# Patient Record
Sex: FEMALE | Race: WHITE | NOT HISPANIC OR LATINO | URBAN - METROPOLITAN AREA
[De-identification: names, ages, dates, MRNs, and addresses within clinical notes are randomized per-mention and may not be internally consistent; named-entity substitution may affect disease eponyms.]

---

## 2017-04-21 PROBLEM — Z00.00 ENCOUNTER FOR PREVENTIVE HEALTH EXAMINATION: Status: ACTIVE | Noted: 2017-04-21

## 2017-06-07 ENCOUNTER — OUTPATIENT (OUTPATIENT)
Dept: OUTPATIENT SERVICES | Facility: HOSPITAL | Age: 82
LOS: 1 days | Discharge: HOME | End: 2017-06-07

## 2017-06-07 DIAGNOSIS — R00.2 PALPITATIONS: ICD-10-CM

## 2017-06-07 DIAGNOSIS — I47.2 VENTRICULAR TACHYCARDIA: ICD-10-CM

## 2017-06-28 DIAGNOSIS — I48.91 UNSPECIFIED ATRIAL FIBRILLATION: ICD-10-CM

## 2017-06-28 DIAGNOSIS — Z79.01 LONG TERM (CURRENT) USE OF ANTICOAGULANTS: ICD-10-CM

## 2017-09-20 ENCOUNTER — OUTPATIENT (OUTPATIENT)
Dept: OUTPATIENT SERVICES | Facility: HOSPITAL | Age: 82
LOS: 1 days | Discharge: HOME | End: 2017-09-20

## 2017-09-20 DIAGNOSIS — R00.2 PALPITATIONS: ICD-10-CM

## 2017-09-20 DIAGNOSIS — I48.91 UNSPECIFIED ATRIAL FIBRILLATION: ICD-10-CM

## 2017-09-20 DIAGNOSIS — Z79.01 LONG TERM (CURRENT) USE OF ANTICOAGULANTS: ICD-10-CM

## 2017-09-20 DIAGNOSIS — I47.2 VENTRICULAR TACHYCARDIA: ICD-10-CM

## 2017-09-27 ENCOUNTER — OUTPATIENT (OUTPATIENT)
Dept: OUTPATIENT SERVICES | Facility: HOSPITAL | Age: 82
LOS: 1 days | Discharge: HOME | End: 2017-09-27

## 2017-09-27 DIAGNOSIS — Z79.01 LONG TERM (CURRENT) USE OF ANTICOAGULANTS: ICD-10-CM

## 2017-09-27 DIAGNOSIS — R00.2 PALPITATIONS: ICD-10-CM

## 2017-09-27 DIAGNOSIS — I47.2 VENTRICULAR TACHYCARDIA: ICD-10-CM

## 2017-09-27 DIAGNOSIS — I48.91 UNSPECIFIED ATRIAL FIBRILLATION: ICD-10-CM

## 2017-10-04 ENCOUNTER — OUTPATIENT (OUTPATIENT)
Dept: OUTPATIENT SERVICES | Facility: HOSPITAL | Age: 82
LOS: 1 days | Discharge: HOME | End: 2017-10-04

## 2017-10-04 DIAGNOSIS — I48.91 UNSPECIFIED ATRIAL FIBRILLATION: ICD-10-CM

## 2017-10-04 DIAGNOSIS — R00.2 PALPITATIONS: ICD-10-CM

## 2017-10-04 DIAGNOSIS — Z79.01 LONG TERM (CURRENT) USE OF ANTICOAGULANTS: ICD-10-CM

## 2017-10-04 DIAGNOSIS — I47.2 VENTRICULAR TACHYCARDIA: ICD-10-CM

## 2017-10-11 ENCOUNTER — OUTPATIENT (OUTPATIENT)
Dept: OUTPATIENT SERVICES | Facility: HOSPITAL | Age: 82
LOS: 1 days | Discharge: HOME | End: 2017-10-11

## 2017-10-11 DIAGNOSIS — R73.03 PREDIABETES: ICD-10-CM

## 2017-10-11 DIAGNOSIS — I10 ESSENTIAL (PRIMARY) HYPERTENSION: ICD-10-CM

## 2017-10-11 DIAGNOSIS — Z00.00 ENCOUNTER FOR GENERAL ADULT MEDICAL EXAMINATION WITHOUT ABNORMAL FINDINGS: ICD-10-CM

## 2017-10-11 DIAGNOSIS — I47.2 VENTRICULAR TACHYCARDIA: ICD-10-CM

## 2017-10-11 DIAGNOSIS — R00.2 PALPITATIONS: ICD-10-CM

## 2017-10-11 DIAGNOSIS — I27.20 PULMONARY HYPERTENSION, UNSPECIFIED: ICD-10-CM

## 2017-10-11 DIAGNOSIS — E55.9 VITAMIN D DEFICIENCY, UNSPECIFIED: ICD-10-CM

## 2017-10-11 DIAGNOSIS — D64.9 ANEMIA, UNSPECIFIED: ICD-10-CM

## 2017-10-18 ENCOUNTER — OUTPATIENT (OUTPATIENT)
Dept: OUTPATIENT SERVICES | Facility: HOSPITAL | Age: 82
LOS: 1 days | Discharge: HOME | End: 2017-10-18

## 2017-10-18 DIAGNOSIS — R00.2 PALPITATIONS: ICD-10-CM

## 2017-10-18 DIAGNOSIS — I48.91 UNSPECIFIED ATRIAL FIBRILLATION: ICD-10-CM

## 2017-10-18 DIAGNOSIS — Z79.01 LONG TERM (CURRENT) USE OF ANTICOAGULANTS: ICD-10-CM

## 2017-10-18 DIAGNOSIS — I47.2 VENTRICULAR TACHYCARDIA: ICD-10-CM

## 2017-10-19 ENCOUNTER — OUTPATIENT (OUTPATIENT)
Dept: OUTPATIENT SERVICES | Facility: HOSPITAL | Age: 82
LOS: 1 days | Discharge: HOME | End: 2017-10-19

## 2017-10-19 DIAGNOSIS — I47.2 VENTRICULAR TACHYCARDIA: ICD-10-CM

## 2017-10-19 DIAGNOSIS — R00.2 PALPITATIONS: ICD-10-CM

## 2017-10-21 ENCOUNTER — OUTPATIENT (OUTPATIENT)
Dept: OUTPATIENT SERVICES | Facility: HOSPITAL | Age: 82
LOS: 1 days | Discharge: HOME | End: 2017-10-21

## 2017-10-21 DIAGNOSIS — R00.2 PALPITATIONS: ICD-10-CM

## 2017-10-21 DIAGNOSIS — I47.2 VENTRICULAR TACHYCARDIA: ICD-10-CM

## 2017-10-23 DIAGNOSIS — H52.03 HYPERMETROPIA, BILATERAL: ICD-10-CM

## 2017-10-23 DIAGNOSIS — H26.9 UNSPECIFIED CATARACT: ICD-10-CM

## 2017-10-23 DIAGNOSIS — H35.373 PUCKERING OF MACULA, BILATERAL: ICD-10-CM

## 2017-10-23 DIAGNOSIS — H35.362 DRUSEN (DEGENERATIVE) OF MACULA, LEFT EYE: ICD-10-CM

## 2017-11-15 ENCOUNTER — OUTPATIENT (OUTPATIENT)
Dept: OUTPATIENT SERVICES | Facility: HOSPITAL | Age: 82
LOS: 1 days | Discharge: HOME | End: 2017-11-15

## 2017-11-15 DIAGNOSIS — R00.2 PALPITATIONS: ICD-10-CM

## 2017-11-15 DIAGNOSIS — Z79.01 LONG TERM (CURRENT) USE OF ANTICOAGULANTS: ICD-10-CM

## 2017-11-15 DIAGNOSIS — I48.91 UNSPECIFIED ATRIAL FIBRILLATION: ICD-10-CM

## 2017-11-15 DIAGNOSIS — I47.2 VENTRICULAR TACHYCARDIA: ICD-10-CM

## 2017-12-18 ENCOUNTER — EMERGENCY (EMERGENCY)
Facility: HOSPITAL | Age: 82
LOS: 0 days | Discharge: HOME | End: 2017-12-18

## 2017-12-18 DIAGNOSIS — I10 ESSENTIAL (PRIMARY) HYPERTENSION: ICD-10-CM

## 2017-12-18 DIAGNOSIS — I47.2 VENTRICULAR TACHYCARDIA: ICD-10-CM

## 2017-12-18 DIAGNOSIS — R00.2 PALPITATIONS: ICD-10-CM
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
## 2017-12-18 DIAGNOSIS — Z79.82 LONG TERM (CURRENT) USE OF ASPIRIN: ICD-10-CM
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
## 2017-12-18 DIAGNOSIS — M25.561 PAIN IN RIGHT KNEE: ICD-10-CM

## 2017-12-18 DIAGNOSIS — E78.00 PURE HYPERCHOLESTEROLEMIA, UNSPECIFIED: ICD-10-CM

## 2017-12-18 DIAGNOSIS — Z79.01 LONG TERM (CURRENT) USE OF ANTICOAGULANTS: ICD-10-CM

## 2017-12-18 DIAGNOSIS — Z95.5 PRESENCE OF CORONARY ANGIOPLASTY IMPLANT AND GRAFT: ICD-10-CM

## 2017-12-18 DIAGNOSIS — S00.83XA CONTUSION OF OTHER PART OF HEAD, INITIAL ENCOUNTER: ICD-10-CM
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
## 2017-12-18 DIAGNOSIS — R51 HEADACHE: ICD-10-CM

## 2017-12-20 ENCOUNTER — OUTPATIENT (OUTPATIENT)
Dept: OUTPATIENT SERVICES | Facility: HOSPITAL | Age: 82
LOS: 1 days | Discharge: HOME | End: 2017-12-20

## 2017-12-20 DIAGNOSIS — Z79.01 LONG TERM (CURRENT) USE OF ANTICOAGULANTS: ICD-10-CM

## 2017-12-20 DIAGNOSIS — I47.2 VENTRICULAR TACHYCARDIA: ICD-10-CM

## 2017-12-20 DIAGNOSIS — I48.91 UNSPECIFIED ATRIAL FIBRILLATION: ICD-10-CM

## 2017-12-20 DIAGNOSIS — R00.2 PALPITATIONS: ICD-10-CM

## 2017-12-27 ENCOUNTER — OUTPATIENT (OUTPATIENT)
Dept: OUTPATIENT SERVICES | Facility: HOSPITAL | Age: 82
LOS: 1 days | Discharge: HOME | End: 2017-12-27

## 2017-12-27 DIAGNOSIS — R00.2 PALPITATIONS: ICD-10-CM

## 2017-12-27 DIAGNOSIS — Z79.01 LONG TERM (CURRENT) USE OF ANTICOAGULANTS: ICD-10-CM

## 2017-12-27 DIAGNOSIS — I48.91 UNSPECIFIED ATRIAL FIBRILLATION: ICD-10-CM

## 2017-12-27 DIAGNOSIS — I47.2 VENTRICULAR TACHYCARDIA: ICD-10-CM

## 2018-01-10 ENCOUNTER — OUTPATIENT (OUTPATIENT)
Dept: OUTPATIENT SERVICES | Facility: HOSPITAL | Age: 83
LOS: 1 days | Discharge: HOME | End: 2018-01-10

## 2018-01-10 DIAGNOSIS — Z79.01 LONG TERM (CURRENT) USE OF ANTICOAGULANTS: ICD-10-CM

## 2018-01-10 DIAGNOSIS — R00.2 PALPITATIONS: ICD-10-CM

## 2018-01-10 DIAGNOSIS — I48.91 UNSPECIFIED ATRIAL FIBRILLATION: ICD-10-CM

## 2018-01-10 DIAGNOSIS — I47.2 VENTRICULAR TACHYCARDIA: ICD-10-CM

## 2018-01-31 ENCOUNTER — OUTPATIENT (OUTPATIENT)
Dept: OUTPATIENT SERVICES | Facility: HOSPITAL | Age: 83
LOS: 1 days | Discharge: HOME | End: 2018-01-31

## 2018-01-31 DIAGNOSIS — I48.91 UNSPECIFIED ATRIAL FIBRILLATION: ICD-10-CM

## 2018-01-31 DIAGNOSIS — Z79.01 LONG TERM (CURRENT) USE OF ANTICOAGULANTS: ICD-10-CM

## 2018-02-04 DIAGNOSIS — I47.2 VENTRICULAR TACHYCARDIA: ICD-10-CM

## 2018-02-04 DIAGNOSIS — R00.2 PALPITATIONS: ICD-10-CM

## 2018-02-14 ENCOUNTER — OUTPATIENT (OUTPATIENT)
Dept: OUTPATIENT SERVICES | Facility: HOSPITAL | Age: 83
LOS: 1 days | Discharge: HOME | End: 2018-02-14

## 2018-02-14 DIAGNOSIS — I48.91 UNSPECIFIED ATRIAL FIBRILLATION: ICD-10-CM

## 2018-02-14 DIAGNOSIS — Z79.01 LONG TERM (CURRENT) USE OF ANTICOAGULANTS: ICD-10-CM

## 2018-03-28 ENCOUNTER — OUTPATIENT (OUTPATIENT)
Dept: OUTPATIENT SERVICES | Facility: HOSPITAL | Age: 83
LOS: 1 days | Discharge: HOME | End: 2018-03-28

## 2018-03-28 DIAGNOSIS — I48.91 UNSPECIFIED ATRIAL FIBRILLATION: ICD-10-CM

## 2018-03-28 DIAGNOSIS — Z79.01 LONG TERM (CURRENT) USE OF ANTICOAGULANTS: ICD-10-CM

## 2018-04-05 ENCOUNTER — OUTPATIENT (OUTPATIENT)
Dept: OUTPATIENT SERVICES | Facility: HOSPITAL | Age: 83
LOS: 1 days | Discharge: HOME | End: 2018-04-05

## 2018-04-06 DIAGNOSIS — H52.4 PRESBYOPIA: ICD-10-CM

## 2018-04-06 DIAGNOSIS — H52.03 HYPERMETROPIA, BILATERAL: ICD-10-CM

## 2018-04-06 DIAGNOSIS — H35.3190 NONEXUDATIVE AGE-RELATED MACULAR DEGENERATION, UNSPECIFIED EYE, STAGE UNSPECIFIED: ICD-10-CM

## 2018-04-06 DIAGNOSIS — H35.373 PUCKERING OF MACULA, BILATERAL: ICD-10-CM

## 2018-04-06 DIAGNOSIS — H35.362 DRUSEN (DEGENERATIVE) OF MACULA, LEFT EYE: ICD-10-CM

## 2018-04-11 ENCOUNTER — OUTPATIENT (OUTPATIENT)
Dept: OUTPATIENT SERVICES | Facility: HOSPITAL | Age: 83
LOS: 1 days | Discharge: HOME | End: 2018-04-11

## 2018-04-11 DIAGNOSIS — I48.91 UNSPECIFIED ATRIAL FIBRILLATION: ICD-10-CM

## 2018-04-11 DIAGNOSIS — Z79.01 LONG TERM (CURRENT) USE OF ANTICOAGULANTS: ICD-10-CM

## 2018-05-03 ENCOUNTER — OUTPATIENT (OUTPATIENT)
Dept: OUTPATIENT SERVICES | Facility: HOSPITAL | Age: 83
LOS: 1 days | Discharge: HOME | End: 2018-05-03

## 2018-05-03 DIAGNOSIS — I24.1 DRESSLER'S SYNDROME: ICD-10-CM

## 2018-05-03 DIAGNOSIS — D64.9 ANEMIA, UNSPECIFIED: ICD-10-CM

## 2018-05-03 DIAGNOSIS — R60.9 EDEMA, UNSPECIFIED: ICD-10-CM

## 2018-05-03 DIAGNOSIS — I95.9 HYPOTENSION, UNSPECIFIED: ICD-10-CM

## 2018-05-03 DIAGNOSIS — R73.03 PREDIABETES: ICD-10-CM

## 2018-05-03 DIAGNOSIS — R06.02 SHORTNESS OF BREATH: ICD-10-CM

## 2018-05-03 DIAGNOSIS — Z00.00 ENCOUNTER FOR GENERAL ADULT MEDICAL EXAMINATION WITHOUT ABNORMAL FINDINGS: ICD-10-CM

## 2018-05-03 DIAGNOSIS — I27.20 PULMONARY HYPERTENSION, UNSPECIFIED: ICD-10-CM

## 2018-05-03 DIAGNOSIS — F41.9 ANXIETY DISORDER, UNSPECIFIED: ICD-10-CM

## 2018-05-09 ENCOUNTER — OUTPATIENT (OUTPATIENT)
Dept: OUTPATIENT SERVICES | Facility: HOSPITAL | Age: 83
LOS: 1 days | Discharge: HOME | End: 2018-05-09

## 2018-05-09 DIAGNOSIS — I48.91 UNSPECIFIED ATRIAL FIBRILLATION: ICD-10-CM

## 2018-05-09 DIAGNOSIS — N39.0 URINARY TRACT INFECTION, SITE NOT SPECIFIED: ICD-10-CM

## 2018-05-09 DIAGNOSIS — Z79.01 LONG TERM (CURRENT) USE OF ANTICOAGULANTS: ICD-10-CM

## 2018-05-11 DIAGNOSIS — N39.0 URINARY TRACT INFECTION, SITE NOT SPECIFIED: ICD-10-CM

## 2018-05-11 DIAGNOSIS — Z02.9 ENCOUNTER FOR ADMINISTRATIVE EXAMINATIONS, UNSPECIFIED: ICD-10-CM

## 2018-06-06 ENCOUNTER — OUTPATIENT (OUTPATIENT)
Dept: OUTPATIENT SERVICES | Facility: HOSPITAL | Age: 83
LOS: 1 days | Discharge: HOME | End: 2018-06-06

## 2018-06-06 DIAGNOSIS — I48.91 UNSPECIFIED ATRIAL FIBRILLATION: ICD-10-CM

## 2018-06-06 DIAGNOSIS — Z79.01 LONG TERM (CURRENT) USE OF ANTICOAGULANTS: ICD-10-CM

## 2018-06-07 ENCOUNTER — OUTPATIENT (OUTPATIENT)
Dept: OUTPATIENT SERVICES | Facility: HOSPITAL | Age: 83
LOS: 1 days | Discharge: HOME | End: 2018-06-07

## 2018-06-07 DIAGNOSIS — R35.0 FREQUENCY OF MICTURITION: ICD-10-CM

## 2018-06-07 DIAGNOSIS — N39.0 URINARY TRACT INFECTION, SITE NOT SPECIFIED: ICD-10-CM

## 2018-07-11 ENCOUNTER — OUTPATIENT (OUTPATIENT)
Dept: OUTPATIENT SERVICES | Facility: HOSPITAL | Age: 83
LOS: 1 days | Discharge: HOME | End: 2018-07-11

## 2018-07-11 DIAGNOSIS — I48.91 UNSPECIFIED ATRIAL FIBRILLATION: ICD-10-CM

## 2018-07-11 DIAGNOSIS — Z79.01 LONG TERM (CURRENT) USE OF ANTICOAGULANTS: ICD-10-CM

## 2018-07-12 ENCOUNTER — OUTPATIENT (OUTPATIENT)
Dept: OUTPATIENT SERVICES | Facility: HOSPITAL | Age: 83
LOS: 1 days | Discharge: HOME | End: 2018-07-12

## 2018-07-13 DIAGNOSIS — H26.9 UNSPECIFIED CATARACT: ICD-10-CM

## 2018-07-13 DIAGNOSIS — H52.223 REGULAR ASTIGMATISM, BILATERAL: ICD-10-CM

## 2018-07-13 DIAGNOSIS — H35.362 DRUSEN (DEGENERATIVE) OF MACULA, LEFT EYE: ICD-10-CM

## 2018-07-13 DIAGNOSIS — H35.373 PUCKERING OF MACULA, BILATERAL: ICD-10-CM

## 2018-07-13 DIAGNOSIS — H35.3190 NONEXUDATIVE AGE-RELATED MACULAR DEGENERATION, UNSPECIFIED EYE, STAGE UNSPECIFIED: ICD-10-CM

## 2018-07-13 DIAGNOSIS — H52.03 HYPERMETROPIA, BILATERAL: ICD-10-CM

## 2018-07-13 DIAGNOSIS — H52.4 PRESBYOPIA: ICD-10-CM

## 2018-07-18 ENCOUNTER — OUTPATIENT (OUTPATIENT)
Dept: OUTPATIENT SERVICES | Facility: HOSPITAL | Age: 83
LOS: 1 days | Discharge: HOME | End: 2018-07-18

## 2018-07-18 DIAGNOSIS — D64.9 ANEMIA, UNSPECIFIED: ICD-10-CM

## 2018-07-18 DIAGNOSIS — I48.91 UNSPECIFIED ATRIAL FIBRILLATION: ICD-10-CM

## 2018-07-18 DIAGNOSIS — Z79.01 LONG TERM (CURRENT) USE OF ANTICOAGULANTS: ICD-10-CM

## 2018-07-18 DIAGNOSIS — R42 DIZZINESS AND GIDDINESS: ICD-10-CM

## 2018-07-18 DIAGNOSIS — R27.0 ATAXIA, UNSPECIFIED: ICD-10-CM

## 2018-07-24 DIAGNOSIS — D64.9 ANEMIA, UNSPECIFIED: ICD-10-CM

## 2018-07-24 DIAGNOSIS — R42 DIZZINESS AND GIDDINESS: ICD-10-CM

## 2018-07-24 DIAGNOSIS — Z02.9 ENCOUNTER FOR ADMINISTRATIVE EXAMINATIONS, UNSPECIFIED: ICD-10-CM

## 2018-07-24 DIAGNOSIS — R27.0 ATAXIA, UNSPECIFIED: ICD-10-CM

## 2018-08-15 ENCOUNTER — OUTPATIENT (OUTPATIENT)
Dept: OUTPATIENT SERVICES | Facility: HOSPITAL | Age: 83
LOS: 1 days | Discharge: HOME | End: 2018-08-15

## 2018-08-15 DIAGNOSIS — Z79.01 LONG TERM (CURRENT) USE OF ANTICOAGULANTS: ICD-10-CM

## 2018-08-15 DIAGNOSIS — I48.91 UNSPECIFIED ATRIAL FIBRILLATION: ICD-10-CM

## 2018-09-12 ENCOUNTER — OUTPATIENT (OUTPATIENT)
Dept: OUTPATIENT SERVICES | Facility: HOSPITAL | Age: 83
LOS: 1 days | Discharge: HOME | End: 2018-09-12

## 2018-09-12 DIAGNOSIS — I48.91 UNSPECIFIED ATRIAL FIBRILLATION: ICD-10-CM

## 2018-09-12 DIAGNOSIS — Z79.01 LONG TERM (CURRENT) USE OF ANTICOAGULANTS: ICD-10-CM

## 2018-09-12 LAB
POCT INR: 3.3 RATIO — HIGH (ref 0.9–1.2)
POCT PT: 39.9 SEC — HIGH (ref 10–13.4)

## 2018-10-02 ENCOUNTER — OUTPATIENT (OUTPATIENT)
Dept: OUTPATIENT SERVICES | Facility: HOSPITAL | Age: 83
LOS: 1 days | Discharge: HOME | End: 2018-10-02

## 2018-10-03 DIAGNOSIS — M89.9 DISORDER OF BONE, UNSPECIFIED: ICD-10-CM

## 2018-10-03 DIAGNOSIS — Z78.0 ASYMPTOMATIC MENOPAUSAL STATE: ICD-10-CM

## 2018-10-03 DIAGNOSIS — Z09 ENCOUNTER FOR FOLLOW-UP EXAMINATION AFTER COMPLETED TREATMENT FOR CONDITIONS OTHER THAN MALIGNANT NEOPLASM: ICD-10-CM

## 2018-10-10 ENCOUNTER — OUTPATIENT (OUTPATIENT)
Dept: OUTPATIENT SERVICES | Facility: HOSPITAL | Age: 83
LOS: 1 days | Discharge: HOME | End: 2018-10-10

## 2018-10-10 DIAGNOSIS — I48.91 UNSPECIFIED ATRIAL FIBRILLATION: ICD-10-CM

## 2018-10-10 DIAGNOSIS — Z79.01 LONG TERM (CURRENT) USE OF ANTICOAGULANTS: ICD-10-CM

## 2018-10-10 LAB
POCT INR: 5.5 RATIO — CRITICAL HIGH (ref 0.9–1.2)
POCT PT: 65.9 SEC — HIGH (ref 10–13.4)

## 2018-10-17 ENCOUNTER — OUTPATIENT (OUTPATIENT)
Dept: OUTPATIENT SERVICES | Facility: HOSPITAL | Age: 83
LOS: 1 days | Discharge: HOME | End: 2018-10-17

## 2018-10-17 DIAGNOSIS — I48.91 UNSPECIFIED ATRIAL FIBRILLATION: ICD-10-CM

## 2018-10-17 DIAGNOSIS — Z79.01 LONG TERM (CURRENT) USE OF ANTICOAGULANTS: ICD-10-CM

## 2018-10-17 LAB
POCT INR: 5.1 RATIO — CRITICAL HIGH (ref 0.9–1.2)
POCT PT: 60.7 SEC — HIGH (ref 10–13.4)

## 2018-10-24 ENCOUNTER — OUTPATIENT (OUTPATIENT)
Dept: OUTPATIENT SERVICES | Facility: HOSPITAL | Age: 83
LOS: 1 days | Discharge: HOME | End: 2018-10-24

## 2018-10-24 DIAGNOSIS — Z79.01 LONG TERM (CURRENT) USE OF ANTICOAGULANTS: ICD-10-CM

## 2018-10-24 DIAGNOSIS — I48.91 UNSPECIFIED ATRIAL FIBRILLATION: ICD-10-CM

## 2018-10-24 LAB
POCT INR: 1.7 RATIO — HIGH (ref 0.9–1.2)
POCT PT: 20.4 SEC — HIGH (ref 10–13.4)

## 2018-10-25 ENCOUNTER — OUTPATIENT (OUTPATIENT)
Dept: OUTPATIENT SERVICES | Facility: HOSPITAL | Age: 83
LOS: 1 days | Discharge: HOME | End: 2018-10-25

## 2018-10-31 ENCOUNTER — OUTPATIENT (OUTPATIENT)
Dept: OUTPATIENT SERVICES | Facility: HOSPITAL | Age: 83
LOS: 1 days | Discharge: HOME | End: 2018-10-31

## 2018-10-31 DIAGNOSIS — I48.91 UNSPECIFIED ATRIAL FIBRILLATION: ICD-10-CM

## 2018-10-31 DIAGNOSIS — Z79.01 LONG TERM (CURRENT) USE OF ANTICOAGULANTS: ICD-10-CM

## 2018-10-31 LAB
POCT INR: 3.5 RATIO — HIGH (ref 0.9–1.2)
POCT PT: 41.4 SEC — HIGH (ref 10–13.4)

## 2018-11-02 DIAGNOSIS — H35.373 PUCKERING OF MACULA, BILATERAL: ICD-10-CM

## 2018-11-02 DIAGNOSIS — H52.223 REGULAR ASTIGMATISM, BILATERAL: ICD-10-CM

## 2018-11-02 DIAGNOSIS — H35.362 DRUSEN (DEGENERATIVE) OF MACULA, LEFT EYE: ICD-10-CM

## 2018-11-02 DIAGNOSIS — H52.03 HYPERMETROPIA, BILATERAL: ICD-10-CM

## 2018-11-02 DIAGNOSIS — H35.3190 NONEXUDATIVE AGE-RELATED MACULAR DEGENERATION, UNSPECIFIED EYE, STAGE UNSPECIFIED: ICD-10-CM

## 2018-11-07 ENCOUNTER — OUTPATIENT (OUTPATIENT)
Dept: OUTPATIENT SERVICES | Facility: HOSPITAL | Age: 83
LOS: 1 days | Discharge: HOME | End: 2018-11-07

## 2018-11-07 DIAGNOSIS — Z79.01 LONG TERM (CURRENT) USE OF ANTICOAGULANTS: ICD-10-CM

## 2018-11-07 DIAGNOSIS — I48.91 UNSPECIFIED ATRIAL FIBRILLATION: ICD-10-CM

## 2018-11-07 LAB
POCT INR: 4.4 RATIO — HIGH (ref 0.9–1.2)
POCT PT: 53 SEC — HIGH (ref 10–13.4)

## 2018-11-14 ENCOUNTER — OUTPATIENT (OUTPATIENT)
Dept: OUTPATIENT SERVICES | Facility: HOSPITAL | Age: 83
LOS: 1 days | Discharge: HOME | End: 2018-11-14

## 2018-11-14 DIAGNOSIS — Z79.01 LONG TERM (CURRENT) USE OF ANTICOAGULANTS: ICD-10-CM

## 2018-11-14 DIAGNOSIS — I48.91 UNSPECIFIED ATRIAL FIBRILLATION: ICD-10-CM

## 2018-11-14 LAB
POCT INR: 2.6 RATIO — HIGH (ref 0.9–1.2)
POCT PT: 30.8 SEC — HIGH (ref 10–13.4)

## 2018-11-28 ENCOUNTER — OUTPATIENT (OUTPATIENT)
Dept: OUTPATIENT SERVICES | Facility: HOSPITAL | Age: 83
LOS: 1 days | Discharge: HOME | End: 2018-11-28

## 2018-11-28 DIAGNOSIS — I48.91 UNSPECIFIED ATRIAL FIBRILLATION: ICD-10-CM

## 2018-11-28 DIAGNOSIS — Z79.01 LONG TERM (CURRENT) USE OF ANTICOAGULANTS: ICD-10-CM

## 2018-11-28 LAB
POCT INR: 1.9 RATIO — HIGH (ref 0.9–1.2)
POCT PT: 22.4 SEC — HIGH (ref 10–13.4)

## 2018-12-05 ENCOUNTER — OUTPATIENT (OUTPATIENT)
Dept: OUTPATIENT SERVICES | Facility: HOSPITAL | Age: 83
LOS: 1 days | Discharge: HOME | End: 2018-12-05

## 2018-12-05 DIAGNOSIS — I48.91 UNSPECIFIED ATRIAL FIBRILLATION: ICD-10-CM

## 2018-12-05 DIAGNOSIS — Z79.01 LONG TERM (CURRENT) USE OF ANTICOAGULANTS: ICD-10-CM

## 2018-12-12 ENCOUNTER — OUTPATIENT (OUTPATIENT)
Dept: OUTPATIENT SERVICES | Facility: HOSPITAL | Age: 83
LOS: 1 days | Discharge: HOME | End: 2018-12-12

## 2018-12-12 DIAGNOSIS — Z79.01 LONG TERM (CURRENT) USE OF ANTICOAGULANTS: ICD-10-CM

## 2018-12-12 DIAGNOSIS — I48.91 UNSPECIFIED ATRIAL FIBRILLATION: ICD-10-CM

## 2018-12-12 LAB
POCT INR: 3.3 RATIO — HIGH (ref 0.9–1.2)
POCT PT: 39.8 SEC — HIGH (ref 10–13.4)

## 2018-12-19 ENCOUNTER — OUTPATIENT (OUTPATIENT)
Dept: OUTPATIENT SERVICES | Facility: HOSPITAL | Age: 83
LOS: 1 days | Discharge: HOME | End: 2018-12-19

## 2018-12-19 DIAGNOSIS — I48.91 UNSPECIFIED ATRIAL FIBRILLATION: ICD-10-CM

## 2018-12-19 DIAGNOSIS — Z79.01 LONG TERM (CURRENT) USE OF ANTICOAGULANTS: ICD-10-CM

## 2019-01-02 ENCOUNTER — OUTPATIENT (OUTPATIENT)
Dept: OUTPATIENT SERVICES | Facility: HOSPITAL | Age: 84
LOS: 1 days | Discharge: HOME | End: 2019-01-02

## 2019-01-02 DIAGNOSIS — I48.91 UNSPECIFIED ATRIAL FIBRILLATION: ICD-10-CM

## 2019-01-02 DIAGNOSIS — Z79.01 LONG TERM (CURRENT) USE OF ANTICOAGULANTS: ICD-10-CM

## 2019-01-16 ENCOUNTER — OUTPATIENT (OUTPATIENT)
Dept: OUTPATIENT SERVICES | Facility: HOSPITAL | Age: 84
LOS: 1 days | Discharge: HOME | End: 2019-01-16

## 2019-01-16 DIAGNOSIS — Z79.01 LONG TERM (CURRENT) USE OF ANTICOAGULANTS: ICD-10-CM

## 2019-01-16 DIAGNOSIS — I48.91 UNSPECIFIED ATRIAL FIBRILLATION: ICD-10-CM

## 2019-01-16 LAB
POCT INR: 2.6 RATIO — HIGH (ref 0.9–1.2)
POCT PT: 30.6 SEC — HIGH (ref 10–13.4)

## 2019-02-06 ENCOUNTER — OUTPATIENT (OUTPATIENT)
Dept: OUTPATIENT SERVICES | Facility: HOSPITAL | Age: 84
LOS: 1 days | Discharge: HOME | End: 2019-02-06

## 2019-02-06 DIAGNOSIS — I48.91 UNSPECIFIED ATRIAL FIBRILLATION: ICD-10-CM

## 2019-02-06 DIAGNOSIS — Z79.01 LONG TERM (CURRENT) USE OF ANTICOAGULANTS: ICD-10-CM

## 2019-02-06 LAB
POCT INR: 2.3 RATIO — HIGH (ref 0.9–1.2)
POCT PT: 28 SEC — HIGH (ref 10–13.4)

## 2019-02-27 ENCOUNTER — OUTPATIENT (OUTPATIENT)
Dept: OUTPATIENT SERVICES | Facility: HOSPITAL | Age: 84
LOS: 1 days | Discharge: HOME | End: 2019-02-27

## 2019-02-27 DIAGNOSIS — Z79.01 LONG TERM (CURRENT) USE OF ANTICOAGULANTS: ICD-10-CM

## 2019-02-27 DIAGNOSIS — I48.91 UNSPECIFIED ATRIAL FIBRILLATION: ICD-10-CM

## 2019-02-27 LAB
POCT INR: 4.1 RATIO — HIGH (ref 0.9–1.2)
POCT PT: 40.4 SEC — HIGH (ref 10–13.4)

## 2019-03-06 ENCOUNTER — OUTPATIENT (OUTPATIENT)
Dept: OUTPATIENT SERVICES | Facility: HOSPITAL | Age: 84
LOS: 1 days | Discharge: HOME | End: 2019-03-06

## 2019-03-06 DIAGNOSIS — I48.91 UNSPECIFIED ATRIAL FIBRILLATION: ICD-10-CM

## 2019-03-06 DIAGNOSIS — Z79.01 LONG TERM (CURRENT) USE OF ANTICOAGULANTS: ICD-10-CM

## 2019-03-06 LAB
POCT INR: 3.9 RATIO — HIGH (ref 0.9–1.2)
POCT PT: 47 SEC — HIGH (ref 10–13.4)

## 2019-03-20 ENCOUNTER — OUTPATIENT (OUTPATIENT)
Dept: OUTPATIENT SERVICES | Facility: HOSPITAL | Age: 84
LOS: 1 days | Discharge: HOME | End: 2019-03-20

## 2019-03-20 DIAGNOSIS — Z79.01 LONG TERM (CURRENT) USE OF ANTICOAGULANTS: ICD-10-CM

## 2019-03-20 DIAGNOSIS — I48.91 UNSPECIFIED ATRIAL FIBRILLATION: ICD-10-CM

## 2019-03-20 LAB
POCT INR: 2.5 RATIO — HIGH (ref 0.9–1.2)
POCT PT: 29.5 SEC — HIGH (ref 10–13.4)

## 2019-04-03 ENCOUNTER — OUTPATIENT (OUTPATIENT)
Dept: OUTPATIENT SERVICES | Facility: HOSPITAL | Age: 84
LOS: 1 days | Discharge: HOME | End: 2019-04-03

## 2019-04-03 DIAGNOSIS — I48.91 UNSPECIFIED ATRIAL FIBRILLATION: ICD-10-CM

## 2019-04-03 DIAGNOSIS — Z79.01 LONG TERM (CURRENT) USE OF ANTICOAGULANTS: ICD-10-CM

## 2019-04-04 ENCOUNTER — OUTPATIENT (OUTPATIENT)
Dept: OUTPATIENT SERVICES | Facility: HOSPITAL | Age: 84
LOS: 1 days | Discharge: HOME | End: 2019-04-04

## 2019-04-08 DIAGNOSIS — H35.362 DRUSEN (DEGENERATIVE) OF MACULA, LEFT EYE: ICD-10-CM

## 2019-04-08 DIAGNOSIS — H26.9 UNSPECIFIED CATARACT: ICD-10-CM

## 2019-04-08 DIAGNOSIS — H52.4 PRESBYOPIA: ICD-10-CM

## 2019-04-08 DIAGNOSIS — H35.373 PUCKERING OF MACULA, BILATERAL: ICD-10-CM

## 2019-04-08 DIAGNOSIS — H52.223 REGULAR ASTIGMATISM, BILATERAL: ICD-10-CM

## 2019-04-08 DIAGNOSIS — H35.3190 NONEXUDATIVE AGE-RELATED MACULAR DEGENERATION, UNSPECIFIED EYE, STAGE UNSPECIFIED: ICD-10-CM

## 2019-04-27 ENCOUNTER — OUTPATIENT (OUTPATIENT)
Dept: OUTPATIENT SERVICES | Facility: HOSPITAL | Age: 84
LOS: 1 days | Discharge: HOME | End: 2019-04-27

## 2019-04-27 DIAGNOSIS — I25.10 ATHEROSCLEROTIC HEART DISEASE OF NATIVE CORONARY ARTERY WITHOUT ANGINA PECTORIS: ICD-10-CM

## 2019-04-27 DIAGNOSIS — I34.0 NONRHEUMATIC MITRAL (VALVE) INSUFFICIENCY: ICD-10-CM

## 2019-04-27 DIAGNOSIS — R73.03 PREDIABETES: ICD-10-CM

## 2019-05-01 ENCOUNTER — OUTPATIENT (OUTPATIENT)
Dept: OUTPATIENT SERVICES | Facility: HOSPITAL | Age: 84
LOS: 1 days | Discharge: HOME | End: 2019-05-01

## 2019-05-01 DIAGNOSIS — I48.91 UNSPECIFIED ATRIAL FIBRILLATION: ICD-10-CM

## 2019-05-01 DIAGNOSIS — Z79.01 LONG TERM (CURRENT) USE OF ANTICOAGULANTS: ICD-10-CM

## 2019-05-01 LAB
POCT INR: 4.2 RATIO — HIGH (ref 0.9–1.2)
POCT PT: 50.2 SEC — HIGH (ref 10–13.4)

## 2019-05-15 ENCOUNTER — OUTPATIENT (OUTPATIENT)
Dept: OUTPATIENT SERVICES | Facility: HOSPITAL | Age: 84
LOS: 1 days | Discharge: HOME | End: 2019-05-15

## 2019-05-15 DIAGNOSIS — I48.91 UNSPECIFIED ATRIAL FIBRILLATION: ICD-10-CM

## 2019-05-15 DIAGNOSIS — Z79.01 LONG TERM (CURRENT) USE OF ANTICOAGULANTS: ICD-10-CM

## 2019-05-15 LAB
POCT INR: 2.7 RATIO — HIGH (ref 0.9–1.2)
POCT PT: 32.8 SEC — HIGH (ref 10–13.4)

## 2019-06-12 ENCOUNTER — OUTPATIENT (OUTPATIENT)
Dept: OUTPATIENT SERVICES | Facility: HOSPITAL | Age: 84
LOS: 1 days | Discharge: HOME | End: 2019-06-12

## 2019-06-12 DIAGNOSIS — Z79.01 LONG TERM (CURRENT) USE OF ANTICOAGULANTS: ICD-10-CM

## 2019-06-12 DIAGNOSIS — I48.91 UNSPECIFIED ATRIAL FIBRILLATION: ICD-10-CM

## 2019-06-19 ENCOUNTER — OUTPATIENT (OUTPATIENT)
Dept: OUTPATIENT SERVICES | Facility: HOSPITAL | Age: 84
LOS: 1 days | Discharge: HOME | End: 2019-06-19

## 2019-06-19 DIAGNOSIS — I48.91 UNSPECIFIED ATRIAL FIBRILLATION: ICD-10-CM

## 2019-06-19 DIAGNOSIS — Z79.01 LONG TERM (CURRENT) USE OF ANTICOAGULANTS: ICD-10-CM

## 2019-06-19 LAB
POCT INR: 4.2 RATIO — HIGH (ref 0.9–1.2)
POCT PT: 50.1 SEC — HIGH (ref 10–13.4)

## 2019-06-26 ENCOUNTER — OUTPATIENT (OUTPATIENT)
Dept: OUTPATIENT SERVICES | Facility: HOSPITAL | Age: 84
LOS: 1 days | Discharge: HOME | End: 2019-06-26

## 2019-06-26 DIAGNOSIS — Z79.01 LONG TERM (CURRENT) USE OF ANTICOAGULANTS: ICD-10-CM

## 2019-06-26 DIAGNOSIS — I48.91 UNSPECIFIED ATRIAL FIBRILLATION: ICD-10-CM

## 2019-06-26 LAB
POCT INR: 2.4 RATIO — HIGH (ref 0.9–1.2)
POCT PT: 28.7 SEC — HIGH (ref 10–13.4)

## 2019-07-10 ENCOUNTER — OUTPATIENT (OUTPATIENT)
Dept: OUTPATIENT SERVICES | Facility: HOSPITAL | Age: 84
LOS: 1 days | Discharge: HOME | End: 2019-07-10

## 2019-07-10 DIAGNOSIS — Z79.01 LONG TERM (CURRENT) USE OF ANTICOAGULANTS: ICD-10-CM

## 2019-07-10 DIAGNOSIS — I48.91 UNSPECIFIED ATRIAL FIBRILLATION: ICD-10-CM

## 2019-07-24 ENCOUNTER — OUTPATIENT (OUTPATIENT)
Dept: OUTPATIENT SERVICES | Facility: HOSPITAL | Age: 84
LOS: 1 days | Discharge: HOME | End: 2019-07-24

## 2019-07-24 DIAGNOSIS — Z79.01 LONG TERM (CURRENT) USE OF ANTICOAGULANTS: ICD-10-CM

## 2019-07-24 DIAGNOSIS — I48.91 UNSPECIFIED ATRIAL FIBRILLATION: ICD-10-CM

## 2019-07-24 LAB
POCT INR: 3.3 RATIO — HIGH (ref 0.9–1.2)
POCT PT: 39.3 SEC — HIGH (ref 10–13.4)

## 2019-08-21 ENCOUNTER — OUTPATIENT (OUTPATIENT)
Dept: OUTPATIENT SERVICES | Facility: HOSPITAL | Age: 84
LOS: 1 days | Discharge: HOME | End: 2019-08-21

## 2019-08-21 DIAGNOSIS — I27.82 CHRONIC PULMONARY EMBOLISM: ICD-10-CM

## 2019-08-21 DIAGNOSIS — Z79.01 LONG TERM (CURRENT) USE OF ANTICOAGULANTS: ICD-10-CM

## 2019-08-21 LAB
POCT INR: 3.5 RATIO — HIGH (ref 0.9–1.2)
POCT PT: 42 SEC — HIGH (ref 10–13.4)

## 2019-09-18 ENCOUNTER — OUTPATIENT (OUTPATIENT)
Dept: OUTPATIENT SERVICES | Facility: HOSPITAL | Age: 84
LOS: 1 days | Discharge: HOME | End: 2019-09-18

## 2019-09-18 DIAGNOSIS — Z79.01 LONG TERM (CURRENT) USE OF ANTICOAGULANTS: ICD-10-CM

## 2019-09-18 DIAGNOSIS — I27.82 CHRONIC PULMONARY EMBOLISM: ICD-10-CM

## 2019-09-18 LAB
POCT INR: 3.6 RATIO — HIGH (ref 0.9–1.2)
POCT PT: 43.2 SEC — HIGH (ref 10–13.4)

## 2019-10-16 ENCOUNTER — OUTPATIENT (OUTPATIENT)
Dept: OUTPATIENT SERVICES | Facility: HOSPITAL | Age: 84
LOS: 1 days | Discharge: HOME | End: 2019-10-16

## 2019-10-16 DIAGNOSIS — I27.82 CHRONIC PULMONARY EMBOLISM: ICD-10-CM

## 2019-10-16 DIAGNOSIS — Z79.01 LONG TERM (CURRENT) USE OF ANTICOAGULANTS: ICD-10-CM

## 2019-10-16 LAB
POCT INR: 2.6 RATIO — HIGH (ref 0.9–1.2)
POCT PT: 30 SEC — HIGH (ref 10–13.4)

## 2019-10-28 ENCOUNTER — OUTPATIENT (OUTPATIENT)
Dept: OUTPATIENT SERVICES | Facility: HOSPITAL | Age: 84
LOS: 1 days | Discharge: HOME | End: 2019-10-28
Payer: MEDICARE

## 2019-10-28 PROCEDURE — 99212 OFFICE O/P EST SF 10 MIN: CPT

## 2019-11-09 ENCOUNTER — OUTPATIENT (OUTPATIENT)
Dept: OUTPATIENT SERVICES | Facility: HOSPITAL | Age: 84
LOS: 1 days | Discharge: HOME | End: 2019-11-09

## 2019-11-09 DIAGNOSIS — R73.03 PREDIABETES: ICD-10-CM

## 2019-11-09 DIAGNOSIS — E55.9 VITAMIN D DEFICIENCY, UNSPECIFIED: ICD-10-CM

## 2019-11-09 DIAGNOSIS — I27.20 PULMONARY HYPERTENSION, UNSPECIFIED: ICD-10-CM

## 2019-11-09 DIAGNOSIS — I25.10 ATHEROSCLEROTIC HEART DISEASE OF NATIVE CORONARY ARTERY WITHOUT ANGINA PECTORIS: ICD-10-CM

## 2019-11-09 DIAGNOSIS — I34.0 NONRHEUMATIC MITRAL (VALVE) INSUFFICIENCY: ICD-10-CM

## 2019-11-09 DIAGNOSIS — D64.9 ANEMIA, UNSPECIFIED: ICD-10-CM

## 2019-11-20 ENCOUNTER — OUTPATIENT (OUTPATIENT)
Dept: OUTPATIENT SERVICES | Facility: HOSPITAL | Age: 84
LOS: 1 days | Discharge: HOME | End: 2019-11-20

## 2019-11-20 DIAGNOSIS — Z79.01 LONG TERM (CURRENT) USE OF ANTICOAGULANTS: ICD-10-CM

## 2019-11-20 DIAGNOSIS — I27.82 CHRONIC PULMONARY EMBOLISM: ICD-10-CM

## 2019-11-20 LAB
POCT INR: 4.3 RATIO — HIGH (ref 0.9–1.2)
POCT PT: 34 SEC — HIGH (ref 10–13.4)

## 2019-12-04 ENCOUNTER — OUTPATIENT (OUTPATIENT)
Dept: OUTPATIENT SERVICES | Facility: HOSPITAL | Age: 84
LOS: 1 days | Discharge: HOME | End: 2019-12-04

## 2019-12-04 DIAGNOSIS — Z79.01 LONG TERM (CURRENT) USE OF ANTICOAGULANTS: ICD-10-CM

## 2019-12-04 DIAGNOSIS — I27.82 CHRONIC PULMONARY EMBOLISM: ICD-10-CM

## 2019-12-04 LAB
POCT INR: 3.6 RATIO — HIGH (ref 0.9–1.2)
POCT PT: 43.3 SEC — HIGH (ref 10–13.4)

## 2019-12-18 ENCOUNTER — OUTPATIENT (OUTPATIENT)
Dept: OUTPATIENT SERVICES | Facility: HOSPITAL | Age: 84
LOS: 1 days | Discharge: HOME | End: 2019-12-18

## 2019-12-18 DIAGNOSIS — Z79.01 LONG TERM (CURRENT) USE OF ANTICOAGULANTS: ICD-10-CM

## 2019-12-18 DIAGNOSIS — I27.82 CHRONIC PULMONARY EMBOLISM: ICD-10-CM

## 2019-12-19 ENCOUNTER — OUTPATIENT (OUTPATIENT)
Dept: OUTPATIENT SERVICES | Facility: HOSPITAL | Age: 84
LOS: 1 days | Discharge: HOME | End: 2019-12-19
Payer: MEDICARE

## 2019-12-19 PROCEDURE — 92014 COMPRE OPH EXAM EST PT 1/>: CPT

## 2020-01-15 ENCOUNTER — OUTPATIENT (OUTPATIENT)
Dept: OUTPATIENT SERVICES | Facility: HOSPITAL | Age: 85
LOS: 1 days | Discharge: HOME | End: 2020-01-15

## 2020-01-15 DIAGNOSIS — I27.82 CHRONIC PULMONARY EMBOLISM: ICD-10-CM

## 2020-01-15 DIAGNOSIS — Z79.01 LONG TERM (CURRENT) USE OF ANTICOAGULANTS: ICD-10-CM

## 2020-01-15 LAB
INR PPP: 2.1 RATIO
POCT INR: 2.1 RATIO — HIGH (ref 0.9–1.2)
POCT PT: 25.6 SEC — HIGH (ref 10–13.4)
POCT-PROTHROMBIN TIME: 25.6 SECS

## 2020-02-12 ENCOUNTER — OUTPATIENT (OUTPATIENT)
Dept: OUTPATIENT SERVICES | Facility: HOSPITAL | Age: 85
LOS: 1 days | Discharge: HOME | End: 2020-02-12

## 2020-02-12 DIAGNOSIS — I27.82 CHRONIC PULMONARY EMBOLISM: ICD-10-CM

## 2020-02-12 DIAGNOSIS — Z79.01 LONG TERM (CURRENT) USE OF ANTICOAGULANTS: ICD-10-CM

## 2020-02-12 LAB
POCT INR: 3.3 RATIO — HIGH (ref 0.9–1.2)
POCT PT: 39.2 SEC — HIGH (ref 10–13.4)

## 2020-03-07 ENCOUNTER — OUTPATIENT (OUTPATIENT)
Dept: OUTPATIENT SERVICES | Facility: HOSPITAL | Age: 85
LOS: 1 days | Discharge: HOME | End: 2020-03-07

## 2020-03-07 DIAGNOSIS — I27.82 CHRONIC PULMONARY EMBOLISM: ICD-10-CM

## 2020-03-07 DIAGNOSIS — Z79.01 LONG TERM (CURRENT) USE OF ANTICOAGULANTS: ICD-10-CM

## 2020-03-07 LAB
POCT INR: 2.8 RATIO — HIGH (ref 0.9–1.2)
POCT PT: 33.8 SEC — HIGH (ref 10–13.4)

## 2020-04-01 ENCOUNTER — OUTPATIENT (OUTPATIENT)
Dept: OUTPATIENT SERVICES | Facility: HOSPITAL | Age: 85
LOS: 1 days | Discharge: HOME | End: 2020-04-01

## 2020-04-01 DIAGNOSIS — I27.82 CHRONIC PULMONARY EMBOLISM: ICD-10-CM

## 2020-04-01 DIAGNOSIS — Z79.01 LONG TERM (CURRENT) USE OF ANTICOAGULANTS: ICD-10-CM

## 2020-04-01 LAB
POCT INR: 3.2 RATIO — HIGH (ref 0.9–1.2)
POCT PT: 37.8 SEC — HIGH (ref 10–13.4)

## 2020-04-08 LAB
INR PPP: 3.2 RATIO
POCT-PROTHROMBIN TIME: 37.8 SECS

## 2020-05-14 ENCOUNTER — OUTPATIENT (OUTPATIENT)
Dept: OUTPATIENT SERVICES | Facility: HOSPITAL | Age: 85
LOS: 1 days | Discharge: HOME | End: 2020-05-14

## 2020-05-14 DIAGNOSIS — I27.82 CHRONIC PULMONARY EMBOLISM: ICD-10-CM

## 2020-05-14 DIAGNOSIS — Z79.01 LONG TERM (CURRENT) USE OF ANTICOAGULANTS: ICD-10-CM

## 2020-05-14 LAB
POCT INR: 2.7 RATIO — HIGH (ref 0.9–1.2)
POCT PT: 32.8 SEC — HIGH (ref 10–13.4)

## 2020-06-10 ENCOUNTER — OUTPATIENT (OUTPATIENT)
Dept: OUTPATIENT SERVICES | Facility: HOSPITAL | Age: 85
LOS: 1 days | Discharge: HOME | End: 2020-06-10

## 2020-06-10 DIAGNOSIS — I27.82 CHRONIC PULMONARY EMBOLISM: ICD-10-CM

## 2020-06-10 DIAGNOSIS — Z79.01 LONG TERM (CURRENT) USE OF ANTICOAGULANTS: ICD-10-CM

## 2020-06-10 LAB
POCT INR: 2.8 RATIO — HIGH (ref 0.9–1.2)
POCT PT: 44.4 SEC — HIGH (ref 10–13.4)

## 2020-07-08 ENCOUNTER — OUTPATIENT (OUTPATIENT)
Dept: OUTPATIENT SERVICES | Facility: HOSPITAL | Age: 85
LOS: 1 days | Discharge: HOME | End: 2020-07-08

## 2020-07-08 DIAGNOSIS — Z79.01 LONG TERM (CURRENT) USE OF ANTICOAGULANTS: ICD-10-CM

## 2020-07-08 DIAGNOSIS — I27.82 CHRONIC PULMONARY EMBOLISM: ICD-10-CM

## 2020-07-08 LAB
POCT INR: 3.8 RATIO — HIGH (ref 0.9–1.2)
POCT PT: 37.5 SEC — HIGH (ref 10–13.4)

## 2020-08-12 DIAGNOSIS — Z86.39 PERSONAL HISTORY OF OTHER ENDOCRINE, NUTRITIONAL AND METABOLIC DISEASE: ICD-10-CM

## 2020-08-12 DIAGNOSIS — D56.3 THALASSEMIA MINOR: ICD-10-CM

## 2020-08-12 DIAGNOSIS — Z86.79 PERSONAL HISTORY OF OTHER DISEASES OF THE CIRCULATORY SYSTEM: ICD-10-CM

## 2020-08-12 DIAGNOSIS — Z78.9 OTHER SPECIFIED HEALTH STATUS: ICD-10-CM

## 2020-08-12 DIAGNOSIS — Z95.5 PRESENCE OF CORONARY ANGIOPLASTY IMPLANT AND GRAFT: ICD-10-CM

## 2020-08-12 RX ORDER — MULTIVIT-MIN/IRON/FOLIC ACID/K 18-600-40
500 CAPSULE ORAL
Refills: 0 | Status: ACTIVE | COMMUNITY

## 2020-08-12 RX ORDER — CHROMIUM 200 MCG
TABLET ORAL
Refills: 0 | Status: ACTIVE | COMMUNITY

## 2020-08-13 ENCOUNTER — OUTPATIENT (OUTPATIENT)
Dept: OUTPATIENT SERVICES | Facility: HOSPITAL | Age: 85
LOS: 1 days | Discharge: HOME | End: 2020-08-13

## 2020-08-13 ENCOUNTER — APPOINTMENT (OUTPATIENT)
Dept: MEDICATION MANAGEMENT | Facility: CLINIC | Age: 85
End: 2020-08-13

## 2020-08-13 VITALS — RESPIRATION RATE: 16 BRPM | HEART RATE: 68 BPM | OXYGEN SATURATION: 98 %

## 2020-08-13 DIAGNOSIS — I27.82 CHRONIC PULMONARY EMBOLISM: ICD-10-CM

## 2020-08-13 DIAGNOSIS — Z79.01 LONG TERM (CURRENT) USE OF ANTICOAGULANTS: ICD-10-CM

## 2020-08-13 LAB
INR PPP: 2.6 RATIO
POCT-PROTHROMBIN TIME: 30.5 SECS
QUALITY CONTROL: YES

## 2020-09-17 ENCOUNTER — APPOINTMENT (OUTPATIENT)
Dept: MEDICATION MANAGEMENT | Facility: CLINIC | Age: 85
End: 2020-09-17

## 2020-09-17 ENCOUNTER — OUTPATIENT (OUTPATIENT)
Dept: OUTPATIENT SERVICES | Facility: HOSPITAL | Age: 85
LOS: 1 days | Discharge: HOME | End: 2020-09-17

## 2020-09-17 VITALS — BODY MASS INDEX: 25.39 KG/M2 | WEIGHT: 158 LBS | TEMPERATURE: 97.8 F | HEIGHT: 66 IN

## 2020-09-17 LAB
INR PPP: 5.9 RATIO
POCT-PROTHROMBIN TIME: 70.7 SECS
QUALITY CONTROL: YES

## 2020-09-24 ENCOUNTER — APPOINTMENT (OUTPATIENT)
Dept: MEDICATION MANAGEMENT | Facility: CLINIC | Age: 85
End: 2020-09-24

## 2020-09-24 ENCOUNTER — OUTPATIENT (OUTPATIENT)
Dept: OUTPATIENT SERVICES | Facility: HOSPITAL | Age: 85
LOS: 1 days | Discharge: HOME | End: 2020-09-24

## 2020-09-24 VITALS — HEART RATE: 75 BPM | WEIGHT: 158 LBS | BODY MASS INDEX: 25.39 KG/M2 | HEIGHT: 66 IN

## 2020-09-24 DIAGNOSIS — I27.82 CHRONIC PULMONARY EMBOLISM: ICD-10-CM

## 2020-09-24 DIAGNOSIS — Z79.01 LONG TERM (CURRENT) USE OF ANTICOAGULANTS: ICD-10-CM

## 2020-09-24 LAB
INR PPP: 2.5 RATIO
POCT-PROTHROMBIN TIME: 29.5 SECS
QUALITY CONTROL: YES

## 2020-10-08 ENCOUNTER — APPOINTMENT (OUTPATIENT)
Dept: MEDICATION MANAGEMENT | Facility: CLINIC | Age: 85
End: 2020-10-08

## 2020-12-10 ENCOUNTER — EMERGENCY (EMERGENCY)
Facility: HOSPITAL | Age: 85
LOS: 0 days | Discharge: HOME | End: 2020-12-11
Attending: EMERGENCY MEDICINE | Admitting: EMERGENCY MEDICINE
Payer: MEDICARE

## 2020-12-10 VITALS
RESPIRATION RATE: 19 BRPM | HEART RATE: 65 BPM | SYSTOLIC BLOOD PRESSURE: 207 MMHG | WEIGHT: 156.09 LBS | TEMPERATURE: 98 F | HEIGHT: 64 IN | DIASTOLIC BLOOD PRESSURE: 82 MMHG
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
## 2020-12-10 DIAGNOSIS — M54.5 LOW BACK PAIN: ICD-10-CM
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
## 2020-12-10 DIAGNOSIS — Z88.0 ALLERGY STATUS TO PENICILLIN: ICD-10-CM

## 2020-12-10 DIAGNOSIS — E87.0 HYPEROSMOLALITY AND HYPERNATREMIA: ICD-10-CM

## 2020-12-10 DIAGNOSIS — Z91.018 ALLERGY TO OTHER FOODS: ICD-10-CM

## 2020-12-10 DIAGNOSIS — R79.1 ABNORMAL COAGULATION PROFILE: ICD-10-CM

## 2020-12-10 LAB
ALBUMIN SERPL ELPH-MCNC: 4.4 G/DL — SIGNIFICANT CHANGE UP (ref 3.5–5.2)
ALP SERPL-CCNC: 51 U/L — SIGNIFICANT CHANGE UP (ref 30–115)
ALT FLD-CCNC: 17 U/L — SIGNIFICANT CHANGE UP (ref 0–41)
ANION GAP SERPL CALC-SCNC: 13 MMOL/L — SIGNIFICANT CHANGE UP (ref 7–14)
AST SERPL-CCNC: 25 U/L — SIGNIFICANT CHANGE UP (ref 0–41)
BILIRUB SERPL-MCNC: 0.3 MG/DL — SIGNIFICANT CHANGE UP (ref 0.2–1.2)
BUN SERPL-MCNC: 38 MG/DL — HIGH (ref 10–20)
CALCIUM SERPL-MCNC: 9 MG/DL — SIGNIFICANT CHANGE UP (ref 8.5–10.1)
CHLORIDE SERPL-SCNC: 113 MMOL/L — HIGH (ref 98–110)
CO2 SERPL-SCNC: 26 MMOL/L — SIGNIFICANT CHANGE UP (ref 17–32)
CREAT SERPL-MCNC: 1.1 MG/DL — SIGNIFICANT CHANGE UP (ref 0.7–1.5)
GLUCOSE SERPL-MCNC: 105 MG/DL — HIGH (ref 70–99)
POTASSIUM SERPL-MCNC: 5.1 MMOL/L — HIGH (ref 3.5–5)
POTASSIUM SERPL-SCNC: 5.1 MMOL/L — HIGH (ref 3.5–5)
PROT SERPL-MCNC: 6.4 G/DL — SIGNIFICANT CHANGE UP (ref 6–8)
SODIUM SERPL-SCNC: 152 MMOL/L — HIGH (ref 135–146)

## 2020-12-10 PROCEDURE — 99284 EMERGENCY DEPT VISIT MOD MDM: CPT

## 2020-12-10 PROCEDURE — 71045 X-RAY EXAM CHEST 1 VIEW: CPT | Mod: 26

## 2020-12-10 PROCEDURE — 72170 X-RAY EXAM OF PELVIS: CPT | Mod: 26

## 2020-12-10 NOTE — ED PROVIDER NOTE - CARE PLAN
Principal Discharge DX:	Accident due to mechanical fall without injury, initial encounter  Secondary Diagnosis:	Hypernatremia   Principal Discharge DX:	Fall from standing, initial encounter  Secondary Diagnosis:	Hypernatremia  Secondary Diagnosis:	Acute left-sided low back pain without sciatica  Secondary Diagnosis:	Elevated INR

## 2020-12-10 NOTE — ED PROVIDER NOTE - OBJECTIVE STATEMENT
86 yo female, pmh of afib on warfin, presents to ed for fall, as per daughter fell backward, +chi. c/o mild, aching, no radiation left back pain. Denies loc, nv, cpm, sob, numbness, tingling, dizziness, cp, sob.

## 2020-12-10 NOTE — ED PROVIDER NOTE - ATTENDING CONTRIBUTION TO CARE
85yF afib on coumadin (last INR 5, pt holding dose) p/w fall - mechanical fall, fell backwards landing first on her coccyx then hit the back of her head.  No LOC.  No bleeding.  Pt c/o pain to her lower back, where she also has herniated discs and chronic pain.    exam w/o midline c/t/l/s spinal tenderness  pt awake, alert, interactive  no focal neuro deficits  pt ambulating w/ stable gait at her baseline (a few steps w/o assistance)

## 2020-12-10 NOTE — ED PROVIDER NOTE - PATIENT PORTAL LINK FT
You can access the FollowMyHealth Patient Portal offered by [facility] by registering at the following website: [email or web address]. By joining [employer]’s FollowMyHealth portal, you will also be able to view your health information using other applications (apps) compatible with our system.

## 2020-12-10 NOTE — ED PROVIDER NOTE - PHYSICAL EXAMINATION
Constitutional: Well developed, well nourished. NAD  TRAUMA: ABC intact. GCS 15.  Head: Normocephalic, atraumatic.  Eyes: PERRL. EOMI. No Raccoon eyes.   ENT: No nasal discharge. No septal hematoma. No Khan sign. Mucous membranes moist.  Neck: Supple. Painless ROM. No midline tenderness or stepoffs.  Cardiovascular: Normal S1, S2. Regular rate and rhythm. No murmurs, rubs, or gallops.  Pulmonary: Normal respiratory rate and effort. Lungs clear to auscultation bilaterally. No wheezing, rales, or rhonchi.  CHEST: No chest wall tenderness or crepitus.  Abdominal: Soft. Nondistended. Nontender. No rebound, guarding, or rigidity.  BACK: No midline T/L/S tenderness or stepoffs. No saddle paresthesia.  Extremities. Pelvis stable. No traumatic deformities or tenderness of extremities.  Skin: No rashes, cyanosis, lacerations, or abrasions.  Neuro: AAOx3. Strength 5/5 in all extremities. Sensation intact throughout. No focal neurological deficits.  Psych: Normal mood. Normal affect.

## 2020-12-10 NOTE — ED PROVIDER NOTE - CLINICAL SUMMARY MEDICAL DECISION MAKING FREE TEXT BOX
85yF afib on coumadin p/w mechanical fall and associated back pain and head trauma.  Imaging w/o apparent traumatic injury.  Pt w/ no focal neuro deficits and says her back pain is in location of her chronic back pain.  Labs reviewed, notable for elevated INR 4 (down from reported prior value of 5) but also hypernatremia suggestive of dehydration.  Pt treated with IV LR and counseled to f/u closely with her PCP for sodium monitoring and further care as needed.

## 2020-12-10 NOTE — ED PROVIDER NOTE - NSFOLLOWUPINSTRUCTIONS_ED_ALL_ED_FT
Patient had a mechanical fall today on coumadin. discussed radiology and lab results seen below. advised of return precautions. f/u with pcp    Hypernatremia    WHAT YOU NEED TO KNOW:    Hypernatremia occurs when there is an imbalance of sodium and water in your body. The amount of sodium (salt) in your blood is higher than normal. Sodium is an electrolyte (mineral) that helps your muscles, heart, and digestive system work properly. It helps control blood pressure and fluid balance. Hypernatremia can become life-threatening if left untreated.    DISCHARGE INSTRUCTIONS:    Follow up with your healthcare provider as directed: You will need more blood tests to check your level of sodium. Write down your questions so you remember to ask them during your visits.     Nutrition: Talk to your healthcare provider about any diet changes you need to make, such as decreasing sodium.     Liquids: Follow your healthcare provider's advice about the amount of liquid you should drink. Ask how much liquid to drink each day and which liquids are best for you.     Contact your healthcare provider if:     Your baby has a high-pitched cry, muscle weakness, or unusual irritability or drowsiness.      You have dry eyes or mouth.      You have nausea, and you are vomiting.      You have muscle weakness or twitching.      You have a headache, confusion, irritability, or any other changes in behavior.      You have questions or concerns about your condition or care.    Return to the emergency department if:     You have a seizure.      You cannot be awakened.      You are breathing faster than normal.         © Copyright [employer] 2019 All illustrations and images included in CareNotes are the copyrighted property of [employer] or [employer].      back to top                      © Copyright [employer] 2019

## 2020-12-10 NOTE — ED PROVIDER NOTE - PRINCIPAL DIAGNOSIS
Accident due to mechanical fall without injury, initial encounter Fall from standing, initial encounter

## 2020-12-10 NOTE — ED PROVIDER NOTE - PROGRESS NOTE DETAILS
FF: pt is asymptomatic. able to ambulate independently. discussed radiology and lab results with pt and daughter at bedside. advised to f/u with pcp and repeat blood work for hypernatremia. advised of return precautions. agreeable to dc.

## 2020-12-11 VITALS
OXYGEN SATURATION: 99 % | TEMPERATURE: 99 F | HEART RATE: 70 BPM | DIASTOLIC BLOOD PRESSURE: 80 MMHG | RESPIRATION RATE: 20 BRPM | SYSTOLIC BLOOD PRESSURE: 199 MMHG

## 2020-12-11 PROCEDURE — 74177 CT ABD & PELVIS W/CONTRAST: CPT | Mod: 26

## 2020-12-11 PROCEDURE — 71260 CT THORAX DX C+: CPT | Mod: 26

## 2020-12-11 PROCEDURE — 70450 CT HEAD/BRAIN W/O DYE: CPT | Mod: 26

## 2020-12-11 PROCEDURE — 72125 CT NECK SPINE W/O DYE: CPT | Mod: 26

## 2020-12-11 NOTE — ED ADULT NURSE NOTE - NSIMPLEMENTINTERV_GEN_ALL_ED
Implemented All Fall with Harm Risk Interventions:  [place] to call system. Call bell, personal items and telephone within reach. Instruct patient to call for assistance. Room bathroom lighting operational. Non-slip footwear when patient is off stretcher. Physically safe environment: no spills, clutter or unnecessary equipment. Stretcher in lowest position, wheels locked, appropriate side rails in place. Provide visual cue, wrist band, yellow gown, etc. Monitor gait and stability. Monitor for mental status changes and reorient to person, place, and time. Review medications for side effects contributing to fall risk. Reinforce activity limits and safety measures with patient and family. Provide visual clues: red socks.

## 2021-02-22 ENCOUNTER — RX RENEWAL (OUTPATIENT)
Age: 86
End: 2021-02-22

## 2021-05-27 ENCOUNTER — OUTPATIENT (OUTPATIENT)
Dept: OUTPATIENT SERVICES | Facility: HOSPITAL | Age: 86
LOS: 1 days | Discharge: HOME | End: 2021-05-27

## 2021-05-27 ENCOUNTER — APPOINTMENT (OUTPATIENT)
Dept: MEDICATION MANAGEMENT | Facility: CLINIC | Age: 86
End: 2021-05-27

## 2021-05-27 VITALS — RESPIRATION RATE: 18 BRPM | HEART RATE: 64 BPM | OXYGEN SATURATION: 99 %

## 2021-05-27 VITALS — BODY MASS INDEX: 25.18 KG/M2 | WEIGHT: 156 LBS

## 2021-05-27 DIAGNOSIS — Z79.01 LONG TERM (CURRENT) USE OF ANTICOAGULANTS: ICD-10-CM

## 2021-05-27 DIAGNOSIS — I27.82 CHRONIC PULMONARY EMBOLISM: ICD-10-CM

## 2021-05-27 LAB
INR PPP: 2.4 RATIO
POCT-PROTHROMBIN TIME: 29.4 SECS
QUALITY CONTROL: YES

## 2021-06-01 LAB
ANION GAP SERPL CALC-SCNC: 12 MMOL/L
BASOPHILS # BLD AUTO: 0.06 K/UL
BASOPHILS NFR BLD AUTO: 1 %
BUN SERPL-MCNC: 20 MG/DL
CALCIUM SERPL-MCNC: 9 MG/DL
CHLORIDE SERPL-SCNC: 97 MMOL/L
CO2 SERPL-SCNC: 27 MMOL/L
CREAT SERPL-MCNC: 0.9 MG/DL
EOSINOPHIL # BLD AUTO: 0.12 K/UL
EOSINOPHIL NFR BLD AUTO: 2 %
GLUCOSE SERPL-MCNC: 80 MG/DL
HCT VFR BLD CALC: 37.1 %
HGB BLD-MCNC: 11.6 G/DL
IMM GRANULOCYTES NFR BLD AUTO: 0.2 %
LYMPHOCYTES # BLD AUTO: 1.77 K/UL
LYMPHOCYTES NFR BLD AUTO: 28.8 %
MAN DIFF?: NORMAL
MCHC RBC-ENTMCNC: 30.2 PG
MCHC RBC-ENTMCNC: 31.3 G/DL
MCV RBC AUTO: 96.6 FL
MONOCYTES # BLD AUTO: 0.56 K/UL
MONOCYTES NFR BLD AUTO: 9.1 %
NEUTROPHILS # BLD AUTO: 3.63 K/UL
NEUTROPHILS NFR BLD AUTO: 58.9 %
PLATELET # BLD AUTO: 199 K/UL
POTASSIUM SERPL-SCNC: 4.7 MMOL/L
RBC # BLD: 3.84 M/UL
RBC # FLD: 13.8 %
SODIUM SERPL-SCNC: 136 MMOL/L
WBC # FLD AUTO: 6.15 K/UL

## 2021-06-02 ENCOUNTER — OUTPATIENT (OUTPATIENT)
Dept: OUTPATIENT SERVICES | Facility: HOSPITAL | Age: 86
LOS: 1 days | Discharge: HOME | End: 2021-06-02

## 2021-06-02 ENCOUNTER — RX RENEWAL (OUTPATIENT)
Age: 86
End: 2021-06-02

## 2021-06-02 ENCOUNTER — APPOINTMENT (OUTPATIENT)
Dept: MEDICATION MANAGEMENT | Facility: CLINIC | Age: 86
End: 2021-06-02

## 2021-06-02 VITALS — HEART RATE: 80 BPM | OXYGEN SATURATION: 95 % | RESPIRATION RATE: 16 BRPM

## 2021-06-02 DIAGNOSIS — I27.82 CHRONIC PULMONARY EMBOLISM: ICD-10-CM

## 2021-06-02 DIAGNOSIS — Z79.01 LONG TERM (CURRENT) USE OF ANTICOAGULANTS: ICD-10-CM

## 2021-06-02 LAB
INR PPP: 2.2 RATIO
POCT-PROTHROMBIN TIME: 25.1 SECS
QUALITY CONTROL: YES

## 2021-06-04 ENCOUNTER — OUTPATIENT (OUTPATIENT)
Dept: OUTPATIENT SERVICES | Facility: HOSPITAL | Age: 86
LOS: 1 days | Discharge: HOME | End: 2021-06-04

## 2021-06-04 ENCOUNTER — APPOINTMENT (OUTPATIENT)
Dept: MEDICATION MANAGEMENT | Facility: CLINIC | Age: 86
End: 2021-06-04

## 2021-06-04 DIAGNOSIS — I27.82 CHRONIC PULMONARY EMBOLISM: ICD-10-CM

## 2021-06-04 DIAGNOSIS — Z79.01 LONG TERM (CURRENT) USE OF ANTICOAGULANTS: ICD-10-CM

## 2021-06-04 LAB
INR PPP: 1.5 RATIO
POCT-PROTHROMBIN TIME: 18 SECS
QUALITY CONTROL: YES

## 2021-06-06 ENCOUNTER — INPATIENT (INPATIENT)
Facility: HOSPITAL | Age: 86
LOS: 3 days | Discharge: HOME | End: 2021-06-10
Attending: INTERNAL MEDICINE | Admitting: INTERNAL MEDICINE
Payer: MEDICARE

## 2021-06-06 VITALS
RESPIRATION RATE: 17 BRPM | DIASTOLIC BLOOD PRESSURE: 74 MMHG | OXYGEN SATURATION: 99 % | WEIGHT: 158.07 LBS | SYSTOLIC BLOOD PRESSURE: 193 MMHG | HEIGHT: 64 IN | HEART RATE: 70 BPM

## 2021-06-06 DIAGNOSIS — Z95.2 PRESENCE OF PROSTHETIC HEART VALVE: Chronic | ICD-10-CM

## 2021-06-06 LAB
ALBUMIN SERPL ELPH-MCNC: 3.4 G/DL — LOW (ref 3.5–5.2)
ALP SERPL-CCNC: 51 U/L — SIGNIFICANT CHANGE UP (ref 30–115)
ALT FLD-CCNC: 21 U/L — SIGNIFICANT CHANGE UP (ref 0–41)
ANION GAP SERPL CALC-SCNC: 10 MMOL/L — SIGNIFICANT CHANGE UP (ref 7–14)
APTT BLD: 46.7 SEC — HIGH (ref 27–39.2)
AST SERPL-CCNC: 27 U/L — SIGNIFICANT CHANGE UP (ref 0–41)
BASOPHILS # BLD AUTO: 0.03 K/UL — SIGNIFICANT CHANGE UP (ref 0–0.2)
BASOPHILS NFR BLD AUTO: 0.4 % — SIGNIFICANT CHANGE UP (ref 0–1)
BILIRUB DIRECT SERPL-MCNC: <0.2 MG/DL — SIGNIFICANT CHANGE UP (ref 0–0.2)
BILIRUB INDIRECT FLD-MCNC: >0 MG/DL — LOW (ref 0.2–1.2)
BILIRUB SERPL-MCNC: 0.2 MG/DL — SIGNIFICANT CHANGE UP (ref 0.2–1.2)
BLD GP AB SCN SERPL QL: SIGNIFICANT CHANGE UP
BUN SERPL-MCNC: 27 MG/DL — HIGH (ref 10–20)
CALCIUM SERPL-MCNC: 7.7 MG/DL — LOW (ref 8.5–10.1)
CHLORIDE SERPL-SCNC: 93 MMOL/L — LOW (ref 98–110)
CO2 SERPL-SCNC: 23 MMOL/L — SIGNIFICANT CHANGE UP (ref 17–32)
CREAT SERPL-MCNC: 1.1 MG/DL — SIGNIFICANT CHANGE UP (ref 0.7–1.5)
EOSINOPHIL # BLD AUTO: 0.08 K/UL — SIGNIFICANT CHANGE UP (ref 0–0.7)
EOSINOPHIL NFR BLD AUTO: 1.2 % — SIGNIFICANT CHANGE UP (ref 0–8)
GLUCOSE SERPL-MCNC: 117 MG/DL — HIGH (ref 70–99)
HCT VFR BLD CALC: 25.8 % — LOW (ref 37–47)
HGB BLD-MCNC: 8.4 G/DL — LOW (ref 12–16)
IMM GRANULOCYTES NFR BLD AUTO: 0.3 % — SIGNIFICANT CHANGE UP (ref 0.1–0.3)
INR BLD: 2.23 RATIO — HIGH (ref 0.65–1.3)
LIDOCAIN IGE QN: 32 U/L — SIGNIFICANT CHANGE UP (ref 7–60)
LYMPHOCYTES # BLD AUTO: 1.29 K/UL — SIGNIFICANT CHANGE UP (ref 1.2–3.4)
LYMPHOCYTES # BLD AUTO: 18.9 % — LOW (ref 20.5–51.1)
MCHC RBC-ENTMCNC: 30.3 PG — SIGNIFICANT CHANGE UP (ref 27–31)
MCHC RBC-ENTMCNC: 32.6 G/DL — SIGNIFICANT CHANGE UP (ref 32–37)
MCV RBC AUTO: 93.1 FL — SIGNIFICANT CHANGE UP (ref 81–99)
MONOCYTES # BLD AUTO: 0.62 K/UL — HIGH (ref 0.1–0.6)
MONOCYTES NFR BLD AUTO: 9.1 % — SIGNIFICANT CHANGE UP (ref 1.7–9.3)
NEUTROPHILS # BLD AUTO: 4.78 K/UL — SIGNIFICANT CHANGE UP (ref 1.4–6.5)
NEUTROPHILS NFR BLD AUTO: 70.1 % — SIGNIFICANT CHANGE UP (ref 42.2–75.2)
NRBC # BLD: 0 /100 WBCS — SIGNIFICANT CHANGE UP (ref 0–0)
PLATELET # BLD AUTO: 160 K/UL — SIGNIFICANT CHANGE UP (ref 130–400)
POTASSIUM SERPL-MCNC: 4.3 MMOL/L — SIGNIFICANT CHANGE UP (ref 3.5–5)
POTASSIUM SERPL-SCNC: 4.3 MMOL/L — SIGNIFICANT CHANGE UP (ref 3.5–5)
PROT SERPL-MCNC: 5.2 G/DL — LOW (ref 6–8)
PROTHROM AB SERPL-ACNC: 25.7 SEC — HIGH (ref 9.95–12.87)
RAPID RVP RESULT: SIGNIFICANT CHANGE UP
RBC # BLD: 2.77 M/UL — LOW (ref 4.2–5.4)
RBC # FLD: 13.3 % — SIGNIFICANT CHANGE UP (ref 11.5–14.5)
SARS-COV-2 RNA SPEC QL NAA+PROBE: SIGNIFICANT CHANGE UP
SODIUM SERPL-SCNC: 126 MMOL/L — LOW (ref 135–146)
TROPONIN T SERPL-MCNC: <0.01 NG/ML — SIGNIFICANT CHANGE UP
WBC # BLD: 6.82 K/UL — SIGNIFICANT CHANGE UP (ref 4.8–10.8)
WBC # FLD AUTO: 6.82 K/UL — SIGNIFICANT CHANGE UP (ref 4.8–10.8)

## 2021-06-06 PROCEDURE — 93010 ELECTROCARDIOGRAM REPORT: CPT | Mod: NC

## 2021-06-06 PROCEDURE — 99291 CRITICAL CARE FIRST HOUR: CPT

## 2021-06-06 PROCEDURE — 71045 X-RAY EXAM CHEST 1 VIEW: CPT | Mod: 26

## 2021-06-06 PROCEDURE — 99222 1ST HOSP IP/OBS MODERATE 55: CPT

## 2021-06-06 RX ORDER — SODIUM CHLORIDE 9 MG/ML
500 INJECTION INTRAMUSCULAR; INTRAVENOUS; SUBCUTANEOUS ONCE
Refills: 0 | Status: COMPLETED | OUTPATIENT
Start: 2021-06-06 | End: 2021-06-06

## 2021-06-06 RX ADMIN — SODIUM CHLORIDE 500 MILLILITER(S): 9 INJECTION INTRAMUSCULAR; INTRAVENOUS; SUBCUTANEOUS at 22:11

## 2021-06-06 NOTE — ED PROVIDER NOTE - OBJECTIVE STATEMENT
84 y/o F with PMH AVR--mechanical valve on coumadin, CAD s/p PCI x 3, HTN, HLD, depression, presents s/p syncopal episode 1 hr PTA in which she was with granddaughter and slumped on toilet after saying she felt lightheaded. no fall/head injury/trauma. no palliating/provoking factors.   Pt had 10 teeth extracted 4 days ago and had coumadin stopped 2 days before procedure, was bridged with Lovenox and has been doing both coumadin and Lovenox since yesterday and has had mild constant intermittent bleeding from different sites of different extractions x yesterday.  +nausea with 1 episode vomiting.    no cp/sob/palpitations/fever/cough.   ?dark stool today.   received 500cc NS by EMS.

## 2021-06-06 NOTE — ED ADULT NURSE NOTE - NSFALLRSKASSESSDT_ED_ALL_ED
Type 2 diabetes mellitus with hyperglycemia, with long-term current use of insulin 06-Jun-2021 21:01

## 2021-06-06 NOTE — ED PROVIDER NOTE - PROGRESS NOTE DETAILS
PALEVY: oozing from bottom gingiva-- placed quickclot and told pt to hold direct pressure. will continue to reassess Spoke to Dr. Nagy.   Recommends PRBC 1 unit and admit to tele. PALESYLVIA: no active bleeding from initial site which was anterior lower gingiva midline mouth, but now mild oozing from R posterior upper site--packed with quick clot. Russ discussed with Dr. aleman and Dr. Hernandes. JOVANNY: Patient with difficult IV access I intend to get an US guided IV

## 2021-06-06 NOTE — ED ADULT NURSE NOTE - NSIMPLEMENTINTERV_GEN_ALL_ED
Implemented All Fall with Harm Risk Interventions:  [place] to call system. Call bell, personal items and telephone within reach. Instruct patient to call for assistance. Room bathroom lighting operational. Non-slip footwear when patient is off stretcher. Physically safe environment: no spills, clutter or unnecessary equipment. Stretcher in lowest position, wheels locked, appropriate side rails in place. Provide visual cue, wrist band, yellow gown, etc. Monitor gait and stability. Monitor for mental status changes and reorient to person, place, and time. Review medications for side effects contributing to fall risk. Reinforce activity limits and safety measures with patient and family. Provide visual clues: red socks.

## 2021-06-06 NOTE — ED ADULT TRIAGE NOTE - CHIEF COMPLAINT QUOTE
pt c/o oral bleeding post 10 teeth extractions on Thursday , pt on Coumadin and Lovenox. pt bleeding from mouth since friday, vomited blood and witnessed pass out on toilet today.

## 2021-06-06 NOTE — ED PROVIDER NOTE - PHYSICAL EXAMINATION
PHYSICAL EXAM:    GENERAL: Alert, appears stated age, well appearing, non-toxic  SKIN: Warm, pink and dry. MMM.   HEAD: NC  EYE: Normal lids/conjunctiva  ENT: Normal hearing, patent oropharynx without erythema or exudate. +midline anterior lower gingiva with mild oozing blood.   NECK: +supple. No meningismus, or JVD  Pulm: Bilateral BS, normal resp effort, no wheezes, stridor, or retractions  CV: RRR, no M/R/G, 2+and = radial pulses  Abd: soft, non-tender, non-distended, no rebound/guarding. no CVA tenderness.   Mskel: no erythema, cyanosis, edema. no calf tenderness  Neuro: AAOx3, No speech slurring, pronator drift, facial asymmetry. . 5/5 strength throughout.

## 2021-06-06 NOTE — ED ADULT NURSE REASSESSMENT NOTE - NS ED NURSE REASSESS COMMENT FT1
pt stopped coumadin two days prior to teeth extraction on Thursday 6/3,  Lovenox given on  the Wednesday prior to procedure . Friday coumadin and Lovenox given.. Saturday Lovenox held Coumadin given. today Lovenox given and coumadin held.

## 2021-06-06 NOTE — ED PROVIDER NOTE - CLINICAL SUMMARY MEDICAL DECISION MAKING FREE TEXT BOX
Labs noted hgb 8.4, INR 2.2, trop negative.  EKG no acute changes.  CXR negative.  Given IVF, direct pressure with Quikclot, PRBC.  Will admit to tele. Labs noted hgb 8.4, INR 2.2, trop negative.  EKG no acute changes.  CXR negative.  Given IVF, direct pressure with Quikclot, PRBC.  Will admit to tele.    Attending Statement: I have personally provided the amount of critical care time documented below excluding time spent on separate procedures.     Critical Care Time Spent (min) Must be 30 or more minutes to qualify: 35.   35 minutes

## 2021-06-06 NOTE — ED PROVIDER NOTE - ATTENDING CONTRIBUTION TO CARE
I personally evaluated the patient. I reviewed the Resident’s or Physician Assistant’s note (as assigned above), and agree with the findings and plan except as documented in my note.  Chart reviewed.  H/O AVR, cardiac stents, on anticoagulants, had teeth extraction x 10 few days ago, brought in for syncopal episode.  Daughter states she is bleeding from mouth and had bloody stools.  Exam shows alert patient in no distress, HEENT NCAT mild oozing from gums, lungs clear, RR S1S2, abdomen soft NT +BS, no CCE.

## 2021-06-06 NOTE — ED PROVIDER NOTE - NS ED ROS FT
Review of Systems    Constitutional: (-) fever   Eyes/ENT: (-) vision changes, (-) epistaxis   Cardiovascular: (-) chest pain, (+) syncope (-) palpitations  Respiratory: (-) cough, (-) shortness of breath  Gastrointestinal: (-) diarrhea  (-) abdominal pain  Genitourinary:  (-) dysuria   Musculoskeletal: (-) neck pain, (-) back pain, (-) leg pain/swelling  Integumentary: (-) rash, (-) edema  Neurological: (-) headache (-) confusion  Hematologic: (-) easy bruising

## 2021-06-07 DIAGNOSIS — Z02.9 ENCOUNTER FOR ADMINISTRATIVE EXAMINATIONS, UNSPECIFIED: ICD-10-CM

## 2021-06-07 LAB
ANION GAP SERPL CALC-SCNC: 7 MMOL/L — SIGNIFICANT CHANGE UP (ref 7–14)
APTT BLD: 36.4 SEC — SIGNIFICANT CHANGE UP (ref 27–39.2)
BASOPHILS # BLD AUTO: 0.02 K/UL — SIGNIFICANT CHANGE UP (ref 0–0.2)
BASOPHILS NFR BLD AUTO: 0.3 % — SIGNIFICANT CHANGE UP (ref 0–1)
BUN SERPL-MCNC: 33 MG/DL — HIGH (ref 10–20)
CALCIUM SERPL-MCNC: 7.8 MG/DL — LOW (ref 8.5–10.1)
CHLORIDE SERPL-SCNC: 99 MMOL/L — SIGNIFICANT CHANGE UP (ref 98–110)
CO2 SERPL-SCNC: 24 MMOL/L — SIGNIFICANT CHANGE UP (ref 17–32)
CREAT SERPL-MCNC: 0.8 MG/DL — SIGNIFICANT CHANGE UP (ref 0.7–1.5)
EOSINOPHIL # BLD AUTO: 0.22 K/UL — SIGNIFICANT CHANGE UP (ref 0–0.7)
EOSINOPHIL NFR BLD AUTO: 2.9 % — SIGNIFICANT CHANGE UP (ref 0–8)
GLUCOSE SERPL-MCNC: 96 MG/DL — SIGNIFICANT CHANGE UP (ref 70–99)
HCT VFR BLD CALC: 26.3 % — LOW (ref 37–47)
HCT VFR BLD CALC: 27.7 % — LOW (ref 37–47)
HGB BLD-MCNC: 8.5 G/DL — LOW (ref 12–16)
HGB BLD-MCNC: 8.9 G/DL — LOW (ref 12–16)
IMM GRANULOCYTES NFR BLD AUTO: 0.4 % — HIGH (ref 0.1–0.3)
INR BLD: 1.99 RATIO — HIGH (ref 0.65–1.3)
LYMPHOCYTES # BLD AUTO: 1.68 K/UL — SIGNIFICANT CHANGE UP (ref 1.2–3.4)
LYMPHOCYTES # BLD AUTO: 22.3 % — SIGNIFICANT CHANGE UP (ref 20.5–51.1)
MAGNESIUM SERPL-MCNC: 1.7 MG/DL — LOW (ref 1.8–2.4)
MCHC RBC-ENTMCNC: 29 PG — SIGNIFICANT CHANGE UP (ref 27–31)
MCHC RBC-ENTMCNC: 29.5 PG — SIGNIFICANT CHANGE UP (ref 27–31)
MCHC RBC-ENTMCNC: 32.1 G/DL — SIGNIFICANT CHANGE UP (ref 32–37)
MCHC RBC-ENTMCNC: 32.3 G/DL — SIGNIFICANT CHANGE UP (ref 32–37)
MCV RBC AUTO: 89.8 FL — SIGNIFICANT CHANGE UP (ref 81–99)
MCV RBC AUTO: 91.7 FL — SIGNIFICANT CHANGE UP (ref 81–99)
MONOCYTES # BLD AUTO: 0.77 K/UL — HIGH (ref 0.1–0.6)
MONOCYTES NFR BLD AUTO: 10.2 % — HIGH (ref 1.7–9.3)
NEUTROPHILS # BLD AUTO: 4.83 K/UL — SIGNIFICANT CHANGE UP (ref 1.4–6.5)
NEUTROPHILS NFR BLD AUTO: 63.9 % — SIGNIFICANT CHANGE UP (ref 42.2–75.2)
NRBC # BLD: 0 /100 WBCS — SIGNIFICANT CHANGE UP (ref 0–0)
NRBC # BLD: 0 /100 WBCS — SIGNIFICANT CHANGE UP (ref 0–0)
PLATELET # BLD AUTO: 145 K/UL — SIGNIFICANT CHANGE UP (ref 130–400)
PLATELET # BLD AUTO: 148 K/UL — SIGNIFICANT CHANGE UP (ref 130–400)
POTASSIUM SERPL-MCNC: 4.5 MMOL/L — SIGNIFICANT CHANGE UP (ref 3.5–5)
POTASSIUM SERPL-SCNC: 4.5 MMOL/L — SIGNIFICANT CHANGE UP (ref 3.5–5)
PROTHROM AB SERPL-ACNC: 22.9 SEC — HIGH (ref 9.95–12.87)
RBC # BLD: 2.93 M/UL — LOW (ref 4.2–5.4)
RBC # BLD: 3.02 M/UL — LOW (ref 4.2–5.4)
RBC # FLD: 14.4 % — SIGNIFICANT CHANGE UP (ref 11.5–14.5)
RBC # FLD: 15.1 % — HIGH (ref 11.5–14.5)
SODIUM SERPL-SCNC: 130 MMOL/L — LOW (ref 135–146)
WBC # BLD: 5.19 K/UL — SIGNIFICANT CHANGE UP (ref 4.8–10.8)
WBC # BLD: 7.55 K/UL — SIGNIFICANT CHANGE UP (ref 4.8–10.8)
WBC # FLD AUTO: 5.19 K/UL — SIGNIFICANT CHANGE UP (ref 4.8–10.8)
WBC # FLD AUTO: 7.55 K/UL — SIGNIFICANT CHANGE UP (ref 4.8–10.8)

## 2021-06-07 PROCEDURE — 93010 ELECTROCARDIOGRAM REPORT: CPT | Mod: NC

## 2021-06-07 PROCEDURE — 99233 SBSQ HOSP IP/OBS HIGH 50: CPT

## 2021-06-07 PROCEDURE — 99232 SBSQ HOSP IP/OBS MODERATE 35: CPT

## 2021-06-07 PROCEDURE — 99223 1ST HOSP IP/OBS HIGH 75: CPT

## 2021-06-07 RX ORDER — SODIUM CHLORIDE 9 MG/ML
1000 INJECTION, SOLUTION INTRAVENOUS
Refills: 0 | Status: DISCONTINUED | OUTPATIENT
Start: 2021-06-07 | End: 2021-06-07

## 2021-06-07 RX ORDER — ESCITALOPRAM OXALATE 10 MG/1
10 TABLET, FILM COATED ORAL DAILY
Refills: 0 | Status: DISCONTINUED | OUTPATIENT
Start: 2021-06-07 | End: 2021-06-10

## 2021-06-07 RX ORDER — ATORVASTATIN CALCIUM 80 MG/1
20 TABLET, FILM COATED ORAL AT BEDTIME
Refills: 0 | Status: DISCONTINUED | OUTPATIENT
Start: 2021-06-07 | End: 2021-06-10

## 2021-06-07 RX ORDER — ATENOLOL 25 MG/1
25 TABLET ORAL DAILY
Refills: 0 | Status: DISCONTINUED | OUTPATIENT
Start: 2021-06-07 | End: 2021-06-10

## 2021-06-07 RX ORDER — ALPRAZOLAM 0.25 MG
0.5 TABLET ORAL AT BEDTIME
Refills: 0 | Status: DISCONTINUED | OUTPATIENT
Start: 2021-06-07 | End: 2021-06-10

## 2021-06-07 RX ORDER — ALPRAZOLAM 0.25 MG
0.25 TABLET ORAL ONCE
Refills: 0 | Status: DISCONTINUED | OUTPATIENT
Start: 2021-06-07 | End: 2021-06-07

## 2021-06-07 RX ORDER — VALSARTAN 80 MG/1
160 TABLET ORAL AT BEDTIME
Refills: 0 | Status: DISCONTINUED | OUTPATIENT
Start: 2021-06-07 | End: 2021-06-10

## 2021-06-07 RX ORDER — MAGNESIUM SULFATE 500 MG/ML
2 VIAL (ML) INJECTION ONCE
Refills: 0 | Status: COMPLETED | OUTPATIENT
Start: 2021-06-07 | End: 2021-06-07

## 2021-06-07 RX ORDER — CHLORHEXIDINE GLUCONATE 213 G/1000ML
1 SOLUTION TOPICAL
Refills: 0 | Status: DISCONTINUED | OUTPATIENT
Start: 2021-06-06 | End: 2021-06-10

## 2021-06-07 RX ADMIN — ESCITALOPRAM OXALATE 10 MILLIGRAM(S): 10 TABLET, FILM COATED ORAL at 21:16

## 2021-06-07 RX ADMIN — ATENOLOL 25 MILLIGRAM(S): 25 TABLET ORAL at 08:22

## 2021-06-07 RX ADMIN — Medication 25 GRAM(S): at 13:12

## 2021-06-07 RX ADMIN — ATORVASTATIN CALCIUM 20 MILLIGRAM(S): 80 TABLET, FILM COATED ORAL at 21:16

## 2021-06-07 RX ADMIN — Medication 0.25 MILLIGRAM(S): at 20:25

## 2021-06-07 RX ADMIN — CHLORHEXIDINE GLUCONATE 1 APPLICATION(S): 213 SOLUTION TOPICAL at 08:12

## 2021-06-07 RX ADMIN — SODIUM CHLORIDE 60 MILLILITER(S): 9 INJECTION, SOLUTION INTRAVENOUS at 03:38

## 2021-06-07 RX ADMIN — VALSARTAN 160 MILLIGRAM(S): 80 TABLET ORAL at 21:18

## 2021-06-07 NOTE — PROGRESS NOTE ADULT - ASSESSMENT
86 y/o F with PMH AVR--mechanical valve on coumadin, CAD s/p PCI x 3, HTN, HLD, depression, presents s/p micturition syncope.    Syncope / acute blood loss anemia     - s/p one unit PRBC transfusion   - coumadin on hold   - monitor H/H

## 2021-06-07 NOTE — H&P ADULT - HISTORY OF PRESENT ILLNESS
84 y/o female with PMH of AVR(mechanical valve on coumadin), Cardiac Stents, HTN, Osteoporosis and High cholesterol presented to the ED for evaluation of syncope. As per Pt and granddaughter at bedside, Pt had teeth extraction x10 a few days ago. As per granddaughter Pt had infected teeth which had to be removed, and coumadin was stopped 2 days prior the procedure. Granddaughter states that 2 days after anticoagulation resumed with Lovenox to bridging with coumadin she noticed bleeding from Pt's mouth. Per granddaughter Pt stopped the coumadin as per Pt's cardiologist Dr Nagy. Earlier today while Pt was on the toiled granddaughter noted bloody BM and Pt had syncopal episode, but did no fall and was caught by granddaughter.    In the ED labwork showed Hgb 8.4 and INR 2.23. Received NS 500mL IV bolus x1 and will receive PRBC x1. 84 y/o female with PMH of AVR(mechanical valve on coumadin), Cardiac Stents, HTN, Osteoporosis and High cholesterol presented to the ED for evaluation of syncope. As per Pt and granddaughter at bedside, Pt had teeth extraction x10 a few days ago. As per granddaughter Pt had infected teeth which had to be removed, and coumadin was stopped 2 days prior the procedure. Granddaughter states that 2 days after anticoagulation resumed with Lovenox to bridging with coumadin she noticed bleeding from Pt's mouth. Per granddaughter Pt stopped the coumadin as per Pt's cardiologist Dr Nagy. Earlier today while Pt was on the toiled granddaughter noted bloody BM and Pt had syncopal episode, but did no fall and was caught by granddaughter.    In the ED labwork showed , Hgb 8.4 and INR 2.23. Received NS 500mL IV bolus x1 and will receive PRBC x1.

## 2021-06-07 NOTE — CONSULT NOTE ADULT - SUBJECTIVE AND OBJECTIVE BOX
CARDIOLOGY CONSULT NOTE     CHIEF COMPLAINT/REASON FOR CONSULT:    HPI:  84 y/o female with PMH of AVR(mechanical valve on coumadin), Cardiac Stents, HTN, Osteoporosis and High cholesterol presented to the ED for evaluation of syncope. As per Pt and granddaughter at bedside, Pt had teeth extraction x10 a few days ago. As per granddaughter Pt had infected teeth which had to be removed, and coumadin was stopped 2 days prior the procedure. Granddaughter states that 2 days after anticoagulation resumed with Lovenox to bridging with coumadin she noticed bleeding from Pt's mouth. Per granddaughter Pt stopped the coumadin as per Pt's cardiologist me. Earlier yesterday  while Pt was on the toiled granddaughter noted bloody BM and Pt had syncopal episode, but did no fall and was caught by granddaughter.    In the ED labwork showed , Hgb 8.4 and INR 2.23. Received NS 500mL IV bolus x1 and  received PRBC x1. (07 Jun 2021 00:40)      PAST MEDICAL & SURGICAL HISTORY:  HTN (hypertension)    High cholesterol    Osteoporosis    Fracture of right shoulder    H/O heart valve replacement with mechanical valve        Cardiac Risks:   [ x]HTN, [ ] DM, [ ] Smoking, [ ] FH,  [x ] Lipids        MEDICATIONS:  MEDICATIONS  (STANDING):  ATENolol  Tablet 25 milliGRAM(s) Oral daily  atorvastatin 20 milliGRAM(s) Oral at bedtime  chlorhexidine 4% Liquid 1 Application(s) Topical <User Schedule>  dextrose 5% + sodium chloride 0.9%. 1000 milliLiter(s) (60 mL/Hr) IV Continuous <Continuous>  escitalopram 10 milliGRAM(s) Oral daily  valsartan 160 milliGRAM(s) Oral at bedtime      FAMILY HISTORY:      SOCIAL HISTORY:      [ ] Marital status  single  Allergies    penicillin (Unknown)  pinapples (Unknown)      	    REVIEW OF SYSTEMS:  CONSTITUTIONAL: No fever, weight loss, or fatigue  EYES: No eye pain, visual disturbances, or discharge  ENMT:  No difficulty hearing, tinnitus, vertigo; No sinus or throat pain  NECK: No pain or stiffness  RESPIRATORY: No cough, wheezing, chills or hemoptysis; No Shortness of Breath  CARDIOVASCULAR: See above  GASTROINTESTINAL: No abdominal or epigastric pain. No nausea, vomiting, or hematemesis; No diarrhea or constipation. No melena or hematochezia.  GENITOURINARY: No dysuria, frequency, hematuria, or incontinence  NEUROLOGICAL: No headaches, memory loss, loss of strength, numbness, or tremors  SKIN: No itching, burning, rashes, or lesions   	    PHYSICAL EXAM:  T(C): 36.1 (06-07-21 @ 07:33), Max: 37.1 (06-06-21 @ 20:21)  HR: 59 (06-07-21 @ 02:50) (59 - 72)  BP: 176/74 (06-07-21 @ 02:50) (136/63 - 193/74)  RR: 20 (06-07-21 @ 07:33) (17 - 26)  SpO2: 98% (06-07-21 @ 02:50) (98% - 99%)  Wt(kg): --  I&O's Summary    06 Jun 2021 07:01  -  07 Jun 2021 07:00  --------------------------------------------------------  IN: 621 mL / OUT: 500 mL / NET: 121 mL        Appearance: Normal	  Psychiatry: A & O x 3, Mood & affect appropriate  HEENT:   Normal oral mucosa, PERRL, EOMI	  Lymphatic: No lymphadenopathy  Cardiovascular: Normal S1 S2,RRR, No JVD, + click  Respiratory: Lungs clear to auscultation	  Gastrointestinal:  Soft, Non-tender, + BS	  Skin: No rashes, No ecchymoses, No cyanosis	  Neurologic: Non-focal  Extremities: Normal range of motion, No clubbing, cyanosis or edema  Vascular: Peripheral pulses palpable 2+ bilaterally      ECG:  	not  available    	  LABS:	 	    CARDIAC MARKERS:                                    8.5    5.19  )-----------( 148      ( 07 Jun 2021 05:26 )             26.3     06-07    130<L>  |  99  |  33<H>  ----------------------------<  96  4.5   |  24  |  0.8    Ca    7.8<L>      07 Jun 2021 05:26  Mg     1.7     06-07    TPro  5.2<L>  /  Alb  3.4<L>  /  TBili  0.2  /  DBili  <0.2  /  AST  27  /  ALT  21  /  AlkPhos  51  06-06    PT/INR - ( 07 Jun 2021 05:26 )   PT: 22.90 sec;   INR: 1.99 ratio         PTT - ( 07 Jun 2021 05:26 )  PTT:36.4 sec

## 2021-06-07 NOTE — H&P ADULT - NSHPPHYSICALEXAM_GEN_ALL_CORE
GENERAL:  86y/o Female NAD, resting comfortably.  HEAD:  Atraumatic, Normocephalic  EYES: EOMI, PERRLA, conjunctiva and sclera clear  NECK: Supple, No JVD, no cervical lymphadenopathy, non-tender  CHEST/LUNG: Clear to auscultation bilaterally; No wheeze, rhonchi, or rales  HEART: Regular rate and rhythm; S1&S2  ABDOMEN: Soft, Nontender, Nondistended x 4 quadrants; Bowel sounds present  EXTREMITIES:   Peripheral Pulses Present, No clubbing, no cyanosis, or no edema, no calf tenderness  PSYCH: AAOx3, cooperative, appropriate  NEUROLOGY: WNL  SKIN: **echymosis noted around chin and mouth**

## 2021-06-07 NOTE — H&P ADULT - NSICDXPASTMEDICALHX_GEN_ALL_CORE_FT
PAST MEDICAL HISTORY:  Fracture of right shoulder     High cholesterol     HTN (hypertension)     Osteoporosis

## 2021-06-07 NOTE — H&P ADULT - NSHPLABSRESULTS_GEN_ALL_CORE
06-06    126<L>  |  93<L>  |  27<H>  ----------------------------<  117<H>  4.3   |  23  |  1.1    Ca    7.7<L>      06 Jun 2021 20:13    TPro  5.2<L>  /  Alb  3.4<L>  /  TBili  0.2  /  DBili  <0.2  /  AST  27  /  ALT  21  /  AlkPhos  51  06-06                            8.4    6.82  )-----------( 160      ( 06 Jun 2021 20:13 )             25.8     CAPILLARY BLOOD GLUCOSE      POCT Blood Glucose.: 121 mg/dL (06 Jun 2021 21:04)    CARDIAC MARKERS ( 06 Jun 2021 20:13 )  x     / <0.01 ng/mL / x     / x     / x

## 2021-06-07 NOTE — H&P ADULT - NSHPREVIEWOFSYSTEMS_GEN_ALL_CORE
REVIEW OF SYSTEMS:    CONSTITUTIONAL: No weakness, fevers or chills  EYES/ENT: No visual changes;  No vertigo or throat pain   NECK: No pain or stiffness  RESPIRATORY: No cough, wheezing, hemoptysis; No shortness of breath  CARDIOVASCULAR: No chest pain or palpitations  GASTROINTESTINAL: No abdominal or epigastric pain. No nausea, vomiting, or hematemesis; No diarrhea or constipation.   GENITOURINARY: No dysuria, frequency or hematuria  NEUROLOGICAL: No numbness or weakness  SKIN: No itching, rashes

## 2021-06-07 NOTE — PROGRESS NOTE ADULT - ASSESSMENT
IMPRESSION:  Syncope likely 2/2 blood loss anemia from dental work  Hyponatremia  AVR(mechanical valve)      SUGGEST:  monitoring  received 1 Unit PRBC  serial HH  will hold AC-  gentle fluid hydration  f/u with repeat lytes and correct as needed  f/u with repeat BMP  cardiology management   2d echo

## 2021-06-07 NOTE — PROGRESS NOTE ADULT - SUBJECTIVE AND OBJECTIVE BOX
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 As per Pt and granddaughter at bedside, Pt had teeth extraction x10 a few days ago. As per granddaughter Pt had infected teeth which had to be removed, and coumadin was stopped 2 days prior the procedure. Granddaughter states that 2 days after anticoagulation resumed with Lovenox to bridging with coumadin she noticed bleeding from Pt's mouth. Per granddaughter Pt stopped the coumadin as per Pt's cardiologist Dr Nagy. Earlier today while Pt was on the toiled granddaughter noted bloody BM and Pt had syncopal episode, but did no fall and was caught by granddaughter.    In the ED labwork showed , Hgb 8.4 and INR 2.23. Received NS 500mL IV bolus x1 and will receive PRBC x1. (07 Jun 2021 00:40)      Pt evaluated on rounds.  I reviewed the radiology tests and hospital record.    I reviewed my previous notes on this patient.    Interval Events: No overnight events.    REVIEW OF SYSTEMS:   see HPI      OBJECTIVE:  ICU Vital Signs Last 24 Hrs  T(C): 36.1 (07 Jun 2021 07:33), Max: 37.1 (06 Jun 2021 20:21)  T(F): 96.9 (07 Jun 2021 07:33), Max: 98.7 (06 Jun 2021 20:21)  HR: 59 (07 Jun 2021 02:50) (59 - 72)  BP: 176/74 (07 Jun 2021 02:50) (136/63 - 193/74)  BP(mean): 106 (07 Jun 2021 02:50) (100 - 106)  ABP: --  ABP(mean): --  RR: 20 (07 Jun 2021 07:33) (17 - 26)  SpO2: 98% (07 Jun 2021 02:50) (98% - 99%)        06-06 @ 07:01  -  06-07 @ 07:00  --------------------------------------------------------  IN: 621 mL / OUT: 500 mL / NET: 121 mL      CAPILLARY BLOOD GLUCOSE      POCT Blood Glucose.: 121 mg/dL (06 Jun 2021 21:04)        PHYSICAL EXAM:     · CONSTITUTIONAL:   not septic appearing,   well nourished,   NAD    · ENMT:   Airway patent,   Nasal mucosa clear.  Mouth with normal mucosa.   No thrush    · EYES:   Clear bilaterally,   pupils equal,   round and reactive to light.    · CARDIAC:   Normal rate,   regular rhythm.    Heart sounds S1, S2.   No murmurs, no rubs or gallops on auscultation  no edema        CAROTID:   normal systolic impulse  no bruits    · RESPIRATORY:   rales  normal chest expansion  no retractions or use of accessory muscles  palpation of chest is normal with no fremitus  percussion of chest demonstrates no hyperresonance or dullness    · GASTROINTESTINAL:  Abdomen soft,   non-tender,   + BS  liver/spleen not palpable    · MUSCULOSKELETAL:   no clubbing, cyanosis      · SKIN:   Skin normal color for race,   warm, dry   No evidence of rash.        · HEME LYMPH:   no splenomegaly.  No cervical  lymphadenopathy.  no inguinal lymphadenopathy    HOSPITAL MEDICATIONS:  MEDICATIONS  (STANDING):  ATENolol  Tablet 25 milliGRAM(s) Oral daily  atorvastatin 20 milliGRAM(s) Oral at bedtime  chlorhexidine 4% Liquid 1 Application(s) Topical <User Schedule>  dextrose 5% + sodium chloride 0.9%. 1000 milliLiter(s) (60 mL/Hr) IV Continuous <Continuous>  escitalopram 10 milliGRAM(s) Oral daily  valsartan 160 milliGRAM(s) Oral at bedtime    MEDICATIONS  (PRN):  ALPRAZolam 0.5 milliGRAM(s) Oral at bedtime PRN anxiety    sodium chloride 0.9% Bolus:   500 milliLiter(s), IV Bolus, once, infuse over 60 Minute(s), Stop After 1 Doses  Provider's Contact #: [phone]      LABS:                        8.5    5.19  )-----------( 148      ( 07 Jun 2021 05:26 )             26.3     06-06    126<L>  |  93<L>  |  27<H>  ----------------------------<  117<H>  4.3   |  23  |  1.1    Ca    7.7<L>      06 Jun 2021 20:13    TPro  5.2<L>  /  Alb  3.4<L>  /  TBili  0.2  /  DBili  <0.2  /  AST  27  /  ALT  21  /  AlkPhos  51  06-06    PT/INR - ( 07 Jun 2021 05:26 )   PT: 22.90 sec;   INR: 1.99 ratio         PTT - ( 07 Jun 2021 05:26 )  PTT:36.4 sec        CARDIAC MARKERS ( 06 Jun 2021 20:13 )  x     / <0.01 ng/mL / x     / x     / x          SARS-CoV-2: NotDetec (06 Jun 2021 20:23)              RADIOLOGY: Today I personally interpreted the latest CXR and other pertinent films.

## 2021-06-07 NOTE — H&P ADULT - ASSESSMENT
84 y/o female with PMH of AVR(mechanical valve on coumadin), Cardiac Stents, HTN, Osteoporosis and High cholesterol presented to the ED for evaluation of syncope.      Plan:  #Syncope likely 2/2 blood loss anemia  -telemetry monitoring  -will receive 1 Unit PRBC  -f/u with repeat CBC  -will hold AC  -gentle fluid hydration  -f/u with repeat lytes and correct as needed    #AVR(mechanical valve)  -f/u with cardiology consult  -f/u with 2d echo    #HTN  -c/w home BP meds    #Osteoporosis  -Pt takes alendronate weekly(non-formulary) and may need to bring from home    #Hypercholesterolemia   -c/w atorvastatin    #Depression  -c/w escitalopram    DVT ppx-sq pneumatic compression stockings  Diet-NPO for now      Discussed case with Dr Nayak and Pt will be admitted to CCU-tele 84 y/o female with PMH of AVR(mechanical valve on coumadin), Cardiac Stents, HTN, Osteoporosis and High cholesterol presented to the ED for evaluation of syncope.      Plan:  #Syncope likely 2/2 blood loss anemia  -telemetry monitoring  -will receive 1 Unit PRBC  -f/u with repeat CBC  -will hold AC  -gentle fluid hydration  -f/u with repeat lytes and correct as needed    #Hyponatremia  -will start NS@60cc/hr  -f/u with repeat BMP    #AVR(mechanical valve)  -f/u with cardiology consult  -f/u with 2d echo     #HTN  -c/w home BP meds    #Osteoporosis  -Pt takes alendronate weekly(non-formulary) and may need to bring from home    #Hypercholesterolemia   -c/w atorvastatin    #Depression  -c/w escitalopram    DVT ppx-sq pneumatic compression stockings  Diet-NPO for now      Discussed case with Dr Nayak and Pt will be admitted to CCU-tele

## 2021-06-07 NOTE — PROGRESS NOTE ADULT - SUBJECTIVE AND OBJECTIVE BOX
History via [place]  ([employer] #[id number])   Pt reports feeling well today no pain or discomfort   presented to the hospital yesterday after having a syncopal episode while sitting on the toilet and urinating   pt had 10 teeth extractions 6 days ago and has been experiencing some gum bleeding        T(F): 96.9 (06-07-21 @ 07:33), Max: 98.7 (06-06-21 @ 20:21)  HR: 63 (06-07-21 @ 07:24)  BP: 146/83 (06-07-21 @ 07:24)  RR: 20 (06-07-21 @ 07:33)  SpO2: 99% (06-07-21 @ 07:24) (98% - 99%)    PHYSICAL EXAM:  GENERAL: NAD  EYES: EOMI, PERRLA, conjunctiva and sclera clear  ENMT: ecchymosis around mouth and jaw  NERVOUS SYSTEM:  Alert & Oriented X3, no focal deficits   CHEST/LUNG: Clear to percussion bilaterally; No rales, rhonchi, wheezing, or rubs  HEART: Regular rate and rhythm; No murmurs, rubs, or gallops  ABDOMEN: Soft, Nontender, Nondistended; Bowel sounds present  EXTREMITIES:  2+ Peripheral Pulses, No clubbing, cyanosis, or edema  LYMPH: No lymphadenopathy noted  SKIN: No rashes or lesions    LABS  06-07    130<L>  |  99  |  33<H>  ----------------------------<  96  4.5   |  24  |  0.8    Ca    7.8<L>      07 Jun 2021 05:26  Mg     1.7     06-07    TPro  5.2<L>  /  Alb  3.4<L>  /  TBili  0.2  /  DBili  <0.2  /  AST  27  /  ALT  21  /  AlkPhos  51  06-06                          8.5    5.19  )-----------( 148      ( 07 Jun 2021 05:26 )             26.3     PT/INR - ( 07 Jun 2021 05:26 )   PT: 22.90 sec;   INR: 1.99 ratio         PTT - ( 07 Jun 2021 05:26 )  PTT:36.4 sec    CARDIAC ENZYMES      Troponin T, Serum: <0.01 ng/mL (06-06-21 @ 20:13)  < from: 12 Lead ECG (06.07.21 @ 08:49) >    Diagnosis Line Normal sinus rhythm  Normal ECG    < end of copied text     RADIOLOGY  < from: Xray Chest 1 View- PORTABLE-Urgent (06.06.21 @ 20:50) >  Impression:    No radiographic evidence of acute cardiopulmonary disease.      < end of copied text >    MEDICATIONS  (STANDING):  ATENolol  Tablet 25 milliGRAM(s) Oral daily  atorvastatin 20 milliGRAM(s) Oral at bedtime  chlorhexidine 4% Liquid 1 Application(s) Topical <User Schedule>  escitalopram 10 milliGRAM(s) Oral daily  valsartan 160 milliGRAM(s) Oral at bedtime    MEDICATIONS  (PRN):  ALPRAZolam 0.5 milliGRAM(s) Oral at bedtime PRN anxiety

## 2021-06-07 NOTE — CONSULT NOTE ADULT - ASSESSMENT
Patient with hx cad s/p AVR mechanical. She had 10 teeth pulled. Patient was on coumadin and lovenox. She bled. She may need another unit of blood.  Hold lovenox coumadin asa today. If not bleeding can probably resume coumadin in am. Dvt prophylaxis.

## 2021-06-08 ENCOUNTER — APPOINTMENT (OUTPATIENT)
Dept: MEDICATION MANAGEMENT | Facility: CLINIC | Age: 86
End: 2021-06-08

## 2021-06-08 LAB
ANION GAP SERPL CALC-SCNC: 7 MMOL/L — SIGNIFICANT CHANGE UP (ref 7–14)
BASOPHILS # BLD AUTO: 0.03 K/UL — SIGNIFICANT CHANGE UP (ref 0–0.2)
BASOPHILS NFR BLD AUTO: 0.4 % — SIGNIFICANT CHANGE UP (ref 0–1)
BUN SERPL-MCNC: 20 MG/DL — SIGNIFICANT CHANGE UP (ref 10–20)
CALCIUM SERPL-MCNC: 8 MG/DL — LOW (ref 8.5–10.1)
CHLORIDE SERPL-SCNC: 99 MMOL/L — SIGNIFICANT CHANGE UP (ref 98–110)
CO2 SERPL-SCNC: 26 MMOL/L — SIGNIFICANT CHANGE UP (ref 17–32)
COVID-19 SPIKE DOMAIN AB INTERP: POSITIVE
COVID-19 SPIKE DOMAIN ANTIBODY RESULT: >250 U/ML — HIGH
CREAT SERPL-MCNC: 0.7 MG/DL — SIGNIFICANT CHANGE UP (ref 0.7–1.5)
EOSINOPHIL # BLD AUTO: 0.31 K/UL — SIGNIFICANT CHANGE UP (ref 0–0.7)
EOSINOPHIL NFR BLD AUTO: 4.4 % — SIGNIFICANT CHANGE UP (ref 0–8)
GLUCOSE SERPL-MCNC: 99 MG/DL — SIGNIFICANT CHANGE UP (ref 70–99)
HCT VFR BLD CALC: 24.7 % — LOW (ref 37–47)
HGB BLD-MCNC: 8.1 G/DL — LOW (ref 12–16)
IMM GRANULOCYTES NFR BLD AUTO: 0.1 % — SIGNIFICANT CHANGE UP (ref 0.1–0.3)
INR BLD: 1.66 RATIO — HIGH (ref 0.65–1.3)
LYMPHOCYTES # BLD AUTO: 1.91 K/UL — SIGNIFICANT CHANGE UP (ref 1.2–3.4)
LYMPHOCYTES # BLD AUTO: 27.2 % — SIGNIFICANT CHANGE UP (ref 20.5–51.1)
MAGNESIUM SERPL-MCNC: 2 MG/DL — SIGNIFICANT CHANGE UP (ref 1.8–2.4)
MCHC RBC-ENTMCNC: 29.6 PG — SIGNIFICANT CHANGE UP (ref 27–31)
MCHC RBC-ENTMCNC: 32.8 G/DL — SIGNIFICANT CHANGE UP (ref 32–37)
MCV RBC AUTO: 90.1 FL — SIGNIFICANT CHANGE UP (ref 81–99)
MONOCYTES # BLD AUTO: 0.63 K/UL — HIGH (ref 0.1–0.6)
MONOCYTES NFR BLD AUTO: 9 % — SIGNIFICANT CHANGE UP (ref 1.7–9.3)
NEUTROPHILS # BLD AUTO: 4.14 K/UL — SIGNIFICANT CHANGE UP (ref 1.4–6.5)
NEUTROPHILS NFR BLD AUTO: 58.9 % — SIGNIFICANT CHANGE UP (ref 42.2–75.2)
NRBC # BLD: 0 /100 WBCS — SIGNIFICANT CHANGE UP (ref 0–0)
PLATELET # BLD AUTO: 154 K/UL — SIGNIFICANT CHANGE UP (ref 130–400)
POTASSIUM SERPL-MCNC: 4.8 MMOL/L — SIGNIFICANT CHANGE UP (ref 3.5–5)
POTASSIUM SERPL-SCNC: 4.8 MMOL/L — SIGNIFICANT CHANGE UP (ref 3.5–5)
PROTHROM AB SERPL-ACNC: 19.1 SEC — HIGH (ref 9.95–12.87)
RBC # BLD: 2.74 M/UL — LOW (ref 4.2–5.4)
RBC # FLD: 14.9 % — HIGH (ref 11.5–14.5)
SARS-COV-2 IGG+IGM SERPL QL IA: >250 U/ML — HIGH
SARS-COV-2 IGG+IGM SERPL QL IA: POSITIVE
SODIUM SERPL-SCNC: 132 MMOL/L — LOW (ref 135–146)
WBC # BLD: 7.03 K/UL — SIGNIFICANT CHANGE UP (ref 4.8–10.8)
WBC # FLD AUTO: 7.03 K/UL — SIGNIFICANT CHANGE UP (ref 4.8–10.8)

## 2021-06-08 PROCEDURE — 99233 SBSQ HOSP IP/OBS HIGH 50: CPT

## 2021-06-08 PROCEDURE — 93306 TTE W/DOPPLER COMPLETE: CPT | Mod: 26

## 2021-06-08 PROCEDURE — 93010 ELECTROCARDIOGRAM REPORT: CPT | Mod: NC

## 2021-06-08 PROCEDURE — 99232 SBSQ HOSP IP/OBS MODERATE 35: CPT

## 2021-06-08 RX ORDER — ALPRAZOLAM 0.25 MG
0.25 TABLET ORAL AT BEDTIME
Refills: 0 | Status: DISCONTINUED | OUTPATIENT
Start: 2021-06-08 | End: 2021-06-10

## 2021-06-08 RX ORDER — PANTOPRAZOLE SODIUM 20 MG/1
40 TABLET, DELAYED RELEASE ORAL
Refills: 0 | Status: DISCONTINUED | OUTPATIENT
Start: 2021-06-08 | End: 2021-06-10

## 2021-06-08 RX ORDER — ALPRAZOLAM 0.25 MG
0.5 TABLET ORAL AT BEDTIME
Refills: 0 | Status: DISCONTINUED | OUTPATIENT
Start: 2021-06-08 | End: 2021-06-08

## 2021-06-08 RX ADMIN — CHLORHEXIDINE GLUCONATE 1 APPLICATION(S): 213 SOLUTION TOPICAL at 07:47

## 2021-06-08 RX ADMIN — ESCITALOPRAM OXALATE 10 MILLIGRAM(S): 10 TABLET, FILM COATED ORAL at 21:01

## 2021-06-08 RX ADMIN — Medication 0.25 MILLIGRAM(S): at 21:17

## 2021-06-08 RX ADMIN — ATORVASTATIN CALCIUM 20 MILLIGRAM(S): 80 TABLET, FILM COATED ORAL at 21:01

## 2021-06-08 RX ADMIN — PANTOPRAZOLE SODIUM 40 MILLIGRAM(S): 20 TABLET, DELAYED RELEASE ORAL at 19:33

## 2021-06-08 RX ADMIN — ATENOLOL 25 MILLIGRAM(S): 25 TABLET ORAL at 06:03

## 2021-06-08 RX ADMIN — VALSARTAN 160 MILLIGRAM(S): 80 TABLET ORAL at 21:02

## 2021-06-08 NOTE — PROGRESS NOTE ADULT - ASSESSMENT
IMPRESSION:  Syncope likely 2/2 blood loss anemia from dental work  Hyponatremia  AVR(mechanical valve)      SUGGEST:  monitoring  received 1 Unit PRBC  serial HH  will hold AC-  gentle fluid hydration  f/u with repeat lytes and correct as needed  f/u with repeat BMP  cardiology management   2d echo IMPRESSION:  Syncope likely 2/2 blood loss anemia from dental work  Hyponatremia; improving  AVR(mechanical valve)      SUGGEST:    transfuse 1 Unit PRBC  serial HH q12h  keep holding coumadin  follow up with cardiology  gentle fluid hydration  f/u with repeat lytes and correct as needed  f/u with repeat BMP  cardiology management   2d echo

## 2021-06-08 NOTE — ED PROCEDURE NOTE - ATTENDING CONTRIBUTION TO CARE
I was present for and supervised the key/critical aspects of the procedures performed during the care of the patient.
I personally supervised the study.  I reviewed the images and interpretation by the resident/ACP and have edited where appropriate.

## 2021-06-08 NOTE — PROGRESS NOTE ADULT - SUBJECTIVE AND OBJECTIVE BOX
ORAL WU  85y  Female      Patient is a 85y old  Female who presents with a chief complaint of syncope (08 Jun 2021 07:44)    near syncope after 10 teeth pulled with coumadin,lovenox bridge    REVIEW OF SYSTEMS:  CONSTITUTIONAL: No fever, weight loss, or fatigue  EYES: No eye pain, visual disturbances, or discharge  ENMT:  No difficulty hearing, tinnitus, vertigo; No sinus or throat pain  NECK: No pain or stiffness  BREASTS: No pain, masses, or nipple discharge  RESPIRATORY: No cough, wheezing, chills or hemoptysis; No shortness of breath  CARDIOVASCULAR: No chest pain, palpitations, dizziness, or leg swelling  GASTROINTESTINAL: No abdominal or epigastric pain. No nausea, vomiting, or hematemesis; diarrhea+  GENITOURINARY: No dysuria, frequency, hematuria, or incontinence  NEUROLOGICAL: No headaches, memory loss,   SKIN: No itching, burning, rashes, or lesions   PSYCHIATRIC: No depression, anxiety, mood swings, or difficulty sleeping  HEME/LYMPH: No easy bruising, or bleeding gums  ALLERY AND IMMUNOLOGIC: No hives or eczema  FAMILY HISTORY:    T(C): 36.8 (06-08-21 @ 07:10), Max: 37.3 (06-07-21 @ 15:10)  HR: 73 (06-08-21 @ 05:00) (61 - 73)  BP: 121/56 (06-08-21 @ 05:00) (121/56 - 213/79)  RR: 21 (06-08-21 @ 07:10) (17 - 29)  SpO2: 99% (06-08-21 @ 05:00) (99% - 100%)  Wt(kg): --Vital Signs Last 24 Hrs  T(C): 36.8 (08 Jun 2021 07:10), Max: 37.3 (07 Jun 2021 15:10)  T(F): 98.2 (08 Jun 2021 07:10), Max: 99.1 (07 Jun 2021 15:10)  HR: 73 (08 Jun 2021 05:00) (61 - 73)  BP: 121/56 (08 Jun 2021 05:00) (121/56 - 213/79)  BP(mean): 81 (08 Jun 2021 05:00) (81 - 115)  RR: 21 (08 Jun 2021 07:10) (17 - 29)  SpO2: 99% (08 Jun 2021 05:00) (99% - 100%)  penicillin (Unknown)  pinapples (Unknown)      PHYSICAL EXAM:  GENERAL: NAD,  HEAD:  Atraumatic, Normocephalic  EYES: EOMI, PERRLA, conjunctiva and sclera clear  ENMT: No tonsillar erythema, exudates,   NECK: Supple, No JVD, Normal thyroid  NERVOUS SYSTEM:  Alert   CHEST/LUNG: Clear to percussion bilaterally; No rales, rhonchi, wheezing, or rubs  HEART: Regular rate and rhythm;systolic mur mur+  ABDOMEN: Soft, Nontender, Nondistended; Bowel sounds present  EXTREMITIES: , No clubbing, cyanosis, or edema  LYMPH: No lymphadenopathy noted  SKIN: No rashes or lesions      LABS:  06-08    132<L>  |  99  |  20  ----------------------------<  99  4.8   |  26  |  0.7    Ca    8.0<L>      08 Jun 2021 05:40  Mg     2.0     06-08    TPro  5.2<L>  /  Alb  3.4<L>  /  TBili  0.2  /  DBili  <0.2  /  AST  27  /  ALT  21  /  AlkPhos  51  06-06                          8.1    7.03  )-----------( 154      ( 08 Jun 2021 05:40 )             24.7         RADIOLOGY & ADDITIONAL TESTS:    MEDICATION:  ALPRAZolam 0.5 milliGRAM(s) Oral at bedtime PRN  ATENolol  Tablet 25 milliGRAM(s) Oral daily  atorvastatin 20 milliGRAM(s) Oral at bedtime  chlorhexidine 4% Liquid 1 Application(s) Topical <User Schedule>  escitalopram 10 milliGRAM(s) Oral daily  valsartan 160 milliGRAM(s) Oral at bedtime      HEALTH ISSUES - PROBLEM Dx:  near syncope due to aNEMIA AND HYPONATREMIA  ANEMIA ACUTE BLOOD LOSS S/P 10 TEETH PULLED ,COUMADIN ON HOLD S/P 1 UNIT PRBC,WILL GIVE 1 MORE UNIT  HX AORTIC VALVE PLACEMENT RESUME COUMADIN WHEN CARDIOLOGIST RECOMMEND  HYPONATREMIA S/P NORMAL SALINE ,IMPROVED  HTN ON VALSARTAN,CASE D/W GRANDDAUGHTER AT BED SIDE

## 2021-06-08 NOTE — PROGRESS NOTE ADULT - SUBJECTIVE AND OBJECTIVE BOX
Patient is a 85y old  Female who presents with a chief complaint of syncope (08 Jun 2021 07:59)        Over Night Events:        ROS:     All ROS are negative except HPI         PHYSICAL EXAM    ICU Vital Signs Last 24 Hrs  T(C): 36.8 (08 Jun 2021 07:10), Max: 37.3 (07 Jun 2021 15:10)  T(F): 98.2 (08 Jun 2021 07:10), Max: 99.1 (07 Jun 2021 15:10)  HR: 66 (08 Jun 2021 07:06) (63 - 73)  BP: 150/66 (08 Jun 2021 07:06) (121/56 - 213/79)  BP(mean): 95 (08 Jun 2021 07:06) (81 - 115)  ABP: --  ABP(mean): --  RR: 21 (08 Jun 2021 07:10) (17 - 29)  SpO2: 100% (08 Jun 2021 07:06) (99% - 100%)      CONSTITUTIONAL:  Well nourished.  NAD    ENT:   Airway patent,   Mouth with normal mucosa.   No thrush    EYES:   Pupils equal,   Round and reactive to light.    CARDIAC:   Normal rate,   Regular rhythm.    No edema      Vascular:  Normal systolic impulse  No Carotid bruits    RESPIRATORY:   No wheezing  Bilateral BS  Normal chest expansion  Not tachypneic,  No use of accessory muscles    GASTROINTESTINAL:  Abdomen soft,   Non-tender,   No guarding,   + BS    MUSCULOSKELETAL:   Range of motion is not limited,  No clubbing, cyanosis    NEUROLOGICAL:   Alert and oriented   No motor  deficits.    SKIN:   Skin normal color for race,   Warm and dry and intact.   No evidence of rash.    PSYCHIATRIC:   Normal mood and affect.   No apparent risk to self or others.    HEMATOLOGICAL:  No cervical  lymphadenopathy.  no inguinal lymphadenopathy      06-07-21 @ 07:01  -  06-08-21 @ 07:00  --------------------------------------------------------  IN:    dextrose 5% + sodium chloride 0.9%: 60 mL    IV PiggyBack: 50 mL    Oral Fluid: 420 mL  Total IN: 530 mL    OUT:    Voided (mL): 200 mL  Total OUT: 200 mL    Total NET: 330 mL          LABS:                            8.1    7.03  )-----------( 154      ( 08 Jun 2021 05:40 )             24.7                                               06-08    132<L>  |  99  |  20  ----------------------------<  99  4.8   |  26  |  0.7    Ca    8.0<L>      08 Jun 2021 05:40  Mg     2.0     06-08    TPro  5.2<L>  /  Alb  3.4<L>  /  TBili  0.2  /  DBili  <0.2  /  AST  27  /  ALT  21  /  AlkPhos  51  06-06      PT/INR - ( 08 Jun 2021 05:40 )   PT: 19.10 sec;   INR: 1.66 ratio         PTT - ( 07 Jun 2021 05:26 )  PTT:36.4 sec                                           CARDIAC MARKERS ( 06 Jun 2021 20:13 )  x     / <0.01 ng/mL / x     / x     / x                                                LIVER FUNCTIONS - ( 06 Jun 2021 20:13 )  Alb: 3.4 g/dL / Pro: 5.2 g/dL / ALK PHOS: 51 U/L / ALT: 21 U/L / AST: 27 U/L / GGT: x                                                                                                                                       MEDICATIONS  (STANDING):  ATENolol  Tablet 25 milliGRAM(s) Oral daily  atorvastatin 20 milliGRAM(s) Oral at bedtime  chlorhexidine 4% Liquid 1 Application(s) Topical <User Schedule>  escitalopram 10 milliGRAM(s) Oral daily  valsartan 160 milliGRAM(s) Oral at bedtime    MEDICATIONS  (PRN):  ALPRAZolam 0.5 milliGRAM(s) Oral at bedtime PRN anxiety      New X-rays reviewed:                                                                                  ECHO    CXR interpreted by me:       Patient is a 85y old  Female who presents with a chief complaint of syncope (08 Jun 2021 07:59)        Over Night Events:    bleeding stopped last night.     ROS:     All ROS are negative except HPI         PHYSICAL EXAM    ICU Vital Signs Last 24 Hrs  T(C): 36.8 (08 Jun 2021 07:10), Max: 37.3 (07 Jun 2021 15:10)  T(F): 98.2 (08 Jun 2021 07:10), Max: 99.1 (07 Jun 2021 15:10)  HR: 66 (08 Jun 2021 07:06) (63 - 73)  BP: 150/66 (08 Jun 2021 07:06) (121/56 - 213/79)  BP(mean): 95 (08 Jun 2021 07:06) (81 - 115)  ABP: --  ABP(mean): --  RR: 21 (08 Jun 2021 07:10) (17 - 29)  SpO2: 100% (08 Jun 2021 07:06) (99% - 100%)      CONSTITUTIONAL:  Well nourished.  NAD    ENT:   Airway patent,   Mouth with normal mucosa.   No thrush    EYES:   Pupils equal,   Round and reactive to light.    CARDIAC:   Normal rate,   Regular rhythm.    No edema      Vascular:  Normal systolic impulse  No Carotid bruits    RESPIRATORY:   No wheezing  Bilateral BS  Normal chest expansion  Not tachypneic,  No use of accessory muscles    GASTROINTESTINAL:  Abdomen soft,   Non-tender,   No guarding,   + BS    MUSCULOSKELETAL:   Range of motion is not limited,  No clubbing, cyanosis    NEUROLOGICAL:   Alert and oriented   No motor  deficits.    SKIN:   Skin normal color for race,   Warm and dry and intact.   No evidence of rash.    PSYCHIATRIC:   Normal mood and affect.   No apparent risk to self or others.    HEMATOLOGICAL:  No cervical  lymphadenopathy.  no inguinal lymphadenopathy      06-07-21 @ 07:01  -  06-08-21 @ 07:00  --------------------------------------------------------  IN:    dextrose 5% + sodium chloride 0.9%: 60 mL    IV PiggyBack: 50 mL    Oral Fluid: 420 mL  Total IN: 530 mL    OUT:    Voided (mL): 200 mL  Total OUT: 200 mL    Total NET: 330 mL          LABS:                            8.1    7.03  )-----------( 154      ( 08 Jun 2021 05:40 )             24.7                                               06-08    132<L>  |  99  |  20  ----------------------------<  99  4.8   |  26  |  0.7    Ca    8.0<L>      08 Jun 2021 05:40  Mg     2.0     06-08    TPro  5.2<L>  /  Alb  3.4<L>  /  TBili  0.2  /  DBili  <0.2  /  AST  27  /  ALT  21  /  AlkPhos  51  06-06      PT/INR - ( 08 Jun 2021 05:40 )   PT: 19.10 sec;   INR: 1.66 ratio         PTT - ( 07 Jun 2021 05:26 )  PTT:36.4 sec                                           CARDIAC MARKERS ( 06 Jun 2021 20:13 )  x     / <0.01 ng/mL / x     / x     / x                                                LIVER FUNCTIONS - ( 06 Jun 2021 20:13 )  Alb: 3.4 g/dL / Pro: 5.2 g/dL / ALK PHOS: 51 U/L / ALT: 21 U/L / AST: 27 U/L / GGT: x                                                                                                                                       MEDICATIONS  (STANDING):  ATENolol  Tablet 25 milliGRAM(s) Oral daily  atorvastatin 20 milliGRAM(s) Oral at bedtime  chlorhexidine 4% Liquid 1 Application(s) Topical <User Schedule>  escitalopram 10 milliGRAM(s) Oral daily  valsartan 160 milliGRAM(s) Oral at bedtime    MEDICATIONS  (PRN):  ALPRAZolam 0.5 milliGRAM(s) Oral at bedtime PRN anxiety

## 2021-06-08 NOTE — PROGRESS NOTE ADULT - ASSESSMENT
Patient seems to be bleeding less. H/H lower. May need transfuse again. Will try give coumadin when stable OOB to chair. Ambulate whenstable

## 2021-06-09 LAB
ALBUMIN SERPL ELPH-MCNC: 3.1 G/DL — LOW (ref 3.5–5.2)
ALP SERPL-CCNC: 51 U/L — SIGNIFICANT CHANGE UP (ref 30–115)
ALT FLD-CCNC: 40 U/L — SIGNIFICANT CHANGE UP (ref 0–41)
ANION GAP SERPL CALC-SCNC: 8 MMOL/L — SIGNIFICANT CHANGE UP (ref 7–14)
AST SERPL-CCNC: 45 U/L — HIGH (ref 0–41)
BASOPHILS # BLD AUTO: 0.04 K/UL — SIGNIFICANT CHANGE UP (ref 0–0.2)
BASOPHILS NFR BLD AUTO: 0.7 % — SIGNIFICANT CHANGE UP (ref 0–1)
BILIRUB SERPL-MCNC: 0.5 MG/DL — SIGNIFICANT CHANGE UP (ref 0.2–1.2)
BUN SERPL-MCNC: 18 MG/DL — SIGNIFICANT CHANGE UP (ref 10–20)
CALCIUM SERPL-MCNC: 8.2 MG/DL — LOW (ref 8.5–10.1)
CHLORIDE SERPL-SCNC: 95 MMOL/L — LOW (ref 98–110)
CO2 SERPL-SCNC: 25 MMOL/L — SIGNIFICANT CHANGE UP (ref 17–32)
CREAT SERPL-MCNC: 0.8 MG/DL — SIGNIFICANT CHANGE UP (ref 0.7–1.5)
EOSINOPHIL # BLD AUTO: 0.3 K/UL — SIGNIFICANT CHANGE UP (ref 0–0.7)
EOSINOPHIL NFR BLD AUTO: 5.2 % — SIGNIFICANT CHANGE UP (ref 0–8)
GLUCOSE SERPL-MCNC: 96 MG/DL — SIGNIFICANT CHANGE UP (ref 70–99)
HCT VFR BLD CALC: 28.3 % — LOW (ref 37–47)
HGB BLD-MCNC: 9.4 G/DL — LOW (ref 12–16)
IMM GRANULOCYTES NFR BLD AUTO: 0.3 % — SIGNIFICANT CHANGE UP (ref 0.1–0.3)
INR BLD: 1.34 RATIO — HIGH (ref 0.65–1.3)
LYMPHOCYTES # BLD AUTO: 1.98 K/UL — SIGNIFICANT CHANGE UP (ref 1.2–3.4)
LYMPHOCYTES # BLD AUTO: 34.6 % — SIGNIFICANT CHANGE UP (ref 20.5–51.1)
MAGNESIUM SERPL-MCNC: 1.8 MG/DL — SIGNIFICANT CHANGE UP (ref 1.8–2.4)
MCHC RBC-ENTMCNC: 29.8 PG — SIGNIFICANT CHANGE UP (ref 27–31)
MCHC RBC-ENTMCNC: 33.2 G/DL — SIGNIFICANT CHANGE UP (ref 32–37)
MCV RBC AUTO: 89.8 FL — SIGNIFICANT CHANGE UP (ref 81–99)
MONOCYTES # BLD AUTO: 0.57 K/UL — SIGNIFICANT CHANGE UP (ref 0.1–0.6)
MONOCYTES NFR BLD AUTO: 10 % — HIGH (ref 1.7–9.3)
NEUTROPHILS # BLD AUTO: 2.81 K/UL — SIGNIFICANT CHANGE UP (ref 1.4–6.5)
NEUTROPHILS NFR BLD AUTO: 49.2 % — SIGNIFICANT CHANGE UP (ref 42.2–75.2)
NRBC # BLD: 0 /100 WBCS — SIGNIFICANT CHANGE UP (ref 0–0)
PLATELET # BLD AUTO: 146 K/UL — SIGNIFICANT CHANGE UP (ref 130–400)
POTASSIUM SERPL-MCNC: 4.6 MMOL/L — SIGNIFICANT CHANGE UP (ref 3.5–5)
POTASSIUM SERPL-SCNC: 4.6 MMOL/L — SIGNIFICANT CHANGE UP (ref 3.5–5)
PROT SERPL-MCNC: 4.9 G/DL — LOW (ref 6–8)
PROTHROM AB SERPL-ACNC: 15.4 SEC — HIGH (ref 9.95–12.87)
RBC # BLD: 3.15 M/UL — LOW (ref 4.2–5.4)
RBC # FLD: 14.4 % — SIGNIFICANT CHANGE UP (ref 11.5–14.5)
SODIUM SERPL-SCNC: 128 MMOL/L — LOW (ref 135–146)
WBC # BLD: 5.72 K/UL — SIGNIFICANT CHANGE UP (ref 4.8–10.8)
WBC # FLD AUTO: 5.72 K/UL — SIGNIFICANT CHANGE UP (ref 4.8–10.8)

## 2021-06-09 PROCEDURE — 99232 SBSQ HOSP IP/OBS MODERATE 35: CPT

## 2021-06-09 PROCEDURE — 93010 ELECTROCARDIOGRAM REPORT: CPT | Mod: NC

## 2021-06-09 RX ORDER — ACETAMINOPHEN 500 MG
650 TABLET ORAL EVERY 6 HOURS
Refills: 0 | Status: DISCONTINUED | OUTPATIENT
Start: 2021-06-09 | End: 2021-06-10

## 2021-06-09 RX ORDER — WARFARIN SODIUM 2.5 MG/1
4 TABLET ORAL ONCE
Refills: 0 | Status: COMPLETED | OUTPATIENT
Start: 2021-06-09 | End: 2021-06-09

## 2021-06-09 RX ADMIN — VALSARTAN 160 MILLIGRAM(S): 80 TABLET ORAL at 21:25

## 2021-06-09 RX ADMIN — Medication 0.5 MILLIGRAM(S): at 21:19

## 2021-06-09 RX ADMIN — WARFARIN SODIUM 4 MILLIGRAM(S): 2.5 TABLET ORAL at 17:26

## 2021-06-09 RX ADMIN — CHLORHEXIDINE GLUCONATE 1 APPLICATION(S): 213 SOLUTION TOPICAL at 05:20

## 2021-06-09 RX ADMIN — ESCITALOPRAM OXALATE 10 MILLIGRAM(S): 10 TABLET, FILM COATED ORAL at 21:17

## 2021-06-09 RX ADMIN — PANTOPRAZOLE SODIUM 40 MILLIGRAM(S): 20 TABLET, DELAYED RELEASE ORAL at 17:26

## 2021-06-09 RX ADMIN — Medication 0.25 MILLIGRAM(S): at 21:22

## 2021-06-09 RX ADMIN — ATORVASTATIN CALCIUM 20 MILLIGRAM(S): 80 TABLET, FILM COATED ORAL at 21:17

## 2021-06-09 RX ADMIN — Medication 650 MILLIGRAM(S): at 16:10

## 2021-06-09 RX ADMIN — ATENOLOL 25 MILLIGRAM(S): 25 TABLET ORAL at 05:20

## 2021-06-09 RX ADMIN — Medication 650 MILLIGRAM(S): at 16:40

## 2021-06-09 NOTE — PROGRESS NOTE ADULT - SUBJECTIVE AND OBJECTIVE BOX
Patient is a 85y old  Female who presents with a chief complaint of syncope (09 Jun 2021 08:12)        Over Night Events:    no events. no bleeding reported over past 24hrs    ROS:     All ROS are negative except HPI         PHYSICAL EXAM    ICU Vital Signs Last 24 Hrs  T(C): 36.6 (09 Jun 2021 07:02), Max: 36.8 (08 Jun 2021 23:01)  T(F): 97.8 (09 Jun 2021 07:02), Max: 98.3 (08 Jun 2021 23:01)  HR: 60 (09 Jun 2021 05:13) (60 - 65)  BP: 142/62 (09 Jun 2021 05:13) (122/63 - 144/80)  BP(mean): 89 (09 Jun 2021 05:13) (85 - 109)  ABP: --  ABP(mean): --  RR: 19 (09 Jun 2021 07:02) (16 - 22)  SpO2: --      CONSTITUTIONAL:  Well nourished.  NAD    ENT:   Airway patent,   Mouth with normal mucosa.   No thrush    EYES:   Pupils equal,   Round and reactive to light.    CARDIAC:   Normal rate,   Regular rhythm.    No edema      Vascular:  Normal systolic impulse  No Carotid bruits    RESPIRATORY:   No wheezing  Bilateral BS  Normal chest expansion  Not tachypneic,  No use of accessory muscles    GASTROINTESTINAL:  Abdomen soft,   Non-tender,   No guarding,   + BS    MUSCULOSKELETAL:   Range of motion is not limited,  No clubbing, cyanosis    NEUROLOGICAL:   Alert and oriented   No motor  deficits.    SKIN:   Skin normal color for race,   Warm and dry and intact.   No evidence of rash.    PSYCHIATRIC:   Normal mood and affect.   No apparent risk to self or others.    HEMATOLOGICAL:  No cervical  lymphadenopathy.  no inguinal lymphadenopathy      06-08-21 @ 07:01  -  06-09-21 @ 07:00  --------------------------------------------------------  IN:    Oral Fluid: 200 mL  Total IN: 200 mL    OUT:    Voided (mL): 400 mL  Total OUT: 400 mL    Total NET: -200 mL          LABS:                            8.1    7.03  )-----------( 154      ( 08 Jun 2021 05:40 )             24.7                                               06-09    128<L>  |  95<L>  |  18  ----------------------------<  96  4.6   |  25  |  0.8    Ca    8.2<L>      09 Jun 2021 05:42  Mg     1.8     06-09    TPro  4.9<L>  /  Alb  3.1<L>  /  TBili  0.5  /  DBili  x   /  AST  45<H>  /  ALT  40  /  AlkPhos  51  06-09      PT/INR - ( 09 Jun 2021 05:42 )   PT: 15.40 sec;   INR: 1.34 ratio                                                                                              LIVER FUNCTIONS - ( 09 Jun 2021 05:42 )  Alb: 3.1 g/dL / Pro: 4.9 g/dL / ALK PHOS: 51 U/L / ALT: 40 U/L / AST: 45 U/L / GGT: x                                                                                                                                       MEDICATIONS  (STANDING):  ALPRAZolam 0.25 milliGRAM(s) Oral at bedtime  ATENolol  Tablet 25 milliGRAM(s) Oral daily  atorvastatin 20 milliGRAM(s) Oral at bedtime  chlorhexidine 4% Liquid 1 Application(s) Topical <User Schedule>  escitalopram 10 milliGRAM(s) Oral daily  pantoprazole    Tablet 40 milliGRAM(s) Oral before breakfast  valsartan 160 milliGRAM(s) Oral at bedtime    MEDICATIONS  (PRN):  ALPRAZolam 0.5 milliGRAM(s) Oral at bedtime PRN anxiety

## 2021-06-09 NOTE — PROGRESS NOTE ADULT - SUBJECTIVE AND OBJECTIVE BOX
ORAL WU  85y  Female      Patient is a 85y old  Female who presents with a chief complaint of syncope (08 Jun 2021 08:41)    s/p 2 units PRBC    REVIEW OF SYSTEMS:  CONSTITUTIONAL: No fever, weight loss, or fatigue  EYES: No eye pain, visual disturbances, or discharge  ENMT:  No difficulty hearing, tinnitus, vertigo; No sinus or throat pain  NECK: No pain or stiffness  BREASTS: No pain, masses, or nipple discharge  RESPIRATORY: No cough, wheezing, chills or hemoptysis; No shortness of breath  CARDIOVASCULAR: No chest pain, palpitations, dizziness, or leg swelling  GASTROINTESTINAL: No abdominal or epigastric pain. No nausea, vomiting, or hematemesis; No diarrhea or constipation. No melena or hematochezia.  GENITOURINARY: No dysuria, frequency, hematuria, or incontinence  MUSCULOSKELETAL: No joint pain or swelling; No muscle, back, or extremity pain  PSYCHIATRIC: No depression, anxiety, mood swings, or difficulty sleeping  HEME/LYMPH: No easy bruising, or bleeding gums  ALLERY AND IMMUNOLOGIC: No hives or eczema  FAMILY HISTORY:    T(C): 36.6 (06-09-21 @ 07:02), Max: 36.8 (06-08-21 @ 23:01)  HR: 60 (06-09-21 @ 05:13) (60 - 65)  BP: 142/62 (06-09-21 @ 05:13) (122/63 - 144/80)  RR: 19 (06-09-21 @ 07:02) (16 - 22)  SpO2: --  Wt(kg): --Vital Signs Last 24 Hrs  T(C): 36.6 (09 Jun 2021 07:02), Max: 36.8 (08 Jun 2021 23:01)  T(F): 97.8 (09 Jun 2021 07:02), Max: 98.3 (08 Jun 2021 23:01)  HR: 60 (09 Jun 2021 05:13) (60 - 65)  BP: 142/62 (09 Jun 2021 05:13) (122/63 - 144/80)  BP(mean): 89 (09 Jun 2021 05:13) (85 - 109)  RR: 19 (09 Jun 2021 07:02) (16 - 22)  SpO2: --  penicillin (Unknown)  pinapples (Unknown)      PHYSICAL EXAM:  GENERAL: NAD,   HEAD:  Atraumatic, Normocephalic  EYES: EOMI, PERRLA, conjunctiva and sclera clear  ENMT: No tonsillar erythema, exudates, or enlargement;   NECK: Supple, No JVD, Normal thyroid  NERVOUS SYSTEM:  Alert & Orienteted  CHEST/LUNG: Clear to percussion bilaterally; No rales, rhonchi, wheezing, or rubs  HEART: Regular rate and rhythm; No murmurs, rubs, or gallops  ABDOMEN: Soft, Nontender, Nondistended; Bowel sounds present  EXTREMITIES:   No clubbing, cyanosis, or edema  LYMPH: No lymphadenopathy noted  SKIN: No rashes or lesions      LABS:  06-09    128<L>  |  95<L>  |  18  ----------------------------<  96  4.6   |  25  |  0.8    Ca    8.2<L>      09 Jun 2021 05:42  Mg     1.8     06-09    TPro  4.9<L>  /  Alb  3.1<L>  /  TBili  0.5  /  DBili  x   /  AST  45<H>  /  ALT  40  /  AlkPhos  51  06-09                          8.1    7.03  )-----------( 154      ( 08 Jun 2021 05:40 )             24.7         RADIOLOGY & ADDITIONAL TESTS:    MEDICATION:  ALPRAZolam 0.25 milliGRAM(s) Oral at bedtime  ALPRAZolam 0.5 milliGRAM(s) Oral at bedtime PRN  ATENolol  Tablet 25 milliGRAM(s) Oral daily  atorvastatin 20 milliGRAM(s) Oral at bedtime  chlorhexidine 4% Liquid 1 Application(s) Topical <User Schedule>  escitalopram 10 milliGRAM(s) Oral daily  pantoprazole    Tablet 40 milliGRAM(s) Oral before breakfast  valsartan 160 milliGRAM(s) Oral at bedtime      HEALTH ISSUES - PROBLEM Dx:  syncope due to anemia,hyponatremia  anemia acute blood loss ,10 teeth pulled on lovenox and coumadin bridge,s/p 2 units prbc  cahd ,hx AVR restart on coumadin  HTN on valsartan  depression on escitaloparm

## 2021-06-09 NOTE — PROGRESS NOTE ADULT - ASSESSMENT
Need check cbc, May need transfuse again. Will try give coumadin when stable OOB to chair. Ambulate when stable

## 2021-06-09 NOTE — PROGRESS NOTE ADULT - SUBJECTIVE AND OBJECTIVE BOX
Patient is a 85y old  Female who presents with a chief complaint of syncope (09 Jun 2021 07:56)      T(F): 97.8 (06-09-21 @ 07:02), Max: 98.3 (06-08-21 @ 23:01)  HR: 60 (06-09-21 @ 05:13)  BP: 142/62 (06-09-21 @ 05:13)  RR: 19 (06-09-21 @ 07:02)  SpO2: --    PHYSICAL EXAM:  GENERAL: NAD, well-groomed, well-developed  HEAD:  Atraumatic, Normocephalic  EYES: EOMI, PERRLA, conjunctiva and sclera clear  ENMT: No tonsillar erythema, exudates, or enlargement; Moist mucous membranes, Good dentition, No lesions  NECK: Supple, No JVD, Normal thyroid  NERVOUS SYSTEM:  Alert & Oriented X3,  Motor Strength 5/5 B/L upper and lower extremities  CHEST/LUNG: Clear to percussion bilaterally; No rales, rhonchi, wheezing, or rubs  HEART: Regular rate and rhythm; No murmurs, rubs, or gallops + click  ABDOMEN: Soft, Nontender, Nondistended; Bowel sounds present  EXTREMITIES:   No clubbing, cyanosis, or edema  LYMPH: No lymphadenopathy noted  SKIN: No rashes or lesions    labs  06-09    128<L>  |  95<L>  |  18  ----------------------------<  96  4.6   |  25  |  0.8    Ca    8.2<L>      09 Jun 2021 05:42  Mg     1.8     06-09    TPro  4.9<L>  /  Alb  3.1<L>  /  TBili  0.5  /  DBili  x   /  AST  45<H>  /  ALT  40  /  AlkPhos  51  06-09                          8.1    7.03  )-----------( 154      ( 08 Jun 2021 05:40 )             24.7       PT/INR - ( 09 Jun 2021 05:42 )   PT: 15.40 sec;   INR: 1.34 ratio                 ALPRAZolam 0.25 milliGRAM(s) Oral at bedtime  ALPRAZolam 0.5 milliGRAM(s) Oral at bedtime PRN  ATENolol  Tablet 25 milliGRAM(s) Oral daily  atorvastatin 20 milliGRAM(s) Oral at bedtime  chlorhexidine 4% Liquid 1 Application(s) Topical <User Schedule>  escitalopram 10 milliGRAM(s) Oral daily  pantoprazole    Tablet 40 milliGRAM(s) Oral before breakfast  valsartan 160 milliGRAM(s) Oral at bedtime

## 2021-06-09 NOTE — PROGRESS NOTE ADULT - ASSESSMENT
IMPRESSION:    Syncope likely 2/2 blood loss anemia from dental work  Hyponatremia; improving  AVR(mechanical valve)      SUGGEST:    restart coumadin today  serial HH q12h  repeat INR  follow up with cardiology  f/u with repeat lytes and correct as needed  f/u with repeat BMP  cardiology management

## 2021-06-10 ENCOUNTER — TRANSCRIPTION ENCOUNTER (OUTPATIENT)
Age: 86
End: 2021-06-10

## 2021-06-10 VITALS
SYSTOLIC BLOOD PRESSURE: 113 MMHG | RESPIRATION RATE: 24 BRPM | DIASTOLIC BLOOD PRESSURE: 58 MMHG | HEART RATE: 62 BPM | TEMPERATURE: 99 F

## 2021-06-10 LAB
ANION GAP SERPL CALC-SCNC: 12 MMOL/L — SIGNIFICANT CHANGE UP (ref 7–14)
ANION GAP SERPL CALC-SCNC: 4 MMOL/L — LOW (ref 7–14)
BASOPHILS # BLD AUTO: 0.03 K/UL — SIGNIFICANT CHANGE UP (ref 0–0.2)
BASOPHILS NFR BLD AUTO: 0.5 % — SIGNIFICANT CHANGE UP (ref 0–1)
BUN SERPL-MCNC: 16 MG/DL — SIGNIFICANT CHANGE UP (ref 10–20)
BUN SERPL-MCNC: 16 MG/DL — SIGNIFICANT CHANGE UP (ref 10–20)
CALCIUM SERPL-MCNC: 8 MG/DL — LOW (ref 8.5–10.1)
CALCIUM SERPL-MCNC: 8.5 MG/DL — SIGNIFICANT CHANGE UP (ref 8.5–10.1)
CHLORIDE SERPL-SCNC: 91 MMOL/L — LOW (ref 98–110)
CHLORIDE SERPL-SCNC: 94 MMOL/L — LOW (ref 98–110)
CO2 SERPL-SCNC: 26 MMOL/L — SIGNIFICANT CHANGE UP (ref 17–32)
CO2 SERPL-SCNC: 27 MMOL/L — SIGNIFICANT CHANGE UP (ref 17–32)
CREAT ?TM UR-MCNC: 34 MG/DL — SIGNIFICANT CHANGE UP
CREAT SERPL-MCNC: 0.9 MG/DL — SIGNIFICANT CHANGE UP (ref 0.7–1.5)
CREAT SERPL-MCNC: 0.9 MG/DL — SIGNIFICANT CHANGE UP (ref 0.7–1.5)
EOSINOPHIL # BLD AUTO: 0.29 K/UL — SIGNIFICANT CHANGE UP (ref 0–0.7)
EOSINOPHIL NFR BLD AUTO: 5.2 % — SIGNIFICANT CHANGE UP (ref 0–8)
GLUCOSE SERPL-MCNC: 140 MG/DL — HIGH (ref 70–99)
GLUCOSE SERPL-MCNC: 92 MG/DL — SIGNIFICANT CHANGE UP (ref 70–99)
HCT VFR BLD CALC: 27.5 % — LOW (ref 37–47)
HGB BLD-MCNC: 9.4 G/DL — LOW (ref 12–16)
IMM GRANULOCYTES NFR BLD AUTO: 0.5 % — HIGH (ref 0.1–0.3)
INR BLD: 1.2 RATIO — SIGNIFICANT CHANGE UP (ref 0.65–1.3)
LYMPHOCYTES # BLD AUTO: 1.77 K/UL — SIGNIFICANT CHANGE UP (ref 1.2–3.4)
LYMPHOCYTES # BLD AUTO: 31.8 % — SIGNIFICANT CHANGE UP (ref 20.5–51.1)
MCHC RBC-ENTMCNC: 30.5 PG — SIGNIFICANT CHANGE UP (ref 27–31)
MCHC RBC-ENTMCNC: 34.2 G/DL — SIGNIFICANT CHANGE UP (ref 32–37)
MCV RBC AUTO: 89.3 FL — SIGNIFICANT CHANGE UP (ref 81–99)
MONOCYTES # BLD AUTO: 0.58 K/UL — SIGNIFICANT CHANGE UP (ref 0.1–0.6)
MONOCYTES NFR BLD AUTO: 10.4 % — HIGH (ref 1.7–9.3)
NEUTROPHILS # BLD AUTO: 2.87 K/UL — SIGNIFICANT CHANGE UP (ref 1.4–6.5)
NEUTROPHILS NFR BLD AUTO: 51.6 % — SIGNIFICANT CHANGE UP (ref 42.2–75.2)
NRBC # BLD: 0 /100 WBCS — SIGNIFICANT CHANGE UP (ref 0–0)
OSMOLALITY UR: 237 MOS/KG — SIGNIFICANT CHANGE UP (ref 50–1200)
PLATELET # BLD AUTO: 166 K/UL — SIGNIFICANT CHANGE UP (ref 130–400)
POTASSIUM SERPL-MCNC: 4.7 MMOL/L — SIGNIFICANT CHANGE UP (ref 3.5–5)
POTASSIUM SERPL-MCNC: 5.9 MMOL/L — HIGH (ref 3.5–5)
POTASSIUM SERPL-SCNC: 4.7 MMOL/L — SIGNIFICANT CHANGE UP (ref 3.5–5)
POTASSIUM SERPL-SCNC: 5.9 MMOL/L — HIGH (ref 3.5–5)
PROT ?TM UR-MCNC: 14 MG/DLG/24H — SIGNIFICANT CHANGE UP
PROT/CREAT UR-RTO: 0.4 RATIO — HIGH (ref 0–0.2)
PROTHROM AB SERPL-ACNC: 13.8 SEC — HIGH (ref 9.95–12.87)
RBC # BLD: 3.08 M/UL — LOW (ref 4.2–5.4)
RBC # FLD: 14 % — SIGNIFICANT CHANGE UP (ref 11.5–14.5)
SODIUM SERPL-SCNC: 122 MMOL/L — LOW (ref 135–146)
SODIUM SERPL-SCNC: 132 MMOL/L — LOW (ref 135–146)
SODIUM UR-SCNC: 47 MMOL/L — SIGNIFICANT CHANGE UP
WBC # BLD: 5.57 K/UL — SIGNIFICANT CHANGE UP (ref 4.8–10.8)
WBC # FLD AUTO: 5.57 K/UL — SIGNIFICANT CHANGE UP (ref 4.8–10.8)

## 2021-06-10 PROCEDURE — 99232 SBSQ HOSP IP/OBS MODERATE 35: CPT

## 2021-06-10 RX ORDER — WARFARIN SODIUM 2.5 MG/1
4 TABLET ORAL ONCE
Refills: 0 | Status: COMPLETED | OUTPATIENT
Start: 2021-06-10 | End: 2021-06-10

## 2021-06-10 RX ADMIN — ATENOLOL 25 MILLIGRAM(S): 25 TABLET ORAL at 06:14

## 2021-06-10 RX ADMIN — WARFARIN SODIUM 4 MILLIGRAM(S): 2.5 TABLET ORAL at 20:14

## 2021-06-10 RX ADMIN — CHLORHEXIDINE GLUCONATE 1 APPLICATION(S): 213 SOLUTION TOPICAL at 06:14

## 2021-06-10 RX ADMIN — PANTOPRAZOLE SODIUM 40 MILLIGRAM(S): 20 TABLET, DELAYED RELEASE ORAL at 17:50

## 2021-06-10 NOTE — DISCHARGE NOTE PROVIDER - HOSPITAL COURSE
84 y/o female with PMH of AVR(mechanical valve on coumadin), Cardiac Stents, HTN, Osteoporosis and High cholesterol presented to the ED for evaluation of syncope. As per Pt and granddaughter at bedside, Pt had teeth extraction x10 a few days ago. As per granddaughter Pt had infected teeth which had to be removed, and coumadin was stopped 2 days prior the procedure. Granddaughter states that 2 days after anticoagulation resumed with Lovenox to bridging with coumadin she noticed bleeding from Pt's mouth. Per granddaughter Pt stopped the coumadin as per Pt's cardiologist Dr Nagy. Earlier today while Pt was on the toiled granddaughter noted bloody BM and Pt had syncopal episode, but did no fall and was caught by granddaughter.    Pt received 2 units PRBC and on serial CBC's, Hgb has been stable, and Hyponatremia improved. Pt reports no further bleeding from gums. Pt was restarted on coumadin as per cardiology and can f/u outpatient with Dr Nagy.

## 2021-06-10 NOTE — PROGRESS NOTE ADULT - PROVIDER SPECIALTY LIST ADULT
Critical Care
Cardiology
Cardiology
Critical Care
Internal Medicine
Internal Medicine
Pulmonology
Hospitalist
Cardiology
Critical Care
Critical Care

## 2021-06-10 NOTE — PROGRESS NOTE ADULT - ASSESSMENT
IMPRESSION:    Syncope likely 2/2 blood loss anemia from dental work; improved  Hyponatremia; improving  AVR(mechanical valve)      SUGGEST:    restarted coumadin  serial HH q12h  repeat INR  follow up with cardiology  f/u with repeat lytes and correct as needed  f/u with repeat BMP  cardiology management  downgrade to floor

## 2021-06-10 NOTE — DISCHARGE NOTE NURSING/CASE MANAGEMENT/SOCIAL WORK - PATIENT PORTAL LINK FT
You can access the FollowMyHealth Patient Portal offered by [facility] by registering at the following website: [email or web address]. By joining [employer]’s FollowMyHealth portal, you will also be able to view your health information using other applications (apps) compatible with our system.

## 2021-06-10 NOTE — PROGRESS NOTE ADULT - SUBJECTIVE AND OBJECTIVE BOX
Patient is a 85y old  Female who presents with a chief complaint of syncope (10 Mynor 2021 07:59)        Over Night Events:    no further bleeding reported.     ROS:     All ROS are negative except HPI         PHYSICAL EXAM    ICU Vital Signs Last 24 Hrs  T(C): 35.7 (10 Mynor 2021 07:01), Max: 37.4 (09 Jun 2021 15:05)  T(F): 96.3 (10 Mynor 2021 07:01), Max: 99.3 (09 Jun 2021 15:05)  HR: 59 (10 Mynor 2021 07:18) (53 - 61)  BP: 160/70 (10 Mynor 2021 07:18) (116/62 - 162/74)  BP(mean): 101 (10 Mynor 2021 07:18) (83 - 107)  ABP: --  ABP(mean): --  RR: 24 (10 Mynor 2021 07:18) (16 - 24)  SpO2: 98% (10 Mynor 2021 07:18) (98% - 98%)      CONSTITUTIONAL:  Well nourished.  NAD    ENT:   Airway patent,   Mouth with normal mucosa.   No thrush    EYES:   Pupils equal,   Round and reactive to light.    CARDIAC:   Normal rate,   Regular rhythm.    No edema      Vascular:  Normal systolic impulse  No Carotid bruits    RESPIRATORY:   No wheezing  Bilateral BS  Normal chest expansion  Not tachypneic,  No use of accessory muscles    GASTROINTESTINAL:  Abdomen soft,   Non-tender,   No guarding,   + BS    MUSCULOSKELETAL:   Range of motion is not limited,  No clubbing, cyanosis    NEUROLOGICAL:   Alert and oriented x3  No motor  deficits.    SKIN:   Skin normal color for race,   Warm and dry and intact.   No evidence of rash.    PSYCHIATRIC:   Normal mood and affect.   No apparent risk to self or others.    HEMATOLOGICAL:  No cervical  lymphadenopathy.  no inguinal lymphadenopathy        LABS:                            9.4    5.57  )-----------( 166      ( 10 Mynor 2021 05:50 )             27.5                                               06-10    122<L>  |  91<L>  |  16  ----------------------------<  92  4.7   |  27  |  0.9    Ca    8.0<L>      10 Mynor 2021 05:50  Mg     1.8     06-09    TPro  4.9<L>  /  Alb  3.1<L>  /  TBili  0.5  /  DBili  x   /  AST  45<H>  /  ALT  40  /  AlkPhos  51  06-09      PT/INR - ( 10 Mynor 2021 05:50 )   PT: 13.80 sec;   INR: 1.20 ratio                                                                                              LIVER FUNCTIONS - ( 09 Jun 2021 05:42 )  Alb: 3.1 g/dL / Pro: 4.9 g/dL / ALK PHOS: 51 U/L / ALT: 40 U/L / AST: 45 U/L / GGT: x                                                                                                                                       MEDICATIONS  (STANDING):  ALPRAZolam 0.25 milliGRAM(s) Oral at bedtime  ATENolol  Tablet 25 milliGRAM(s) Oral daily  atorvastatin 20 milliGRAM(s) Oral at bedtime  chlorhexidine 4% Liquid 1 Application(s) Topical <User Schedule>  escitalopram 10 milliGRAM(s) Oral daily  pantoprazole    Tablet 40 milliGRAM(s) Oral before breakfast  valsartan 160 milliGRAM(s) Oral at bedtime  warfarin 4 milliGRAM(s) Oral once    MEDICATIONS  (PRN):  acetaminophen   Tablet .. 650 milliGRAM(s) Oral every 6 hours PRN Temp greater or equal to 38C (100.4F), Mild Pain (1 - 3), Moderate Pain (4 - 6)  ALPRAZolam 0.5 milliGRAM(s) Oral at bedtime PRN anxiety      New X-rays reviewed:                                                                                  ECHO    CXR interpreted by me:

## 2021-06-10 NOTE — DISCHARGE NOTE PROVIDER - PROVIDER TOKENS
PROVIDER:[TOKEN:[[id number]],FOLLOWUP:[2 weeks]] PROVIDER:[TOKEN:[[id number]],FOLLOWUP:[2 weeks]],PROVIDER:[TOKEN:[[id number]],FOLLOWUP:[1 week]]

## 2021-06-10 NOTE — DISCHARGE NOTE PROVIDER - CARE PROVIDERS DIRECT ADDRESSES
[email or web address]

## 2021-06-10 NOTE — PROGRESS NOTE ADULT - SUBJECTIVE AND OBJECTIVE BOX
Patient is a 85y old  Female who presents with a chief complaint of syncope (09 Jun 2021 09:29)      T(F): 96.3 (06-10-21 @ 07:01), Max: 99.3 (06-09-21 @ 15:05)  HR: 59 (06-10-21 @ 07:18)  BP: 160/70 (06-10-21 @ 07:18)  RR: 24 (06-10-21 @ 07:18)  SpO2: 98% (06-10-21 @ 07:18) (98% - 98%)    PHYSICAL EXAM:  GENERAL: NAD, well-groomed, well-developed  HEAD:  Atraumatic, Normocephalic  EYES: EOMI, PERRLA, conjunctiva and sclera clear  ENMT: No tonsillar erythema, exudates, or enlargement; Moist mucous membranes, Good dentition, No lesions  NECK: Supple, No JVD, Normal thyroid  NERVOUS SYSTEM:  Alert & Oriented X3,  Motor Strength 5/5 B/L upper and lower extremities  CHEST/LUNG: Clear to percussion bilaterally; No rales, rhonchi, wheezing, or rubs  HEART: Regular rate and rhythm; No murmurs, rubs, or gallops  ABDOMEN: Soft, Nontender, Nondistended; Bowel sounds present  EXTREMITIES:   No clubbing, cyanosis, or edema  LYMPH: No lymphadenopathy noted  SKIN: No rashes or lesions    labs  06-09    128<L>  |  95<L>  |  18  ----------------------------<  96  4.6   |  25  |  0.8    Ca    8.2<L>      09 Jun 2021 05:42  Mg     1.8     06-09    TPro  4.9<L>  /  Alb  3.1<L>  /  TBili  0.5  /  DBili  x   /  AST  45<H>  /  ALT  40  /  AlkPhos  51  06-09                          9.4    5.57  )-----------( 166      ( 10 Mynor 2021 05:50 )             27.5       PT/INR - ( 10 Mynor 2021 05:50 )   PT: 13.80 sec;   INR: 1.20 ratio                 acetaminophen   Tablet .. 650 milliGRAM(s) Oral every 6 hours PRN  ALPRAZolam 0.25 milliGRAM(s) Oral at bedtime  ALPRAZolam 0.5 milliGRAM(s) Oral at bedtime PRN  ATENolol  Tablet 25 milliGRAM(s) Oral daily  atorvastatin 20 milliGRAM(s) Oral at bedtime  chlorhexidine 4% Liquid 1 Application(s) Topical <User Schedule>  escitalopram 10 milliGRAM(s) Oral daily  pantoprazole    Tablet 40 milliGRAM(s) Oral before breakfast  valsartan 160 milliGRAM(s) Oral at bedtime

## 2021-06-10 NOTE — PROGRESS NOTE ADULT - ATTENDING COMMENTS
Attending Statement: I have personally performed a face to face diagnostic evaluation on this patient. The patient is suffering from Syncope likely 2/2 blood loss anemia from dental work.  I have reviewed the above note and agree with the history, exam and suggestions for care, except as I have noted in the text. I have amended the treatment plans as necessary.
Attending Statement: I have personally performed a face to face diagnostic evaluation on this patient. The patient is suffering from Syncope likely 2/2 blood loss anemia .  I have reviewed the above note and agree with the history, exam and suggestions for care, except as I have noted in the text. I have amended the treatment plans as necessary.
Attending Statement: I have personally performed a face to face diagnostic evaluation on this patient. The patient is suffering from Syncope likely 2/2 blood loss anemia from dental work.  I have reviewed the above note and agree with the history, exam and suggestions for care, except as I have noted in the text. I have amended the treatment plans as necessary.
Attending Statement: I have personally performed a face to face diagnostic evaluation on this patient. The patient is suffering from blood loss anemia  .  I have reviewed the above note and agree with the history, exam and suggestions for care, except as I have noted in the text. I have amended the treatment plans as necessary.

## 2021-06-10 NOTE — PROGRESS NOTE ADULT - ASSESSMENT
IMPRESSION:    Syncope likely 2/2 blood loss anemia from dental work; improved  Hyponatremia; improving  AVR(mechanical valve)      SUGGEST:    restarted coumadin- w/o lovenox- as per cardio  serial HH q12h  repeat INR  follow up with cardiology  f/u with repeat lytes and correct as needed; liberalize salt intake; check Na q12- tonite and in am; fluid restrict-water  f/u with repeat BMP  cardiology management  downgrade to floor- pert

## 2021-06-10 NOTE — DISCHARGE NOTE PROVIDER - CARE PROVIDER_API CALL
Crow Nagy)  Cardiovascular Disease; Internal Medicine  [address] [place] [address]  Phone: [phone]  Fax: [phone]  Follow Up Time: 2 weeks   Crow Nagy)  Cardiovascular Disease; Internal Medicine  [address] [place] [address]  Phone: [phone]  Fax: [phone]  Follow Up Time: 2 weeks    [place] [address]  Phone: [phone]  Fax: [phone]  Follow Up Time: 1 week

## 2021-06-10 NOTE — DISCHARGE NOTE PROVIDER - NSDCCPCAREPLAN_GEN_ALL_CORE_FT
PRINCIPAL DISCHARGE DIAGNOSIS  Diagnosis: Syncope  Assessment and Plan of Treatment:       SECONDARY DISCHARGE DIAGNOSES  Diagnosis: Gingival bleeding  Assessment and Plan of Treatment:

## 2021-06-10 NOTE — PROGRESS NOTE ADULT - SUBJECTIVE AND OBJECTIVE BOX
Patient is a 85y old  Female who presented with a chief complaint of syncope (10 Mynor 2021 07:59), 2/2 to gross oral, postop bleeding. She was transfused and admitted to the ICU.\  DTR at bedside translating for all.    Over Night Events:    no further bleeding reported.     ROS:     All ROS are negative except HPI ; pt anxious to go home        PHYSICAL EXAM  Vital Signs Last 24 Hrs  T(C): 35.7 (10 Mynor 2021 07:01), Max: 37.4 (09 Jun 2021 15:05)  T(F): 96.3 (10 Mynor 2021 07:01), Max: 99.3 (09 Jun 2021 15:05)  HR: 59 (10 Mynor 2021 07:18) (53 - 61)  BP: 160/70 (10 Mynor 2021 07:18) (116/62 - 162/74)  BP(mean): 101 (10 Mynor 2021 07:18) (83 - 107)  RR: 24 (10 Mynor 2021 07:18) (16 - 24)  SpO2: 98% (10 Mynor 2021 07:18) (98% - 98%)    CONSTITUTIONAL:  Well nourished.  NAD    ENT:   Airway patent,   Mouth with normal mucosa. adentulous; no bleeding  No thrush  Ecchymose around R side of  mouth & chin    EYES:   Pupils equal,   Round and reactive to light.    CARDIAC: Scar;   Normal rate,   Regular rhythm.    No edema; 1/6 BELINDA w click    RESPIRATORY:   No wheezing  Bilateral BS  Not tachypneic,  No use of accessory muscles    GASTROINTESTINAL:  Abdomen soft,   Non-tender,   No guarding,   + BS    MUSCULOSKELETAL:   Range of motion is not limited,  No clubbing, cyanosis; tr edema shins    NEUROLOGICAL:   Alert and oriented x3  No motor  deficits.    SKIN:   Skin normal color for race,   Warm and dry and intact.   No evidence of rash.    PSYCHIATRIC:   Normal mood, min anxious  No apparent risk to self or others.    HEMATOLOGICAL:  No cervical  lymphadenopathy.  no inguinal lymphadenopathy        LABS:                            9.4    5.57  )-----------( 166      ( 10 Mynor 2021 05:50 )             27.5                                               06-10    122<L>  |  91<L>  |  16  ----------------------------<  92  4.7   |  27  |  0.9    Ca    8.0<L>      10 Mynor 2021 05:50  Mg     1.8     06-09    TPro  4.9<L>  /  Alb  3.1<L>  /  TBili  0.5  /  DBili  x   /  AST  45<H>  /  ALT  40  /  AlkPhos  51  06-09      PT/INR - ( 10 Mynor 2021 05:50 )   PT: 13.80 sec;   INR: 1.20 ratio                                                                                              LIVER FUNCTIONS - ( 09 Jun 2021 05:42 )  Alb: 3.1 g/dL / Pro: 4.9 g/dL / ALK PHOS: 51 U/L / ALT: 40 U/L / AST: 45 U/L / GGT: x                                                                                                                                       MEDICATIONS  (STANDING):  ALPRAZolam 0.25 milliGRAM(s) Oral at bedtime  ATENolol  Tablet 25 milliGRAM(s) Oral daily  atorvastatin 20 milliGRAM(s) Oral at bedtime  chlorhexidine 4% Liquid 1 Application(s) Topical <User Schedule>  escitalopram 10 milliGRAM(s) Oral daily  pantoprazole    Tablet 40 milliGRAM(s) Oral before breakfast  valsartan 160 milliGRAM(s) Oral at bedtime  warfarin 4 milliGRAM(s) Oral once    MEDICATIONS  (PRN):  acetaminophen   Tablet .. 650 milliGRAM(s) Oral every 6 hours PRN Temp greater or equal to 38C (100.4F), Mild Pain (1 - 3), Moderate Pain (4 - 6)  ALPRAZolam 0.5 milliGRAM(s) Oral at bedtime PRN anxiety      New X-rays reviewed:                                                                                  ECHO    CXR interpreted by me:

## 2021-06-10 NOTE — DISCHARGE NOTE PROVIDER - NSDCMRMEDTOKEN_GEN_ALL_CORE_FT
alendronate 35 mg oral tablet: 1 tab(s) orally once a week  ALPRAZolam 0.5 mg oral tablet: 1 tab(s) orally once a day (at bedtime), As Needed  aspirin 81 mg oral tablet, chewable: 1 tab(s) orally once a day  atenolol 25 mg oral tablet: 1 tab(s) orally once a day  atorvastatin 20 mg oral tablet: 1 tab(s) orally once a day  escitalopram 10 mg oral tablet: 1 tab(s) orally once a day  ezetimibe 10 mg oral tablet: 1 tab(s) orally once a day  iron gluconate: 27 milligram(s) orally once a day  lidocaine 5% patch: 1  transdermally once a day  valsartan 160 mg oral tablet: 1 tab(s) orally once a day (at bedtime)  warfarin 4 mg oral tablet: 1 tab(s) orally once a day

## 2021-06-11 ENCOUNTER — APPOINTMENT (OUTPATIENT)
Dept: MEDICATION MANAGEMENT | Facility: CLINIC | Age: 86
End: 2021-06-11

## 2021-06-11 ENCOUNTER — OUTPATIENT (OUTPATIENT)
Dept: OUTPATIENT SERVICES | Facility: HOSPITAL | Age: 86
LOS: 1 days | Discharge: HOME | End: 2021-06-11

## 2021-06-11 DIAGNOSIS — Z95.2 PRESENCE OF PROSTHETIC HEART VALVE: Chronic | ICD-10-CM

## 2021-06-11 DIAGNOSIS — Z79.01 LONG TERM (CURRENT) USE OF ANTICOAGULANTS: ICD-10-CM

## 2021-06-11 DIAGNOSIS — I27.81 COR PULMONALE (CHRONIC): ICD-10-CM

## 2021-06-11 PROBLEM — E78.00 PURE HYPERCHOLESTEROLEMIA, UNSPECIFIED: Chronic | Status: ACTIVE | Noted: 2021-06-06

## 2021-06-11 PROBLEM — S42.91XA FRACTURE OF RIGHT SHOULDER GIRDLE, PART UNSPECIFIED, INITIAL ENCOUNTER FOR CLOSED FRACTURE: Chronic | Status: ACTIVE | Noted: 2021-06-06

## 2021-06-11 PROBLEM — I10 ESSENTIAL (PRIMARY) HYPERTENSION: Chronic | Status: ACTIVE | Noted: 2021-06-06

## 2021-06-11 PROBLEM — M81.0 AGE-RELATED OSTEOPOROSIS WITHOUT CURRENT PATHOLOGICAL FRACTURE: Chronic | Status: ACTIVE | Noted: 2021-06-06

## 2021-06-11 LAB
INR PPP: 1.3 RATIO
QUALITY CONTROL: YES

## 2021-06-15 ENCOUNTER — OUTPATIENT (OUTPATIENT)
Dept: OUTPATIENT SERVICES | Facility: HOSPITAL | Age: 86
LOS: 1 days | Discharge: HOME | End: 2021-06-15

## 2021-06-15 ENCOUNTER — APPOINTMENT (OUTPATIENT)
Dept: MEDICATION MANAGEMENT | Facility: CLINIC | Age: 86
End: 2021-06-15
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
## 2021-06-15 DIAGNOSIS — F32.9 MAJOR DEPRESSIVE DISORDER, SINGLE EPISODE, UNSPECIFIED: ICD-10-CM

## 2021-06-15 DIAGNOSIS — D62 ACUTE POSTHEMORRHAGIC ANEMIA: ICD-10-CM

## 2021-06-15 DIAGNOSIS — T45.515A ADVERSE EFFECT OF ANTICOAGULANTS, INITIAL ENCOUNTER: ICD-10-CM

## 2021-06-15 DIAGNOSIS — Z95.2 PRESENCE OF PROSTHETIC HEART VALVE: Chronic | ICD-10-CM

## 2021-06-15 DIAGNOSIS — I25.10 ATHEROSCLEROTIC HEART DISEASE OF NATIVE CORONARY ARTERY WITHOUT ANGINA PECTORIS: ICD-10-CM

## 2021-06-15 DIAGNOSIS — K91.840 POSTPROCEDURAL HEMORRHAGE OF A DIGESTIVE SYSTEM ORGAN OR STRUCTURE FOLLOWING A DIGESTIVE SYSTEM PROCEDURE: ICD-10-CM
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
## 2021-06-15 DIAGNOSIS — R55 SYNCOPE AND COLLAPSE: ICD-10-CM

## 2021-06-15 DIAGNOSIS — Z95.2 PRESENCE OF PROSTHETIC HEART VALVE: ICD-10-CM

## 2021-06-15 DIAGNOSIS — Z95.5 PRESENCE OF CORONARY ANGIOPLASTY IMPLANT AND GRAFT: ICD-10-CM

## 2021-06-15 DIAGNOSIS — Z88.0 ALLERGY STATUS TO PENICILLIN: ICD-10-CM

## 2021-06-15 DIAGNOSIS — M81.0 AGE-RELATED OSTEOPOROSIS WITHOUT CURRENT PATHOLOGICAL FRACTURE: ICD-10-CM

## 2021-06-15 DIAGNOSIS — E87.1 HYPO-OSMOLALITY AND HYPONATREMIA: ICD-10-CM

## 2021-06-15 DIAGNOSIS — I27.81 COR PULMONALE (CHRONIC): ICD-10-CM

## 2021-06-15 DIAGNOSIS — Z79.01 LONG TERM (CURRENT) USE OF ANTICOAGULANTS: ICD-10-CM
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
## 2021-06-15 DIAGNOSIS — E78.5 HYPERLIPIDEMIA, UNSPECIFIED: ICD-10-CM

## 2021-06-15 DIAGNOSIS — Z91.018 ALLERGY TO OTHER FOODS: ICD-10-CM

## 2021-06-15 DIAGNOSIS — D68.32 HEMORRHAGIC DISORDER DUE TO EXTRINSIC CIRCULATING ANTICOAGULANTS: ICD-10-CM

## 2021-06-15 DIAGNOSIS — E78.00 PURE HYPERCHOLESTEROLEMIA, UNSPECIFIED: ICD-10-CM
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
## 2021-06-15 DIAGNOSIS — I10 ESSENTIAL (PRIMARY) HYPERTENSION: ICD-10-CM
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
## 2021-06-15 LAB
INR PPP: 2.1 RATIO
QUALITY CONTROL: YES

## 2021-06-22 ENCOUNTER — RX RENEWAL (OUTPATIENT)
Age: 86
End: 2021-06-22

## 2021-06-23 ENCOUNTER — OUTPATIENT (OUTPATIENT)
Dept: OUTPATIENT SERVICES | Facility: HOSPITAL | Age: 86
LOS: 1 days | Discharge: HOME | End: 2021-06-23

## 2021-06-23 ENCOUNTER — APPOINTMENT (OUTPATIENT)
Dept: MEDICATION MANAGEMENT | Facility: CLINIC | Age: 86
End: 2021-06-23

## 2021-06-23 DIAGNOSIS — I27.82 CHRONIC PULMONARY EMBOLISM: ICD-10-CM

## 2021-06-23 DIAGNOSIS — Z95.2 PRESENCE OF PROSTHETIC HEART VALVE: Chronic | ICD-10-CM

## 2021-06-23 DIAGNOSIS — Z79.01 LONG TERM (CURRENT) USE OF ANTICOAGULANTS: ICD-10-CM

## 2021-06-23 LAB
INR PPP: 3.2 RATIO
QUALITY CONTROL: YES

## 2021-06-30 ENCOUNTER — OUTPATIENT (OUTPATIENT)
Dept: OUTPATIENT SERVICES | Facility: HOSPITAL | Age: 86
LOS: 1 days | Discharge: HOME | End: 2021-06-30

## 2021-06-30 ENCOUNTER — APPOINTMENT (OUTPATIENT)
Dept: MEDICATION MANAGEMENT | Facility: CLINIC | Age: 86
End: 2021-06-30

## 2021-06-30 DIAGNOSIS — I27.82 CHRONIC PULMONARY EMBOLISM: ICD-10-CM

## 2021-06-30 DIAGNOSIS — Z95.2 PRESENCE OF PROSTHETIC HEART VALVE: Chronic | ICD-10-CM

## 2021-06-30 DIAGNOSIS — Z79.01 LONG TERM (CURRENT) USE OF ANTICOAGULANTS: ICD-10-CM

## 2021-06-30 PROBLEM — R60.0 LOWER EXTREMITY EDEMA: Status: ACTIVE | Noted: 2021-06-30

## 2021-06-30 PROBLEM — I07.1 TRICUSPID REGURGITATION: Status: ACTIVE | Noted: 2021-06-30

## 2021-06-30 PROBLEM — Z63.4 WIDOW: Status: ACTIVE | Noted: 2021-06-30

## 2021-06-30 LAB
INR PPP: 2.5 RATIO
QUALITY CONTROL: YES

## 2021-07-02 ENCOUNTER — APPOINTMENT (OUTPATIENT)
Dept: CARDIOLOGY | Facility: CLINIC | Age: 86
End: 2021-07-02
Payer: MEDICARE

## 2021-07-02 VITALS
DIASTOLIC BLOOD PRESSURE: 70 MMHG | BODY MASS INDEX: 25.23 KG/M2 | WEIGHT: 157 LBS | HEIGHT: 66 IN | SYSTOLIC BLOOD PRESSURE: 130 MMHG

## 2021-07-02 DIAGNOSIS — Z63.4 DISAPPEARANCE AND DEATH OF FAMILY MEMBER: ICD-10-CM

## 2021-07-02 DIAGNOSIS — R60.0 LOCALIZED EDEMA: ICD-10-CM

## 2021-07-02 DIAGNOSIS — I07.1 RHEUMATIC TRICUSPID INSUFFICIENCY: ICD-10-CM

## 2021-07-02 PROCEDURE — 99213 OFFICE O/P EST LOW 20 MIN: CPT

## 2021-07-02 SDOH — SOCIAL STABILITY - SOCIAL INSECURITY: DISSAPEARANCE AND DEATH OF FAMILY MEMBER: Z63.4

## 2021-07-02 NOTE — DISCUSSION/SUMMARY
[FreeTextEntry1] : The pt had CABG mechanical AVR 2003 on 5/14 palpitations found in V tach. IV amiodarone need temp pacemaker. Cath RCA PO. V tach secondary ischemia. Event monitor no VT. Now off amiodarone. Palpitations resolved. .Now off xanax. Echo 1017 mild pulm htn mild MR. off plavix. Now on asa 81mg not drive arthritis. She needs access a ride told resume exercise.She lost 3 lbs. She does not want stress test.Fx right shoulder 11/16. Refused shoulder surgery. go rehab.Dizzy get up resolved. She 6/21 10 teeth pulled. On Lovenox bled mouth. Got 2 units blood.

## 2021-07-02 NOTE — PHYSICAL EXAM
[5th Left ICS - MCL] : palpated at the 5th LICS in the midclavicular line [No Precordial Heave] : no precordial heave was noted [Normal Rate] : normal [Rhythm Regular] : regular [Normal S1] : normal S1 [Normal S2] : normal S2 [Click] : a ~M click was heard [I] : a grade 1 [No Pitting Edema] : no pitting edema present [2+] : left 2+ [No Abnormalities] : the abdominal aorta was not enlarged and no bruit was heard [Normal] : alert and oriented, normal memory [S3] : no S3 [S4] : no S4 [Right Carotid Bruit] : no bruit heard over the right carotid [Left Carotid Bruit] : no bruit heard over the left carotid

## 2021-07-02 NOTE — REASON FOR VISIT
[FreeTextEntry1] : Patient had 10 teeth pulled. Then on lovenox she bled. She got 2 units blood. No more bleeding. No SOB . INR good. No beeding

## 2021-07-02 NOTE — REVIEW OF SYSTEMS
[Palpitations] : palpitations [Joint Pain] : joint pain [Shoulder Pain] : shoulder pain [Hand Pain] : hand pain [Knee Pain] : knee pain [Lower Back Pain] : lower back pain [Fever] : no fever [Chills] : no chills [Blurry Vision] : no blurred vision [Earache] : no earache [Hearing Loss] : no hearing loss [Sinus Pressure] : no sinus pressure [SOB] : no shortness of breath [Chest Discomfort] : no chest discomfort [Cough] : no cough [Wheezing] : no wheezing [Abdominal Pain] : no abdominal pain [Vomiting] : no vomiting [Constipation] : no constipation [Dysuria] : no dysuria [Rash] : no rash [Skin Lesions] : no skin lesions [Dizziness] : no dizziness [Confusion] : no confusion was observed [Swollen Glands] : no swollen glands

## 2021-07-07 ENCOUNTER — OUTPATIENT (OUTPATIENT)
Dept: OUTPATIENT SERVICES | Facility: HOSPITAL | Age: 86
LOS: 1 days | Discharge: HOME | End: 2021-07-07

## 2021-07-07 ENCOUNTER — APPOINTMENT (OUTPATIENT)
Dept: MEDICATION MANAGEMENT | Facility: CLINIC | Age: 86
End: 2021-07-07

## 2021-07-07 DIAGNOSIS — Z79.01 LONG TERM (CURRENT) USE OF ANTICOAGULANTS: ICD-10-CM

## 2021-07-07 DIAGNOSIS — I27.82 CHRONIC PULMONARY EMBOLISM: ICD-10-CM

## 2021-07-07 DIAGNOSIS — Z95.2 PRESENCE OF PROSTHETIC HEART VALVE: Chronic | ICD-10-CM

## 2021-07-07 LAB
INR PPP: 3.3 RATIO
QUALITY CONTROL: YES

## 2021-07-13 ENCOUNTER — INPATIENT (INPATIENT)
Facility: HOSPITAL | Age: 86
LOS: 9 days | Discharge: NURSING, REHAB OR LONG-TERM CARE | End: 2021-07-23
Attending: SURGERY | Admitting: SURGERY
Payer: MEDICARE

## 2021-07-13 VITALS
WEIGHT: 156.97 LBS | SYSTOLIC BLOOD PRESSURE: 174 MMHG | TEMPERATURE: 96 F | RESPIRATION RATE: 18 BRPM | OXYGEN SATURATION: 99 % | HEIGHT: 66 IN | DIASTOLIC BLOOD PRESSURE: 74 MMHG | HEART RATE: 70 BPM

## 2021-07-13 DIAGNOSIS — Z95.2 PRESENCE OF PROSTHETIC HEART VALVE: Chronic | ICD-10-CM

## 2021-07-13 LAB
ALBUMIN SERPL ELPH-MCNC: 4 G/DL — SIGNIFICANT CHANGE UP (ref 3.5–5.2)
ALP SERPL-CCNC: 54 U/L — SIGNIFICANT CHANGE UP (ref 30–115)
ALT FLD-CCNC: 13 U/L — SIGNIFICANT CHANGE UP (ref 0–41)
ANION GAP SERPL CALC-SCNC: 13 MMOL/L — SIGNIFICANT CHANGE UP (ref 7–14)
APPEARANCE UR: ABNORMAL
APTT BLD: 39.9 SEC — HIGH (ref 27–39.2)
AST SERPL-CCNC: 24 U/L — SIGNIFICANT CHANGE UP (ref 0–41)
BACTERIA # UR AUTO: ABNORMAL
BASOPHILS # BLD AUTO: 0.03 K/UL — SIGNIFICANT CHANGE UP (ref 0–0.2)
BASOPHILS NFR BLD AUTO: 0.5 % — SIGNIFICANT CHANGE UP (ref 0–1)
BILIRUB SERPL-MCNC: 0.5 MG/DL — SIGNIFICANT CHANGE UP (ref 0.2–1.2)
BILIRUB UR-MCNC: NEGATIVE — SIGNIFICANT CHANGE UP
BUN SERPL-MCNC: 15 MG/DL — SIGNIFICANT CHANGE UP (ref 10–20)
CALCIUM SERPL-MCNC: 8.8 MG/DL — SIGNIFICANT CHANGE UP (ref 8.5–10.1)
CHLORIDE SERPL-SCNC: 94 MMOL/L — LOW (ref 98–110)
CO2 SERPL-SCNC: 25 MMOL/L — SIGNIFICANT CHANGE UP (ref 17–32)
COLOR SPEC: ABNORMAL
CREAT SERPL-MCNC: 1 MG/DL — SIGNIFICANT CHANGE UP (ref 0.7–1.5)
DIFF PNL FLD: ABNORMAL
EOSINOPHIL # BLD AUTO: 0.07 K/UL — SIGNIFICANT CHANGE UP (ref 0–0.7)
EOSINOPHIL NFR BLD AUTO: 1.3 % — SIGNIFICANT CHANGE UP (ref 0–8)
EPI CELLS # UR: 1 /HPF — SIGNIFICANT CHANGE UP (ref 0–5)
ETHANOL SERPL-MCNC: <10 MG/DL — SIGNIFICANT CHANGE UP
GLUCOSE SERPL-MCNC: 120 MG/DL — HIGH (ref 70–99)
GLUCOSE UR QL: NEGATIVE — SIGNIFICANT CHANGE UP
HCT VFR BLD CALC: 33.7 % — LOW (ref 37–47)
HGB BLD-MCNC: 10.9 G/DL — LOW (ref 12–16)
HYALINE CASTS # UR AUTO: >145 /LPF — HIGH (ref 0–7)
IMM GRANULOCYTES NFR BLD AUTO: 0.5 % — HIGH (ref 0.1–0.3)
INR BLD: 3.9 RATIO — HIGH (ref 0.65–1.3)
KETONES UR-MCNC: NEGATIVE — SIGNIFICANT CHANGE UP
LACTATE SERPL-SCNC: 3.1 MMOL/L — HIGH (ref 0.7–2)
LEUKOCYTE ESTERASE UR-ACNC: ABNORMAL
LIDOCAIN IGE QN: 40 U/L — SIGNIFICANT CHANGE UP (ref 7–60)
LYMPHOCYTES # BLD AUTO: 1.92 K/UL — SIGNIFICANT CHANGE UP (ref 1.2–3.4)
LYMPHOCYTES # BLD AUTO: 34.4 % — SIGNIFICANT CHANGE UP (ref 20.5–51.1)
MCHC RBC-ENTMCNC: 28.8 PG — SIGNIFICANT CHANGE UP (ref 27–31)
MCHC RBC-ENTMCNC: 32.3 G/DL — SIGNIFICANT CHANGE UP (ref 32–37)
MCV RBC AUTO: 89.2 FL — SIGNIFICANT CHANGE UP (ref 81–99)
MONOCYTES # BLD AUTO: 0.39 K/UL — SIGNIFICANT CHANGE UP (ref 0.1–0.6)
MONOCYTES NFR BLD AUTO: 7 % — SIGNIFICANT CHANGE UP (ref 1.7–9.3)
NEUTROPHILS # BLD AUTO: 3.14 K/UL — SIGNIFICANT CHANGE UP (ref 1.4–6.5)
NEUTROPHILS NFR BLD AUTO: 56.3 % — SIGNIFICANT CHANGE UP (ref 42.2–75.2)
NITRITE UR-MCNC: POSITIVE
NRBC # BLD: 0 /100 WBCS — SIGNIFICANT CHANGE UP (ref 0–0)
PH UR: 6.5 — SIGNIFICANT CHANGE UP (ref 5–8)
PLATELET # BLD AUTO: 197 K/UL — SIGNIFICANT CHANGE UP (ref 130–400)
POTASSIUM SERPL-MCNC: 4.4 MMOL/L — SIGNIFICANT CHANGE UP (ref 3.5–5)
POTASSIUM SERPL-SCNC: 4.4 MMOL/L — SIGNIFICANT CHANGE UP (ref 3.5–5)
PROT SERPL-MCNC: 6.1 G/DL — SIGNIFICANT CHANGE UP (ref 6–8)
PROT UR-MCNC: ABNORMAL
PROTHROM AB SERPL-ACNC: >40 SEC — HIGH (ref 9.95–12.87)
RBC # BLD: 3.78 M/UL — LOW (ref 4.2–5.4)
RBC # FLD: 13.8 % — SIGNIFICANT CHANGE UP (ref 11.5–14.5)
RBC CASTS # UR COMP ASSIST: 4 /HPF — SIGNIFICANT CHANGE UP (ref 0–4)
SARS-COV-2 RNA SPEC QL NAA+PROBE: SIGNIFICANT CHANGE UP
SODIUM SERPL-SCNC: 132 MMOL/L — LOW (ref 135–146)
SP GR SPEC: 1.01 — SIGNIFICANT CHANGE UP (ref 1.01–1.03)
UROBILINOGEN FLD QL: SIGNIFICANT CHANGE UP
WBC # BLD: 5.58 K/UL — SIGNIFICANT CHANGE UP (ref 4.8–10.8)
WBC # FLD AUTO: 5.58 K/UL — SIGNIFICANT CHANGE UP (ref 4.8–10.8)
WBC UR QL: >720 /HPF — HIGH (ref 0–5)

## 2021-07-13 PROCEDURE — 93010 ELECTROCARDIOGRAM REPORT: CPT

## 2021-07-13 PROCEDURE — 71045 X-RAY EXAM CHEST 1 VIEW: CPT | Mod: 26

## 2021-07-13 PROCEDURE — 73502 X-RAY EXAM HIP UNI 2-3 VIEWS: CPT | Mod: 26,RT

## 2021-07-13 PROCEDURE — 99285 EMERGENCY DEPT VISIT HI MDM: CPT

## 2021-07-13 PROCEDURE — 72125 CT NECK SPINE W/O DYE: CPT | Mod: 26,MA

## 2021-07-13 PROCEDURE — 99223 1ST HOSP IP/OBS HIGH 75: CPT

## 2021-07-13 PROCEDURE — 74177 CT ABD & PELVIS W/CONTRAST: CPT | Mod: 26,MA

## 2021-07-13 PROCEDURE — 71260 CT THORAX DX C+: CPT | Mod: 26,MA

## 2021-07-13 PROCEDURE — 70450 CT HEAD/BRAIN W/O DYE: CPT | Mod: 26,MA

## 2021-07-13 PROCEDURE — 73562 X-RAY EXAM OF KNEE 3: CPT | Mod: 26,RT

## 2021-07-13 PROCEDURE — 73552 X-RAY EXAM OF FEMUR 2/>: CPT | Mod: 26,RT

## 2021-07-13 RX ORDER — ALPRAZOLAM 0.25 MG
0.5 TABLET ORAL AT BEDTIME
Refills: 0 | Status: DISCONTINUED | OUTPATIENT
Start: 2021-07-13 | End: 2021-07-14

## 2021-07-13 RX ORDER — DEXTROSE 50 % IN WATER 50 %
15 SYRINGE (ML) INTRAVENOUS ONCE
Refills: 0 | Status: DISCONTINUED | OUTPATIENT
Start: 2021-07-13 | End: 2021-07-16

## 2021-07-13 RX ORDER — HEPARIN SODIUM 5000 [USP'U]/ML
5000 INJECTION INTRAVENOUS; SUBCUTANEOUS EVERY 8 HOURS
Refills: 0 | Status: DISCONTINUED | OUTPATIENT
Start: 2021-07-13 | End: 2021-07-14

## 2021-07-13 RX ORDER — DEXTROSE 50 % IN WATER 50 %
25 SYRINGE (ML) INTRAVENOUS ONCE
Refills: 0 | Status: DISCONTINUED | OUTPATIENT
Start: 2021-07-13 | End: 2021-07-16

## 2021-07-13 RX ORDER — ESCITALOPRAM OXALATE 10 MG/1
10 TABLET, FILM COATED ORAL DAILY
Refills: 0 | Status: DISCONTINUED | OUTPATIENT
Start: 2021-07-13 | End: 2021-07-16

## 2021-07-13 RX ORDER — IBUPROFEN 200 MG
400 TABLET ORAL EVERY 6 HOURS
Refills: 0 | Status: DISCONTINUED | OUTPATIENT
Start: 2021-07-13 | End: 2021-07-16

## 2021-07-13 RX ORDER — SENNA PLUS 8.6 MG/1
2 TABLET ORAL AT BEDTIME
Refills: 0 | Status: DISCONTINUED | OUTPATIENT
Start: 2021-07-13 | End: 2021-07-16

## 2021-07-13 RX ORDER — SODIUM CHLORIDE 9 MG/ML
500 INJECTION INTRAMUSCULAR; INTRAVENOUS; SUBCUTANEOUS ONCE
Refills: 0 | Status: COMPLETED | OUTPATIENT
Start: 2021-07-13 | End: 2021-07-13

## 2021-07-13 RX ORDER — GLUCAGON INJECTION, SOLUTION 0.5 MG/.1ML
1 INJECTION, SOLUTION SUBCUTANEOUS ONCE
Refills: 0 | Status: DISCONTINUED | OUTPATIENT
Start: 2021-07-13 | End: 2021-07-16

## 2021-07-13 RX ORDER — ATENOLOL 25 MG/1
25 TABLET ORAL DAILY
Refills: 0 | Status: DISCONTINUED | OUTPATIENT
Start: 2021-07-13 | End: 2021-07-16

## 2021-07-13 RX ORDER — VALSARTAN 80 MG/1
160 TABLET ORAL DAILY
Refills: 0 | Status: DISCONTINUED | OUTPATIENT
Start: 2021-07-13 | End: 2021-07-16

## 2021-07-13 RX ORDER — CHLORHEXIDINE GLUCONATE 213 G/1000ML
1 SOLUTION TOPICAL
Refills: 0 | Status: DISCONTINUED | OUTPATIENT
Start: 2021-07-13 | End: 2021-07-16

## 2021-07-13 RX ORDER — MORPHINE SULFATE 50 MG/1
4 CAPSULE, EXTENDED RELEASE ORAL ONCE
Refills: 0 | Status: DISCONTINUED | OUTPATIENT
Start: 2021-07-13 | End: 2021-07-13

## 2021-07-13 RX ORDER — ATORVASTATIN CALCIUM 80 MG/1
20 TABLET, FILM COATED ORAL AT BEDTIME
Refills: 0 | Status: DISCONTINUED | OUTPATIENT
Start: 2021-07-13 | End: 2021-07-16

## 2021-07-13 RX ORDER — PANTOPRAZOLE SODIUM 20 MG/1
40 TABLET, DELAYED RELEASE ORAL
Refills: 0 | Status: DISCONTINUED | OUTPATIENT
Start: 2021-07-13 | End: 2021-07-16

## 2021-07-13 RX ORDER — INSULIN LISPRO 100/ML
VIAL (ML) SUBCUTANEOUS
Refills: 0 | Status: DISCONTINUED | OUTPATIENT
Start: 2021-07-13 | End: 2021-07-16

## 2021-07-13 RX ORDER — ACETAMINOPHEN 500 MG
650 TABLET ORAL EVERY 6 HOURS
Refills: 0 | Status: DISCONTINUED | OUTPATIENT
Start: 2021-07-13 | End: 2021-07-16

## 2021-07-13 RX ORDER — SODIUM CHLORIDE 9 MG/ML
1000 INJECTION, SOLUTION INTRAVENOUS
Refills: 0 | Status: DISCONTINUED | OUTPATIENT
Start: 2021-07-13 | End: 2021-07-16

## 2021-07-13 RX ORDER — CEFTRIAXONE 500 MG/1
1000 INJECTION, POWDER, FOR SOLUTION INTRAMUSCULAR; INTRAVENOUS ONCE
Refills: 0 | Status: COMPLETED | OUTPATIENT
Start: 2021-07-13 | End: 2021-07-13

## 2021-07-13 RX ORDER — ASPIRIN/CALCIUM CARB/MAGNESIUM 324 MG
81 TABLET ORAL DAILY
Refills: 0 | Status: DISCONTINUED | OUTPATIENT
Start: 2021-07-13 | End: 2021-07-16

## 2021-07-13 RX ORDER — MORPHINE SULFATE 50 MG/1
2 CAPSULE, EXTENDED RELEASE ORAL ONCE
Refills: 0 | Status: DISCONTINUED | OUTPATIENT
Start: 2021-07-13 | End: 2021-07-13

## 2021-07-13 RX ORDER — CEFTRIAXONE 500 MG/1
1000 INJECTION, POWDER, FOR SOLUTION INTRAMUSCULAR; INTRAVENOUS EVERY 24 HOURS
Refills: 0 | Status: COMPLETED | OUTPATIENT
Start: 2021-07-13 | End: 2021-07-16

## 2021-07-13 RX ORDER — BUPIVACAINE HCL/PF 7.5 MG/ML
50 VIAL (ML) INJECTION ONCE
Refills: 0 | Status: DISCONTINUED | OUTPATIENT
Start: 2021-07-13 | End: 2021-07-13

## 2021-07-13 RX ADMIN — MORPHINE SULFATE 4 MILLIGRAM(S): 50 CAPSULE, EXTENDED RELEASE ORAL at 15:24

## 2021-07-13 RX ADMIN — CEFTRIAXONE 100 MILLIGRAM(S): 500 INJECTION, POWDER, FOR SOLUTION INTRAMUSCULAR; INTRAVENOUS at 17:57

## 2021-07-13 RX ADMIN — SODIUM CHLORIDE 500 MILLILITER(S): 9 INJECTION INTRAMUSCULAR; INTRAVENOUS; SUBCUTANEOUS at 15:20

## 2021-07-13 RX ADMIN — ATORVASTATIN CALCIUM 20 MILLIGRAM(S): 80 TABLET, FILM COATED ORAL at 22:22

## 2021-07-13 RX ADMIN — SODIUM CHLORIDE 500 MILLILITER(S): 9 INJECTION INTRAMUSCULAR; INTRAVENOUS; SUBCUTANEOUS at 14:19

## 2021-07-13 RX ADMIN — MORPHINE SULFATE 2 MILLIGRAM(S): 50 CAPSULE, EXTENDED RELEASE ORAL at 18:07

## 2021-07-13 RX ADMIN — SENNA PLUS 2 TABLET(S): 8.6 TABLET ORAL at 22:22

## 2021-07-13 RX ADMIN — MORPHINE SULFATE 4 MILLIGRAM(S): 50 CAPSULE, EXTENDED RELEASE ORAL at 14:19

## 2021-07-13 NOTE — ED PROVIDER NOTE - PROGRESS NOTE DETAILS
AH - R femoral neck fx; Ortho consulted, will come see pt Authored by Dr. Nelson: urine results reviewed. no symptoms. no fever. urine cx sent. ATTENDING NOTE: I personally evaluated the patient. I reviewed the Resident’s note (as assigned above), and agree with the findings and plan except as documented in my note. 84 y/o F PMH aortic mechanical valve replacement on Coumadin, Stance and HTN presents s/p mechanical fall off of a commode, landing on her R side, now with pain to the R hip. Pt also hit her head but there was no LOC. On exam: Awake and alert. GCS 15. PERRL, EOMI. Lungs clear. No focal neuro deficits. RLE shortened and externally rotated. Pain reproducible with log roll of RLE. Trauma alert called on arrival.  Plan: Labs, imaging, ortho consult and admit. Authored by Dr. Nelson: Bedside imaging reviewed + R hip fracture. Ortho consulted. AH - R femoral neck fx; Ortho consulted, will come see pt/ xray viewed on portable machine Authored by Dr. Nelson: s/o to dr yang - f/u results of ct imaging, trauma completion and dispo Received sign out from Dr. Nelson. Patient reassessed, still having right hip pain. Pending labs, ct read and trauma and ortho recommendations.

## 2021-07-13 NOTE — H&P ADULT - HISTORY OF PRESENT ILLNESS
85F w/PMHx of HTN, HLD, hx of mechanical aortic valve on coumadin, depression, anxiety, osteoporosis presents s/p mechanical fall, +HT, -LOC, +AC (coumadin). Patient was in her bathroom attempting to get out of the bathtub when she fell, falling onto her right side as well as the right side of her head. Denies any loss of consciousness. Was not ambulatory following the incident. In the ED she is GCS 15, A&Ox3. Primary survey negative. On exam her RLE is shortened and externally rotated. Endorses pain at her right hip. Unable to flex at the right knee joint. No other external signs of injury, denies pain at the chest, abdomen, and spine.

## 2021-07-13 NOTE — H&P ADULT - ATTENDING COMMENTS
This is 85F w/PMHx of HTN, HLD, hx of mechanical aortic valve on coumadin, depression, anxiety, osteoporosis presents s/p mechanical fall, +HT, -LOC, +AC (coumadin). Patient was in her bathroom attempting to get out of the bathtub when she fell, falling onto her right side as well as the right side of her head. Denies any loss of consciousness. Was not ambulatory following the incident. In the ED she is GCS 15, A&Ox3. Primary survey negative. On exam her RLE is shortened and externally rotated. Endorses pain at her right hip.     Primary and secondary surveys were performed.    AAO x3  GCS 15.  Left forehead ecchymosis.  Neuro intact.  Neck: no pain  Chest: clear.  CV : rrr  Abdomen: soft, nontender  Extr: right leg shorten    All images and labs were reviewed.    ASSESSMENT:  84 y/o female, S/P Fall.  Closed head injury.  Forehead ecchymosis.  Closed Right Femoral Neck Fracture.  Acute pain due to trauma  Atelectasis  AVR. No Coumadin  Coagulopathy.    PLAN:  - Ortho evaluation  - immobilize the right leg  - hold Coumadin  - Cardiology eval preoperatively.  - follow INR and all labs  Admit to Trauma. This is 85F w/PMHx of HTN, HLD, hx of mechanical aortic valve on coumadin, depression, anxiety, osteoporosis presents s/p mechanical fall, +HT, -LOC, +AC (coumadin). Patient was in her bathroom attempting to get out of the bathtub when she fell, falling onto her right side as well as the right side of her head. Denies any loss of consciousness. Was not ambulatory following the incident. In the ED she is GCS 15, A&Ox3. Primary survey negative. On exam her RLE is shortened and externally rotated. Endorses pain at her right hip.     Primary and secondary surveys were performed.    AAO x3  GCS 15.  Left forehead ecchymosis.  Neuro intact.  Neck: no pain  Chest: clear.  CV : rrr  Abdomen: soft, nontender  Extr: right leg shorten    All images and labs were reviewed.    ASSESSMENT:  84 y/o female, S/P Fall.  Closed head injury.  Forehead ecchymosis.  Closed Right Femoral Neck Fracture.  Acute pain due to trauma  Atelectasis  AVR. On Coumadin  Coagulopathy.    PLAN:  - Ortho evaluation  - immobilize the right leg  - hold Coumadin  - Cardiology eval preoperatively.  - follow INR and all labs  Admit to Trauma.

## 2021-07-13 NOTE — CONSULT NOTE ADULT - SUBJECTIVE AND OBJECTIVE BOX
TRAUMA ACTIVATION LEVEL:  Trauma Alert     MECHANISM OF INJURY: S/p     GCS: 15 	E: 4	V: 5	M: 6    HPI:  85F w/PMHx of HTN, HLD, hx of mechanical aortic valve on coumadin, depression, anxiety, osteoporosis presents s/p mechanical fall, +HT, -LOC, +AC (coumadin). Patient was in her bathroom attempting to get out of the bathtub when she fell, falling onto her right side as well as the right side of her head. Denies any loss of consciousness. Was not ambulatory following the incident. In the ED she is GCS 15, A&Ox3. Primary survey negative. On exam her RLE is shortened and externally rotated. Endorses pain at her right hip. Unable to flex at the right knee joint. No other external signs of injury, denies pain at the chest, abdomen, and spine.     PAST MEDICAL & SURGICAL HISTORY:  HTN (hypertension)  High cholesterol  Osteoporosis  Fracture of right shoulder  Anxiety  Depression  H/O heart valve replacement with mechanical valve    Allergies  penicillin (Unknown)  pinapples (Unknown)    Home Medications:  alendronate 35 mg oral tablet: 1 tab(s) orally once a week ()  ALPRAZolam 0.5 mg oral tablet: 1 tab(s) orally once a day (at bedtime), As Needed ()  aspirin 81 mg oral tablet, chewable: 1 tab(s) orally once a day ()  atenolol 25 mg oral tablet: 1 tab(s) orally once a day ()  atorvastatin 20 mg oral tablet: 1 tab(s) orally once a day ()  escitalopram 10 mg oral tablet: 1 tab(s) orally once a day (:)  ezetimibe 10 mg oral tablet: 1 tab(s) orally once a day (16)  iron gluconate: 27 milligram(s) orally once a day (16)  lidocaine 5% patch: 1  transdermally once a day (:)  valsartan 160 mg oral tablet: 1 tab(s) orally once a day (at bedtime) ()  warfarin 4 mg oral tablet: 1 tab(s) orally once a day (2021 00:16)      ROS: 10-system review is otherwise negative except HPI above.      Primary Survey:    A - airway intact  B - bilateral breath sounds and good chest rise  C - palpable pulses in all extremities  D - GCS 15 on arrival, JONES  Exposure obtained    Vital Signs Last 24 Hrs  T(C): 35.7 (2021 13:51), Max: 35.7 (2021 13:51)  T(F): 96.3 (2021 13:51), Max: 96.3 (2021 13:51)  HR: 67 (2021 14:12) (67 - 70)  BP: 189/85 (2021 14:12) (174/74 - 189/85)  RR: 18 (2021 14:12) (18 - 18)  SpO2: 99% (2021 14:12) (99% - 99%)    Secondary Survey:   General: NAD  HEENT: Normocephalic, atraumatic, EOMI, PEERLA. no scalp lacerations   Neck: Soft, midline trachea. no cspine tenderness  Chest: No chest wall tenderness. or subq  emphysema   Cardiac: S1, S2, RRR  Respiratory: Bilateral breath sounds, clear and equal bilaterally  Abdomen: Soft, non-distended, non-tender, no rebound,   Groin: Normal appearing, pelvis stable   Ext: palp radial b/l UE, b/l DP palp in Lower Extrem. RLE shortened and externally rotated, tender at the right hip  Back: no TTP, no palpable runoff/stepoff/deformity    FAST    Procedures:    LABS:    POCT Blood Glucose.: 96 mg/dL (2021 14:02)                      10.9   5.58  )-----------( 197      ( 2021 14:15 )             33.7       Auto Neutrophil %: 56.3 % (21 @ 14:15)  Auto Immature Granulocyte %: 0.5 % (21 @ 14:15)        132<L>  |  94<L>  |  15  ----------------------------<  120<H>  4.4   |  25  |  1.0    Calcium, Total Serum: 8.8 mg/dL (21 @ 14:15)    LFTs:             6.1  | 0.5  | 24       ------------------[54      ( 2021 14:15 )  4.0  | x    | 13          Lipase:40       Lactate, Blood: 3.1 mmol/L (21 @ 14:15)    Coags:     >40.00  ----< 3.90    ( 2021 14:15 )     39.9     Alcohol, Blood: <10 mg/dL (21 @ 14:15)    Urinalysis Basic - ( 2021 14:33 )    Color: Orange / Appearance: Turbid / S.010 / pH: x  Gluc: x / Ketone: Negative  / Bili: Negative / Urobili: <2 mg/dL   Blood: x / Protein: 30 mg/dL / Nitrite: Positive   Leuk Esterase: Large / RBC: 4 /HPF / WBC >720 /HPF   Sq Epi: x / Non Sq Epi: 1 /HPF / Bacteria: Few    Alcohol, Blood: <10 mg/dL (21 @ 14:15)    RADIOLOGY & ADDITIONAL STUDIES:  *Pending trauma pan scan    TRAUMA ACTIVATION LEVEL:  Trauma Alert     MECHANISM OF INJURY: S/p mechanical fall, +HT, -LOC, +AC (coumadin)    GCS: 15 	E: 4	V: 5	M: 6    HPI:  85F w/PMHx of HTN, HLD, hx of mechanical aortic valve on coumadin, depression, anxiety, osteoporosis presents s/p mechanical fall, +HT, -LOC, +AC (coumadin). Patient was in her bathroom attempting to get out of the bathtub when she fell, falling onto her right side as well as the right side of her head. Denies any loss of consciousness. Was not ambulatory following the incident. In the ED she is GCS 15, A&Ox3. Primary survey negative. On exam her RLE is shortened and externally rotated. Endorses pain at her right hip. Unable to flex at the right knee joint. No other external signs of injury, denies pain at the chest, abdomen, and spine.     PAST MEDICAL & SURGICAL HISTORY:  HTN (hypertension)  High cholesterol  Osteoporosis  Fracture of right shoulder  Anxiety  Depression  H/O heart valve replacement with mechanical valve    Allergies  penicillin (Unknown)  pinapples (Unknown)    Home Medications:  alendronate 35 mg oral tablet: 1 tab(s) orally once a week (:)  ALPRAZolam 0.5 mg oral tablet: 1 tab(s) orally once a day (at bedtime), As Needed (:16)  aspirin 81 mg oral tablet, chewable: 1 tab(s) orally once a day (:16)  atenolol 25 mg oral tablet: 1 tab(s) orally once a day (:)  atorvastatin 20 mg oral tablet: 1 tab(s) orally once a day (:16)  escitalopram 10 mg oral tablet: 1 tab(s) orally once a day (:16)  ezetimibe 10 mg oral tablet: 1 tab(s) orally once a day (:16)  iron gluconate: 27 milligram(s) orally once a day (:16)  lidocaine 5% patch: 1  transdermally once a day (:)  valsartan 160 mg oral tablet: 1 tab(s) orally once a day (at bedtime) (2021 00:16)  warfarin 4 mg oral tablet: 1 tab(s) orally once a day (2021 00:16)      ROS: 10-system review is otherwise negative except HPI above.      Primary Survey:    A - airway intact  B - bilateral breath sounds and good chest rise  C - palpable pulses in all extremities  D - GCS 15 on arrival, JONES  Exposure obtained    Vital Signs Last 24 Hrs  T(C): 35.7 (2021 13:51), Max: 35.7 (2021 13:51)  T(F): 96.3 (2021 13:51), Max: 96.3 (2021 13:51)  HR: 67 (2021 14:12) (67 - 70)  BP: 189/85 (2021 14:12) (174/74 - 189/85)  RR: 18 (2021 14:12) (18 - 18)  SpO2: 99% (2021 14:12) (99% - 99%)    Secondary Survey:   General: NAD  HEENT: Normocephalic, atraumatic, EOMI, PEERLA. no scalp lacerations   Neck: Soft, midline trachea. no cspine tenderness  Chest: No chest wall tenderness. or subq  emphysema   Cardiac: S1, S2, RRR  Respiratory: Bilateral breath sounds, clear and equal bilaterally  Abdomen: Soft, non-distended, non-tender, no rebound,   Groin: Normal appearing, pelvis stable   Ext: palp radial b/l UE, b/l DP palp in Lower Extrem. RLE shortened and externally rotated, tender at the right hip  Back: no TTP, no palpable runoff/stepoff/deformity    FAST    Procedures:    LABS:    POCT Blood Glucose.: 96 mg/dL (2021 14:02)                      10.9   5.58  )-----------( 197      ( 2021 14:15 )             33.7       Auto Neutrophil %: 56.3 % (21 @ 14:15)  Auto Immature Granulocyte %: 0.5 % (21 @ 14:15)        132<L>  |  94<L>  |  15  ----------------------------<  120<H>  4.4   |  25  |  1.0    Calcium, Total Serum: 8.8 mg/dL (21 @ 14:15)    LFTs:             6.1  | 0.5  | 24       ------------------[54      ( 2021 14:15 )  4.0  | x    | 13          Lipase:40       Lactate, Blood: 3.1 mmol/L (21 @ 14:15)    Coags:     >40.00  ----< 3.90    ( 2021 14:15 )     39.9     Alcohol, Blood: <10 mg/dL (21 @ 14:15)    Urinalysis Basic - ( 2021 14:33 )    Color: Orange / Appearance: Turbid / S.010 / pH: x  Gluc: x / Ketone: Negative  / Bili: Negative / Urobili: <2 mg/dL   Blood: x / Protein: 30 mg/dL / Nitrite: Positive   Leuk Esterase: Large / RBC: 4 /HPF / WBC >720 /HPF   Sq Epi: x / Non Sq Epi: 1 /HPF / Bacteria: Few    Alcohol, Blood: <10 mg/dL (21 @ 14:15)    RADIOLOGY & ADDITIONAL STUDIES:

## 2021-07-13 NOTE — ED ADULT TRIAGE NOTE - ACCOMPANIED BY
DATE OF SERVICE:  12/27/2020     UROLOGIC CONSULTATION     REQUESTING PHYSICIAN:  Emergency room physician.     CHIEF COMPLAINT:  This is a 52-year-old woman with intractable left flank   pain.     HISTORY OF PRESENT ILLNESS:  The patient states she was in her usual state of   health until several weeks ago when she presented with a significant   left-sided stone.  She was treated with a stent.  She developed intractable   stent pain.  The stent was removed and a third procedure was performed on   12/24 by Dr. Rosenthal where a cystoscopy, ureteroscopy, laser lithotripsy was   performed.  The patient did well afterwards, but on 12/26/2020 developed acute   onset left flank pain with nausea and vomiting, presented to the emergency   room, was found to have what looks to be 7 mm of steinstrasse stone burden   distally and some stone fragments in the left kidney.  The patient was   admitted to the hospitalist and treated with IV fluid hydration.     She has some urgency, frequency, dysuria.  She has had some hematuria and she   rates the pain as 10/10.     PAST MEDICAL HISTORY:  Noteworthy for,  1.  Epistaxis.  2.  Gastroesophageal reflux disease.  3.  Hypertension.  4.  Indigestion.  5.  Migraines.  6.  Postoperative nausea and vomiting.  7.  History of urinary tract infection.  8.  History of nephrolithiasis.     PAST SURGICAL HISTORY:  Noteworthy for a lumpectomy in the left breast in   1988.  She has had a laparoscopy, hysteroscopy.  She has a history of   dilatation and curettage.  She has a history of cystoscopy with left stent   insertion on 12/11/2020.  She has a history of left stent removal on   12/20/2020 and she has a history of a cystoscopy, ureteroscopy, laser   lithotripsy of stone on 12/24/2020.     MEDICATIONS:  She is on Percocet as needed for pain.  She takes Pepcid.  She   is using Tylenol, probiotic, multivitamin, Pyridium as needed.     ALLERGIES:  HYDROCODONE, WHICH CAUSES NIGHTMARES AND NAUSEA;  CODEINE, WHICH   CAUSES EMESIS, AND GRAPE JUICE.  SHE ALSO STATES THE PENICILLINS HAVE CAUSED   HER VOMITING IN THE PAST.     FAMILY HISTORY:  Noteworthy for hypertension in the mother, heart disease in   maternal grandmother and lung disease in her maternal uncle and mother.     SOCIAL HISTORY:  She quit smoking eight years ago.  She uses marijuana   recreationally.  She denies any current alcohol use.     REVIEW OF SYSTEMS:  GENERAL:  She denies any fever or chills.  She has had no significant weight   loss or weight gain.  HEENT:  Denies any loss of vision, loss of hearing, difficulty swallowing.  RESPIRATORY:  Denies any shortness of breath, cough or hemoptysis.  CARDIOVASCULAR:  Denies any dyspnea on exertion, chest pain or skipped   heartbeats.  ABDOMEN:  She has had nausea and vomiting.  She denies any blood in the stool.  GENITOURINARY:  She has had urgency, frequency, dysuria and left flank pain.  MUSCULOSKELETAL:  She denies any arthralgias or myalgias.  NEUROLOGIC:  She denies any seizure or discoordination.  PSYCHIATRIC:  Denies any anxiety or depression.  ENDOCRINE:  Denies a feeling of excessive heat or cold intolerance or   polyuria.     PHYSICAL EXAMINATION:  VITAL SIGNS:  She is a well-developed, well-nourished woman in moderate   distress due to left flank pain.  HEENT: Normocephalic, atraumatic.  Pupils are equal, round.  Sclerae   nonicteric.  NECK:  Supple.  I did not appreciate a carotid bruit or jugular venous   distention.  CARDIOVASCULAR:  Regular rate and rhythm without a murmur.  ABDOMEN:  Soft, nontender, nondistended.  She had normoactive bowel sounds.  BACK:  Noteworthy for mild left costovertebral angle tenderness.  EXTREMITIES:  Without clubbing, cyanosis, edema. Homans' sign is negative.  NEUROLOGICAL:  Cranial nerves II-XII intact.  Motor and sensory examination   nonfocal.  PSYCHIATRIC:  Appropriate mood and judgment.     DIAGNOSTIC DATA:  I have personally reviewed her CAT scan.   She has a 3-4 mm   stones in the upper pole on the left side, one is probably biggest 5 mm.  In   addition, she has what looks to be 7 mm of steinstrasse stone burden in the   distal ureter.  She denies passing this since admission and her creatinine is   1.28.     ASSESSMENT:  1.  Left flank pain with intractable pain and nausea.  2.  Left distal steinstrasse.  3.  Left nephrolithiasis.     PLAN:  I explained to the patient that I would recommend proceeding with   cystoscopy, left ureteroscopy, laser lithotripsy and possible stent.  I   explained the rationale for the stent that she likely has a significant amount   of edema due to steinstrasse.  We discussed the risk of the procedure   including, but not limited to risk of perioperative urinary tract infection,   urosepsis, risk of ureteral injury, requiring a stent for three to four weeks.    There is the risk of stent migration, associated urgency, frequency and   stent pain, which she has previously had.  I explained to the patient I would   put a stent on a string so if need to be removed sooner, we could proceed with   self removal or removal in the office.  In addition, I discussed the   potential need for another procedure that stone burden can be significant and   difficult to eradicate.  We also discussed the perioperative risk of deep vein   thrombosis, pulmonary embolism, aspiration pneumonia, heart attack, stroke   and death.  Informed consent was given to me by the patient to proceed with   surgery and she was marked on her left thigh with my initials DH and the Y for   proper site surgery.        ______________________________  MD LILI Pierce/ALFRED/RICHARD    DD:  12/27/2020 12:26  DT:  12/27/2020 15:13    Job#:  [id number]   EMT/paramedic

## 2021-07-13 NOTE — ED PROVIDER NOTE - PHYSICAL EXAMINATION
CONSTITUTIONAL: in mild acute distress  SKIN: Warm, dry  HEAD: Normocephalic; atraumatic  EYES: No conjunctival injection. PERRLA. EOMI  ENT: No nasal discharge; oropharynx nonerythematous; airway clear  NECK: Supple; non tender  CARD:  Regular rate and rhythm  RESP: CTAB  ABD: Soft NTND; No guarding or rebound tenderness  EXT: No clubbing or cyanosis.  No edema; RLE shortened, externally rotated, DP pulses 2+, moving all extremities; no bleeding or bruising, no sensory deficits  NEURO: A&O x3, grossly unremarkable, no focal deficits

## 2021-07-13 NOTE — H&P ADULT - NSHPPHYSICALEXAM_GEN_ALL_CORE
General: NAD  HEENT: Normocephalic, atraumatic, EOMI, PEERLA. no scalp lacerations   Neck: Soft, midline trachea. no cspine tenderness  Chest: No chest wall tenderness. or subq  emphysema   Cardiac: S1, S2, RRR  Respiratory: Bilateral breath sounds, clear and equal bilaterally  Abdomen: Soft, non-distended, non-tender, no rebound,   Groin: Normal appearing, pelvis stable   Ext: palp radial b/l UE, b/l DP palp in Lower Extrem. RLE shortened and externally rotated, tender at the right hip  Back: no TTP, no palpable runoff/stepoff/deformity

## 2021-07-13 NOTE — ED PROVIDER NOTE - WR INTERPRETATION DATE TIME  3
(H52.533) Spasm of accommodation, bilateral - Assesment : Examination revealed spasm of accommodation OU. - Plan : New contact lenses were prescribed to correct visual acuity and function. New glasses were prescribed to correct visual acuity and function.    RTC 1 year/CL Sarah Wilson
13-Jul-2021 17:41

## 2021-07-13 NOTE — CONSULT NOTE ADULT - ASSESSMENT
85F w/PMHx of HTN, HLD, hx of mechanical aortic valve on coumadin, depression, anxiety, osteoporosis presents s/p mechanical fall, +HT, -LOC, +AC (coumadin).    Plan:   -Trauma pan scan   -Trauma labs   -CXR, pelvis XR, extremity XRs   -C-collar in place  -Patient and plan discussed with Dr. Salmon

## 2021-07-13 NOTE — ED ADULT NURSE NOTE - OBJECTIVE STATEMENT
pt tripped on commode in bathroom, unwitnessed. found on the floor. bumped head on wall, no LOC on coumadin. c/o right groin pain. right leg externally rotated

## 2021-07-13 NOTE — CONSULT NOTE ADULT - ASSESSMENT
[facility] CON[facility]ULT [facility]    CC / HPI    85F w/PMHx of HTN, HLD, hx of mechanical aortic valve on coumadin, depression, anxiety, osteoporosis presents s/p mechanical fall, +HT, -LOC, +AC (coumadin). Patient was in her bathroom attempting to get out of the bathtub when she fell, falling onto her right side as well as the right side of her head. Denies any loss of consciousness. Was not ambulatory following the incident. In the ED she is GCS 15, A&Ox3. Primary survey negative. On exam her RLE is shortened and externally rotated. Endorses pain at her right hip. Unable to flex at the right knee joint. No other external signs of injury, denies pain at the chest, abdomen, and spine.       PMH:   HTN (hypertension)  High cholesterol  Osteoporosis  Fracture of right shoulder  Anxiety  Depression  H/O heart valve replacement with mechanical valve    Allergies  penicillin (Unknown)  pinapples (Unknown)    Home Medications:  alendronate 35 mg oral tablet: 1 tab(s) orally once a week (07 Jun 2021 00:16)  ALPRAZolam 0.5 mg oral tablet: 1 tab(s) orally once a day (at bedtime), As Needed (07 Jun 2021 00:16)  aspirin 81 mg oral tablet, chewable: 1 tab(s) orally once a day (07 Jun 2021 00:16)  atenolol 25 mg oral tablet: 1 tab(s) orally once a day (07 Jun 2021 00:16)  atorvastatin 20 mg oral tablet: 1 tab(s) orally once a day (07 Jun 2021 00:16)  escitalopram 10 mg oral tablet: 1 tab(s) orally once a day (07 Jun 2021 00:16)  ezetimibe 10 mg oral tablet: 1 tab(s) orally once a day (07 Jun 2021 00:16)  iron gluconate: 27 milligram(s) orally once a day (07 Jun 2021 00:16)  lidocaine 5% patch: 1  transdermally once a day (07 Jun 2021 00:16)  valsartan 160 mg oral tablet: 1 tab(s) orally once a day (at bedtime) (07 Jun 2021 00:16)  warfarin 4 mg oral tablet: 1 tab(s) orally once a day (07 Jun 2021 00:16)    SH:   - lives at home w/ family as primary caregivers  - walk around house w/o assistive device, but does not walk outside     PHYSICAL EXAM  General: In no acute distress, awake/alert and oriented    Right Hip  Unable to range hip secondary to pain  No swelling or ecchymosis  Skin intact about the hip  AROM limited due to pain  Motor intact: 5/5 gastroc-soleus, tibialis anterior, FHL, EHL  Sensation intact to light touch sural/saphenous/DP/SP/tibial   DP/PT palpable pulses, toes warm and well perfused    Complete orthopaedic secondary exam unremarkable for other injuries    IMAGING  Studies During Admission:  Multiple XR views today of pelvis, R hip and femur demonstrate   complete, displaced femoral neck fracture     ASSESSMENT AND PLAN   67F w/ a R femoral neck fracture     Discussed with patient and daughter the nature and severity of the injury, and the need and rationale for operative intervention for best chance return to function. We discussed that many patients never return to function and significant medical complications are relatively common with this injury. All questions answered.     Formal orthopedic plan to follow  Will require operative fixation of right hip   OR TBD pending medical optimization     - Medicine/Trauma & cardiology team consult  needs cardiology clearance    primary cardiologist: Dr. Crow Nagy ([facility])  Office: [phone]  Cell: [phone]      - Preop labs: need CBC, BMP, PT/PTT, active type and screen   - Goal INR for surgery: 1.5  please DC coumadin and trend INR  - Chest XR  - EKG  - Nonweight bearing  - Pain control

## 2021-07-13 NOTE — CONSULT NOTE ADULT - NSCONSULTADDITIONALINFOA_GEN_ALL_CORE
Orthopedic Attending  Patient seen and examined.  PMHx, Psurg Hx, Medications and Allergies reviewed.  X-rays and CT reviewed.  Agree with findings, assessment and plan. Impression: Displaced femoral neck fracture.  Recommend: Hemiarthroplasty when optimized.

## 2021-07-13 NOTE — ED PROVIDER NOTE - OBJECTIVE STATEMENT
84 yo F with Aortic valve replacement on Coumadin, CAD, HTN, HLD, OA s/p mechanical fall in bathroom.  Pt attempting to get out of tub, tripped, fell on R side, did hit head, no LOC, was not able to ambulate after fall.  Pt denies any other pain. No palpitations, chest pain, abdominal pain, back pain.

## 2021-07-13 NOTE — H&P ADULT - NSICDXPASTMEDICALHX_GEN_ALL_CORE_FT
PAST MEDICAL HISTORY:  Anxiety     Depression     Fracture of right shoulder     High cholesterol     HTN (hypertension)     Osteoporosis

## 2021-07-13 NOTE — ED PROVIDER NOTE - CLINICAL SUMMARY MEDICAL DECISION MAKING FREE TEXT BOX
Patient presents after a fall. labs, xray, ct done. Trauma alert since patient is on coumadin. Found to have a hip fracture. Ortho consulted. Patient admitted to trauma service.

## 2021-07-13 NOTE — H&P ADULT - NSHPLABSRESULTS_GEN_ALL_CORE
Labs:  CAPILLARY BLOOD GLUCOSE    POCT Blood Glucose.: 96 mg/dL (2021 14:02)                        10.9   5.58  )-----------( 197      ( 2021 14:15 )             33.7       Auto Neutrophil %: 56.3 % (21 @ 14:15)  Auto Immature Granulocyte %: 0.5 % (21 @ 14:15)        132<L>  |  94<L>  |  15  ----------------------------<  120<H>  4.4   |  25  |  1.0    Calcium, Total Serum: 8.8 mg/dL (21 @ 14:15)    LFTs:             6.1  | 0.5  | 24       ------------------[54      ( 2021 14:15 )  4.0  | x    | 13          Lipase:40       Lactate, Blood: 3.1 mmol/L (21 @ 14:15)    Coags:     >40.00  ----< 3.90    ( 2021 14:15 )     39.9      Alcohol, Blood: <10 mg/dL (21 @ 14:15)    Urinalysis Basic - ( 2021 14:33 )    Color: Orange / Appearance: Turbid / S.010 / pH: x  Gluc: x / Ketone: Negative  / Bili: Negative / Urobili: <2 mg/dL   Blood: x / Protein: 30 mg/dL / Nitrite: Positive   Leuk Esterase: Large / RBC: 4 /HPF / WBC >720 /HPF   Sq Epi: x / Non Sq Epi: 1 /HPF / Bacteria: Few    RADIOLOGY:   < from: CT Head No Cont (21 @ 16:44) >  IMPRESSION:  CT HEAD:  No acute intracranial injury.  Mild chronic microvascular ischemic changes.  Extracalvarial scalp fluid collection measuring 0.9 cm in thickness overlies the frontal and biparietal calvarium likely representing subgaleal hematoma. No underlying calvarial fracture.  CT CERVICAL SPINE:  No acute fracture or dislocation.    < from: CT Chest w/ IV Cont (21 @ 16:54) >  IMPRESSION:  1.  Acute displaced subcapital right femoral neck fracture with superior displacement and varus angulation.  2.  No CT evidence of additional acutetraumatic intrathoracic or abdominopelvic pathology.  3.  Additional nonemergent findings as above.    < from: Xray Pelvis AP only (21 @ 17:30) >  IMPRESSION:  Acute impacted, displaced subcapital right femoral neck fracture.    < from: Xray Hip 1 View, Right (21 @ 17:31) >  IMPRESSION:  Acute impacted, displaced subcapital right femoral neck fracture.

## 2021-07-13 NOTE — ED PROVIDER NOTE - NS ED ROS FT
Review of Systems:  CONSTITUTIONAL: No fever, No diaphoresis  SKIN: No rash  EYES: No eye pain, No blurred vision  ENT: No change in hearing, No sore throat, No neck pain  RESPIRATORY: No shortness of breath, No cough  CARDIAC: No chest pain, No palpitations  GI: No abdominal pain, No nausea, No vomiting  MUSCULOSKELETAL: + joint paint, No swelling, No back pain  NEUROLOGIC: No numbness, No focal weakness, No headache, No dizziness  All other systems negative, unless specified in HPI

## 2021-07-13 NOTE — H&P ADULT - ASSESSMENT
85F w/PMHx of HTN, HLD, hx of mechanical aortic valve on coumadin, depression, anxiety, osteoporosis presents s/p mechanical fall, +HT, -LOC, +AC (coumadin) with right femoral neck fracture    Plan:   -Admission to the Trauma Surgery service   -Orthopedic surgery: operative repair once INR 1.5   -Holding home coumadin, repeat coags   -Regular diet, NPO at midnight   -Cardiology and Medical risk stratification   -Continue home medications   -Adequate pain control  -PT/Rehab  -Social work for dispo planning  -Patient and plan discussed with Dr. Pedro

## 2021-07-13 NOTE — ED ADULT TRIAGE NOTE - CHIEF COMPLAINT QUOTE
BIBA s/p fall from standing height. Patient hit head but did not lose consciousness. Patient on coumadin. RLE is externally rotated. C.o hip pain. GCS=15.

## 2021-07-14 PROBLEM — F41.9 ANXIETY DISORDER, UNSPECIFIED: Chronic | Status: ACTIVE | Noted: 2021-07-13

## 2021-07-14 PROBLEM — F32.9 MAJOR DEPRESSIVE DISORDER, SINGLE EPISODE, UNSPECIFIED: Chronic | Status: ACTIVE | Noted: 2021-07-13

## 2021-07-14 LAB
A1C WITH ESTIMATED AVERAGE GLUCOSE RESULT: 5.2 % — SIGNIFICANT CHANGE UP (ref 4–5.6)
ANION GAP SERPL CALC-SCNC: 9 MMOL/L — SIGNIFICANT CHANGE UP (ref 7–14)
APTT BLD: 38.2 SEC — SIGNIFICANT CHANGE UP (ref 27–39.2)
APTT BLD: 43.4 SEC — HIGH (ref 27–39.2)
BLD GP AB SCN SERPL QL: SIGNIFICANT CHANGE UP
BUN SERPL-MCNC: 16 MG/DL — SIGNIFICANT CHANGE UP (ref 10–20)
CALCIUM SERPL-MCNC: 8.3 MG/DL — LOW (ref 8.5–10.1)
CHLORIDE SERPL-SCNC: 96 MMOL/L — LOW (ref 98–110)
CO2 SERPL-SCNC: 24 MMOL/L — SIGNIFICANT CHANGE UP (ref 17–32)
COVID-19 SPIKE DOMAIN AB INTERP: POSITIVE
COVID-19 SPIKE DOMAIN ANTIBODY RESULT: >250 U/ML — HIGH
CREAT SERPL-MCNC: 0.9 MG/DL — SIGNIFICANT CHANGE UP (ref 0.7–1.5)
ESTIMATED AVERAGE GLUCOSE: 103 MG/DL — SIGNIFICANT CHANGE UP (ref 68–114)
GLUCOSE BLDC GLUCOMTR-MCNC: 115 MG/DL — HIGH (ref 70–99)
GLUCOSE BLDC GLUCOMTR-MCNC: 115 MG/DL — HIGH (ref 70–99)
GLUCOSE SERPL-MCNC: 126 MG/DL — HIGH (ref 70–99)
HCT VFR BLD CALC: 29.3 % — LOW (ref 37–47)
HCT VFR BLD CALC: 30.6 % — LOW (ref 37–47)
HGB BLD-MCNC: 10.1 G/DL — LOW (ref 12–16)
HGB BLD-MCNC: 9.9 G/DL — LOW (ref 12–16)
INR BLD: 3.56 RATIO — HIGH (ref 0.65–1.3)
INR BLD: 3.62 RATIO — HIGH (ref 0.65–1.3)
INR BLD: 3.63 RATIO — HIGH (ref 0.65–1.3)
LACTATE SERPL-SCNC: 1 MMOL/L — SIGNIFICANT CHANGE UP (ref 0.7–2)
MAGNESIUM SERPL-MCNC: 1.8 MG/DL — SIGNIFICANT CHANGE UP (ref 1.8–2.4)
MCHC RBC-ENTMCNC: 29 PG — SIGNIFICANT CHANGE UP (ref 27–31)
MCHC RBC-ENTMCNC: 30.2 PG — SIGNIFICANT CHANGE UP (ref 27–31)
MCHC RBC-ENTMCNC: 33 G/DL — SIGNIFICANT CHANGE UP (ref 32–37)
MCHC RBC-ENTMCNC: 33.8 G/DL — SIGNIFICANT CHANGE UP (ref 32–37)
MCV RBC AUTO: 87.9 FL — SIGNIFICANT CHANGE UP (ref 81–99)
MCV RBC AUTO: 89.3 FL — SIGNIFICANT CHANGE UP (ref 81–99)
NRBC # BLD: 0 /100 WBCS — SIGNIFICANT CHANGE UP (ref 0–0)
NRBC # BLD: 0 /100 WBCS — SIGNIFICANT CHANGE UP (ref 0–0)
PHOSPHATE SERPL-MCNC: 4.6 MG/DL — SIGNIFICANT CHANGE UP (ref 2.1–4.9)
PLATELET # BLD AUTO: 181 K/UL — SIGNIFICANT CHANGE UP (ref 130–400)
PLATELET # BLD AUTO: 184 K/UL — SIGNIFICANT CHANGE UP (ref 130–400)
POTASSIUM SERPL-MCNC: 4.7 MMOL/L — SIGNIFICANT CHANGE UP (ref 3.5–5)
POTASSIUM SERPL-SCNC: 4.7 MMOL/L — SIGNIFICANT CHANGE UP (ref 3.5–5)
PROTHROM AB SERPL-ACNC: >40 SEC — HIGH (ref 9.95–12.87)
RBC # BLD: 3.28 M/UL — LOW (ref 4.2–5.4)
RBC # BLD: 3.48 M/UL — LOW (ref 4.2–5.4)
RBC # FLD: 14 % — SIGNIFICANT CHANGE UP (ref 11.5–14.5)
RBC # FLD: 14.2 % — SIGNIFICANT CHANGE UP (ref 11.5–14.5)
SARS-COV-2 IGG+IGM SERPL QL IA: >250 U/ML — HIGH
SARS-COV-2 IGG+IGM SERPL QL IA: POSITIVE
SODIUM SERPL-SCNC: 129 MMOL/L — LOW (ref 135–146)
WBC # BLD: 11.22 K/UL — HIGH (ref 4.8–10.8)
WBC # BLD: 8.06 K/UL — SIGNIFICANT CHANGE UP (ref 4.8–10.8)
WBC # FLD AUTO: 11.22 K/UL — HIGH (ref 4.8–10.8)
WBC # FLD AUTO: 8.06 K/UL — SIGNIFICANT CHANGE UP (ref 4.8–10.8)

## 2021-07-14 PROCEDURE — 99232 SBSQ HOSP IP/OBS MODERATE 35: CPT

## 2021-07-14 PROCEDURE — 99221 1ST HOSP IP/OBS SF/LOW 40: CPT

## 2021-07-14 RX ORDER — MORPHINE SULFATE 50 MG/1
2 CAPSULE, EXTENDED RELEASE ORAL ONCE
Refills: 0 | Status: DISCONTINUED | OUTPATIENT
Start: 2021-07-14 | End: 2021-07-14

## 2021-07-14 RX ORDER — ALPRAZOLAM 0.25 MG
0.25 TABLET ORAL AT BEDTIME
Refills: 0 | Status: DISCONTINUED | OUTPATIENT
Start: 2021-07-14 | End: 2021-07-16

## 2021-07-14 RX ORDER — ALPRAZOLAM 0.25 MG
0.25 TABLET ORAL EVERY 12 HOURS
Refills: 0 | Status: DISCONTINUED | OUTPATIENT
Start: 2021-07-14 | End: 2021-07-14

## 2021-07-14 RX ORDER — PROTHROMBIN COMPLEX CONCENTRATE (HUMAN) 25.5; 16.5; 24; 22; 22; 26 [IU]/ML; [IU]/ML; [IU]/ML; [IU]/ML; [IU]/ML; [IU]/ML
1500 POWDER, FOR SOLUTION INTRAVENOUS ONCE
Refills: 0 | Status: DISCONTINUED | OUTPATIENT
Start: 2021-07-14 | End: 2021-07-14

## 2021-07-14 RX ADMIN — ATORVASTATIN CALCIUM 20 MILLIGRAM(S): 80 TABLET, FILM COATED ORAL at 21:14

## 2021-07-14 RX ADMIN — Medication 400 MILLIGRAM(S): at 07:12

## 2021-07-14 RX ADMIN — VALSARTAN 160 MILLIGRAM(S): 80 TABLET ORAL at 05:09

## 2021-07-14 RX ADMIN — Medication 0.25 MILLIGRAM(S): at 21:13

## 2021-07-14 RX ADMIN — Medication 0.5 MILLIGRAM(S): at 00:32

## 2021-07-14 RX ADMIN — CEFTRIAXONE 100 MILLIGRAM(S): 500 INJECTION, POWDER, FOR SOLUTION INTRAMUSCULAR; INTRAVENOUS at 05:08

## 2021-07-14 RX ADMIN — CHLORHEXIDINE GLUCONATE 1 APPLICATION(S): 213 SOLUTION TOPICAL at 05:54

## 2021-07-14 RX ADMIN — HEPARIN SODIUM 5000 UNIT(S): 5000 INJECTION INTRAVENOUS; SUBCUTANEOUS at 05:08

## 2021-07-14 RX ADMIN — MORPHINE SULFATE 2 MILLIGRAM(S): 50 CAPSULE, EXTENDED RELEASE ORAL at 21:13

## 2021-07-14 RX ADMIN — PANTOPRAZOLE SODIUM 40 MILLIGRAM(S): 20 TABLET, DELAYED RELEASE ORAL at 05:09

## 2021-07-14 RX ADMIN — MORPHINE SULFATE 2 MILLIGRAM(S): 50 CAPSULE, EXTENDED RELEASE ORAL at 21:45

## 2021-07-14 RX ADMIN — ATENOLOL 25 MILLIGRAM(S): 25 TABLET ORAL at 05:08

## 2021-07-14 RX ADMIN — SENNA PLUS 2 TABLET(S): 8.6 TABLET ORAL at 21:14

## 2021-07-14 NOTE — CONSULT NOTE ADULT - SUBJECTIVE AND OBJECTIVE BOX
Patient is a 85y old  Female who presents with a chief complaint of S/p mechanical fall, +HT, -LOC, +AC (coumadin) (2021 13:24)      HPI:  85F w/PMHx of HTN, HLD, hx of mechanical aortic valve on coumadin, depression, anxiety, osteoporosis presents s/p mechanical fall, +HT, -LOC, +AC (coumadin). Patient was in her bathroom attempting to get out of the bathtub when she fell, falling onto her right side as well as the right side of her head. Denies any loss of consciousness. Was not ambulatory following the incident. In the ED she is GCS 15, A&Ox3. Primary survey negative. On exam her RLE is shortened and externally rotated. Endorses pain at her right hip. Unable to flex at the right knee joint. No other external signs of injury, denies pain at the chest, abdomen, and spine.    (2021 21:43)      PAST MEDICAL & SURGICAL HISTORY:  HTN (hypertension)    High cholesterol    Osteoporosis    Fracture of right shoulder    Anxiety    Depression    H/O heart valve replacement with mechanical valve                        PREVIOUS DIAGNOSTIC TESTING:      ECHO  FINDINGS:    STRESS  FINDINGS:    CATHETERIZATION  FINDINGS:    MEDICATIONS  (STANDING):  aspirin  chewable 81 milliGRAM(s) Oral daily  ATENolol  Tablet 25 milliGRAM(s) Oral daily  atorvastatin 20 milliGRAM(s) Oral at bedtime  cefTRIAXone   IVPB 1000 milliGRAM(s) IV Intermittent every 24 hours  chlorhexidine 4% Liquid 1 Application(s) Topical <User Schedule>  dextrose 40% Gel 15 Gram(s) Oral once  dextrose 5%. 1000 milliLiter(s) (50 mL/Hr) IV Continuous <Continuous>  dextrose 50% Injectable 25 Gram(s) IV Push once  escitalopram 10 milliGRAM(s) Oral daily  glucagon  Injectable 1 milliGRAM(s) IntraMuscular once  insulin lispro (ADMELOG) corrective regimen sliding scale   SubCutaneous three times a day before meals  pantoprazole    Tablet 40 milliGRAM(s) Oral before breakfast  senna 2 Tablet(s) Oral at bedtime  valsartan 160 milliGRAM(s) Oral daily    MEDICATIONS  (PRN):  acetaminophen   Tablet .. 650 milliGRAM(s) Oral every 6 hours PRN Temp greater or equal to 38C (100.4F), Mild Pain (1 - 3)  ALPRAZolam 0.25 milliGRAM(s) Oral at bedtime PRN for anxiety  ibuprofen  Tablet. 400 milliGRAM(s) Oral every 6 hours PRN Moderate Pain (4 - 6)      FAMILY HISTORY:      SOCIAL HISTORY:    CIGARETTES:    ALCOHOL:        Vital Signs Last 24 Hrs  T(C): 37.3 (2021 09:27), Max: 37.3 (2021 07:45)  T(F): 99.1 (2021 07:45), Max: 99.1 (2021 07:45)  HR: 67 (2021 09:27) (64 - 75)  BP: 140/65 (2021 09:27) (130/60 - 162/72)  BP(mean): --  RR: 18 (2021 09:27) (18 - 18)  SpO2: 98% (2021 09:27) (98% - 99%)            INTERPRETATION OF TELEMETRY:    ECG:    I&O's Detail    2021 07:01  -  2021 07:00  --------------------------------------------------------  IN:  Total IN: 0 mL    OUT:    Ureteral Catheter (mL): 1200 mL  Total OUT: 1200 mL    Total NET: -1200 mL          LABS:                        9.9    8.06  )-----------( 184      ( 2021 07:50 )             29.3     07-14    129<L>  |  96<L>  |  16  ----------------------------<  126<H>  4.7   |  24  |  0.9    Ca    8.3<L>      2021 00:37  Phos  4.6     07-14  Mg     1.8     07-14    TPro  6.1  /  Alb  4.0  /  TBili  0.5  /  DBili  x   /  AST  24  /  ALT  13  /  AlkPhos  54  07-13        PT/INR - ( 2021 11:10 )   PT: >40.00 sec;   INR: 3.63 ratio         PTT - ( 2021 07:50 )  PTT:38.2 sec  Urinalysis Basic - ( 2021 14:33 )    Color: Orange / Appearance: Turbid / S.010 / pH: x  Gluc: x / Ketone: Negative  / Bili: Negative / Urobili: <2 mg/dL   Blood: x / Protein: 30 mg/dL / Nitrite: Positive   Leuk Esterase: Large / RBC: 4 /HPF / WBC >720 /HPF   Sq Epi: x / Non Sq Epi: 1 /HPF / Bacteria: Few      I&O's Summary    2021 07:01  -  2021 07:00  --------------------------------------------------------  IN: 0 mL / OUT: 1200 mL / NET: -1200 mL      BNP  RADIOLOGY & ADDITIONAL STUDIES:

## 2021-07-14 NOTE — CONSULT NOTE ADULT - ASSESSMENT
# right femoral neck fracture following mechanical fall  trauma and orthopedic team following. Case discussed with ortho team  and cardiologist Dr Nagy. ALso discussed with granddaughter at bedside.  My recommendation is to hold coumadin and follow INR levels. Once levels less than 2.5 can initiate heparin or lovenox for bridging until levels are to the goal for surgery.  echo from June reviewed. Normal EF  patient has no active cardiac symptoms and has MET score >= 4  will await official evaluation by cardiologist for clearance.    # UTI  follow urine culture  continue ceftriaxone for total of 3 days    # hyponatremia- chronic and stable    # h/o CAD- s/p CABG and stents   # HTN  # HLP  # osteoporosis  # depression    DVT px- at therpeutic level of INR

## 2021-07-14 NOTE — PROGRESS NOTE ADULT - SUBJECTIVE AND OBJECTIVE BOX
TRAUMA SURGERY PROGRESS NOTE    Patient: ORAL WU , 85y ([birth year])Female   MRN: [id number]  Location: [address]  Visit: 21 Inpatient  Date: 21 @ 21:06    Hospital Day #: 2    Procedure/Dx/Injuries: mechanical fall, +HT, -LOC, +AC (coumadin)    Events of past 24 hours: Patient seen this AM bedside.  Pan scan significant for closed Right Femoral Neck Fracture, plan is possible OR w/ ortho today pending cariology consult and INR trend as patient is on coumadin for mechanical heart valve.      PAST MEDICAL & SURGICAL HISTORY:  HTN (hypertension)    High cholesterol    Osteoporosis    Fracture of right shoulder    Anxiety    Depression    H/O heart valve replacement with mechanical valve        Vitals:   T(F): 97.5 (21 @ 15:19), Max: 99.1 (21 @ 07:45)  HR: 60 (21 @ 15:19)  BP: 133/63 (21 @ 15:19)  RR: 19 (21 @ 15:19)  SpO2: 99% (21 @ 18:36)      Diet, NPO after Midnight:      NPO Start Date: 2021,   NPO Start Time: 23:59  Except Medications  Diet, Regular:   DASH/TLC Sodium & Cholesterol Restricted    I & O's:    21 @ 07:01  -  21 @ 07:00  --------------------------------------------------------  IN:  Total IN: 0 mL    OUT:    Ureteral Catheter (mL): 1200 mL  Total OUT: 1200 mL    Total NET: -1200 mL      PHYSICAL EXAM:  General: NAD  HEENT: Normocephalic, atraumatic, EOMI, PEERLA. no scalp lacerations   Neck: Soft, midline trachea. no c-spine tenderness  Chest: No chest wall tenderness, no subcutaneous emphysema   Cardiac: RRR  Respiratory: Unlabored breathing at rest  Abdomen: Soft, non-distended, non-tender  Groin: Normal appearing, pelvis stable       MEDICATIONS  (STANDING):  aspirin  chewable 81 milliGRAM(s) Oral daily  ATENolol  Tablet 25 milliGRAM(s) Oral daily  atorvastatin 20 milliGRAM(s) Oral at bedtime  cefTRIAXone   IVPB 1000 milliGRAM(s) IV Intermittent every 24 hours  chlorhexidine 4% Liquid 1 Application(s) Topical <User Schedule>  dextrose 40% Gel 15 Gram(s) Oral once  dextrose 5%. 1000 milliLiter(s) (50 mL/Hr) IV Continuous <Continuous>  dextrose 50% Injectable 25 Gram(s) IV Push once  escitalopram 10 milliGRAM(s) Oral daily  glucagon  Injectable 1 milliGRAM(s) IntraMuscular once  insulin lispro (ADMELOG) corrective regimen sliding scale   SubCutaneous three times a day before meals  morphine  - Injectable 2 milliGRAM(s) IV Push once  pantoprazole    Tablet 40 milliGRAM(s) Oral before breakfast  senna 2 Tablet(s) Oral at bedtime  valsartan 160 milliGRAM(s) Oral daily    MEDICATIONS  (PRN):  acetaminophen   Tablet .. 650 milliGRAM(s) Oral every 6 hours PRN Temp greater or equal to 38C (100.4F), Mild Pain (1 - 3)  ALPRAZolam 0.25 milliGRAM(s) Oral at bedtime PRN for anxiety  ibuprofen  Tablet. 400 milliGRAM(s) Oral every 6 hours PRN Moderate Pain (4 - 6)      DVT PROPHYLAXIS: SCDs,   GI PROPHYLAXIS: pantoprazole    Tablet 40 milliGRAM(s) Oral before breakfast    ANTICOAGULATION:   ANTIBIOTICS: cefTRIAXone   IVPB 1000 milliGRAM(s)            LAB/STUDIES:  Labs:  CAPILLARY BLOOD GLUCOSE      POCT Blood Glucose.: 115 mg/dL (2021 11:07)  POCT Blood Glucose.: 115 mg/dL (2021 08:06)                          9.9    8.06  )-----------( 184      ( 2021 07:50 )             29.3         07-14    129<L>  |  96<L>  |  16  ----------------------------<  126<H>  4.7   |  24  |  0.9      Calcium, Total Serum: 8.3 mg/dL (21 @ 00:37)      LFTs:             6.1  | 0.5  | 24       ------------------[54      ( 2021 14:15 )  4.0  | x    | 13          Lipase:40     Amylase:x         Lactate, Blood: 1.0 mmol/L (21 @ 07:50)  Lactate, Blood: 3.1 mmol/L (21 @ 14:15)      Coags:     >40.00  ----< 3.63    ( 2021 11:10 )     x                   Urinalysis Basic - ( 2021 14:33 )    Color: Orange / Appearance: Turbid / S.010 / pH: x  Gluc: x / Ketone: Negative  / Bili: Negative / Urobili: <2 mg/dL   Blood: x / Protein: 30 mg/dL / Nitrite: Positive   Leuk Esterase: Large / RBC: 4 /HPF / WBC >720 /HPF   Sq Epi: x / Non Sq Epi: 1 /HPF / Bacteria: Few        IMAGING:  `< from: CT Abdomen and Pelvis w/ IV Cont (21 @ 16:54) >  IMPRESSION:    1.  Acute displaced subcapital right femoral neck fracture with superior displacement and varus angulation.    2.  No CT evidence of additional acutetraumatic intrathoracic or abdominopelvic pathology.    3.  Additional nonemergent findings as above.    < end of copied text >  < from: CT Cervical Spine No Cont (21 @ 16:44) >    CT HEAD:  No acute intracranial injury.    Mild chronic microvascular ischemic changes.    Extracalvarial scalp fluid collection measuring 0.9 cm in thickness overlies the frontal and biparietal calvarium likely representing subgaleal hematoma. No underlying calvarial fracture.    CT CERVICAL SPINE:  No acute fracture or dislocation.    < end of copied text >      ASSESSMENT:  85F w/PMHx of HTN, HLD, hx of mechanical aortic valve on coumadin, depression, anxiety, osteoporosis presents s/p mechanical fall, +HT, -LOC, +AC (coumadin) with right femoral neck fracture.  Plan is OR w/ Ortho 7/15, cardiology risk stratification done and patient is moderate risk.    PLAN:   - NPOmn, IVF  - Trend PTT, INR, INR goal of 1.5, hold coumadin  - Appreciate cards recs  - OR 7/15 for Right hip hemiarthroplasty w/ ortho        TRAUMA SPECTRA: [id number]

## 2021-07-14 NOTE — CONSULT NOTE ADULT - SUBJECTIVE AND OBJECTIVE BOX
ORAL WU  85y  Female      Patient is a 85y old  Female who presents with a chief complaint of S/p mechanical fall  patient with PMH of  HTN, HLD, CAD s/p CABG 17 y ears before and 2-3 stents placed -14-15 years before,  hx of mechanical aortic valve on coumadin, depression, anxiety, osteoporosis presents s/p mechanical fall, +HT, -LOC, +AC (coumadin). Patient was in her bathroom attempting to get out of the bathtub when she fell, falling onto her right side as well as the right side of her head. Denies any loss of consciousness. Was not ambulatory following the incident. In the ED she is GCS 15, A&Ox3. Primary survey negative. On exam her RLE is shortened and externally rotated. Endorses pain at her right hip. Unable to flex at the right knee joint. No other external signs of injury, denies pain at the chest, abdomen, and spine.       PAST MEDICAL/SURGICAL HISTORY  PAST MEDICAL & SURGICAL HISTORY:  HTN (hypertension)    High cholesterol    Osteoporosis    Fracture of right shoulder    Anxiety    Depression    H/O heart valve replacement with mechanical valve        REVIEW OF SYSTEMS:  CONSTITUTIONAL: No fever, weight loss, or fatigue  EYES: No eye pain, visual disturbances, or discharge  ENMT:  No difficulty hearing, tinnitus, vertigo; No sinus or throat pain  NECK: No pain or stiffness  BREASTS: No pain, masses, or nipple discharge  RESPIRATORY: No cough, wheezing, chills or hemoptysis; No shortness of breath  CARDIOVASCULAR: No chest pain, palpitations, dizziness, or leg swelling  GASTROINTESTINAL: No abdominal or epigastric pain. No nausea, vomiting, or hematemesis; No diarrhea or constipation. No melena or hematochezia.  GENITOURINARY: No dysuria, frequency, hematuria, or incontinence  NEUROLOGICAL: No headaches, memory loss, loss of strength, numbness, or tremors  SKIN: No itching, burning, rashes, or lesions   LYMPH NODES: No enlarged glands  ENDOCRINE: No heat or cold intolerance; No hair loss  MUSCULOSKELETAL: No joint pain or swelling; No muscle, back, or extremity pain  PSYCHIATRIC: No depression, anxiety, mood swings, or difficulty sleeping  HEME/LYMPH: No easy bruising, or bleeding gums  ALLERY AND IMMUNOLOGIC: No hives or eczema    T(C): 37.3 (07-14-21 @ 09:27), Max: 37.3 (07-14-21 @ 07:45)  HR: 67 (07-14-21 @ 09:27) (64 - 75)  BP: 140/65 (07-14-21 @ 09:27) (130/60 - 189/85)  RR: 18 (07-14-21 @ 09:27) (16 - 18)  SpO2: 98% (07-14-21 @ 09:27) (98% - 99%)  Wt(kg): --Vital Signs Last 24 Hrs  T(C): 37.3 (14 Jul 2021 09:27), Max: 37.3 (14 Jul 2021 07:45)  T(F): 99.1 (14 Jul 2021 07:45), Max: 99.1 (14 Jul 2021 07:45)  HR: 67 (14 Jul 2021 09:27) (64 - 75)  BP: 140/65 (14 Jul 2021 09:27) (130/60 - 189/85)  BP(mean): --  RR: 18 (14 Jul 2021 09:27) (16 - 18)  SpO2: 98% (14 Jul 2021 09:27) (98% - 99%)    PHYSICAL EXAM:  GENERAL: NAD, well-groomed, well-developed  HEAD:  Atraumatic, Normocephalic  EYES: EOMI, PERRLA, conjunctiva and sclera clear  ENMT: No tonsillar erythema, exudates, or enlargement; Moist mucous membranes, Good dentition, No lesions  NECK: Supple, No JVD, Normal thyroid  NERVOUS SYSTEM:  Alert & Oriented X3, Good concentration; Motor Strength 5/5 B/L upper and lower extremities; DTRs 2+ intact and symmetric  CHEST/LUNG: Clear to percussion bilaterally; No rales, rhonchi, wheezing, or rubs  HEART: Regular rate and rhythm; No murmurs, rubs, or gallops  ABDOMEN: Soft, Nontender, Nondistended; Bowel sounds present  EXTREMITIES:  2+ Peripheral Pulses, No clubbing, cyanosis, or edema  LYMPH: No lymphadenopathy noted  SKIN: No rashes or lesions    Consultant(s) Notes Reviewed:  [x ] YES  [ ] NO  Care Discussed with Consultants/Other Providers [ x] YES  [ ] NO    LABS:  CBC   07-14-21 @ 07:50  Hematcorit 29.3  Hemoglobin 9.9  Mean Cell Hemoglobin 30.2  Platelet Count-Automated 184  RBC Count 3.28  Red Cell Distrib Width 14.2  Wbc Count 8.06  07-14-21 @ 00:37  Hematcorit 30.6  Hemoglobin 10.1  Mean Cell Hemoglobin 29.0  Platelet Count-Automated 181  RBC Count 3.48  Red Cell Distrib Width 14.0  Wbc Count 11.22  07-13-21 @ 14:15  Hematcorit 33.7  Hemoglobin 10.9  Mean Cell Hemoglobin 28.8  Platelet Count-Automated 197  RBC Count 3.78  Red Cell Distrib Width 13.8  Wbc Count 5.58      BMP  07-14-21 @ 00:37  Anion Gap. Serum 9  Blood Urea Nitrogen,Serm 16  Calcium, Total Serum 8.3  Carbon Dioxide, Serum 24  Chloride, Serum 96  Creatinine, Serum 0.9  eGFR in  68  eGFR in Non Afican American 58  Gloucose, serum 126  Potassium, Serum 4.7  Sodium, Serum 129              07-13-21 @ 14:15  Anion Gap. Serum 13  Blood Urea Nitrogen,Serm 15  Calcium, Total Serum 8.8  Carbon Dioxide, Serum 25  Chloride, Serum 94  Creatinine, Serum 1.0  eGFR in  59  eGFR in Non Afican American 51  Gloucose, serum 120  Potassium, Serum 4.4  Sodium, Serum 132                  CMP  07-14-21 @ 00:37  Daly Aminotransferase(ALT/SGPT)--  Albumin, Serum --  Alkaline Phosphatase, Serum --  Anion Gap, Serum 9  Aspartate Aminotransferase (AST/SGOT)--  Bilirubin Total, Serum --  Blood Urea Nitrogen, Serum 16  Calcium,Total Serum 8.3  Carbon Dioxide, Serum 24  Chloride, Serum 96  Creatinine, Serum 0.9  eGFR if  68  eGFR if Non African American 58  Glucose, Serum 126  Potassium, Serum 4.7  Protein Total, Serum --  Sodium, Serum 129                      07-13-21 @ 14:15  Daly Aminotransferase(ALT/SGPT)13  Albumin, Serum 4.0  Alkaline Phosphatase, Serum 54  Anion Gap, Serum 13  Aspartate Aminotransferase (AST/SGOT)24  Bilirubin Total, Serum 0.5  Blood Urea Nitrogen, Serum 15  Calcium,Total Serum 8.8  Carbon Dioxide, Serum 25  Chloride, Serum 94  Creatinine, Serum 1.0  eGFR if  59  eGFR if Non African American 51  Glucose, Serum 120  Potassium, Serum 4.4  Protein Total, Serum 6.1  Sodium, Serum 132                          PT/INR  PT/INR  07-14-21 @ 07:50  INR 3.62  Prothrombin Time Comment --  Prothrobin Time, Plasma>40.00  PT/INR  07-14-21 @ 00:37  INR 3.56  Prothrombin Time Comment --  Prothrobin Time, Plasma>40.00  PT/INR  07-13-21 @ 14:15  INR 3.90  Prothrombin Time Comment --  Prothrobin Time, Plasma>40.00      Amylase/Lipase  07-13-21 @ 14:15  Amylase, Serum Total --  Lipase, Serum 40            RADIOLOGY & ADDITIONAL TESTS:    Imaging Personally Reviewed:  [ ] YES  [ ] NO ORAL WU  85y  Female      Patient is a 85y old  Female who presents with a chief complaint of S/p mechanical fall  patient with PMH of  HTN, HLD, CAD s/p CABG 17 y ears before and 2-3 stents placed 14-15 years before,  hx of mechanical aortic valve on coumadin, depression, anxiety, osteoporosis presents s/p mechanical fall.  Denies any loss of consciousness. Further evaluation notived to have right femoral neck fracture. Patient is followed by ortho team and planning for hemiarthroplasty.    Patient denies any cardiac symptoms now. Denies any chest pain, SOB. Denies any palpitation.   Denies any N/V/ abdominal pain.   Denies any h/o stroke, renal disease or diabetes.  mild dysuria present. also have increased frequency of urination.      PAST MEDICAL/SURGICAL HISTORY  HTN (hypertension)  High cholesterol  Osteoporosis  Fracture of right shoulder  Anxiety  Depression  H/O heart valve replacement with mechanical valve  h/o CABG  h/o cardiac stent placements    SH: no tobacco or alcohol use  Family history: h/o heart disease in mother's family  Medications: reviewed  Allergie: NKDA    REVIEW OF SYSTEMS:  CONSTITUTIONAL: No fever, weight loss, or fatigue  EYES: No eye pain, visual disturbances, or discharge  ENMT:  No difficulty hearing, tinnitus, vertigo; No sinus or throat pain  NECK: No pain or stiffness  BREASTS: No pain, masses, or nipple discharge  RESPIRATORY: No cough, wheezing, chills or hemoptysis; No shortness of breath  CARDIOVASCULAR: No chest pain, palpitations, dizziness,   GASTROINTESTINAL: No abdominal or epigastric pain. No nausea, vomiting, or hematemesis; No diarrhea or constipation. No melena or hematochezia.  GENITOURINARY: as mentioned in HPIO  NEUROLOGICAL: No headaches, memory loss, loss of strength, numbness, or tremors  SKIN: No itching, burning, rashes, or lesions   LYMPH NODES: No enlarged glands  ENDOCRINE: No heat or cold intolerance; No hair loss  MUSCULOSKELETAL: as mentioned in HPI  HEME/LYMPH: recent bleeding problem after dental extraction  ALLERY AND IMMUNOLOGIC: No hives or eczema    T(C): 37.3 (07-14-21 @ 09:27), Max: 37.3 (07-14-21 @ 07:45)  HR: 67 (07-14-21 @ 09:27) (64 - 75)  BP: 140/65 (07-14-21 @ 09:27) (130/60 - 189/85)  RR: 18 (07-14-21 @ 09:27) (16 - 18)  SpO2: 98% (07-14-21 @ 09:27) (98% - 99%)  Wt(kg): --Vital Signs Last 24 Hrs  T(C): 37.3 (14 Jul 2021 09:27), Max: 37.3 (14 Jul 2021 07:45)  T(F): 99.1 (14 Jul 2021 07:45), Max: 99.1 (14 Jul 2021 07:45)  HR: 67 (14 Jul 2021 09:27) (64 - 75)  BP: 140/65 (14 Jul 2021 09:27) (130/60 - 189/85)  BP(mean): --  RR: 18 (14 Jul 2021 09:27) (16 - 18)  SpO2: 98% (14 Jul 2021 09:27) (98% - 99%)      PHYSICAL EXAM    GENERAL: resting, lying in bed comfortably, no distress  HEAD:  Atraumatic, Normocephalic  EYES: EOMI, PERRLA, conjunctiva and sclera clear  ENT: Moist mucous membranes  NECK: No JVD, trachea central  CHEST/LUNG: BS heard b/l equal, right basilar crepts present  HEART: Regular rate and rhythm; S1,S2 present, mechanical heart valve sound present  ABDOMEN: Bowel sounds present; Soft, nontender, nondistended, no guarding or rigidity  EXTREMITIES:  No clubbing, cyanosis. 1+ edema present on right lower leg  MUSCULOSKELETAL:right hip deformity.  no swollen or erythematous joint  NERVOUS SYSTEM:  A&Ox3, no focal deficits   LYMPH NODES: No lymphadenopahty  SKIN: No rashes or lesions      Consultant(s) Notes Reviewed:  [x ] YES  [ ] NO  Care Discussed with Consultants/Other Providers [ x] YES  [ ] NO    LABS:  CBC   07-14-21 @ 07:50  Hematcorit 29.3  Hemoglobin 9.9  Mean Cell Hemoglobin 30.2  Platelet Count-Automated 184  RBC Count 3.28  Red Cell Distrib Width 14.2  Wbc Count 8.06  07-14-21 @ 00:37  Hematcorit 30.6  Hemoglobin 10.1  Mean Cell Hemoglobin 29.0  Platelet Count-Automated 181  RBC Count 3.48  Red Cell Distrib Width 14.0  Wbc Count 11.22  07-13-21 @ 14:15  Hematcorit 33.7  Hemoglobin 10.9  Mean Cell Hemoglobin 28.8  Platelet Count-Automated 197  RBC Count 3.78  Red Cell Distrib Width 13.8  Wbc Count 5.58      BMP  07-14-21 @ 00:37  Anion Gap. Serum 9  Blood Urea Nitrogen,Serm 16  Calcium, Total Serum 8.3  Carbon Dioxide, Serum 24  Chloride, Serum 96  Creatinine, Serum 0.9  eGFR in  68  eGFR in Non Afican American 58  Gloucose, serum 126  Potassium, Serum 4.7  Sodium, Serum 129              07-13-21 @ 14:15  Anion Gap. Serum 13  Blood Urea Nitrogen,Serm 15  Calcium, Total Serum 8.8  Carbon Dioxide, Serum 25  Chloride, Serum 94  Creatinine, Serum 1.0  eGFR in  59  eGFR in Non Afican American 51  Gloucose, serum 120  Potassium, Serum 4.4  Sodium, Serum 132                  CMP  07-14-21 @ 00:37  Daly Aminotransferase(ALT/SGPT)--  Albumin, Serum --  Alkaline Phosphatase, Serum --  Anion Gap, Serum 9  Aspartate Aminotransferase (AST/SGOT)--  Bilirubin Total, Serum --  Blood Urea Nitrogen, Serum 16  Calcium,Total Serum 8.3  Carbon Dioxide, Serum 24  Chloride, Serum 96  Creatinine, Serum 0.9  eGFR if  68  eGFR if Non African American 58  Glucose, Serum 126  Potassium, Serum 4.7  Protein Total, Serum --  Sodium, Serum 129                      07-13-21 @ 14:15  Daly Aminotransferase(ALT/SGPT)13  Albumin, Serum 4.0  Alkaline Phosphatase, Serum 54  Anion Gap, Serum 13  Aspartate Aminotransferase (AST/SGOT)24  Bilirubin Total, Serum 0.5  Blood Urea Nitrogen, Serum 15  Calcium,Total Serum 8.8  Carbon Dioxide, Serum 25  Chloride, Serum 94  Creatinine, Serum 1.0  eGFR if  59  eGFR if Non African American 51  Glucose, Serum 120  Potassium, Serum 4.4  Protein Total, Serum 6.1  Sodium, Serum 132                          PT/INR  PT/INR  07-14-21 @ 07:50  INR 3.62  Prothrombin Time Comment --  Prothrobin Time, Plasma>40.00  PT/INR  07-14-21 @ 00:37  INR 3.56  Prothrombin Time Comment --  Prothrobin Time, Plasma>40.00  PT/INR  07-13-21 @ 14:15  INR 3.90  Prothrombin Time Comment --  Prothrobin Time, Plasma>40.00      Amylase/Lipase  07-13-21 @ 14:15  Amylase, Serum Total --  Lipase, Serum 40            RADIOLOGY & ADDITIONAL TESTS:    Imaging Personally Reviewed:  [ ] YES  [ ] NO

## 2021-07-14 NOTE — CONSULT NOTE ADULT - ASSESSMENT
pt s/p mechanical avr on anticoag, s/p cabg, pci for cad. pt w/o cp,sob. pt w/ atleast moderate to high risk. no further cardiac intervention is advised prior to her planned procedure. advise choose anesthesia to minimize risk. anticoagulation may be held for the miniimum time required for surgery given mechanical valve.

## 2021-07-15 LAB
ANION GAP SERPL CALC-SCNC: 12 MMOL/L — SIGNIFICANT CHANGE UP (ref 7–14)
ANION GAP SERPL CALC-SCNC: 15 MMOL/L — HIGH (ref 7–14)
APTT BLD: 26.4 SEC — LOW (ref 27–39.2)
APTT BLD: 31.6 SEC — SIGNIFICANT CHANGE UP (ref 27–39.2)
APTT BLD: 40.6 SEC — HIGH (ref 27–39.2)
APTT BLD: 44.4 SEC — HIGH (ref 27–39.2)
BASOPHILS # BLD AUTO: 0.04 K/UL — SIGNIFICANT CHANGE UP (ref 0–0.2)
BASOPHILS # BLD AUTO: 0.04 K/UL — SIGNIFICANT CHANGE UP (ref 0–0.2)
BASOPHILS NFR BLD AUTO: 0.4 % — SIGNIFICANT CHANGE UP (ref 0–1)
BASOPHILS NFR BLD AUTO: 0.5 % — SIGNIFICANT CHANGE UP (ref 0–1)
BUN SERPL-MCNC: 17 MG/DL — SIGNIFICANT CHANGE UP (ref 10–20)
BUN SERPL-MCNC: 19 MG/DL — SIGNIFICANT CHANGE UP (ref 10–20)
CALCIUM SERPL-MCNC: 8 MG/DL — LOW (ref 8.5–10.1)
CALCIUM SERPL-MCNC: 8.1 MG/DL — LOW (ref 8.5–10.1)
CHLORIDE SERPL-SCNC: 94 MMOL/L — LOW (ref 98–110)
CHLORIDE SERPL-SCNC: 94 MMOL/L — LOW (ref 98–110)
CO2 SERPL-SCNC: 21 MMOL/L — SIGNIFICANT CHANGE UP (ref 17–32)
CO2 SERPL-SCNC: 24 MMOL/L — SIGNIFICANT CHANGE UP (ref 17–32)
CREAT SERPL-MCNC: 0.9 MG/DL — SIGNIFICANT CHANGE UP (ref 0.7–1.5)
CREAT SERPL-MCNC: 1 MG/DL — SIGNIFICANT CHANGE UP (ref 0.7–1.5)
EOSINOPHIL # BLD AUTO: 0.26 K/UL — SIGNIFICANT CHANGE UP (ref 0–0.7)
EOSINOPHIL # BLD AUTO: 0.29 K/UL — SIGNIFICANT CHANGE UP (ref 0–0.7)
EOSINOPHIL NFR BLD AUTO: 2.9 % — SIGNIFICANT CHANGE UP (ref 0–8)
EOSINOPHIL NFR BLD AUTO: 3.8 % — SIGNIFICANT CHANGE UP (ref 0–8)
GLUCOSE BLDC GLUCOMTR-MCNC: 109 MG/DL — HIGH (ref 70–99)
GLUCOSE BLDC GLUCOMTR-MCNC: 110 MG/DL — HIGH (ref 70–99)
GLUCOSE BLDC GLUCOMTR-MCNC: 116 MG/DL — HIGH (ref 70–99)
GLUCOSE BLDC GLUCOMTR-MCNC: 142 MG/DL — HIGH (ref 70–99)
GLUCOSE SERPL-MCNC: 106 MG/DL — HIGH (ref 70–99)
GLUCOSE SERPL-MCNC: 112 MG/DL — HIGH (ref 70–99)
HCT VFR BLD CALC: 26.3 % — LOW (ref 37–47)
HCT VFR BLD CALC: 26.7 % — LOW (ref 37–47)
HGB BLD-MCNC: 8.7 G/DL — LOW (ref 12–16)
HGB BLD-MCNC: 9 G/DL — LOW (ref 12–16)
IMM GRANULOCYTES NFR BLD AUTO: 0.3 % — SIGNIFICANT CHANGE UP (ref 0.1–0.3)
IMM GRANULOCYTES NFR BLD AUTO: 0.4 % — HIGH (ref 0.1–0.3)
INR BLD: 1.57 RATIO — HIGH (ref 0.65–1.3)
INR BLD: 1.99 RATIO — HIGH (ref 0.65–1.3)
INR BLD: 2.91 RATIO — HIGH (ref 0.65–1.3)
INR BLD: 3.82 RATIO — HIGH (ref 0.65–1.3)
LYMPHOCYTES # BLD AUTO: 0.88 K/UL — LOW (ref 1.2–3.4)
LYMPHOCYTES # BLD AUTO: 0.88 K/UL — LOW (ref 1.2–3.4)
LYMPHOCYTES # BLD AUTO: 11.4 % — LOW (ref 20.5–51.1)
LYMPHOCYTES # BLD AUTO: 9.8 % — LOW (ref 20.5–51.1)
MAGNESIUM SERPL-MCNC: 1.8 MG/DL — SIGNIFICANT CHANGE UP (ref 1.8–2.4)
MAGNESIUM SERPL-MCNC: 2.2 MG/DL — SIGNIFICANT CHANGE UP (ref 1.8–2.4)
MCHC RBC-ENTMCNC: 29.9 PG — SIGNIFICANT CHANGE UP (ref 27–31)
MCHC RBC-ENTMCNC: 30 PG — SIGNIFICANT CHANGE UP (ref 27–31)
MCHC RBC-ENTMCNC: 33.1 G/DL — SIGNIFICANT CHANGE UP (ref 32–37)
MCHC RBC-ENTMCNC: 33.7 G/DL — SIGNIFICANT CHANGE UP (ref 32–37)
MCV RBC AUTO: 89 FL — SIGNIFICANT CHANGE UP (ref 81–99)
MCV RBC AUTO: 90.4 FL — SIGNIFICANT CHANGE UP (ref 81–99)
MONOCYTES # BLD AUTO: 0.45 K/UL — SIGNIFICANT CHANGE UP (ref 0.1–0.6)
MONOCYTES # BLD AUTO: 0.63 K/UL — HIGH (ref 0.1–0.6)
MONOCYTES NFR BLD AUTO: 5.9 % — SIGNIFICANT CHANGE UP (ref 1.7–9.3)
MONOCYTES NFR BLD AUTO: 7 % — SIGNIFICANT CHANGE UP (ref 1.7–9.3)
NEUTROPHILS # BLD AUTO: 6.01 K/UL — SIGNIFICANT CHANGE UP (ref 1.4–6.5)
NEUTROPHILS # BLD AUTO: 7.1 K/UL — HIGH (ref 1.4–6.5)
NEUTROPHILS NFR BLD AUTO: 78.1 % — HIGH (ref 42.2–75.2)
NEUTROPHILS NFR BLD AUTO: 79.5 % — HIGH (ref 42.2–75.2)
NRBC # BLD: 0 /100 WBCS — SIGNIFICANT CHANGE UP (ref 0–0)
NRBC # BLD: 0 /100 WBCS — SIGNIFICANT CHANGE UP (ref 0–0)
PHOSPHATE SERPL-MCNC: 3.3 MG/DL — SIGNIFICANT CHANGE UP (ref 2.1–4.9)
PHOSPHATE SERPL-MCNC: 3.9 MG/DL — SIGNIFICANT CHANGE UP (ref 2.1–4.9)
PLATELET # BLD AUTO: 143 K/UL — SIGNIFICANT CHANGE UP (ref 130–400)
PLATELET # BLD AUTO: 168 K/UL — SIGNIFICANT CHANGE UP (ref 130–400)
POTASSIUM SERPL-MCNC: 4.4 MMOL/L — SIGNIFICANT CHANGE UP (ref 3.5–5)
POTASSIUM SERPL-MCNC: 4.5 MMOL/L — SIGNIFICANT CHANGE UP (ref 3.5–5)
POTASSIUM SERPL-SCNC: 4.4 MMOL/L — SIGNIFICANT CHANGE UP (ref 3.5–5)
POTASSIUM SERPL-SCNC: 4.5 MMOL/L — SIGNIFICANT CHANGE UP (ref 3.5–5)
PROTHROM AB SERPL-ACNC: 18 SEC — HIGH (ref 9.95–12.87)
PROTHROM AB SERPL-ACNC: 22.9 SEC — HIGH (ref 9.95–12.87)
PROTHROM AB SERPL-ACNC: 33.5 SEC — HIGH (ref 9.95–12.87)
PROTHROM AB SERPL-ACNC: >40 SEC — HIGH (ref 9.95–12.87)
RBC # BLD: 2.91 M/UL — LOW (ref 4.2–5.4)
RBC # BLD: 3 M/UL — LOW (ref 4.2–5.4)
RBC # FLD: 13.8 % — SIGNIFICANT CHANGE UP (ref 11.5–14.5)
RBC # FLD: 14 % — SIGNIFICANT CHANGE UP (ref 11.5–14.5)
SODIUM SERPL-SCNC: 130 MMOL/L — LOW (ref 135–146)
SODIUM SERPL-SCNC: 130 MMOL/L — LOW (ref 135–146)
WBC # BLD: 7.69 K/UL — SIGNIFICANT CHANGE UP (ref 4.8–10.8)
WBC # BLD: 8.95 K/UL — SIGNIFICANT CHANGE UP (ref 4.8–10.8)
WBC # FLD AUTO: 7.69 K/UL — SIGNIFICANT CHANGE UP (ref 4.8–10.8)
WBC # FLD AUTO: 8.95 K/UL — SIGNIFICANT CHANGE UP (ref 4.8–10.8)

## 2021-07-15 PROCEDURE — 99232 SBSQ HOSP IP/OBS MODERATE 35: CPT

## 2021-07-15 PROCEDURE — 99233 SBSQ HOSP IP/OBS HIGH 50: CPT

## 2021-07-15 RX ORDER — PHYTONADIONE (VIT K1) 5 MG
5 TABLET ORAL ONCE
Refills: 0 | Status: DISCONTINUED | OUTPATIENT
Start: 2021-07-15 | End: 2021-07-15

## 2021-07-15 RX ORDER — PHYTONADIONE (VIT K1) 5 MG
2.5 TABLET ORAL ONCE
Refills: 0 | Status: COMPLETED | OUTPATIENT
Start: 2021-07-15 | End: 2021-07-15

## 2021-07-15 RX ORDER — MAGNESIUM SULFATE 500 MG/ML
2 VIAL (ML) INJECTION ONCE
Refills: 0 | Status: COMPLETED | OUTPATIENT
Start: 2021-07-15 | End: 2021-07-15

## 2021-07-15 RX ORDER — PROTHROMBIN COMPLEX CONCENTRATE (HUMAN) 25.5; 16.5; 24; 22; 22; 26 [IU]/ML; [IU]/ML; [IU]/ML; [IU]/ML; [IU]/ML; [IU]/ML
1500 POWDER, FOR SOLUTION INTRAVENOUS ONCE
Refills: 0 | Status: DISCONTINUED | OUTPATIENT
Start: 2021-07-15 | End: 2021-07-15

## 2021-07-15 RX ADMIN — Medication 100.5 MILLIGRAM(S): at 14:42

## 2021-07-15 RX ADMIN — ATORVASTATIN CALCIUM 20 MILLIGRAM(S): 80 TABLET, FILM COATED ORAL at 21:10

## 2021-07-15 RX ADMIN — PANTOPRAZOLE SODIUM 40 MILLIGRAM(S): 20 TABLET, DELAYED RELEASE ORAL at 05:15

## 2021-07-15 RX ADMIN — Medication 0.25 MILLIGRAM(S): at 21:10

## 2021-07-15 RX ADMIN — Medication 50 GRAM(S): at 08:07

## 2021-07-15 RX ADMIN — ATENOLOL 25 MILLIGRAM(S): 25 TABLET ORAL at 05:15

## 2021-07-15 RX ADMIN — VALSARTAN 160 MILLIGRAM(S): 80 TABLET ORAL at 05:15

## 2021-07-15 RX ADMIN — CEFTRIAXONE 100 MILLIGRAM(S): 500 INJECTION, POWDER, FOR SOLUTION INTRAMUSCULAR; INTRAVENOUS at 05:15

## 2021-07-15 RX ADMIN — SENNA PLUS 2 TABLET(S): 8.6 TABLET ORAL at 21:10

## 2021-07-15 NOTE — PROGRESS NOTE ADULT - SUBJECTIVE AND OBJECTIVE BOX
SUBJ:No chest pain or shortness of breath      MEDICATIONS  (STANDING):  aspirin  chewable 81 milliGRAM(s) Oral daily  ATENolol  Tablet 25 milliGRAM(s) Oral daily  atorvastatin 20 milliGRAM(s) Oral at bedtime  cefTRIAXone   IVPB 1000 milliGRAM(s) IV Intermittent every 24 hours  chlorhexidine 4% Liquid 1 Application(s) Topical <User Schedule>  dextrose 40% Gel 15 Gram(s) Oral once  dextrose 5%. 1000 milliLiter(s) (50 mL/Hr) IV Continuous <Continuous>  dextrose 50% Injectable 25 Gram(s) IV Push once  escitalopram 10 milliGRAM(s) Oral daily  glucagon  Injectable 1 milliGRAM(s) IntraMuscular once  insulin lispro (ADMELOG) corrective regimen sliding scale   SubCutaneous three times a day before meals  pantoprazole    Tablet 40 milliGRAM(s) Oral before breakfast  senna 2 Tablet(s) Oral at bedtime  valsartan 160 milliGRAM(s) Oral daily    MEDICATIONS  (PRN):  acetaminophen   Tablet .. 650 milliGRAM(s) Oral every 6 hours PRN Temp greater or equal to 38C (100.4F), Mild Pain (1 - 3)  ALPRAZolam 0.25 milliGRAM(s) Oral at bedtime PRN for anxiety  ibuprofen  Tablet. 400 milliGRAM(s) Oral every 6 hours PRN Moderate Pain (4 - 6)            Vital Signs Last 24 Hrs  T(C): 37 (15 Jul 2021 15:28), Max: 37.2 (15 Jul 2021 07:40)  T(F): 98.6 (15 Jul 2021 15:28), Max: 99 (15 Jul 2021 07:40)  HR: 66 (15 Jul 2021 15:28) (66 - 78)  BP: 134/79 (15 Jul 2021 15:28) (134/79 - 157/67)  BP(mean): --  RR: 18 (15 Jul 2021 15:28) (18 - 18)  SpO2: 97% (15 Jul 2021 07:40) (97% - 99%)      ECG:NML    TTE:    LABS:                        9.0    7.69  )-----------( 143      ( 15 Jul 2021 01:02 )             26.7     07-15    130<L>  |  94<L>  |  17  ----------------------------<  106<H>  4.4   |  24  |  1.0    Ca    8.0<L>      15 Jul 2021 01:02  Phos  3.9     07-15  Mg     1.8     07-15          PT/INR - ( 15 Jul 2021 11:32 )   PT: 33.50 sec;   INR: 2.91 ratio         PTT - ( 15 Jul 2021 11:32 )  PTT:40.6 sec    I&O's Summary    14 Jul 2021 07:01  -  15 Jul 2021 07:00  --------------------------------------------------------  IN: 0 mL / OUT: 1050 mL / NET: -1050 mL    15 Jul 2021 07:01  -  15 Jul 2021 16:23  --------------------------------------------------------  IN: 0 mL / OUT: 600 mL / NET: -600 mL      BNP

## 2021-07-15 NOTE — PROGRESS NOTE ADULT - SUBJECTIVE AND OBJECTIVE BOX
GENERAL SURGERY PROGRESS NOTE    Patient: ORAL WU , 85y ([birth year])Female   MRN: [id number]  Location: [address] 016   Visit: 21 Inpatient  Date: 07-15-21 @ 11:41    Hospital Day #: 3  Post-Op Day #:    Procedure/Dx/Injuries: right femoral neck fx    Events of past 24 hours:    PAST MEDICAL & SURGICAL HISTORY:  HTN (hypertension)    High cholesterol    Osteoporosis    Fracture of right shoulder    Anxiety    Depression    H/O heart valve replacement with mechanical valve        Vitals:   T(F): 99 (07-15-21 @ 07:40), Max: 99 (07-15-21 @ 07:40)  HR: 67 (07-15-21 @ 07:40)  BP: 156/69 (07-15-21 @ 07:40)  RR: 18 (07-15-21 @ 07:40)  SpO2: 97% (07-15-21 @ 07:40)      Diet, Regular:   DASH/TLC Sodium & Cholesterol Restricted      Fluids:     I & O's:    21 @ 07:01  -  07-15-21 @ 07:00  --------------------------------------------------------  IN:  Total IN: 0 mL    OUT:    Ureteral Catheter (mL): 1050 mL  Total OUT: 1050 mL    Total NET: -1050 mL      PHYSICAL EXAM:  General: NAD, AAOx3, calm and cooperative  HEENT: NCAT, SILVIA, EOMI, Trachea ML, Neck supple  Cardiac: RRR S1, S2, no Murmurs, rubs or gallops  Respiratory: CTAB, normal respiratory effort, breath sounds equal BL, no wheeze, rhonchi or crackles  Abdomen: Soft, non-distended, non-tender, no rebound, no guarding. +BS.  Musculoskeletal: Strength 5/5 BL UE/LE, ROM intact, compartments soft, RLE externally rotated  Neuro: Sensation grossly intact and equal throughout, no focal deficits  Vascular: Pulses 2+ throughout, extremities well perfused  Skin: Warm/dry, normal color, no jaundice      MEDICATIONS  (STANDING):  aspirin  chewable 81 milliGRAM(s) Oral daily  ATENolol  Tablet 25 milliGRAM(s) Oral daily  atorvastatin 20 milliGRAM(s) Oral at bedtime  cefTRIAXone   IVPB 1000 milliGRAM(s) IV Intermittent every 24 hours  chlorhexidine 4% Liquid 1 Application(s) Topical <User Schedule>  dextrose 40% Gel 15 Gram(s) Oral once  dextrose 5%. 1000 milliLiter(s) (50 mL/Hr) IV Continuous <Continuous>  dextrose 50% Injectable 25 Gram(s) IV Push once  escitalopram 10 milliGRAM(s) Oral daily  glucagon  Injectable 1 milliGRAM(s) IntraMuscular once  insulin lispro (ADMELOG) corrective regimen sliding scale   SubCutaneous three times a day before meals  pantoprazole    Tablet 40 milliGRAM(s) Oral before breakfast  phytonadione  IVPB 5 milliGRAM(s) IV Intermittent once  senna 2 Tablet(s) Oral at bedtime  valsartan 160 milliGRAM(s) Oral daily    MEDICATIONS  (PRN):  acetaminophen   Tablet .. 650 milliGRAM(s) Oral every 6 hours PRN Temp greater or equal to 38C (100.4F), Mild Pain (1 - 3)  ALPRAZolam 0.25 milliGRAM(s) Oral at bedtime PRN for anxiety  ibuprofen  Tablet. 400 milliGRAM(s) Oral every 6 hours PRN Moderate Pain (4 - 6)      DVT PROPHYLAXIS:   GI PROPHYLAXIS: pantoprazole    Tablet 40 milliGRAM(s) Oral before breakfast    ANTICOAGULATION:   ANTIBIOTICS:  cefTRIAXone   IVPB 1000 milliGRAM(s)      LAB/STUDIES:  Labs:  CAPILLARY BLOOD GLUCOSE      POCT Blood Glucose.: 110 mg/dL (15 Jul 2021 11:09)  POCT Blood Glucose.: 109 mg/dL (15 Jul 2021 07:18)                          9.0    7.69  )-----------( 143      ( 15 Jul 2021 01:02 )             26.7       Auto Neutrophil %: 78.1 % (07-15-21 @ 01:02)  Auto Immature Granulocyte %: 0.3 % (07-15-21 @ 01:02)    07-15    130<L>  |  94<L>  |  17  ----------------------------<  106<H>  4.4   |  24  |  1.0      Calcium, Total Serum: 8.0 mg/dL (07-15-21 @ 01:02)      LFTs:             6.1  | 0.5  | 24       ------------------[54      ( 2021 14:15 )  4.0  | x    | 13          Lipase:40     Amylase:x         Lactate, Blood: 1.0 mmol/L (21 @ 07:50)  Lactate, Blood: 3.1 mmol/L (21 @ 14:15)      Coags:     >40.00  ----< 3.82    ( 15 Jul 2021 01:02 )     44.4                Urinalysis Basic - ( 2021 14:33 )    Color: Orange / Appearance: Turbid / S.010 / pH: x  Gluc: x / Ketone: Negative  / Bili: Negative / Urobili: <2 mg/dL   Blood: x / Protein: 30 mg/dL / Nitrite: Positive   Leuk Esterase: Large / RBC: 4 /HPF / WBC >720 /HPF   Sq Epi: x / Non Sq Epi: 1 /HPF / Bacteria: Few      IMAGING:      ACCESS/ DEVICES:  [X ] Peripheral IV

## 2021-07-15 NOTE — PRE PROCEDURE NOTE - PRE PROCEDURE EVALUATION
ORTHOPEDIC SURGERY PRE OP NOTE    Diagnosis: right femoral neck fracture    Planned Procedure: right hip hemiarthroplasty    Consent: TO BE OBTAINED BY ATTENDING                   Risks/benefits/alternatives were discussed with the patient/family and they wish to proceed with surgery.       ANTICIPATED DATE OF PROCEDURE : 7/16/21  SCHEDULED CASE OR ADD-ON CASE: scheduled      Consent: to be obtained in pre-op; grand daughter is proxy     Clearances:   [x] Medicine: cleared as long as cardiology cleared  [x] Cardiology: moderate to high risk  [x] Anesthesia     T(C): 37 (07-15-21 @ 15:28), Max: 37.2 (07-15-21 @ 07:40)  HR: 66 (07-15-21 @ 15:28) (66 - 78)  BP: 134/79 (07-15-21 @ 15:28) (134/79 - 157/67)  RR: 18 (07-15-21 @ 15:28) (18 - 18)  SpO2: 97% (07-15-21 @ 07:40) (97% - 99%)    Labs:                        9.0    7.69  )-----------( 143      ( 15 Jul 2021 01:02 )             26.7     07-15    130<L>  |  94<L>  |  17  ----------------------------<  106<H>  4.4   |  24  |  1.0    Ca    8.0<L>      15 Jul 2021 01:02  Phos  3.9     07-15  Mg     1.8     07-15      PT/INR -   -PT: 33.50 sec -( 15 Jul 2021 11:32 )    - INR: 2.91 ratio   -( 15 Jul 2021 11:32 )  ; 3.8 7/14 (down trending)  - PTT:40.6 sec ( 15 Jul 2021 11:32 )      [x]Type and Screen X 2    COVID-19 PCR: NotDetec (13 Jul 2021 14:20)  SARS-CoV-2: NotDetec (06 Jun 2021 20:23)    [ ]Pregnancy test ( if female of childbearing age) : n/a  [x]EKG  [x]CXR       DIET: NPO after midnight  IVF: per primary team       ANTICOAGULATION STATUS ( include name of anticoagulant) :  COUMADIN AND ASA BEING HELD DUE TO INR     A/P: Patient is a 85y y/o Female Pending right hip hemiarthroplasty tomorrow  [x]NPO and IVF @ midnight  [x ]pain control/analgesia prn per primary team   [x]Incentive Spirometry   [ ]F/U Clearance- PENDING GOAL INR BETWEEN 1.5-2.0; PATIENT RECEIVING VIT K TODAY  [ ]F/U Pending Labs- 4PM INR PENDING       Notify Ortho with any questions- spectra [id number]    [x]DISCUSSED WITH PRIMARY TEAM MEMBER (name of team member): KRISTIE Galvan +   [x]Date and Time DISCUSSED WITH PRIMARY TEAM MEMBER: 10AM 7/15/21 @ patients Bedside

## 2021-07-15 NOTE — PROGRESS NOTE ADULT - SUBJECTIVE AND OBJECTIVE BOX
pre-operative evaluation follow up    patient seen at bedside, spoke with granddaughter and orthopedic PA. Patient to  receive vitamin K today. Surgery postponed until tomorrow pending INR level goal of between 1.5 and 2.0. Granddaughter Janina is healthcare proxy and will sign Anesthesia consent after she has signed surgical consent to be obtained later today or tomorrow. Cardiology has seen patient and reports no further cardiac interventions.

## 2021-07-15 NOTE — PROGRESS NOTE ADULT - SUBJECTIVE AND OBJECTIVE BOX
Orthopaedic Surgery Progress Note    85 year old female with right femoral neck fracture. S&E at bedside this AM, granddaughter bedside. Pain well controlled. Denies fever/chills/CP/SOB. No other complaints.       T(C): 37.2 (07-15-21 @ 07:40), Max: 37.2 (07-15-21 @ 07:40)  HR: 67 (07-15-21 @ 07:40) (60 - 78)  BP: 156/69 (07-15-21 @ 07:40) (133/63 - 157/67)  RR: 18 (07-15-21 @ 07:40) (18 - 19)  SpO2: 97% (07-15-21 @ 07:40) (97% - 99%)    P/E:  AOx3, NAD  Nonlabored breathing    Labs                        9.0    7.69  )-----------( 143      ( 15 Jul 2021 01:02 )             26.7     07-15    130<L>  |  94<L>  |  17  ----------------------------<  106<H>  4.4   |  24  |  1.0    Ca    8.0<L>      15 Jul 2021 01:02  Phos  3.9     07-15  Mg     1.8     07-15    TPro  6.1  /  Alb  4.0  /  TBili  0.5  /  DBili  x   /  AST  24  /  ALT  13  /  AlkPhos  54  07-13    LIVER FUNCTIONS - ( 13 Jul 2021 14:15 )  Alb: 4.0 g/dL / Pro: 6.1 g/dL / ALK PHOS: 54 U/L / ALT: 13 U/L / AST: 24 U/L / GGT: x           PT/INR - ( 15 Jul 2021 01:02 )   PT: >40.00 sec;   INR: 3.82 ratio         PTT - ( 15 Jul 2021 01:02 )  PTT:44.4 sec    A/P:   84yo Female with right femoral neck fracture  -Discussed plan with Trauma attending/resident/PA and granddaughter bedside. Patient will be given Vitamin K today to lower INR so patient is optimized for surgery tomorrow morning. Will continue to monitor INR today/tomorrow AM, if INR does not reach goal of 1.5-2.0, patient will receive dose of  Kcentra pre-operatively.   Weightbearing: bedrest  DVT ppx: coumadin being held  Pain control  Home meds  Trend labs  ortho following

## 2021-07-15 NOTE — PROGRESS NOTE ADULT - ASSESSMENT
ASSESSMENT:  85y F s/p mechanical fall with right femoral neck fracture. Orthopedics following. Pt takes coumadin at home and last INR was 3.8. Vitamin K given and will repeat INR before ortho surgery.     PLAN:  - Vit K 5 mg IV given, will trend INR   - pre op for OR tomorrow morning, NPO at midnight   -    TRAUMA [id number]

## 2021-07-15 NOTE — PROGRESS NOTE ADULT - SUBJECTIVE AND OBJECTIVE BOX
HPI  Patient is a 85y old Female who presents with a chief complaint of S/p mechanical fall, +HT, -LOC, +AC (coumadin) (15 Jul 2021 11:40)    Currently admitted to medicine with the primary diagnosis of Hip fracture       Today is hospital day 2d.     INTERVAL HPI / OVERNIGHT EVENTS:  Patient was examined and seen at bedside.   family present at bedside  pain is controlled  no new complaints        PAST MEDICAL & SURGICAL HISTORY  HTN (hypertension)    High cholesterol    Osteoporosis    Fracture of right shoulder    Anxiety    Depression    H/O heart valve replacement with mechanical valve      ALLERGIES  penicillin (Unknown)  pinapples (Unknown)    MEDICATIONS  STANDING MEDICATIONS  aspirin  chewable 81 milliGRAM(s) Oral daily  ATENolol  Tablet 25 milliGRAM(s) Oral daily  atorvastatin 20 milliGRAM(s) Oral at bedtime  cefTRIAXone   IVPB 1000 milliGRAM(s) IV Intermittent every 24 hours  chlorhexidine 4% Liquid 1 Application(s) Topical <User Schedule>  dextrose 40% Gel 15 Gram(s) Oral once  dextrose 5%. 1000 milliLiter(s) IV Continuous <Continuous>  dextrose 50% Injectable 25 Gram(s) IV Push once  escitalopram 10 milliGRAM(s) Oral daily  glucagon  Injectable 1 milliGRAM(s) IntraMuscular once  insulin lispro (ADMELOG) corrective regimen sliding scale   SubCutaneous three times a day before meals  pantoprazole    Tablet 40 milliGRAM(s) Oral before breakfast  phytonadione  IVPB 5 milliGRAM(s) IV Intermittent once  senna 2 Tablet(s) Oral at bedtime  valsartan 160 milliGRAM(s) Oral daily    PRN MEDICATIONS  acetaminophen   Tablet .. 650 milliGRAM(s) Oral every 6 hours PRN  ALPRAZolam 0.25 milliGRAM(s) Oral at bedtime PRN  ibuprofen  Tablet. 400 milliGRAM(s) Oral every 6 hours PRN    VITALS:  T(F): 99  HR: 67  BP: 156/69  RR: 18  SpO2: 97%    PHYSICAL EXAM  GEN: NAD, Resting comfortably in bed  PULM: normal respiratory rate  EXT: No edema  NEURO: A&Ox3, no focal deficits    LABS                        9.0    7.69  )-----------( 143      ( 15 Jul 2021 01:02 )             26.7     07-15    130<L>  |  94<L>  |  17  ----------------------------<  106<H>  4.4   |  24  |  1.0    Ca    8.0<L>      15 Jul 2021 01:02  Phos  3.9     07-15  Mg     1.8     07-15    TPro  6.1  /  Alb  4.0  /  TBili  0.5  /  DBili  x   /  AST  24  /  ALT  13  /  AlkPhos  54  07-13    PT/INR - ( 15 Jul 2021 01:02 )   PT: >40.00 sec;   INR: 3.82 ratio         PTT - ( 15 Jul 2021 01:02 )  PTT:44.4 sec  Urinalysis Basic - ( 2021 14:33 )    Color: Orange / Appearance: Turbid / S.010 / pH: x  Gluc: x / Ketone: Negative  / Bili: Negative / Urobili: <2 mg/dL   Blood: x / Protein: 30 mg/dL / Nitrite: Positive   Leuk Esterase: Large / RBC: 4 /HPF / WBC >720 /HPF   Sq Epi: x / Non Sq Epi: 1 /HPF / Bacteria: Few                RADIOLOGY

## 2021-07-15 NOTE — PROGRESS NOTE ADULT - ASSESSMENT
Patient is a 85y old  Female who presents with a chief complaint of S/p mechanical fall  patient with PMH of  HTN, HLD, CAD s/p CABG 17 y ears before and 2-3 stents placed 14-15 years before,  hx of mechanical aortic valve on coumadin, depression, anxiety, osteoporosis presents s/p mechanical fall.  Denies any loss of consciousness. Further evaluation notived to have right femoral neck fracture. Patient is followed by ortho team and planning for hemiarthroplasty.    # right femoral neck fracture following mechanical fall  trauma and orthopedic team following. Case discussed with ortho team   echo from June reviewed. Normal EF  patient has no active cardiac symptoms and has MET score >= 4  Patient has given vitamin K 5 mg IV today. Plan for monitoring INR and possible OR tomorrow    # UTI  continue ceftriaxone 2/ 3 days    # hyponatremia- chronic and stable    # h/o CAD- s/p CABG and stents   # HTN  # HLP  # osteoporosis  # depression    DVT px- at supra therapeutic level of INR

## 2021-07-16 ENCOUNTER — RESULT REVIEW (OUTPATIENT)
Age: 86
End: 2021-07-16

## 2021-07-16 LAB
ANION GAP SERPL CALC-SCNC: 12 MMOL/L — SIGNIFICANT CHANGE UP (ref 7–14)
ANION GAP SERPL CALC-SCNC: 8 MMOL/L — SIGNIFICANT CHANGE UP (ref 7–14)
APTT BLD: 29.7 SEC — SIGNIFICANT CHANGE UP (ref 27–39.2)
BASOPHILS # BLD AUTO: 0.02 K/UL — SIGNIFICANT CHANGE UP (ref 0–0.2)
BASOPHILS # BLD AUTO: 0.04 K/UL — SIGNIFICANT CHANGE UP (ref 0–0.2)
BASOPHILS NFR BLD AUTO: 0.2 % — SIGNIFICANT CHANGE UP (ref 0–1)
BASOPHILS NFR BLD AUTO: 0.4 % — SIGNIFICANT CHANGE UP (ref 0–1)
BUN SERPL-MCNC: 19 MG/DL — SIGNIFICANT CHANGE UP (ref 10–20)
BUN SERPL-MCNC: 19 MG/DL — SIGNIFICANT CHANGE UP (ref 10–20)
CALCIUM SERPL-MCNC: 7.2 MG/DL — LOW (ref 8.5–10.1)
CALCIUM SERPL-MCNC: 7.3 MG/DL — LOW (ref 8.5–10.1)
CHLORIDE SERPL-SCNC: 95 MMOL/L — LOW (ref 98–110)
CHLORIDE SERPL-SCNC: 96 MMOL/L — LOW (ref 98–110)
CK MB CFR SERPL CALC: 5.3 NG/ML — SIGNIFICANT CHANGE UP (ref 0.6–6.3)
CK SERPL-CCNC: 255 U/L — HIGH (ref 0–225)
CO2 SERPL-SCNC: 21 MMOL/L — SIGNIFICANT CHANGE UP (ref 17–32)
CO2 SERPL-SCNC: 24 MMOL/L — SIGNIFICANT CHANGE UP (ref 17–32)
CREAT SERPL-MCNC: 0.8 MG/DL — SIGNIFICANT CHANGE UP (ref 0.7–1.5)
CREAT SERPL-MCNC: 0.8 MG/DL — SIGNIFICANT CHANGE UP (ref 0.7–1.5)
EOSINOPHIL # BLD AUTO: 0.2 K/UL — SIGNIFICANT CHANGE UP (ref 0–0.7)
EOSINOPHIL # BLD AUTO: 0.24 K/UL — SIGNIFICANT CHANGE UP (ref 0–0.7)
EOSINOPHIL NFR BLD AUTO: 2.1 % — SIGNIFICANT CHANGE UP (ref 0–8)
EOSINOPHIL NFR BLD AUTO: 2.2 % — SIGNIFICANT CHANGE UP (ref 0–8)
GLUCOSE BLDC GLUCOMTR-MCNC: 112 MG/DL — HIGH (ref 70–99)
GLUCOSE BLDC GLUCOMTR-MCNC: 122 MG/DL — HIGH (ref 70–99)
GLUCOSE SERPL-MCNC: 134 MG/DL — HIGH (ref 70–99)
GLUCOSE SERPL-MCNC: 137 MG/DL — HIGH (ref 70–99)
HCT VFR BLD CALC: 18.4 % — LOW (ref 37–47)
HCT VFR BLD CALC: 22.4 % — LOW (ref 37–47)
HCT VFR BLD CALC: 26.5 % — LOW (ref 37–47)
HGB BLD-MCNC: 6.2 G/DL — CRITICAL LOW (ref 12–16)
HGB BLD-MCNC: 7.4 G/DL — LOW (ref 12–16)
HGB BLD-MCNC: 8.7 G/DL — LOW (ref 12–16)
IMM GRANULOCYTES NFR BLD AUTO: 0.4 % — HIGH (ref 0.1–0.3)
IMM GRANULOCYTES NFR BLD AUTO: 0.5 % — HIGH (ref 0.1–0.3)
INR BLD: 1.26 RATIO — SIGNIFICANT CHANGE UP (ref 0.65–1.3)
INR BLD: 1.29 RATIO — SIGNIFICANT CHANGE UP (ref 0.65–1.3)
LYMPHOCYTES # BLD AUTO: 0.67 K/UL — LOW (ref 1.2–3.4)
LYMPHOCYTES # BLD AUTO: 0.88 K/UL — LOW (ref 1.2–3.4)
LYMPHOCYTES # BLD AUTO: 6.1 % — LOW (ref 20.5–51.1)
LYMPHOCYTES # BLD AUTO: 9.1 % — LOW (ref 20.5–51.1)
MAGNESIUM SERPL-MCNC: 1.7 MG/DL — LOW (ref 1.8–2.4)
MCHC RBC-ENTMCNC: 29.7 PG — SIGNIFICANT CHANGE UP (ref 27–31)
MCHC RBC-ENTMCNC: 29.7 PG — SIGNIFICANT CHANGE UP (ref 27–31)
MCHC RBC-ENTMCNC: 30.2 PG — SIGNIFICANT CHANGE UP (ref 27–31)
MCHC RBC-ENTMCNC: 32.8 G/DL — SIGNIFICANT CHANGE UP (ref 32–37)
MCHC RBC-ENTMCNC: 33 G/DL — SIGNIFICANT CHANGE UP (ref 32–37)
MCHC RBC-ENTMCNC: 33.7 G/DL — SIGNIFICANT CHANGE UP (ref 32–37)
MCV RBC AUTO: 89.8 FL — SIGNIFICANT CHANGE UP (ref 81–99)
MCV RBC AUTO: 90 FL — SIGNIFICANT CHANGE UP (ref 81–99)
MCV RBC AUTO: 90.4 FL — SIGNIFICANT CHANGE UP (ref 81–99)
MONOCYTES # BLD AUTO: 0.72 K/UL — HIGH (ref 0.1–0.6)
MONOCYTES # BLD AUTO: 0.74 K/UL — HIGH (ref 0.1–0.6)
MONOCYTES NFR BLD AUTO: 6.8 % — SIGNIFICANT CHANGE UP (ref 1.7–9.3)
MONOCYTES NFR BLD AUTO: 7.4 % — SIGNIFICANT CHANGE UP (ref 1.7–9.3)
NEUTROPHILS # BLD AUTO: 7.79 K/UL — HIGH (ref 1.4–6.5)
NEUTROPHILS # BLD AUTO: 9.2 K/UL — HIGH (ref 1.4–6.5)
NEUTROPHILS NFR BLD AUTO: 80.6 % — HIGH (ref 42.2–75.2)
NEUTROPHILS NFR BLD AUTO: 84.2 % — HIGH (ref 42.2–75.2)
NRBC # BLD: 0 /100 WBCS — SIGNIFICANT CHANGE UP (ref 0–0)
PHOSPHATE SERPL-MCNC: 3.5 MG/DL — SIGNIFICANT CHANGE UP (ref 2.1–4.9)
PLATELET # BLD AUTO: 137 K/UL — SIGNIFICANT CHANGE UP (ref 130–400)
PLATELET # BLD AUTO: 152 K/UL — SIGNIFICANT CHANGE UP (ref 130–400)
PLATELET # BLD AUTO: 160 K/UL — SIGNIFICANT CHANGE UP (ref 130–400)
POTASSIUM SERPL-MCNC: 4.5 MMOL/L — SIGNIFICANT CHANGE UP (ref 3.5–5)
POTASSIUM SERPL-MCNC: 4.5 MMOL/L — SIGNIFICANT CHANGE UP (ref 3.5–5)
POTASSIUM SERPL-SCNC: 4.5 MMOL/L — SIGNIFICANT CHANGE UP (ref 3.5–5)
POTASSIUM SERPL-SCNC: 4.5 MMOL/L — SIGNIFICANT CHANGE UP (ref 3.5–5)
PROTHROM AB SERPL-ACNC: 14.5 SEC — HIGH (ref 9.95–12.87)
PROTHROM AB SERPL-ACNC: 14.8 SEC — HIGH (ref 9.95–12.87)
RBC # BLD: 2.05 M/UL — LOW (ref 4.2–5.4)
RBC # BLD: 2.49 M/UL — LOW (ref 4.2–5.4)
RBC # BLD: 2.93 M/UL — LOW (ref 4.2–5.4)
RBC # FLD: 13.5 % — SIGNIFICANT CHANGE UP (ref 11.5–14.5)
RBC # FLD: 13.6 % — SIGNIFICANT CHANGE UP (ref 11.5–14.5)
RBC # FLD: 13.6 % — SIGNIFICANT CHANGE UP (ref 11.5–14.5)
SARS-COV-2 RNA SPEC QL NAA+PROBE: SIGNIFICANT CHANGE UP
SODIUM SERPL-SCNC: 127 MMOL/L — LOW (ref 135–146)
SODIUM SERPL-SCNC: 129 MMOL/L — LOW (ref 135–146)
TROPONIN T SERPL-MCNC: 0.03 NG/ML — CRITICAL HIGH
WBC # BLD: 10.92 K/UL — HIGH (ref 4.8–10.8)
WBC # BLD: 12.88 K/UL — HIGH (ref 4.8–10.8)
WBC # BLD: 9.67 K/UL — SIGNIFICANT CHANGE UP (ref 4.8–10.8)
WBC # FLD AUTO: 10.92 K/UL — HIGH (ref 4.8–10.8)
WBC # FLD AUTO: 12.88 K/UL — HIGH (ref 4.8–10.8)
WBC # FLD AUTO: 9.67 K/UL — SIGNIFICANT CHANGE UP (ref 4.8–10.8)

## 2021-07-16 PROCEDURE — 99291 CRITICAL CARE FIRST HOUR: CPT | Mod: 24,25

## 2021-07-16 PROCEDURE — 93010 ELECTROCARDIOGRAM REPORT: CPT

## 2021-07-16 PROCEDURE — 88305 TISSUE EXAM BY PATHOLOGIST: CPT | Mod: 26

## 2021-07-16 PROCEDURE — 71045 X-RAY EXAM CHEST 1 VIEW: CPT | Mod: 26

## 2021-07-16 PROCEDURE — 88311 DECALCIFY TISSUE: CPT | Mod: 26

## 2021-07-16 PROCEDURE — 73502 X-RAY EXAM HIP UNI 2-3 VIEWS: CPT | Mod: 26,RT

## 2021-07-16 RX ORDER — DEXTROSE 50 % IN WATER 50 %
15 SYRINGE (ML) INTRAVENOUS ONCE
Refills: 0 | Status: DISCONTINUED | OUTPATIENT
Start: 2021-07-16 | End: 2021-07-19

## 2021-07-16 RX ORDER — MAGNESIUM SULFATE 500 MG/ML
2 VIAL (ML) INJECTION ONCE
Refills: 0 | Status: COMPLETED | OUTPATIENT
Start: 2021-07-16 | End: 2021-07-16

## 2021-07-16 RX ORDER — INSULIN LISPRO 100/ML
VIAL (ML) SUBCUTANEOUS
Refills: 0 | Status: DISCONTINUED | OUTPATIENT
Start: 2021-07-16 | End: 2021-07-23

## 2021-07-16 RX ORDER — CHLORHEXIDINE GLUCONATE 213 G/1000ML
1 SOLUTION TOPICAL
Refills: 0 | Status: DISCONTINUED | OUTPATIENT
Start: 2021-07-16 | End: 2021-07-23

## 2021-07-16 RX ORDER — SODIUM CHLORIDE 9 MG/ML
1000 INJECTION, SOLUTION INTRAVENOUS
Refills: 0 | Status: DISCONTINUED | OUTPATIENT
Start: 2021-07-16 | End: 2021-07-19

## 2021-07-16 RX ORDER — SODIUM CHLORIDE 9 MG/ML
1000 INJECTION INTRAMUSCULAR; INTRAVENOUS; SUBCUTANEOUS
Refills: 0 | Status: DISCONTINUED | OUTPATIENT
Start: 2021-07-16 | End: 2021-07-17

## 2021-07-16 RX ORDER — CEFAZOLIN SODIUM 1 G
2000 VIAL (EA) INJECTION EVERY 8 HOURS
Refills: 0 | Status: COMPLETED | OUTPATIENT
Start: 2021-07-16 | End: 2021-07-17

## 2021-07-16 RX ORDER — DEXTROSE 50 % IN WATER 50 %
25 SYRINGE (ML) INTRAVENOUS ONCE
Refills: 0 | Status: DISCONTINUED | OUTPATIENT
Start: 2021-07-16 | End: 2021-07-19

## 2021-07-16 RX ORDER — ASPIRIN/CALCIUM CARB/MAGNESIUM 324 MG
81 TABLET ORAL DAILY
Refills: 0 | Status: DISCONTINUED | OUTPATIENT
Start: 2021-07-16 | End: 2021-07-16

## 2021-07-16 RX ORDER — LABETALOL HCL 100 MG
5 TABLET ORAL ONCE
Refills: 0 | Status: COMPLETED | OUTPATIENT
Start: 2021-07-16 | End: 2021-07-16

## 2021-07-16 RX ORDER — ESCITALOPRAM OXALATE 10 MG/1
10 TABLET, FILM COATED ORAL DAILY
Refills: 0 | Status: DISCONTINUED | OUTPATIENT
Start: 2021-07-16 | End: 2021-07-23

## 2021-07-16 RX ORDER — ATENOLOL 25 MG/1
25 TABLET ORAL DAILY
Refills: 0 | Status: DISCONTINUED | OUTPATIENT
Start: 2021-07-16 | End: 2021-07-23

## 2021-07-16 RX ORDER — IBUPROFEN 200 MG
400 TABLET ORAL EVERY 6 HOURS
Refills: 0 | Status: DISCONTINUED | OUTPATIENT
Start: 2021-07-16 | End: 2021-07-16

## 2021-07-16 RX ORDER — ACETAMINOPHEN 500 MG
650 TABLET ORAL EVERY 6 HOURS
Refills: 0 | Status: DISCONTINUED | OUTPATIENT
Start: 2021-07-16 | End: 2021-07-18

## 2021-07-16 RX ORDER — PANTOPRAZOLE SODIUM 20 MG/1
40 TABLET, DELAYED RELEASE ORAL
Refills: 0 | Status: DISCONTINUED | OUTPATIENT
Start: 2021-07-16 | End: 2021-07-23

## 2021-07-16 RX ORDER — GLUCAGON INJECTION, SOLUTION 0.5 MG/.1ML
1 INJECTION, SOLUTION SUBCUTANEOUS ONCE
Refills: 0 | Status: DISCONTINUED | OUTPATIENT
Start: 2021-07-16 | End: 2021-07-19

## 2021-07-16 RX ORDER — HEPARIN SODIUM 5000 [USP'U]/ML
5000 INJECTION INTRAVENOUS; SUBCUTANEOUS EVERY 8 HOURS
Refills: 0 | Status: DISCONTINUED | OUTPATIENT
Start: 2021-07-16 | End: 2021-07-17

## 2021-07-16 RX ORDER — SODIUM CHLORIDE 9 MG/ML
1000 INJECTION, SOLUTION INTRAVENOUS
Refills: 0 | Status: DISCONTINUED | OUTPATIENT
Start: 2021-07-16 | End: 2021-07-16

## 2021-07-16 RX ORDER — ATORVASTATIN CALCIUM 80 MG/1
20 TABLET, FILM COATED ORAL AT BEDTIME
Refills: 0 | Status: DISCONTINUED | OUTPATIENT
Start: 2021-07-16 | End: 2021-07-23

## 2021-07-16 RX ORDER — ONDANSETRON 8 MG/1
4 TABLET, FILM COATED ORAL ONCE
Refills: 0 | Status: DISCONTINUED | OUTPATIENT
Start: 2021-07-16 | End: 2021-07-16

## 2021-07-16 RX ORDER — HYDROMORPHONE HYDROCHLORIDE 2 MG/ML
0.4 INJECTION INTRAMUSCULAR; INTRAVENOUS; SUBCUTANEOUS
Refills: 0 | Status: DISCONTINUED | OUTPATIENT
Start: 2021-07-16 | End: 2021-07-16

## 2021-07-16 RX ORDER — SODIUM CHLORIDE 9 MG/ML
250 INJECTION, SOLUTION INTRAVENOUS ONCE
Refills: 0 | Status: COMPLETED | OUTPATIENT
Start: 2021-07-16 | End: 2021-07-16

## 2021-07-16 RX ORDER — ATENOLOL 25 MG/1
25 TABLET ORAL DAILY
Refills: 0 | Status: DISCONTINUED | OUTPATIENT
Start: 2021-07-16 | End: 2021-07-16

## 2021-07-16 RX ORDER — SENNA PLUS 8.6 MG/1
2 TABLET ORAL AT BEDTIME
Refills: 0 | Status: DISCONTINUED | OUTPATIENT
Start: 2021-07-16 | End: 2021-07-23

## 2021-07-16 RX ADMIN — Medication 25 GRAM(S): at 21:07

## 2021-07-16 RX ADMIN — PANTOPRAZOLE SODIUM 40 MILLIGRAM(S): 20 TABLET, DELAYED RELEASE ORAL at 05:47

## 2021-07-16 RX ADMIN — VALSARTAN 160 MILLIGRAM(S): 80 TABLET ORAL at 05:39

## 2021-07-16 RX ADMIN — ATENOLOL 25 MILLIGRAM(S): 25 TABLET ORAL at 05:39

## 2021-07-16 RX ADMIN — SODIUM CHLORIDE 500 MILLILITER(S): 9 INJECTION, SOLUTION INTRAVENOUS at 14:30

## 2021-07-16 RX ADMIN — HEPARIN SODIUM 5000 UNIT(S): 5000 INJECTION INTRAVENOUS; SUBCUTANEOUS at 16:35

## 2021-07-16 RX ADMIN — CEFTRIAXONE 100 MILLIGRAM(S): 500 INJECTION, POWDER, FOR SOLUTION INTRAMUSCULAR; INTRAVENOUS at 05:39

## 2021-07-16 NOTE — CONSULT NOTE ADULT - SUBJECTIVE AND OBJECTIVE BOX
SICU Consultation Note  =====================================================  HPI:  86 y/o F w/ PMHx of HTN, HLD, CAD, h/o PCI/PO RCA 2018, CABG 2003, hx of mechanical aortic valve 2003 (on coumadin), depression, anxiety, osteoporosis presents s/p mechanical fall, +HT, -LOC, falling onto her right side as well as the right side of her head.   CT head and CT c-spine were negative.  Pt was found to have Right femoral neck fx.  INR 3.9 on admission, pt was given 2.5mg of Vitamin K, INR came down to 1.26.  Pt went to the OR this morning with ortho, s/p hemiarthroplasty of right hip.  Uneventful case, pt was successfully extubated.  SICU c/s was called later towards the evening due to pt being hypotensive with MAP of 45 and making 7-12cc of urine, bolus 250cc x 2 given.  Stat labs sent and revealed Hb 6.2, 2 units of pRBCs are being given.  Pt is upgraded to SICU.     Surgery Information  OR time: 3.5hrs      EBL:  150     UO:  375           IV Fluids:  2L     Blood Products: none            PAST MEDICAL & SURGICAL HISTORY:  HTN (hypertension)    High cholesterol    Osteoporosis    Fracture of right shoulder    Anxiety    Depression    H/O heart valve replacement with mechanical valve 2003    CABG 2003    PCI/PO 2018    Allergies  penicillin (Unknown)  pinapples (Unknown)    Home Medications:  alendronate 35 mg oral tablet: 1 tab(s) orally once a week (07 Jun 2021 00:16)  ALPRAZolam 0.5 mg oral tablet: 1 tab(s) orally once a day (at bedtime), As Needed (07 Jun 2021 00:16)  aspirin 81 mg oral tablet, chewable: 1 tab(s) orally once a day (07 Jun 2021 00:16)  atenolol 25 mg oral tablet: 1 tab(s) orally once a day (07 Jun 2021 00:16)  atorvastatin 20 mg oral tablet: 1 tab(s) orally once a day (07 Jun 2021 00:16)  escitalopram 10 mg oral tablet: 1 tab(s) orally once a day (07 Jun 2021 00:16)  ezetimibe 10 mg oral tablet: 1 tab(s) orally once a day (07 Jun 2021 00:16)  iron gluconate: 27 milligram(s) orally once a day (07 Jun 2021 00:16)  lidocaine 5% patch: 1  transdermally once a day (07 Jun 2021 00:16)  valsartan 160 mg oral tablet: 1 tab(s) orally once a day (at bedtime) (07 Jun 2021 00:16)  warfarin 4 mg oral tablet: 1 tab(s) orally once a day (07 Jun 2021 00:16)    Advanced Directives: Full Code     ROS:  [x ] A ten-point review of systems was otherwise negative except as noted.  [ ] Due to altered mental status/intubation, subjective information were not able to be obtained from the patient. History was obtained, to the extent possible, from review of the chart and collateral sources of information.      CURRENT MEDICATIONS:   --------------------------------------------------------------------------------------  Neurologic Medications  acetaminophen   Tablet .. 650 milliGRAM(s) Oral every 6 hours PRN Temp greater or equal to 38C (100.4F), Mild Pain (1 - 3)  escitalopram 10 milliGRAM(s) Oral daily  HYDROmorphone  Injectable 0.4 milliGRAM(s) IV Push every 10 minutes PRN Moderate Pain (4 - 6)  ondansetron Injectable 4 milliGRAM(s) IV Push once PRN Nausea and/or Vomiting    Respiratory Medications    Cardiovascular Medications    Gastrointestinal Medications  dextrose 5%. 1000 milliLiter(s) IV Continuous <Continuous>  pantoprazole    Tablet 40 milliGRAM(s) Oral before breakfast  senna 2 Tablet(s) Oral at bedtime  sodium chloride 0.9%. 1000 milliLiter(s) IV Continuous <Continuous>    Genitourinary Medications    Hematologic/Oncologic Medications  aspirin  chewable 81 milliGRAM(s) Oral daily  heparin   Injectable 5000 Unit(s) SubCutaneous every 8 hours    Antimicrobial/Immunologic Medications  ceFAZolin   IVPB 2000 milliGRAM(s) IV Intermittent every 8 hours    Endocrine/Metabolic Medications  atorvastatin 20 milliGRAM(s) Oral at bedtime  dextrose 40% Gel 15 Gram(s) Oral once  dextrose 50% Injectable 25 Gram(s) IV Push once  glucagon  Injectable 1 milliGRAM(s) IntraMuscular once  insulin lispro (ADMELOG) corrective regimen sliding scale   SubCutaneous three times a day before meals    Topical/Other Medications  chlorhexidine 4% Liquid 1 Application(s) Topical <User Schedule>    --------------------------------------------------------------------------------------    Vital Signs Last 24 Hrs  T(C): 36.9 (16 Jul 2021 21:42), Max: 36.9 (16 Jul 2021 03:55)  T(F): 98.4 (16 Jul 2021 21:42), Max: 98.5 (16 Jul 2021 03:55)  HR: 83 (16 Jul 2021 21:42) (65 - 95)  BP: 122/58 (16 Jul 2021 21:27) (83/42 - 161/72)  BP(mean): 81 (16 Jul 2021 21:00) (60 - 102)  RR: 14 (16 Jul 2021 21:42) (11 - 23)  SpO2: 96% (16 Jul 2021 21:42) (93% - 100%)  I&O's Detail    15 Jul 2021 07:01  -  16 Jul 2021 07:00  --------------------------------------------------------  IN:  Total IN: 0 mL    OUT:    Ureteral Catheter (mL): 1200 mL  Total OUT: 1200 mL    Total NET: -1200 mL      16 Jul 2021 07:01  -  16 Jul 2021 22:16  --------------------------------------------------------  IN:    Lactated Ringers: 700 mL    Lactated Ringers Bolus: 250 mL    PRBCs (Packed Red Blood Cells): 299 mL  Total IN: 1249 mL    OUT:    Ureteral Catheter (mL): 117 mL  Total OUT: 117 mL    Total NET: 1132 mL        I&O's Summary    15 Jul 2021 07:01  -  16 Jul 2021 07:00  --------------------------------------------------------  IN: 0 mL / OUT: 1200 mL / NET: -1200 mL    16 Jul 2021 07:01  -  16 Jul 2021 22:16  --------------------------------------------------------  IN: 1249 mL / OUT: 117 mL / NET: 1132 mL        LABS:  ABG - ( 16 Jul 2021 10:38 )  pH, Arterial: 7.46  pH, Blood: x     /  pCO2: 36    /  pO2: 293   / HCO3: 26    / Base Excess: 1.9   /  SaO2: 100                           6.2    10.92 )-----------( 137      ( 16 Jul 2021 19:33 )             18.4     07-16    129<L>  |  96<L>  |  19  ----------------------------<  137<H>  4.5   |  21  |  0.8    Ca    7.3<L>      16 Jul 2021 19:33  Phos  3.5     07-16  Mg     1.7     07-16    PT/INR - ( 16 Jul 2021 19:33 )   PT: 14.80 sec;   INR: 1.29 ratio    PTT - ( 16 Jul 2021 19:33 )  PTT:29.7 sec    CARDIAC MARKERS ( 16 Jul 2021 19:33 )  x     / 0.03 ng/mL / 255 U/L / x     / 5.3 ng/mL        EXAM:  General/Neuro     Exam: looking pale and drowsy, but arousable, no focal deficits. PERRLA, EOMI, JONES    Respiratory  Exam: Lungs clear to auscultation, Normal expansion/effort.     Cardiovascular  Exam: S1, S2.  Regular rate and rhythm.   Cardiac Rhythm: Normal Sinus Rhythm    GI  Exam: Abdomen soft, Non-tender, Non-distended.      Extremities  Exam: Extremities warm, No peripheral edema b/l LE  Right thigh area dressing c/d/i, no hematoma    Derm:  Exam: Good skin turgor, no skin breakdown.      :   Exam: Zamudio catheter in place.     Tubes/Lines/Drains  ***  [x] Peripheral IV  [x] Arterial Line		[x] R	[] L	[] Fem	[x] Rad	[] Ax	Date Placed: 7/16/21     [x] Urinary Catheter		Date Placed: 7/16/21           SICU Consultation Note  =====================================================  HPI:  86 y/o F w/ PMHx of HTN, HLD, CAD, h/o PCI/PO RCA 2014, 3V CABG 2003, hx of mechanical aortic valve 2003 (on coumadin), depression, anxiety, osteoporosis presents s/p mechanical fall, +HT, -LOC, falling onto her right side as well as the right side of her head.   CT head and CT c-spine were negative.  Pt was found to have Right femoral neck fx.  INR 3.9 on admission, pt was given 2.5mg of Vitamin K, INR came down to 1.26.  Pt went to the OR this morning with ortho, s/p hemiarthroplasty of right hip.  Uneventful case, pt was successfully extubated.  SICU c/s was called later towards the evening due to pt being hypotensive with MAP of 45 and making 7-12cc of urine, bolus 250cc x 2 given.  Stat labs sent and revealed Hb 6.2, 2 units of pRBCs are being given.  Pt is upgraded to SICU.     Surgery Information  OR time: 3.5hrs      EBL:  150     UO:  375           IV Fluids:  2L     Blood Products: none            PAST MEDICAL & SURGICAL HISTORY:  HTN (hypertension)    High cholesterol    Osteoporosis    Fracture of right shoulder    Anxiety    Depression    H/O heart valve replacement with mechanical valve 2003    CABG 2003    PCI/PO 2018    Allergies  penicillin (Unknown)  pinapples (Unknown)    Home Medications:  alendronate 35 mg oral tablet: 1 tab(s) orally once a week (07 Jun 2021 00:16)  ALPRAZolam 0.5 mg oral tablet: 1 tab(s) orally once a day (at bedtime), As Needed (07 Jun 2021 00:16)  aspirin 81 mg oral tablet, chewable: 1 tab(s) orally once a day (07 Jun 2021 00:16)  atenolol 25 mg oral tablet: 1 tab(s) orally once a day (07 Jun 2021 00:16)  atorvastatin 20 mg oral tablet: 1 tab(s) orally once a day (07 Jun 2021 00:16)  escitalopram 10 mg oral tablet: 1 tab(s) orally once a day (07 Jun 2021 00:16)  ezetimibe 10 mg oral tablet: 1 tab(s) orally once a day (07 Jun 2021 00:16)  iron gluconate: 27 milligram(s) orally once a day (07 Jun 2021 00:16)  lidocaine 5% patch: 1  transdermally once a day (07 Jun 2021 00:16)  valsartan 160 mg oral tablet: 1 tab(s) orally once a day (at bedtime) (07 Jun 2021 00:16)  warfarin 4 mg oral tablet: 1 tab(s) orally once a day (07 Jun 2021 00:16)    Advanced Directives: Full Code     ROS:  [x ] A ten-point review of systems was otherwise negative except as noted.  [ ] Due to altered mental status/intubation, subjective information were not able to be obtained from the patient. History was obtained, to the extent possible, from review of the chart and collateral sources of information.      CURRENT MEDICATIONS:   --------------------------------------------------------------------------------------  Neurologic Medications  acetaminophen   Tablet .. 650 milliGRAM(s) Oral every 6 hours PRN Temp greater or equal to 38C (100.4F), Mild Pain (1 - 3)  escitalopram 10 milliGRAM(s) Oral daily  HYDROmorphone  Injectable 0.4 milliGRAM(s) IV Push every 10 minutes PRN Moderate Pain (4 - 6)  ondansetron Injectable 4 milliGRAM(s) IV Push once PRN Nausea and/or Vomiting    Respiratory Medications    Cardiovascular Medications    Gastrointestinal Medications  dextrose 5%. 1000 milliLiter(s) IV Continuous <Continuous>  pantoprazole    Tablet 40 milliGRAM(s) Oral before breakfast  senna 2 Tablet(s) Oral at bedtime  sodium chloride 0.9%. 1000 milliLiter(s) IV Continuous <Continuous>    Genitourinary Medications    Hematologic/Oncologic Medications  aspirin  chewable 81 milliGRAM(s) Oral daily  heparin   Injectable 5000 Unit(s) SubCutaneous every 8 hours    Antimicrobial/Immunologic Medications  ceFAZolin   IVPB 2000 milliGRAM(s) IV Intermittent every 8 hours    Endocrine/Metabolic Medications  atorvastatin 20 milliGRAM(s) Oral at bedtime  dextrose 40% Gel 15 Gram(s) Oral once  dextrose 50% Injectable 25 Gram(s) IV Push once  glucagon  Injectable 1 milliGRAM(s) IntraMuscular once  insulin lispro (ADMELOG) corrective regimen sliding scale   SubCutaneous three times a day before meals    Topical/Other Medications  chlorhexidine 4% Liquid 1 Application(s) Topical <User Schedule>    --------------------------------------------------------------------------------------    Vital Signs Last 24 Hrs  T(C): 36.9 (16 Jul 2021 21:42), Max: 36.9 (16 Jul 2021 03:55)  T(F): 98.4 (16 Jul 2021 21:42), Max: 98.5 (16 Jul 2021 03:55)  HR: 83 (16 Jul 2021 21:42) (65 - 95)  BP: 122/58 (16 Jul 2021 21:27) (83/42 - 161/72)  BP(mean): 81 (16 Jul 2021 21:00) (60 - 102)  RR: 14 (16 Jul 2021 21:42) (11 - 23)  SpO2: 96% (16 Jul 2021 21:42) (93% - 100%)  I&O's Detail    15 Jul 2021 07:01  -  16 Jul 2021 07:00  --------------------------------------------------------  IN:  Total IN: 0 mL    OUT:    Ureteral Catheter (mL): 1200 mL  Total OUT: 1200 mL    Total NET: -1200 mL      16 Jul 2021 07:01  -  16 Jul 2021 22:16  --------------------------------------------------------  IN:    Lactated Ringers: 700 mL    Lactated Ringers Bolus: 250 mL    PRBCs (Packed Red Blood Cells): 299 mL  Total IN: 1249 mL    OUT:    Ureteral Catheter (mL): 117 mL  Total OUT: 117 mL    Total NET: 1132 mL        I&O's Summary    15 Jul 2021 07:01  -  16 Jul 2021 07:00  --------------------------------------------------------  IN: 0 mL / OUT: 1200 mL / NET: -1200 mL    16 Jul 2021 07:01  -  16 Jul 2021 22:16  --------------------------------------------------------  IN: 1249 mL / OUT: 117 mL / NET: 1132 mL        LABS:  ABG - ( 16 Jul 2021 10:38 )  pH, Arterial: 7.46  pH, Blood: x     /  pCO2: 36    /  pO2: 293   / HCO3: 26    / Base Excess: 1.9   /  SaO2: 100                           6.2    10.92 )-----------( 137      ( 16 Jul 2021 19:33 )             18.4     07-16    129<L>  |  96<L>  |  19  ----------------------------<  137<H>  4.5   |  21  |  0.8    Ca    7.3<L>      16 Jul 2021 19:33  Phos  3.5     07-16  Mg     1.7     07-16    PT/INR - ( 16 Jul 2021 19:33 )   PT: 14.80 sec;   INR: 1.29 ratio    PTT - ( 16 Jul 2021 19:33 )  PTT:29.7 sec    CARDIAC MARKERS ( 16 Jul 2021 19:33 )  x     / 0.03 ng/mL / 255 U/L / x     / 5.3 ng/mL        EXAM:  General/Neuro     Exam: looking pale and drowsy, but arousable, no focal deficits. PERRLA, EOMI, JONES    Respiratory  Exam: Lungs clear to auscultation, Normal expansion/effort.     Cardiovascular  Exam: S1, S2.  Regular rate and rhythm.   Cardiac Rhythm: Normal Sinus Rhythm    GI  Exam: Abdomen soft, Non-tender, Non-distended.      Extremities  Exam: Extremities warm, No peripheral edema b/l LE  Right thigh area dressing c/d/i, no hematoma    Derm:  Exam: Good skin turgor, no skin breakdown.      :   Exam: Zamudio catheter in place.     Tubes/Lines/Drains  ***  [x] Peripheral IV  [x] Arterial Line		[x] R	[] L	[] Fem	[x] Rad	[] Ax	Date Placed: 7/16/21     [x] Urinary Catheter		Date Placed: 7/16/21

## 2021-07-16 NOTE — PROGRESS NOTE ADULT - ASSESSMENT
ASSESSMENT:  85y Female  hospital day 4 admitted for right femoral neck fracture. Patient is due to go to OR with Ortho on 7/16 at 7:30 am. 2 units PRBC's and 2 units FFP's have been placed on hold for OR per Ortho request. INR still above 1.5. Per ortho, if 4am INR is not below 1.5, give 1 unit FFP prior to OR.     PLAN:   - F/U 4am INR- if below 1.5 give 1 unit FFP prior to OR per Ortho.   - OR with Ortho on 7/16.   - Start on Heparin drip post op   - NPO at midnight     Lines/Tubes: PIV, Zamudio Catheter.    TRAUMA SPECTRA: [id number]

## 2021-07-16 NOTE — CHART NOTE - NSCHARTNOTEFT_GEN_A_CORE
PACU ANESTHESIA ADMISSION NOTE      Procedure: Hemiarthroplasty of right hip      Post op diagnosis:  Fracture of femoral neck, right        ____  Intubated  TV:______       Rate: ______      FiO2: ______    __X__  Patent Airway    __X__  Full return of protective reflexes    __X__  Full recovery from anesthesia / back to baseline status    Vitals:  T(F): 98.3  HR: 93  BP: 166/52   RR: 14   SpO2: 99%     Mental Status:  ____ Awake   _____ Alert   ___X__ Drowsy   _____ Sedated    Nausea/Vomiting:  ____ Yes, See Post - Op Orders      ____ No    Pain Scale (0-10):  _____    Treatment: ____ None    ____ See Post - Op/PCA Orders    Post - Operative Fluids:   ____ Oral   ____ See Post - Op Orders    Plan: Discharge:   ____Home       ___X__Floor     _____Critical Care    _____  Other:_________________    Comments:

## 2021-07-16 NOTE — PRE-OP CHECKLIST - 1.
Patient is Uzbek speaking. Uzbek phone  used # [phone]. Patient gives approval for daughter to provide translation and daughter states she is HCP and will be signing consents. Daughter is english speaking.

## 2021-07-16 NOTE — PROGRESS NOTE ADULT - SUBJECTIVE AND OBJECTIVE BOX
TRAUMA SURGERY PROGRESS NOTE    Patient: ORAL WU , 85y ([birth year])Female   MRN: [id number]  Location: [address]  Visit: 07-13-21 Inpatient  Date: 07-16-21 @ 01:43    Hospital Day #: 4      Procedure/Dx/Injuries: Right femoral neck fracture     Events of past 24 hours: Patient has been pre-op'ed for OR tomorrow morning with Ortho. Per ortho request 2 units PRBC's and FFP's have been placed on hold for OR. Patient only speaks Estonian so have been using granddaughter Oral to communicate with patient.     PAST MEDICAL & SURGICAL HISTORY:  HTN (hypertension)    High cholesterol    Osteoporosis    Fracture of right shoulder    Anxiety    Depression    H/O heart valve replacement with mechanical valve        Vitals:   T(F): 97.9 (07-15-21 @ 23:26), Max: 99 (07-15-21 @ 07:40)  HR: 79 (07-15-21 @ 23:26)  BP: 155/68 (07-15-21 @ 23:26)  RR: 18 (07-15-21 @ 23:26)  SpO2: 97% (07-15-21 @ 07:40)      Diet, NPO after Midnight:      NPO Start Date: 15-Jul-2021,   NPO Start Time: 23:59  Diet, Regular:   DASH/TLC Sodium & Cholesterol Restricted      Fluids:     I & O's:    07-14-21 @ 07:01  -  07-15-21 @ 07:00  --------------------------------------------------------  IN:  Total IN: 0 mL    OUT:    Ureteral Catheter (mL): 1050 mL  Total OUT: 1050 mL    Total NET: -1050 mL        Bowel Movement: : [x] YES [] NO  Flatus: : [x] YES [] NO    PHYSICAL EXAM:  General: NAD  HEENT: Normocephalic, atraumatic, EOMI, PEERLA. no scalp lacerations   Neck: Soft, midline trachea. no c-spine tenderness  Chest: No chest wall tenderness, no subcutaneous emphysema   Cardiac: S1, S2, RRR  Respiratory: Bilateral breath sounds, clear and equal bilaterally  Abdomen: Soft, non-distended, non-tender, no rebound, no guarding.  Groin: Normal appearing, pelvis stable   Ext:  Moving b/l upper and lower extremities. Palpable Radial b/l UE, b/l DP palpable in LE.       MEDICATIONS  (STANDING):  aspirin  chewable 81 milliGRAM(s) Oral daily  ATENolol  Tablet 25 milliGRAM(s) Oral daily  atorvastatin 20 milliGRAM(s) Oral at bedtime  cefTRIAXone   IVPB 1000 milliGRAM(s) IV Intermittent every 24 hours  chlorhexidine 4% Liquid 1 Application(s) Topical <User Schedule>  dextrose 40% Gel 15 Gram(s) Oral once  dextrose 5%. 1000 milliLiter(s) (50 mL/Hr) IV Continuous <Continuous>  dextrose 50% Injectable 25 Gram(s) IV Push once  escitalopram 10 milliGRAM(s) Oral daily  glucagon  Injectable 1 milliGRAM(s) IntraMuscular once  insulin lispro (ADMELOG) corrective regimen sliding scale   SubCutaneous three times a day before meals  pantoprazole    Tablet 40 milliGRAM(s) Oral before breakfast  senna 2 Tablet(s) Oral at bedtime  valsartan 160 milliGRAM(s) Oral daily    MEDICATIONS  (PRN):  acetaminophen   Tablet .. 650 milliGRAM(s) Oral every 6 hours PRN Temp greater or equal to 38C (100.4F), Mild Pain (1 - 3)  ALPRAZolam 0.25 milliGRAM(s) Oral at bedtime PRN for anxiety  ibuprofen  Tablet. 400 milliGRAM(s) Oral every 6 hours PRN Moderate Pain (4 - 6)      DVT PROPHYLAXIS: SCDs,   GI PROPHYLAXIS: pantoprazole    Tablet 40 milliGRAM(s) Oral before breakfast    ANTICOAGULATION:   ANTIBIOTICS: cefTRIAXone   IVPB 1000 milliGRAM(s)            LAB/STUDIES:  Labs:  CAPILLARY BLOOD GLUCOSE      POCT Blood Glucose.: 142 mg/dL (15 Jul 2021 21:16)  POCT Blood Glucose.: 116 mg/dL (15 Jul 2021 16:46)  POCT Blood Glucose.: 110 mg/dL (15 Jul 2021 11:09)  POCT Blood Glucose.: 109 mg/dL (15 Jul 2021 07:18)                          8.7    8.95  )-----------( 168      ( 15 Jul 2021 20:00 )             26.3       Auto Neutrophil %: 79.5 % (07-15-21 @ 20:00)  Auto Immature Granulocyte %: 0.4 % (07-15-21 @ 20:00)    07-15    130<L>  |  94<L>  |  19  ----------------------------<  112<H>  4.5   |  21  |  0.9      Calcium, Total Serum: 8.1 mg/dL (07-15-21 @ 20:00)      LFTs:     Lactate, Blood: 1.0 mmol/L (07-14-21 @ 07:50)  Lactate, Blood: 3.1 mmol/L (07-13-21 @ 14:15)      Coags:     18.00  ----< 1.57    ( 15 Jul 2021 20:00 )     31.6                            IMAGING:      ACCESS DEVICES:  [x ] Peripheral IV  [ x] Urinary Catheter,  Date Placed:

## 2021-07-16 NOTE — PROVIDER CONTACT NOTE (OTHER) - SITUATION
pt is baseline axo4. pt is now disoriented to place. RN is attempting to reorient pt through , but pt believes she is at home on the couch. can you come assess her?

## 2021-07-16 NOTE — PHYSICAL THERAPY INITIAL EVALUATION ADULT - SPECIFY REASON(S)
Pt is still pending surgery and does not have activity orders. Will hold PT  will follow up for IE post-surgery
Pt is still pending surgery. PT will follow up for IE post-op, will hold PT until then.
Pt currently does not have activity orders. PT spoke with ortho team ext [phone] to get the activity orders. AS per the ortho team, pt is scheduled for surgery today.
PT attempted to see the patient for the PT IE. Pt is currently off the unit. PT will f/u when appropriate.

## 2021-07-16 NOTE — PRE-ANESTHESIA EVALUATION ADULT - NSANTHPMHFT_GEN_ALL_CORE
History of mechanical AVR on ATC, recently given vitamin K now INR 1.26  History of CABG
85F w/PMHx of HTN, HLD, hx of mechanical aortic valve on coumadin, depression, anxiety, osteoporosis presents s/p mechanical fall, +HT, -LOC, +AC (coumadin). Patient was in her bathroom attempting to get out of the bathtub when she fell, falling onto her right side as well as the right side of her head. Denies any loss of consciousness. Was not ambulatory following the incident. In the ED she is GCS 15, A&Ox3. Primary survey negative. On exam her RLE is shortened and externally rotated. Endorses pain at her right hip. Unable to flex at the right knee joint. No other external signs of injury, denies pain at the chest, abdomen, and spine.       PMH:   HTN (hypertension)  High cholesterol  Osteoporosis  Fracture of right shoulder  Anxiety  Depression  H/O heart valve replacement with mechanical valve    7/14/21 AWAITING CARDIAC CLEARANCE AND TREATMENT OF ELEVATED INR ( 3.62 as of 7/14/21 07:50)

## 2021-07-16 NOTE — PRE-ANESTHESIA EVALUATION ADULT - NSANTHOSAYNRD_GEN_A_CORE
No. LINDA screening performed.  STOP BANG Legend: 0-2 = LOW Risk; 3-4 = INTERMEDIATE Risk; 5-8 = HIGH Risk
No. LINDA screening performed.  STOP BANG Legend: 0-2 = LOW Risk; 3-4 = INTERMEDIATE Risk; 5-8 = HIGH Risk

## 2021-07-16 NOTE — BRIEF OPERATIVE NOTE - OPERATION/FINDINGS
Right femoral neck fracture  Stem - Depuy SUMMIT 4 stem  Femoral head - outer 48, inner 28+5 Displaced right subcapital femoral neck fracture; post op - WBAT with anterolateral hip recautions; Abx and DVT ppx per prptocol  Dict:  Implants:  Depuy SUMMIT size 4 standard offset stem; +5/28/48 Biploar head; Simplex Displaced right subcapital femoral neck fracture; post op - WBAT with anterolateral hip precautions; Abx and DVT ppx per protocol  Dict:[id number]  Implants:  Depuy SUMMIT size 4 standard offset stem; +5/28/48 Biploar head; Simplex

## 2021-07-16 NOTE — CONSULT NOTE ADULT - ASSESSMENT
Assessment & Plan  86 y/o F w/ PMHx of HTN, HLD, CAD, h/o PCI/PO RCA 2018, CABG 2003, hx of mechanical aortic valve 2003 (on coumadin), depression, anxiety, presents s/p mechanical fall, with Right femoral neck fx, s/p hemiarthroplasty of right hip.  Upgraded to SICU for hypotension, with Hb 6.2.    NEURO:   -Acute pain-controlled with tylenol  -h/o depession/anxiety - resume lexapro    RESP:     Oxygen insufficiency-wean off NC to RA as tolerated    Activity- bedrest tonight  07-16 @ 10:38 -- 7.46 / 36 / 293 / 26 / 100        SAT  100  Lac 1.2   -Encourage IS  -f/up CXR      CARDS:    -h/o HTN-hold home antihypertensives for now (Valsartan, Atenolol) due to hypotension  -Nicom prn  -HLD -resume statin  -h/o CABG/PCI- resume ASA  -h/o mechanical AVR -hold AC (Coumodin) for now  -1st Trop 0.03, 2 more sets pending  -f/up ECG  -ECHO 6/8/21: EF 55-60%, moderate MR and TR, mechanical AV, mild pulm HTN    GI/NUTR:     Diet, Regular    GI Prophylaxis- PPI    Bowel regimen-  senna 2 Tablet(s) Oral at bedtime    /RENAL:      Monitor UO-anderson in place, strict I and Os  Labs:          BUN/Cr- 19/0.9  -->,  19/0.8  -->,  19/0.8  -->          Electrolytes-Na 129 // K 4.5 // Mg 1.7 //  Phos 3.5 (07-16 @ 19:33)  -Hyponatremia: change IVF LR to NS @ 75/hr  -hypomagenesemia repleted      HEME/ONC:       DVT prophylaxis-heparin Injectable, SCDs  -Hold full dose AC for now, transfuse 2 units pRBCs for Hb 6.2  -INR 3.9 on admission, s/p 2.5mg of Vit K    Labs: Hb/Hct:  7.4/22.4  -->,  6.2/18.4  -->                      Plts:  137  -->,  152  -->                 PTT/INR:  --/1.26  (07-16 @ 05:35) --->,  29.7/1.29  (07-16 @ 19:33) --->                 T&S: (07-14)    ID:  WBC- 9.67  --->>,  12.88  --->>,  10.92  --->>  Temp trend- 24hrs T(F): 98.4 (07-16 @ 21:42), Max: 98.5 (07-16 @ 03:55)  Antibiotics-ceFAZolin   IVPB 2000 every 8 hours       ENDO:     Glucose, Serum: 137 (07-16 @ 19:33)  -FS q6 while NPO    HA1C in am    LINES/DRAINS:  Wiht FOSTER Foley     DISPO:    SICU  -approved by Dr. Michaels Assessment & Plan  84 y/o F w/ PMHx of HTN, HLD, CAD, h/o PCI/PO RCA 2014, 3V CABG 2003, hx of mechanical aortic valve 2003 (on coumadin), depression, anxiety, presents s/p mechanical fall, with Right femoral neck fx, s/p hemiarthroplasty of right hip.  Upgraded to SICU for hypotension, with Hb 6.2.    NEURO:   -Acute pain-controlled with tylenol  -h/o depession/anxiety - resume lexapro    RESP:     Oxygen insufficiency-wean off NC to RA as tolerated    Activity- bedrest tonight  07-16 @ 10:38 -- 7.46 / 36 / 293 / 26 / 100        SAT  100  Lac 1.2   -Encourage IS  -f/up CXR      CARDS:    -h/o HTN-hold home antihypertensives for now (Valsartan, Atenolol) due to hypotension  -Nicom prn  -HLD -resume statin  -h/o CABG/PCI- resume ASA  -h/o mechanical AVR -hold AC (Coumodin) for now  -1st Trop 0.03, 2 more sets pending  -f/up ECG  -ECHO 6/8/21: EF 55-60%, moderate MR and TR, mechanical AV, mild pulm HTN    GI/NUTR:     Diet, Regular    GI Prophylaxis- PPI    Bowel regimen-  senna 2 Tablet(s) Oral at bedtime    /RENAL:      Monitor UOJayceanderson in place, strict I and Os  Labs:          BUN/Cr- 19/0.9  -->,  19/0.8  -->,  19/0.8  -->          Electrolytes-Na 129 // K 4.5 // Mg 1.7 //  Phos 3.5 (07-16 @ 19:33)  -Hyponatremia: change IVF LR to NS @ 75/hr  -hypomagenesemia repleted      HEME/ONC:       DVT prophylaxis-heparin Injectable, SCDs  -Hold full dose AC for now, transfuse 2 units pRBCs for Hb 6.2  -INR 3.9 on admission, s/p 2.5mg of Vit K    Labs: Hb/Hct:  7.4/22.4  -->,  6.2/18.4  -->                      Plts:  137  -->,  152  -->                 PTT/INR:  --/1.26  (07-16 @ 05:35) --->,  29.7/1.29  (07-16 @ 19:33) --->                 T&S: (07-14)    ID:  WBC- 9.67  --->>,  12.88  --->>,  10.92  --->>  Temp trend- 24hrs T(F): 98.4 (07-16 @ 21:42), Max: 98.5 (07-16 @ 03:55)  Antibiotics-ceFAZolin   IVPB 2000 every 8 hours       ENDO:     Glucose, Serum: 137 (07-16 @ 19:33)  -FS q6 while NPO    HA1C in am    LINES/DRAINS:  Whit FOSTER Foley     DISPO:    SICU  -approved by Dr. Michaels

## 2021-07-16 NOTE — CHART NOTE - NSCHARTNOTEFT_GEN_A_CORE
Pre-Op Note    Patient: ORAL WU  MRN: [id number]  85y Female  Location: [facility] [id number] A  21 @ 01:50    Admit Diagnosis: HIP FRACTURE        Procedure: ORIF right femoral neck.   Consent in Chart: [x  ] Yes [  ] No  Diet: Diet, NPO after Midnight:      NPO Start Date: 15-Jul-2021,   NPO Start Time: 23:59 (07-15-21 @ 15:54)    Fluids: dextrose 5%. 1000 milliLiter(s) (50 mL/Hr) IV Continuous <Continuous>    EK Lead ECG:   Ventricular Rate 73 BPM    Atrial Rate 73 BPM    P-R Interval 200 ms    QRS Duration 78 ms    Q-T Interval 388 ms    QTC Calculation(Bazett) 427 ms    P Axis 92 degrees    R Axis 78 degrees    T Axis 77 degrees    Diagnosis Line Normal sinus rhythm  Nonspecific ST abnormality  Abnormal ECG    Confirmed by CANDIDO JOYNER MD ([id number]) on 2021 3:49:06 PM (21 @ 15:07)    CXR:  Xray Chest 1 View AP/PA:   EXAM:  XR CHEST 1 VIEW            PROCEDURE DATE:  2021            INTERPRETATION:  Clinical History / Reason for exam: Trauma    Comparison : Chest radiograph 2021.    Technique/Positioning: Single frontal chest radiograph.    Findings:    Support devices: None.    Cardiac/mediastinum/hilum: Stable heart size. Status post median sternotomy.    Lung parenchyma/Pleura: No focal pulmonary consolidation. No pneumothorax.    Skeleton/soft tissues: Bony demineralization. Degenerative changes.    Impression:    No focal pulmonary consolidation.    --- End of Report ---              ERMA KRISHNA MD; Attending Radiologist  This document has been electronically signed. 2021  9:25AM (21 @ 17:21)      Vitals:  T(F): 97.9 (07-15-21 @ 23:26), Max: 99 (07-15-21 @ 07:40)  HR: 79 (07-15-21 @ 23:26) (66 - 79)  BP: 155/68 (07-15-21 @ 23:26) (134/79 - 157/67)  RR: 18 (07-15-21 @ 23:26) (18 - 18)  SpO2: 97% (07-15-21 @ 07:40) (97% - 97%)    Pre-OP Labs:  CAPILLARY BLOOD GLUCOSE      POCT Blood Glucose.: 142 mg/dL (15 Jul 2021 21:16)  POCT Blood Glucose.: 116 mg/dL (15 Jul 2021 16:46)  POCT Blood Glucose.: 110 mg/dL (15 Jul 2021 11:09)  POCT Blood Glucose.: 109 mg/dL (15 Jul 2021 07:18)                          8.7    8.95  )-----------( 168      ( 15 Jul 2021 20:00 )             26.3     07-15    130<L>  |  94<L>  |  19  ----------------------------<  112<H>  4.5   |  21  |  0.9    Ca    8.1<L>      15 Jul 2021 20:00  Phos  3.3     07-15  Mg     2.2     07-15      PT/INR - ( 15 Jul 2021 20:00 )   PT: 18.00 sec;   INR: 1.57 ratio         PTT - ( 15 Jul 2021 20:00 )  PTT:31.6 sec      Type & Screen: ABO RH Interpretation: A POS (21 @ 07:50)      Pregnancy Test: negative     COVID: COVID-19 PCR: NotDetec (2021 00:00)  COVID-19 PCR: NotDetec (2021 14:20)  SARS-CoV-2: NotDetec (2021 20:23)

## 2021-07-16 NOTE — PROGRESS NOTE ADULT - SUBJECTIVE AND OBJECTIVE BOX
SUBJ:No chest pain or shortness of breath      MEDICATIONS  (STANDING):  aspirin  chewable 81 milliGRAM(s) Oral daily  atorvastatin 20 milliGRAM(s) Oral at bedtime  ceFAZolin   IVPB 2000 milliGRAM(s) IV Intermittent every 8 hours  chlorhexidine 4% Liquid 1 Application(s) Topical <User Schedule>  dextrose 40% Gel 15 Gram(s) Oral once  dextrose 5%. 1000 milliLiter(s) (50 mL/Hr) IV Continuous <Continuous>  dextrose 50% Injectable 25 Gram(s) IV Push once  escitalopram 10 milliGRAM(s) Oral daily  glucagon  Injectable 1 milliGRAM(s) IntraMuscular once  heparin   Injectable 5000 Unit(s) SubCutaneous every 8 hours  insulin lispro (ADMELOG) corrective regimen sliding scale   SubCutaneous three times a day before meals  pantoprazole    Tablet 40 milliGRAM(s) Oral before breakfast  senna 2 Tablet(s) Oral at bedtime  sodium chloride 0.9%. 1000 milliLiter(s) (75 mL/Hr) IV Continuous <Continuous>    MEDICATIONS  (PRN):  acetaminophen   Tablet .. 650 milliGRAM(s) Oral every 6 hours PRN Temp greater or equal to 38C (100.4F), Mild Pain (1 - 3)            Vital Signs Last 24 Hrs  T(C): 36.1 (16 Jul 2021 22:26), Max: 36.9 (16 Jul 2021 03:55)  T(F): 97 (16 Jul 2021 22:26), Max: 98.5 (16 Jul 2021 03:55)  HR: 91 (16 Jul 2021 23:00) (65 - 95)  BP: 133/62 (16 Jul 2021 22:26) (83/42 - 161/72)  BP(mean): 81 (16 Jul 2021 21:00) (60 - 102)  RR: 16 (16 Jul 2021 22:26) (11 - 23)  SpO2: 84% (16 Jul 2021 23:00) (73% - 100%)      ECG:NML    TTE:    LABS:                        6.2    10.92 )-----------( 137      ( 16 Jul 2021 19:33 )             18.4     07-16    129<L>  |  96<L>  |  19  ----------------------------<  137<H>  4.5   |  21  |  0.8    Ca    7.3<L>      16 Jul 2021 19:33  Phos  3.5     07-16  Mg     1.7     07-16      CARDIAC MARKERS ( 16 Jul 2021 19:33 )  x     / 0.03 ng/mL / 255 U/L / x     / 5.3 ng/mL      PT/INR - ( 16 Jul 2021 19:33 )   PT: 14.80 sec;   INR: 1.29 ratio         PTT - ( 16 Jul 2021 19:33 )  PTT:29.7 sec    I&O's Summary    15 Jul 2021 07:01  -  16 Jul 2021 07:00  --------------------------------------------------------  IN: 0 mL / OUT: 1200 mL / NET: -1200 mL    16 Jul 2021 07:01  -  16 Jul 2021 23:25  --------------------------------------------------------  IN: 1249 mL / OUT: 117 mL / NET: 1132 mL      BNP

## 2021-07-16 NOTE — BRIEF OPERATIVE NOTE - NSICDXBRIEFPROCEDURE_GEN_ALL_CORE_FT
PROCEDURES:  Hemiarthroplasty of right hip 16-Jul-2021 11:11:14  Jr Marcos   PROCEDURES:  Hemiarthroplasty of right hip 16-Jul-2021 11:11:14 for fracture CPT code 18483 Jr Marcos

## 2021-07-16 NOTE — CONSULT NOTE ADULT - ATTENDING COMMENTS
Critical Care: [id number]   This patient has a high probability of sudden, clinically significant deterioration, which requires the highest level of physician preparedness to intervene urgently. I managed/supervised life or organ supporting interventions that required frequent physician assessment. I devoted my full attention in the ICU to the direct care of this patient for the period of time indicated below. Time I spent with the family or surrogate(s) is included only if the patient was incapable of providing the necessary information or participating in medical decision making. Time devoted to teaching and to any procedures I billed separately is not included.     ORAL WU 85y Female admitted to [x ] SICU / [ ] SDU with hemodynamic instability S/P hip hemiarthroplasty, found to have post hemorrhagic anemia with Hg 6.2, airway at risk , oliguria  Patient is examined and evaluated at the bedside with SICU team. Treatment plan discussed with SICU team, nurses and primary team.   Chest X-ray and all relevant studies reviewed during rounds.  Will continue hemodynamic monitoring as per protocol in SICU.    Neuro:  GCS [14 ]   [x ]  Neuro checks as per SICU protocol                 [ ] 3% NaCl    Paralysis [ ] Yes  [x ]  No  Sedated/Pain control with                 [ ] Dilaudid drip, [ ]  Ketamine, [ ] Fentanyl, [ ] Propofol, [ ] Precedex, [ ] Versed, [ ] Ativan,                           [x ] Tylenol, [x ] Gabapentin, [ ] OxyContin standing,  [ ] OxyContin PRN,  [ ] Dilaudid PRN pushes, [x] Fentanyl PRN pushes,  [ ]  PCA  Other Medications               [ ] Seroquel, [ ] Haldol, [ ] Zyprexa,  [ ] Clonazepam [ ] Xanax, [ ] Versed/Ativan PRN,  [ ]None               [ ] Keppra  [ ] Lamictal  [ ] Depakote  [ ] Dilantin    CV: hypotensive and respond to fluids and PRBCs transfusions. Know history of mechanical valve on Coumadin   On pressors [ ]  Yes  [x ]  No          [ ]  Levophed, [ ] Godfrey-Synephrine, [ ] Vasopressin, [ ]  Epinephrine          [ ] Dobutamine, [ ] Milrinone, [ ]  Midodrine,  [ ] Others    Other Cardiac Meds          [ ] Amiodarone drip, [ ] Digoxin, [ ] Cardizem drip, [ ] Cleviprex drip, [ ] Esmolol drip    Respiratory: Acute respiratory insufficiency ->continue to monitor                        None Invasive Support  [ x] Incentive Spirometer                                  [ ] BiPAP   [ ] CPAP   [ ] HFNC   [ ] NR Face Mask   [ x] NC  [ ] Trach Caller                        Ventilatory support  [ ] Yes [ x] No   [ ] SBT                               [ ] PC    [ ] VC   [ ] AC   [ ] BiVent/APRV   [ ]CPAP     GI  [ x]  bowel regiment  [ x] BM none  Nutrition: continue    [x ] Diet  NPO overnight [ ] TPN/PPN     [ ]  Tube Feeds       Renal: Continue I&Os monitoring, Zamudio catheter  [ x] Yes,  [ ]   No ,  [ ] Consideration for discontinuation  Lytes/Acid-base: replete hypokalemia, hypomagnesemia, hypocalcemia, hypophosphatemia   IV Fluids   [x ] LR,  [ ] NS    ID: leukocytosis -> continue to monitor:         IV Abx [x ] Yes-> sandra-Op, [ ] No;  ID consulted [ ] Yes, [x ] No        Cultures send  [ ] Respiratory     [ ] Blood     [ ] Urine    [ ] Fluids   [x ]  None    Lines:   [ ] Right   [ ] Left  [ ] Bilateral                     [ ] Subclavian TLC        [ ]  Internal Jugular TLC     [ ]  Femoral TLC                     [ ] Subclavian Cordis    [ ] Internal Jugular Cordis   [ ] Femoral Cordis                     [ ] HD catheter                     [ ] PICC     [ ]  Midline   [ x] Peripheral IVs                                  [x ] Right   [ ] Left   [ ] None                     [x ] Radial A-Line           [ ] Femoral A-Line            [ ] Axillary A-Line                  Heme: continue to evaluate for acute blood loss anemia- trend Hg/Hct                     Transfused  indicated  [x ] Yes, [ ] No    [ x] PRBCs x2  [ ] Platelets   [ ] FFPs   [ ] Cryoprecipitate                    Should be started or continued following  [x ] Yes,  [ ] No                               [ ] Lovenox  [ ] Coumadin  [ x] Heparin drip  [ ] NOVACs  [ ] ASA  [ ] Antiplatelets   Endocrine: Prevent and treat hyperglycemia with insulin as needed,                        Insuline drip [ ] Yes, [x ] No     PV: follow pulse exam  Skin: decub precautions  DVT Prophylaxis:  [x ] SCDs  [ ] Heparin SQ  [ ] Lovenox  SQ  Stress Gastritis Prophylaxis: PPI/H2 Blockers if indicated  Mobility: patient is evaluated at the bedside with mobility team and the goals for today are discussed with PT [x ]    PATIENT/FAMILY/SURROGATE CONFERENCE:  [x ] Yes with family over the phone. [ ] No  PURPOSE: To obtain necessary information, To discuss treatment options under consideration today.    I saw and evaluated the patient personally. I have reviewed and agree with note above. Treatment plan discussed with SICU team, nurses and primary team at the time of the multidisciplinary rounds. The above note is NOT written at the time of rounds and will reflect all changes throughout management of the patient for the day note is written for.    Taylor Michaels MD, FACS  Trauma/ACS/SCC Attending

## 2021-07-16 NOTE — PRE-OP CHECKLIST - SELECT TESTS ORDERED
BMP/CBC/Type and Screen/EKG/CXR/POCT Blood Glucose/COVID-19  this AM/BMP/CBC/Type and Screen/EKG/CXR/POCT Blood Glucose/COVID-19

## 2021-07-16 NOTE — PROGRESS NOTE ADULT - ASSESSMENT
ORTHOPEDIC POST OP CHECK    POD0 s/p R hip hemiarthroplasty.  S/e at bedside.  Doing well, pain controlled.  Still somnolent/recovering from anesthesia.    T(C): 36.8 (07-16-21 @ 11:40), Max: 37 (07-15-21 @ 15:28)  HR: 69 (07-16-21 @ 14:00) (66 - 95)  BP: 118/58 (07-16-21 @ 14:00) (118/58 - 161/72)  RR: 12 (07-16-21 @ 14:00) (12 - 22)  SpO2: 99% (07-16-21 @ 14:00) (96% - 100%)    Gen: awake alert NAD    RLE:   dressing c/d/i  compartments soft/nontender  digits wwp    A/P 85yFemale POD0 s/p R hip hemiarthroplasty    - WBAT  - pain control  - postop antibiotics  - DVT prophylaxis, AC per cardiology/primary team in the setting of mechanical valve  - Trend Hb, postop Hb 7.4 - okay to transfuse PRN  - IS encouraged  - AM labs  - PT/rehab  - Dispo planning

## 2021-07-17 LAB
ALBUMIN SERPL ELPH-MCNC: 2.7 G/DL — LOW (ref 3.5–5.2)
ALP SERPL-CCNC: 48 U/L — SIGNIFICANT CHANGE UP (ref 30–115)
ALT FLD-CCNC: 11 U/L — SIGNIFICANT CHANGE UP (ref 0–41)
ANION GAP SERPL CALC-SCNC: 10 MMOL/L — SIGNIFICANT CHANGE UP (ref 7–14)
ANION GAP SERPL CALC-SCNC: 10 MMOL/L — SIGNIFICANT CHANGE UP (ref 7–14)
ANION GAP SERPL CALC-SCNC: 9 MMOL/L — SIGNIFICANT CHANGE UP (ref 7–14)
AST SERPL-CCNC: 19 U/L — SIGNIFICANT CHANGE UP (ref 0–41)
BASOPHILS # BLD AUTO: 0.02 K/UL — SIGNIFICANT CHANGE UP (ref 0–0.2)
BASOPHILS # BLD AUTO: 0.03 K/UL — SIGNIFICANT CHANGE UP (ref 0–0.2)
BASOPHILS NFR BLD AUTO: 0.2 % — SIGNIFICANT CHANGE UP (ref 0–1)
BASOPHILS NFR BLD AUTO: 0.3 % — SIGNIFICANT CHANGE UP (ref 0–1)
BILIRUB DIRECT SERPL-MCNC: 0.2 MG/DL — SIGNIFICANT CHANGE UP (ref 0–0.2)
BILIRUB INDIRECT FLD-MCNC: 0.3 MG/DL — SIGNIFICANT CHANGE UP (ref 0.2–1.2)
BILIRUB SERPL-MCNC: 0.5 MG/DL — SIGNIFICANT CHANGE UP (ref 0.2–1.2)
BUN SERPL-MCNC: 19 MG/DL — SIGNIFICANT CHANGE UP (ref 10–20)
BUN SERPL-MCNC: 21 MG/DL — HIGH (ref 10–20)
BUN SERPL-MCNC: 22 MG/DL — HIGH (ref 10–20)
CALCIUM SERPL-MCNC: 6.8 MG/DL — LOW (ref 8.5–10.1)
CALCIUM SERPL-MCNC: 6.9 MG/DL — LOW (ref 8.5–10.1)
CALCIUM SERPL-MCNC: 7.2 MG/DL — LOW (ref 8.5–10.1)
CHLORIDE SERPL-SCNC: 93 MMOL/L — LOW (ref 98–110)
CHLORIDE SERPL-SCNC: 96 MMOL/L — LOW (ref 98–110)
CHLORIDE SERPL-SCNC: 98 MMOL/L — SIGNIFICANT CHANGE UP (ref 98–110)
CK MB CFR SERPL CALC: 2.3 NG/ML — SIGNIFICANT CHANGE UP (ref 0.6–6.3)
CK MB CFR SERPL CALC: 4.3 NG/ML — SIGNIFICANT CHANGE UP (ref 0.6–6.3)
CK SERPL-CCNC: 268 U/L — HIGH (ref 0–225)
CK SERPL-CCNC: 290 U/L — HIGH (ref 0–225)
CO2 SERPL-SCNC: 23 MMOL/L — SIGNIFICANT CHANGE UP (ref 17–32)
CREAT SERPL-MCNC: 0.8 MG/DL — SIGNIFICANT CHANGE UP (ref 0.7–1.5)
CREAT SERPL-MCNC: 1 MG/DL — SIGNIFICANT CHANGE UP (ref 0.7–1.5)
CREAT SERPL-MCNC: 1 MG/DL — SIGNIFICANT CHANGE UP (ref 0.7–1.5)
EOSINOPHIL # BLD AUTO: 0.07 K/UL — SIGNIFICANT CHANGE UP (ref 0–0.7)
EOSINOPHIL # BLD AUTO: 0.66 K/UL — SIGNIFICANT CHANGE UP (ref 0–0.7)
EOSINOPHIL NFR BLD AUTO: 0.7 % — SIGNIFICANT CHANGE UP (ref 0–8)
EOSINOPHIL NFR BLD AUTO: 7.1 % — SIGNIFICANT CHANGE UP (ref 0–8)
GLUCOSE BLDC GLUCOMTR-MCNC: 130 MG/DL — HIGH (ref 70–99)
GLUCOSE BLDC GLUCOMTR-MCNC: 132 MG/DL — HIGH (ref 70–99)
GLUCOSE BLDC GLUCOMTR-MCNC: 162 MG/DL — HIGH (ref 70–99)
GLUCOSE BLDC GLUCOMTR-MCNC: 167 MG/DL — HIGH (ref 70–99)
GLUCOSE SERPL-MCNC: 138 MG/DL — HIGH (ref 70–99)
GLUCOSE SERPL-MCNC: 140 MG/DL — HIGH (ref 70–99)
GLUCOSE SERPL-MCNC: 142 MG/DL — HIGH (ref 70–99)
HCT VFR BLD CALC: 23.9 % — LOW (ref 37–47)
HCT VFR BLD CALC: 24.4 % — LOW (ref 37–47)
HCT VFR BLD CALC: 26.9 % — LOW (ref 37–47)
HGB BLD-MCNC: 8.2 G/DL — LOW (ref 12–16)
HGB BLD-MCNC: 8.2 G/DL — LOW (ref 12–16)
HGB BLD-MCNC: 9.2 G/DL — LOW (ref 12–16)
IMM GRANULOCYTES NFR BLD AUTO: 0.3 % — SIGNIFICANT CHANGE UP (ref 0.1–0.3)
IMM GRANULOCYTES NFR BLD AUTO: 0.4 % — HIGH (ref 0.1–0.3)
LACTATE SERPL-SCNC: 1.2 MMOL/L — SIGNIFICANT CHANGE UP (ref 0.7–2)
LYMPHOCYTES # BLD AUTO: 0.69 K/UL — LOW (ref 1.2–3.4)
LYMPHOCYTES # BLD AUTO: 0.74 K/UL — LOW (ref 1.2–3.4)
LYMPHOCYTES # BLD AUTO: 7.4 % — LOW (ref 20.5–51.1)
LYMPHOCYTES # BLD AUTO: 7.5 % — LOW (ref 20.5–51.1)
MAGNESIUM SERPL-MCNC: 1.9 MG/DL — SIGNIFICANT CHANGE UP (ref 1.8–2.4)
MAGNESIUM SERPL-MCNC: 2 MG/DL — SIGNIFICANT CHANGE UP (ref 1.8–2.4)
MAGNESIUM SERPL-MCNC: 2.2 MG/DL — SIGNIFICANT CHANGE UP (ref 1.8–2.4)
MCHC RBC-ENTMCNC: 29.3 PG — SIGNIFICANT CHANGE UP (ref 27–31)
MCHC RBC-ENTMCNC: 29.8 PG — SIGNIFICANT CHANGE UP (ref 27–31)
MCHC RBC-ENTMCNC: 29.9 PG — SIGNIFICANT CHANGE UP (ref 27–31)
MCHC RBC-ENTMCNC: 33.6 G/DL — SIGNIFICANT CHANGE UP (ref 32–37)
MCHC RBC-ENTMCNC: 34.2 G/DL — SIGNIFICANT CHANGE UP (ref 32–37)
MCHC RBC-ENTMCNC: 34.3 G/DL — SIGNIFICANT CHANGE UP (ref 32–37)
MCV RBC AUTO: 87.1 FL — SIGNIFICANT CHANGE UP (ref 81–99)
MCV RBC AUTO: 87.1 FL — SIGNIFICANT CHANGE UP (ref 81–99)
MCV RBC AUTO: 87.2 FL — SIGNIFICANT CHANGE UP (ref 81–99)
MONOCYTES # BLD AUTO: 0.63 K/UL — HIGH (ref 0.1–0.6)
MONOCYTES # BLD AUTO: 0.86 K/UL — HIGH (ref 0.1–0.6)
MONOCYTES NFR BLD AUTO: 6.4 % — SIGNIFICANT CHANGE UP (ref 1.7–9.3)
MONOCYTES NFR BLD AUTO: 9.2 % — SIGNIFICANT CHANGE UP (ref 1.7–9.3)
NEUTROPHILS # BLD AUTO: 7.05 K/UL — HIGH (ref 1.4–6.5)
NEUTROPHILS # BLD AUTO: 8.39 K/UL — HIGH (ref 1.4–6.5)
NEUTROPHILS NFR BLD AUTO: 75.6 % — HIGH (ref 42.2–75.2)
NEUTROPHILS NFR BLD AUTO: 84.9 % — HIGH (ref 42.2–75.2)
NRBC # BLD: 0 /100 WBCS — SIGNIFICANT CHANGE UP (ref 0–0)
PHOSPHATE SERPL-MCNC: 2.6 MG/DL — SIGNIFICANT CHANGE UP (ref 2.1–4.9)
PHOSPHATE SERPL-MCNC: 2.9 MG/DL — SIGNIFICANT CHANGE UP (ref 2.1–4.9)
PHOSPHATE SERPL-MCNC: 3.1 MG/DL — SIGNIFICANT CHANGE UP (ref 2.1–4.9)
PLATELET # BLD AUTO: 130 K/UL — SIGNIFICANT CHANGE UP (ref 130–400)
PLATELET # BLD AUTO: 145 K/UL — SIGNIFICANT CHANGE UP (ref 130–400)
PLATELET # BLD AUTO: 145 K/UL — SIGNIFICANT CHANGE UP (ref 130–400)
POTASSIUM SERPL-MCNC: 4.1 MMOL/L — SIGNIFICANT CHANGE UP (ref 3.5–5)
POTASSIUM SERPL-MCNC: 4.2 MMOL/L — SIGNIFICANT CHANGE UP (ref 3.5–5)
POTASSIUM SERPL-MCNC: 4.6 MMOL/L — SIGNIFICANT CHANGE UP (ref 3.5–5)
POTASSIUM SERPL-SCNC: 4.1 MMOL/L — SIGNIFICANT CHANGE UP (ref 3.5–5)
POTASSIUM SERPL-SCNC: 4.2 MMOL/L — SIGNIFICANT CHANGE UP (ref 3.5–5)
POTASSIUM SERPL-SCNC: 4.6 MMOL/L — SIGNIFICANT CHANGE UP (ref 3.5–5)
PROT SERPL-MCNC: 4.2 G/DL — LOW (ref 6–8)
RBC # BLD: 2.74 M/UL — LOW (ref 4.2–5.4)
RBC # BLD: 2.8 M/UL — LOW (ref 4.2–5.4)
RBC # BLD: 3.09 M/UL — LOW (ref 4.2–5.4)
RBC # FLD: 13.4 % — SIGNIFICANT CHANGE UP (ref 11.5–14.5)
RBC # FLD: 14 % — SIGNIFICANT CHANGE UP (ref 11.5–14.5)
RBC # FLD: 14.1 % — SIGNIFICANT CHANGE UP (ref 11.5–14.5)
SODIUM SERPL-SCNC: 126 MMOL/L — LOW (ref 135–146)
SODIUM SERPL-SCNC: 129 MMOL/L — LOW (ref 135–146)
SODIUM SERPL-SCNC: 130 MMOL/L — LOW (ref 135–146)
TROPONIN T SERPL-MCNC: 0.03 NG/ML — CRITICAL HIGH
TROPONIN T SERPL-MCNC: 0.04 NG/ML — CRITICAL HIGH
WBC # BLD: 9.33 K/UL — SIGNIFICANT CHANGE UP (ref 4.8–10.8)
WBC # BLD: 9.72 K/UL — SIGNIFICANT CHANGE UP (ref 4.8–10.8)
WBC # BLD: 9.88 K/UL — SIGNIFICANT CHANGE UP (ref 4.8–10.8)
WBC # FLD AUTO: 9.33 K/UL — SIGNIFICANT CHANGE UP (ref 4.8–10.8)
WBC # FLD AUTO: 9.72 K/UL — SIGNIFICANT CHANGE UP (ref 4.8–10.8)
WBC # FLD AUTO: 9.88 K/UL — SIGNIFICANT CHANGE UP (ref 4.8–10.8)

## 2021-07-17 PROCEDURE — 93010 ELECTROCARDIOGRAM REPORT: CPT

## 2021-07-17 PROCEDURE — 71045 X-RAY EXAM CHEST 1 VIEW: CPT | Mod: 26

## 2021-07-17 PROCEDURE — 71045 X-RAY EXAM CHEST 1 VIEW: CPT | Mod: 26,77

## 2021-07-17 PROCEDURE — 99291 CRITICAL CARE FIRST HOUR: CPT

## 2021-07-17 RX ORDER — HEPARIN SODIUM 5000 [USP'U]/ML
1000 INJECTION INTRAVENOUS; SUBCUTANEOUS
Qty: 25000 | Refills: 0 | Status: DISCONTINUED | OUTPATIENT
Start: 2021-07-17 | End: 2021-07-18

## 2021-07-17 RX ORDER — FUROSEMIDE 40 MG
20 TABLET ORAL ONCE
Refills: 0 | Status: COMPLETED | OUTPATIENT
Start: 2021-07-17 | End: 2021-07-17

## 2021-07-17 RX ORDER — VALSARTAN 80 MG/1
160 TABLET ORAL DAILY
Refills: 0 | Status: DISCONTINUED | OUTPATIENT
Start: 2021-07-17 | End: 2021-07-23

## 2021-07-17 RX ORDER — WARFARIN SODIUM 2.5 MG/1
4 TABLET ORAL ONCE
Refills: 0 | Status: COMPLETED | OUTPATIENT
Start: 2021-07-17 | End: 2021-07-17

## 2021-07-17 RX ORDER — LABETALOL HCL 100 MG
10 TABLET ORAL ONCE
Refills: 0 | Status: COMPLETED | OUTPATIENT
Start: 2021-07-17 | End: 2021-07-17

## 2021-07-17 RX ORDER — MAGNESIUM SULFATE 500 MG/ML
2 VIAL (ML) INJECTION ONCE
Refills: 0 | Status: COMPLETED | OUTPATIENT
Start: 2021-07-17 | End: 2021-07-17

## 2021-07-17 RX ORDER — MAGNESIUM SULFATE 500 MG/ML
1 VIAL (ML) INJECTION ONCE
Refills: 0 | Status: COMPLETED | OUTPATIENT
Start: 2021-07-17 | End: 2021-07-17

## 2021-07-17 RX ADMIN — Medication 1: at 07:54

## 2021-07-17 RX ADMIN — Medication 650 MILLIGRAM(S): at 03:37

## 2021-07-17 RX ADMIN — Medication 20 MILLIGRAM(S): at 05:03

## 2021-07-17 RX ADMIN — ESCITALOPRAM OXALATE 10 MILLIGRAM(S): 10 TABLET, FILM COATED ORAL at 11:20

## 2021-07-17 RX ADMIN — WARFARIN SODIUM 4 MILLIGRAM(S): 2.5 TABLET ORAL at 23:07

## 2021-07-17 RX ADMIN — Medication 62.5 MILLIMOLE(S): at 03:37

## 2021-07-17 RX ADMIN — Medication 10 MILLIGRAM(S): at 02:52

## 2021-07-17 RX ADMIN — Medication 5 MILLIGRAM(S): at 00:06

## 2021-07-17 RX ADMIN — Medication 1: at 11:19

## 2021-07-17 RX ADMIN — Medication 50 GRAM(S): at 21:25

## 2021-07-17 RX ADMIN — ATENOLOL 25 MILLIGRAM(S): 25 TABLET ORAL at 00:07

## 2021-07-17 RX ADMIN — HEPARIN SODIUM 5000 UNIT(S): 5000 INJECTION INTRAVENOUS; SUBCUTANEOUS at 16:19

## 2021-07-17 RX ADMIN — HEPARIN SODIUM 5000 UNIT(S): 5000 INJECTION INTRAVENOUS; SUBCUTANEOUS at 05:04

## 2021-07-17 RX ADMIN — Medication 100 MILLIGRAM(S): at 05:04

## 2021-07-17 RX ADMIN — PANTOPRAZOLE SODIUM 40 MILLIGRAM(S): 20 TABLET, DELAYED RELEASE ORAL at 10:35

## 2021-07-17 RX ADMIN — ATORVASTATIN CALCIUM 20 MILLIGRAM(S): 80 TABLET, FILM COATED ORAL at 21:26

## 2021-07-17 RX ADMIN — Medication 62.5 MILLIMOLE(S): at 22:29

## 2021-07-17 RX ADMIN — Medication 650 MILLIGRAM(S): at 04:00

## 2021-07-17 RX ADMIN — VALSARTAN 160 MILLIGRAM(S): 80 TABLET ORAL at 21:27

## 2021-07-17 NOTE — CONSULT NOTE ADULT - SUBJECTIVE AND OBJECTIVE BOX
HPI:  85F w/PMHx of HTN, HLD, hx of mechanical aortic valve on coumadin, depression, anxiety, osteoporosis presents s/p mechanical fall, +HT, -LOC, +AC (coumadin). Patient was in her bathroom attempting to get out of the bathtub when she fell, falling onto her right side as well as the right side of her head. Denies any loss of consciousness. Was not ambulatory following the incident. In the ED she is GCS 15, A&Ox3. Primary survey negative. On exam her RLE is shortened and externally rotated. Endorses pain at her right hip. Unable to flex at the right knee joint. No other external signs of injury, denies pain at the chest, abdomen, and spine.    (13 Jul 2021 21:43)      PAST MEDICAL & SURGICAL HISTORY:  HTN (hypertension)    High cholesterol    Osteoporosis    Fracture of right shoulder    Anxiety    Depression    H/O heart valve replacement with mechanical valve        Hospital Course:  She is s/p HORAN with Dr. Moreno on 7/16 and claeared for WBAT on the RLE.  [facility] and takes [facility].  She has OA of her knees and has received viscosupplementation shots recently.  Hr dtr translates t her request. she speks Swedish.  TODAY'S SUBJECTIVE & REVIEW OF SYMPTOMS:     Constitutional WNL   Cardio WNL   Resp WNL   GI WNL  Heme WNL  Endo WNL  Skin WNL  MSK WNL  Neuro WNL  Cognitive WNL  Psych WNL      MEDICATIONS  (STANDING):  ATENolol  Tablet 25 milliGRAM(s) Oral daily  atorvastatin 20 milliGRAM(s) Oral at bedtime  chlorhexidine 4% Liquid 1 Application(s) Topical <User Schedule>  dextrose 40% Gel 15 Gram(s) Oral once  dextrose 5%. 1000 milliLiter(s) (50 mL/Hr) IV Continuous <Continuous>  dextrose 50% Injectable 25 Gram(s) IV Push once  escitalopram 10 milliGRAM(s) Oral daily  glucagon  Injectable 1 milliGRAM(s) IntraMuscular once  heparin   Injectable 5000 Unit(s) SubCutaneous every 8 hours  insulin lispro (ADMELOG) corrective regimen sliding scale   SubCutaneous three times a day before meals  pantoprazole    Tablet 40 milliGRAM(s) Oral before breakfast  senna 2 Tablet(s) Oral at bedtime    MEDICATIONS  (PRN):  acetaminophen   Tablet .. 650 milliGRAM(s) Oral every 6 hours PRN Temp greater or equal to 38C (100.4F), Mild Pain (1 - 3)      FAMILY HISTORY:      Allergies    penicillin (Unknown)  pinapples (Unknown)    Intolerances        SOCIAL HISTORY:    [  ] Etoh  [  ] Smoking  [  ] Substance abuse     Home Environment:  [  ] Home Alone  [x ] Lives with Family-dtr  [  ] Home Health Aid    Dwelling:  [  ] Apartment  [  ] Private House  [  ] Adult Home  [  ] Skilled Nursing Facility      [  ] Short Term  [  ] Long Term  [x ] Stairs-1 to enter and a flight to bedroom but an alternative will be sought.    FUNCTIONAL STATUS PTA: (Check all that apply)  Ambulation: [x  ]Independent    [  ] Dependent     [  ] Non-Ambulatory  Assistive Device: [  ] SA Cane  [  ]  Q Cane  [  ] Walker  [  ]  Wheelchair  no ad but walking limited to about 60 ft  ADL : [  ] Independent  [  ]  Dependent       Vital Signs Last 24 Hrs  T(C): 37.3 (17 Jul 2021 15:37), Max: 37.3 (17 Jul 2021 15:37)  T(F): 99.2 (17 Jul 2021 15:37), Max: 99.2 (17 Jul 2021 15:37)  HR: 71 (17 Jul 2021 17:00) (68 - 92)  BP: 117/56 (17 Jul 2021 10:00) (83/42 - 167/100)  BP(mean): 122 (17 Jul 2021 03:40) (60 - 122)  RR: 16 (16 Jul 2021 22:26) (11 - 22)  SpO2: 100% (17 Jul 2021 17:00) (73% - 100%)      PHYSICAL EXAM: Alert & Oriented X3  GENERAL: NAD, well-groomed, well-developed  HEAD:  Atraumatic, Normocephalic  EYES: EOMI, PERRLA, conjunctiva and sclera clear  NECK: Supple, No JVD, Normal thyroid  CHEST/LUNG: Clear bilaterally; No rales, rhonchi, wheezing, or rubs  HEART: Regular rate and rhythm; No murmurs, rubs, or gallops  ABDOMEN: Soft, Nontender, Nondistended; Bowel sounds present  EXTREMITIES:  2+ Peripheral Pulses, No clubbing, cyanosis, or edema    NERVOUS SYSTEM:  Cranial Nerves 2-12 intact [  ] Abnormal  [  ]  ROM: WFL all extremities [  ]  Abnormal [x ] crepitus left knee  Motor Strength: WFL all extremities  [  ]  Abnormal [x ] pain and weakness right hip  Sensation: intact to light touch [  ] Abnormal [  ]  Reflexes: Symmetric [  ]  Abnormal [  ]    FUNCTIONAL STATUS:  Bed Mobility: Independent [  ]  Supervision [  ]  Needs Assistance [  ]  N/A [  ]  Transfers: Independent [  ]  Supervision [  ]  Needs Assistance [  ]  N/A [  ]   Ambulation: Independent [  ]  Supervision [  ]  Needs Assistance [  ]  N/A [  ]  ADL: Independent [  ] Requires Assistance [  ] N/A [  ]      LABS:                        8.2    9.72  )-----------( 145      ( 17 Jul 2021 16:22 )             24.4     07-17    129<L>  |  96<L>  |  21<H>  ----------------------------<  140<H>  4.2   |  23  |  1.0    Ca    6.9<L>      17 Jul 2021 16:22  Phos  3.1     07-17  Mg     2.0     07-17      PT/INR - ( 16 Jul 2021 19:33 )   PT: 14.80 sec;   INR: 1.29 ratio         PTT - ( 16 Jul 2021 19:33 )  PTT:29.7 sec      RADIOLOGY & ADDITIONAL STUDIES:    Assesment:

## 2021-07-17 NOTE — CONSULT NOTE ADULT - CONSULT REQUESTED DATE/TIME
13-Jul-2021 15:10
13-Jul-2021 16:16
14-Jul-2021 14:32
14-Jul-2021 10:32
17-Jul-2021 17:59
16-Jul-2021 22:14

## 2021-07-17 NOTE — PROGRESS NOTE ADULT - ATTENDING COMMENTS
Pt seen and examined.  Agree with resident exam and plan.  s/p right hemiarthroplasty  c/d/i  nvi  plan: wbat, oob, pain control, dvt proph, PT

## 2021-07-17 NOTE — PROGRESS NOTE ADULT - ASSESSMENT
ORTHOPEDIC PROGRESS NOTE    POD1 s/p R hip hemiarthroplasty.  S/e at bedside.  Doing well, pain controlled. Received 2u pRBC last night. Was also anuric. transferred to ICU.     T(C): 36.8 (07-16-21 @ 11:40), Max: 37 (07-15-21 @ 15:28)  HR: 69 (07-16-21 @ 14:00) (66 - 95)  BP: 118/58 (07-16-21 @ 14:00) (118/58 - 161/72)  RR: 12 (07-16-21 @ 14:00) (12 - 22)  SpO2: 99% (07-16-21 @ 14:00) (96% - 100%)    Gen: awake alert NAD    RLE:   dressing c/d/i  compartments soft/nontender  Able to move all fingers  sensation grossly intact   digits wwp    A/P 85yFemale POD1 s/p R hip hemiarthroplasty    - WBAT  - pain control  - postop antibiotics  - DVT prophylaxis, AC per cardiology/primary team  - Trend Hb transfuse PRN   - IS encouraged  - PT/rehab  - Dispo planning

## 2021-07-17 NOTE — CONSULT NOTE ADULT - CONSULT REASON
hypotension - s/p hemiarthroplasty of right hip
RIGHT HIP FRACTURE
cad
S/p mechanical fall, +HT, -LOC, +AC (coumadin)
medical clearence for procedure
right hip subcapital fx, s/p HA

## 2021-07-17 NOTE — CONSULT NOTE ADULT - REASON FOR ADMISSION
S/p mechanical fall, +HT, -LOC, +AC (coumadin)

## 2021-07-17 NOTE — CONSULT NOTE ADULT - PROVIDER SPECIALTY LIST ADULT
Adequate: hears normal conversation without difficulty
Physiatry
SICU
Cardiology
Orthopedics
Trauma Surgery
Internal Medicine

## 2021-07-17 NOTE — CONSULT NOTE ADULT - ASSESSMENT
IMPRESSION: Rehab of left hip subcapital fx, s/p HA, bilat knee OA, mechanical aortic valve on warfarin    PRECAUTIONS: [ x ] Cardiac  [  ] Respiratory  [  ] Seizures [  ] Contact Isolation  [  ] Droplet Isolation  [ x ] Other: spes [place] citizen    Weight Bearing Status:     RECOMMENDATION:    Out of Bed to Chair     DVT/Decubiti Prophylaxis    REHAB PLAN:     [ x  ] Bedside P/T 3-5 times a week   [   ]   Bedside O/T  2-3 times a week             [   ] No Rehab Therapy Indicated                   [   ]  Speech Therapy   Conditioning/ROM                                    ADL  Bed Mobility                                               Conditioning/ROM  Transfers                                                     Bed Mobility  Sitting /Standing Balance                         Transfers                                        Gait Training                                               Sitting/Standing Balance  Stair Training [   ]Applicable                    Home equipment Eval                                                                        Splinting  [   ] Only      GOALS:   ADL   [  x ]   Independent                    Transfers  [  x ] Independent                          Ambulation  [   x] Independent     [  x  ] With device                            [ x  ]  CG                                                         [   ]  CG                                                                  [   ] CG                            [    ] Min A                                                   [   ] Min A                                                              [   ] Min  A          DISCHARGE PLAN:   [   ]  Good candidate for Intensive Rehabilitation/Hospital based-[facility]                                             Will tolerate 3hrs Intensive Rehab Daily                                       [  xx  ]  Short Term Rehab in Skilled Nursing Facility                                       [    ]  Home with Outpatient or  services                                         [    ]  Possible Candidate for Intensive Hospital based Rehab

## 2021-07-17 NOTE — PROGRESS NOTE ADULT - SUBJECTIVE AND OBJECTIVE BOX
ORAL WU  [id number]  85y Female    Indication for ICU admission: hypotension with Hb 6.2 - s/ p hemiarthroplasty of Right hip    Admit Date:07-13-21  ICU Date: 7/16/21  OR Date: 7/16/21    penicillin (Unknown)  pinapples (Unknown)    PAST MEDICAL & SURGICAL HISTORY:  HTN (hypertension)    High cholesterol    Osteoporosis    Fracture of right shoulder    Anxiety    Depression    H/O heart valve replacement with mechanical valve      Home Medications:  alendronate 35 mg oral tablet: 1 tab(s) orally once a week (07 Jun 2021 00:16)  ALPRAZolam 0.5 mg oral tablet: 1 tab(s) orally once a day (at bedtime), As Needed (07 Jun 2021 00:16)  aspirin 81 mg oral tablet, chewable: 1 tab(s) orally once a day (07 Jun 2021 00:16)  atenolol 25 mg oral tablet: 1 tab(s) orally once a day (07 Jun 2021 00:16)  atorvastatin 20 mg oral tablet: 1 tab(s) orally once a day (07 Jun 2021 00:16)  escitalopram 10 mg oral tablet: 1 tab(s) orally once a day (07 Jun 2021 00:16)  ezetimibe 10 mg oral tablet: 1 tab(s) orally once a day (07 Jun 2021 00:16)  iron gluconate: 27 milligram(s) orally once a day (07 Jun 2021 00:16)  lidocaine 5% patch: 1  transdermally once a day (07 Jun 2021 00:16)  valsartan 160 mg oral tablet: 1 tab(s) orally once a day (at bedtime) (07 Jun 2021 00:16)  warfarin 4 mg oral tablet: 1 tab(s) orally once a day (07 Jun 2021 00:16)        24HRS EVENT:  7/16/21  Night  -250cc x2  -s/p 2units pRBC for Hb 6.2 --> rept Hb 9.2  -O2 desat to 86%, NRB placed, sat improved to 100%  -CXR does not look congested, however she might need lasix  -also hypertensive 160-180s, gave Labetalol 5mg, then 10mg, as well as restarted home po atenolol         DVT PTX: Hep sq    GI PTX:pantoprazole    Tablet 40 milliGRAM(s) Oral before breakfast      ***Tubes/Lines/Drains  ***  Peripheral IV    Arterial Line: right radial 		                Date 7/16/21    Urinary Catheter		Indication: Strict I&O    Date Placed: 7/16/21      REVIEW OF SYSTEMS    [x ] A ten-point review of systems was otherwise negative except as noted.  [ ] Due to altered mental status/intubation, subjective information were not able to be obtained from the patient. History was obtained, to the extent possible, from review of the chart and collateral sources of information.     ORAL WU  [id number]  85y Female    Indication for ICU admission: hypotension with Hb 6.2 - s/ p hemiarthroplasty of Right hip    Admit Date:21  ICU Date: 21  OR Date: 21    penicillin (Unknown)  pinapples (Unknown)    PAST MEDICAL & SURGICAL HISTORY:  HTN (hypertension)    High cholesterol    Osteoporosis    Fracture of right shoulder    Anxiety    Depression    H/O heart valve replacement with mechanical valve      Home Medications:  alendronate 35 mg oral tablet: 1 tab(s) orally once a week (:16)  ALPRAZolam 0.5 mg oral tablet: 1 tab(s) orally once a day (at bedtime), As Needed ()  aspirin 81 mg oral tablet, chewable: 1 tab(s) orally once a day ()  atenolol 25 mg oral tablet: 1 tab(s) orally once a day ()  atorvastatin 20 mg oral tablet: 1 tab(s) orally once a day ()  escitalopram 10 mg oral tablet: 1 tab(s) orally once a day (:)  ezetimibe 10 mg oral tablet: 1 tab(s) orally once a day (:16)  iron gluconate: 27 milligram(s) orally once a day (:16)  lidocaine 5% patch: 1  transdermally once a day (:16)  valsartan 160 mg oral tablet: 1 tab(s) orally once a day (at bedtime) (:16)  warfarin 4 mg oral tablet: 1 tab(s) orally once a day (:16)        24HRS EVENT:  21  Night  -250cc x2  -s/p 2units pRBC for Hb 6.2 --> rept Hb 9.2  -O2 desat to 86%, NRB placed, sat improved to 100%  -CXR does not look congested, however she might need lasix  -also hypertensive 160-180s, gave Labetalol 5mg, then 10mg, as well as restarted home po atenolol         DVT PTX: Hep sq    GI PTX:pantoprazole    Tablet 40 milliGRAM(s) Oral before breakfast      ***Tubes/Lines/Drains  ***  Peripheral IV    Arterial Line: right radial 		                Date 21    Urinary Catheter		Indication: Strict I&O    Date Placed: 21      REVIEW OF SYSTEMS    [x ] A ten-point review of systems was otherwise negative except as noted.  [ ] Due to altered mental status/intubation, subjective information were not able to be obtained from the patient. History was obtained, to the extent possible, from review of the chart and collateral sources of information.    Daily     Daily Weight in k.9 (2021 06:00)    Diet, Regular:   DASH/TLC Sodium & Cholesterol Restricted (21 @ 11:19)      CURRENT MEDS:  Neurologic Medications  acetaminophen   Tablet .. 650 milliGRAM(s) Oral every 6 hours PRN Temp greater or equal to 38C (100.4F), Mild Pain (1 - 3)  escitalopram 10 milliGRAM(s) Oral daily    Respiratory Medications    Cardiovascular Medications  ATENolol  Tablet 25 milliGRAM(s) Oral daily    Gastrointestinal Medications  dextrose 5%. 1000 milliLiter(s) IV Continuous <Continuous>  pantoprazole    Tablet 40 milliGRAM(s) Oral before breakfast  senna 2 Tablet(s) Oral at bedtime    Genitourinary Medications    Hematologic/Oncologic Medications  heparin   Injectable 5000 Unit(s) SubCutaneous every 8 hours    Antimicrobial/Immunologic Medications    Endocrine/Metabolic Medications  atorvastatin 20 milliGRAM(s) Oral at bedtime  dextrose 40% Gel 15 Gram(s) Oral once  dextrose 50% Injectable 25 Gram(s) IV Push once  glucagon  Injectable 1 milliGRAM(s) IntraMuscular once  insulin lispro (ADMELOG) corrective regimen sliding scale   SubCutaneous three times a day before meals    Topical/Other Medications  chlorhexidine 4% Liquid 1 Application(s) Topical <User Schedule>      ICU Vital Signs Last 24 Hrs  T(C): 36.3 (2021 23:00), Max: 36.9 (2021 21:00)  T(F): 97.3 (2021 23:00), Max: 98.4 (2021 21:00)  HR: 68 (2021 07:00) (65 - 95)  BP: 167/100 (2021 03:40) (83/42 - 167/100)  BP(mean): 122 (2021 03:40) (60 - 122)  ABP: 151/55 (2021 07:00) (82/29 - 192/63)  ABP(mean): 89 (2021 07:00) (44 - 110)  RR: 16 (2021 22:26) (11 - 23)  SpO2: 100% (2021 07:00) (73% - 100%)      Adult Advanced Hemodynamics Last 24 Hrs  CVP(mm Hg): --  CVP(cm H2O): --  CO: --  CI: --  PA: --  PA(mean): --  PCWP: --  SVR: --  SVRI: --  PVR: --  PVRI: --      ABG - ( 2021 10:38 )  pH, Arterial: 7.46  pH, Blood: x     /  pCO2: 36    /  pO2: 293   / HCO3: 26    / Base Excess: 1.9   /  SaO2: 100                 I&O's Summary    2021 07:01  -  2021 07:00  --------------------------------------------------------  IN: 1924 mL / OUT: 547 mL / NET: 1377 mL      I&O's Detail    2021 07:01  -  2021 07:00  --------------------------------------------------------  IN:    IV PiggyBack: 300 mL    Lactated Ringers: 700 mL    Lactated Ringers Bolus: 250 mL    PRBCs (Packed Red Blood Cells): 299 mL    sodium chloride 0.9%: 375 mL  Total IN: 1924 mL    OUT:    Ureteral Catheter (mL): 547 mL  Total OUT: 547 mL    Total NET: 1377 mL          PHYSICAL EXAM:    General/Neuro   GCS: 15    = E 4  / V 5  / M    6  Deficits:   alert & oriented x 3, no focal deficits      Lungs:  clear to auscultation, Normal expansion/effort    Cardiovascular : S1, S2.  Regular rate and rhythm.  Peripheral edema   Cardiac Rhythm: Normal Sinus Rhythm    GI: Abdomen soft, Non-tender, Non-distended.      Extremities: Extremities warm, pink, well-perfused    Derm: Good skin turgor, no skin breakdown.      :  Zamudio catheter in place.      CXR:     LABS:  CAPILLARY BLOOD GLUCOSE      POCT Blood Glucose.: 162 mg/dL (2021 07:46)  POCT Blood Glucose.: 112 mg/dL (2021 12:19)                          9.2    9.88  )-----------( 130      ( 2021 01:30 )             26.9       07-17    130<L>  |  98  |  19  ----------------------------<  138<H>  4.6   |  23  |  0.8    Ca    7.2<L>      2021 01:30  Phos  2.9     07-17  Mg     2.2     07-17        PT/INR - ( 2021 19:33 )   PT: 14.80 sec;   INR: 1.29 ratio         PTT - ( 2021 19:33 )  PTT:29.7 sec  CARDIAC MARKERS ( 2021 01:30 )  x     / 0.04 ng/mL / 290 U/L / x     / 4.3 ng/mL  CARDIAC MARKERS ( 2021 19:33 )  x     / 0.03 ng/mL / 255 U/L / x     / 5.3 ng/mL

## 2021-07-17 NOTE — PROGRESS NOTE ADULT - ASSESSMENT
Assessment & Plan  84 y/o F w/ PMHx of HTN, HLD, CAD, h/o PCI/PO RCA 2014, 3V CABG 2003, hx of mechanical aortic valve 2003 (on coumadin), depression, anxiety, presents s/p mechanical fall, with Right femoral neck fx, s/p hemiarthroplasty of right hip.  Upgraded to SICU for hypotension, with Hb 6.2.    NEURO:   -Acute pain-controlled with tylenol  -h/o depession/anxiety - resume lexapro    RESP:     Oxygen insufficiency-wean off NC to RA as tolerated    Activity- bedrest tonight  -Encourage IS  O2 desat to 86%, NRB placed, sat improved to 100%  -CXR does not look congested, however she might need lasix      CARDS:    -h/o HTN-hold home antihypertensives for now (Valsartan, Atenolol) due to hypotension.  However, pt became hypertensive afetr 2 units of pRBCs, 160-180s, gave Labetalol 5mg, then 10mg IV, as well as restarted home po atenolol   -Nicom prn  -HLD -resume statin  -h/o CABG/PCI- resume ASA  -h/o mechanical AVR -hold AC (Coumodin) for now  -Trop 0.03->0.04, 3d set pending @ 800  -f/up ECG  -ECHO 6/8/21: EF 55-60%, moderate MR and TR, mechanical AV, mild pulm HTN    GI/NUTR:     Diet, Regular    GI Prophylaxis- PPI    Bowel regimen-  senna 2 Tablet(s) Oral at bedtime    /RENAL:      Monitor UO-anderson in place, strict I and Os  Labs:          BUN/Cr- 19/0.9  -->,  19/0.8  -->,  19/0.8  -->          Electrolytes-Na 130 // K 4.6 // Mg 2.2 //  Phos 3.5 (07-16 @ 19:33)  -Hyponatremia: changed IVF LR to NS @ 75/hr  -hypomagenesemia repleted earlier    HEME/ONC:       DVT prophylaxis-heparin Injectable, SCDs  -Hold full dose AC for now, transfuse 2 units pRBCs for Hb 6.2--> rept 9.2  -INR 3.9 on admission, s/p 2.5mg of Vit K    Labs: Hb/Hct:  7.4/22.4  -->,  6.2/18.4  -->  9.2/26.9                    Plts:  137  -->,  152  -->                 PTT/INR:  --/1.26  (07-16 @ 05:35) --->,  29.7/1.29  (07-16 @ 19:33) --->                 T&S: (07-14)    ID:  WBC- 9.67  --->>,  12.88  --->>,  10.92  --->>9.9  Temp trend- 24hrs T(F): 98.4 (07-16 @ 21:42), Max: 98.5 (07-16 @ 03:55)  Antibiotics-ceFAZolin   IVPB 2000 every 8 hours       ENDO:     Glucose, Serum: 137 (07-16 @ 19:33)  -FS q6 while NPO    HA1C in am    LINES/DRAINS:  Whit FOSTER Foley     DISPO:    SICU

## 2021-07-18 LAB
ANION GAP SERPL CALC-SCNC: 8 MMOL/L — SIGNIFICANT CHANGE UP (ref 7–14)
APTT BLD: 112.6 SEC — CRITICAL HIGH (ref 27–39.2)
APTT BLD: 138.9 SEC — CRITICAL HIGH (ref 27–39.2)
APTT BLD: 25.4 SEC — LOW (ref 27–39.2)
APTT BLD: >200 SEC — CRITICAL HIGH (ref 27–39.2)
APTT BLD: >200 SEC — CRITICAL HIGH (ref 27–39.2)
BASOPHILS # BLD AUTO: 0.02 K/UL — SIGNIFICANT CHANGE UP (ref 0–0.2)
BASOPHILS # BLD AUTO: 0.03 K/UL — SIGNIFICANT CHANGE UP (ref 0–0.2)
BASOPHILS NFR BLD AUTO: 0.2 % — SIGNIFICANT CHANGE UP (ref 0–1)
BASOPHILS NFR BLD AUTO: 0.3 % — SIGNIFICANT CHANGE UP (ref 0–1)
BUN SERPL-MCNC: 20 MG/DL — SIGNIFICANT CHANGE UP (ref 10–20)
CALCIUM SERPL-MCNC: 7.1 MG/DL — LOW (ref 8.5–10.1)
CHLORIDE SERPL-SCNC: 96 MMOL/L — LOW (ref 98–110)
CO2 SERPL-SCNC: 24 MMOL/L — SIGNIFICANT CHANGE UP (ref 17–32)
CREAT SERPL-MCNC: 0.8 MG/DL — SIGNIFICANT CHANGE UP (ref 0.7–1.5)
EOSINOPHIL # BLD AUTO: 0.5 K/UL — SIGNIFICANT CHANGE UP (ref 0–0.7)
EOSINOPHIL # BLD AUTO: 0.54 K/UL — SIGNIFICANT CHANGE UP (ref 0–0.7)
EOSINOPHIL NFR BLD AUTO: 5.3 % — SIGNIFICANT CHANGE UP (ref 0–8)
EOSINOPHIL NFR BLD AUTO: 5.9 % — SIGNIFICANT CHANGE UP (ref 0–8)
GLUCOSE BLDC GLUCOMTR-MCNC: 115 MG/DL — HIGH (ref 70–99)
GLUCOSE BLDC GLUCOMTR-MCNC: 115 MG/DL — HIGH (ref 70–99)
GLUCOSE BLDC GLUCOMTR-MCNC: 116 MG/DL — HIGH (ref 70–99)
GLUCOSE SERPL-MCNC: 115 MG/DL — HIGH (ref 70–99)
HCT VFR BLD CALC: 22.3 % — LOW (ref 37–47)
HCT VFR BLD CALC: 22.5 % — LOW (ref 37–47)
HCT VFR BLD CALC: 22.9 % — LOW (ref 37–47)
HCT VFR BLD CALC: 23.5 % — LOW (ref 37–47)
HCT VFR BLD CALC: 23.8 % — LOW (ref 37–47)
HGB BLD-MCNC: 7.3 G/DL — LOW (ref 12–16)
HGB BLD-MCNC: 7.4 G/DL — LOW (ref 12–16)
HGB BLD-MCNC: 7.5 G/DL — LOW (ref 12–16)
HGB BLD-MCNC: 7.9 G/DL — LOW (ref 12–16)
HGB BLD-MCNC: 8.1 G/DL — LOW (ref 12–16)
IMM GRANULOCYTES NFR BLD AUTO: 0.4 % — HIGH (ref 0.1–0.3)
IMM GRANULOCYTES NFR BLD AUTO: 0.5 % — HIGH (ref 0.1–0.3)
INR BLD: 1.22 RATIO — SIGNIFICANT CHANGE UP (ref 0.65–1.3)
INR BLD: 1.37 RATIO — HIGH (ref 0.65–1.3)
LYMPHOCYTES # BLD AUTO: 0.94 K/UL — LOW (ref 1.2–3.4)
LYMPHOCYTES # BLD AUTO: 1.07 K/UL — LOW (ref 1.2–3.4)
LYMPHOCYTES # BLD AUTO: 10 % — LOW (ref 20.5–51.1)
LYMPHOCYTES # BLD AUTO: 11.6 % — LOW (ref 20.5–51.1)
MAGNESIUM SERPL-MCNC: 2.2 MG/DL — SIGNIFICANT CHANGE UP (ref 1.8–2.4)
MCHC RBC-ENTMCNC: 28.7 PG — SIGNIFICANT CHANGE UP (ref 27–31)
MCHC RBC-ENTMCNC: 28.7 PG — SIGNIFICANT CHANGE UP (ref 27–31)
MCHC RBC-ENTMCNC: 28.9 PG — SIGNIFICANT CHANGE UP (ref 27–31)
MCHC RBC-ENTMCNC: 29 PG — SIGNIFICANT CHANGE UP (ref 27–31)
MCHC RBC-ENTMCNC: 29.5 PG — SIGNIFICANT CHANGE UP (ref 27–31)
MCHC RBC-ENTMCNC: 32.7 G/DL — SIGNIFICANT CHANGE UP (ref 32–37)
MCHC RBC-ENTMCNC: 32.8 G/DL — SIGNIFICANT CHANGE UP (ref 32–37)
MCHC RBC-ENTMCNC: 32.9 G/DL — SIGNIFICANT CHANGE UP (ref 32–37)
MCHC RBC-ENTMCNC: 33.6 G/DL — SIGNIFICANT CHANGE UP (ref 32–37)
MCHC RBC-ENTMCNC: 34 G/DL — SIGNIFICANT CHANGE UP (ref 32–37)
MCV RBC AUTO: 86.1 FL — SIGNIFICANT CHANGE UP (ref 81–99)
MCV RBC AUTO: 86.5 FL — SIGNIFICANT CHANGE UP (ref 81–99)
MCV RBC AUTO: 87.7 FL — SIGNIFICANT CHANGE UP (ref 81–99)
MCV RBC AUTO: 87.8 FL — SIGNIFICANT CHANGE UP (ref 81–99)
MCV RBC AUTO: 88.2 FL — SIGNIFICANT CHANGE UP (ref 81–99)
MONOCYTES # BLD AUTO: 0.75 K/UL — HIGH (ref 0.1–0.6)
MONOCYTES # BLD AUTO: 0.83 K/UL — HIGH (ref 0.1–0.6)
MONOCYTES NFR BLD AUTO: 8.1 % — SIGNIFICANT CHANGE UP (ref 1.7–9.3)
MONOCYTES NFR BLD AUTO: 8.9 % — SIGNIFICANT CHANGE UP (ref 1.7–9.3)
NEUTROPHILS # BLD AUTO: 6.79 K/UL — HIGH (ref 1.4–6.5)
NEUTROPHILS # BLD AUTO: 7.03 K/UL — HIGH (ref 1.4–6.5)
NEUTROPHILS NFR BLD AUTO: 73.7 % — SIGNIFICANT CHANGE UP (ref 42.2–75.2)
NEUTROPHILS NFR BLD AUTO: 75.1 % — SIGNIFICANT CHANGE UP (ref 42.2–75.2)
NRBC # BLD: 0 /100 WBCS — SIGNIFICANT CHANGE UP (ref 0–0)
PHOSPHATE SERPL-MCNC: 3.1 MG/DL — SIGNIFICANT CHANGE UP (ref 2.1–4.9)
PLATELET # BLD AUTO: 144 K/UL — SIGNIFICANT CHANGE UP (ref 130–400)
PLATELET # BLD AUTO: 164 K/UL — SIGNIFICANT CHANGE UP (ref 130–400)
PLATELET # BLD AUTO: 171 K/UL — SIGNIFICANT CHANGE UP (ref 130–400)
PLATELET # BLD AUTO: 178 K/UL — SIGNIFICANT CHANGE UP (ref 130–400)
PLATELET # BLD AUTO: 189 K/UL — SIGNIFICANT CHANGE UP (ref 130–400)
POTASSIUM SERPL-MCNC: 4 MMOL/L — SIGNIFICANT CHANGE UP (ref 3.5–5)
POTASSIUM SERPL-SCNC: 4 MMOL/L — SIGNIFICANT CHANGE UP (ref 3.5–5)
PROTHROM AB SERPL-ACNC: 14 SEC — HIGH (ref 9.95–12.87)
PROTHROM AB SERPL-ACNC: 15.8 SEC — HIGH (ref 9.95–12.87)
RBC # BLD: 2.54 M/UL — LOW (ref 4.2–5.4)
RBC # BLD: 2.55 M/UL — LOW (ref 4.2–5.4)
RBC # BLD: 2.61 M/UL — LOW (ref 4.2–5.4)
RBC # BLD: 2.73 M/UL — LOW (ref 4.2–5.4)
RBC # BLD: 2.75 M/UL — LOW (ref 4.2–5.4)
RBC # FLD: 13.7 % — SIGNIFICANT CHANGE UP (ref 11.5–14.5)
RBC # FLD: 13.8 % — SIGNIFICANT CHANGE UP (ref 11.5–14.5)
RBC # FLD: 13.9 % — SIGNIFICANT CHANGE UP (ref 11.5–14.5)
SODIUM SERPL-SCNC: 128 MMOL/L — LOW (ref 135–146)
WBC # BLD: 10.93 K/UL — HIGH (ref 4.8–10.8)
WBC # BLD: 11.39 K/UL — HIGH (ref 4.8–10.8)
WBC # BLD: 9.22 K/UL — SIGNIFICANT CHANGE UP (ref 4.8–10.8)
WBC # BLD: 9.37 K/UL — SIGNIFICANT CHANGE UP (ref 4.8–10.8)
WBC # BLD: 9.66 K/UL — SIGNIFICANT CHANGE UP (ref 4.8–10.8)
WBC # FLD AUTO: 10.93 K/UL — HIGH (ref 4.8–10.8)
WBC # FLD AUTO: 11.39 K/UL — HIGH (ref 4.8–10.8)
WBC # FLD AUTO: 9.22 K/UL — SIGNIFICANT CHANGE UP (ref 4.8–10.8)
WBC # FLD AUTO: 9.37 K/UL — SIGNIFICANT CHANGE UP (ref 4.8–10.8)
WBC # FLD AUTO: 9.66 K/UL — SIGNIFICANT CHANGE UP (ref 4.8–10.8)

## 2021-07-18 PROCEDURE — 99291 CRITICAL CARE FIRST HOUR: CPT

## 2021-07-18 PROCEDURE — 93010 ELECTROCARDIOGRAM REPORT: CPT

## 2021-07-18 PROCEDURE — 71045 X-RAY EXAM CHEST 1 VIEW: CPT | Mod: 26

## 2021-07-18 RX ORDER — OXYCODONE HYDROCHLORIDE 5 MG/1
5 TABLET ORAL EVERY 6 HOURS
Refills: 0 | Status: DISCONTINUED | OUTPATIENT
Start: 2021-07-18 | End: 2021-07-23

## 2021-07-18 RX ORDER — HEPARIN SODIUM 5000 [USP'U]/ML
1000 INJECTION INTRAVENOUS; SUBCUTANEOUS
Qty: 25000 | Refills: 0 | Status: DISCONTINUED | OUTPATIENT
Start: 2021-07-18 | End: 2021-07-18

## 2021-07-18 RX ORDER — WARFARIN SODIUM 2.5 MG/1
4 TABLET ORAL ONCE
Refills: 0 | Status: COMPLETED | OUTPATIENT
Start: 2021-07-18 | End: 2021-07-18

## 2021-07-18 RX ORDER — ACETAMINOPHEN 500 MG
650 TABLET ORAL EVERY 6 HOURS
Refills: 0 | Status: DISCONTINUED | OUTPATIENT
Start: 2021-07-18 | End: 2021-07-23

## 2021-07-18 RX ORDER — HEPARIN SODIUM 5000 [USP'U]/ML
800 INJECTION INTRAVENOUS; SUBCUTANEOUS
Qty: 25000 | Refills: 0 | Status: DISCONTINUED | OUTPATIENT
Start: 2021-07-18 | End: 2021-07-19

## 2021-07-18 RX ORDER — HEPARIN SODIUM 5000 [USP'U]/ML
1200 INJECTION INTRAVENOUS; SUBCUTANEOUS
Qty: 25000 | Refills: 0 | Status: DISCONTINUED | OUTPATIENT
Start: 2021-07-18 | End: 2021-07-18

## 2021-07-18 RX ADMIN — ATENOLOL 25 MILLIGRAM(S): 25 TABLET ORAL at 06:09

## 2021-07-18 RX ADMIN — SENNA PLUS 2 TABLET(S): 8.6 TABLET ORAL at 21:31

## 2021-07-18 RX ADMIN — HEPARIN SODIUM 10 UNIT(S)/HR: 5000 INJECTION INTRAVENOUS; SUBCUTANEOUS at 17:15

## 2021-07-18 RX ADMIN — HEPARIN SODIUM 1200 UNIT(S)/HR: 5000 INJECTION INTRAVENOUS; SUBCUTANEOUS at 07:15

## 2021-07-18 RX ADMIN — Medication 650 MILLIGRAM(S): at 17:16

## 2021-07-18 RX ADMIN — Medication 650 MILLIGRAM(S): at 18:37

## 2021-07-18 RX ADMIN — Medication 650 MILLIGRAM(S): at 13:31

## 2021-07-18 RX ADMIN — WARFARIN SODIUM 4 MILLIGRAM(S): 2.5 TABLET ORAL at 21:32

## 2021-07-18 RX ADMIN — CHLORHEXIDINE GLUCONATE 1 APPLICATION(S): 213 SOLUTION TOPICAL at 06:09

## 2021-07-18 RX ADMIN — Medication 650 MILLIGRAM(S): at 23:33

## 2021-07-18 RX ADMIN — PANTOPRAZOLE SODIUM 40 MILLIGRAM(S): 20 TABLET, DELAYED RELEASE ORAL at 06:09

## 2021-07-18 RX ADMIN — ESCITALOPRAM OXALATE 10 MILLIGRAM(S): 10 TABLET, FILM COATED ORAL at 12:17

## 2021-07-18 RX ADMIN — Medication 650 MILLIGRAM(S): at 12:14

## 2021-07-18 RX ADMIN — ATORVASTATIN CALCIUM 20 MILLIGRAM(S): 80 TABLET, FILM COATED ORAL at 21:32

## 2021-07-18 NOTE — PROGRESS NOTE ADULT - SUBJECTIVE AND OBJECTIVE BOX
SUBJ:No chest pain or shortness of breath      MEDICATIONS  (STANDING):  acetaminophen   Tablet .. 650 milliGRAM(s) Oral every 6 hours  ATENolol  Tablet 25 milliGRAM(s) Oral daily  atorvastatin 20 milliGRAM(s) Oral at bedtime  chlorhexidine 4% Liquid 1 Application(s) Topical <User Schedule>  dextrose 40% Gel 15 Gram(s) Oral once  dextrose 5%. 1000 milliLiter(s) (50 mL/Hr) IV Continuous <Continuous>  dextrose 50% Injectable 25 Gram(s) IV Push once  escitalopram 10 milliGRAM(s) Oral daily  glucagon  Injectable 1 milliGRAM(s) IntraMuscular once  heparin  Infusion. 1200 Unit(s)/Hr (12 mL/Hr) IV Continuous <Continuous>  insulin lispro (ADMELOG) corrective regimen sliding scale   SubCutaneous three times a day before meals  pantoprazole    Tablet 40 milliGRAM(s) Oral before breakfast  senna 2 Tablet(s) Oral at bedtime  valsartan 160 milliGRAM(s) Oral daily  warfarin 4 milliGRAM(s) Oral once    MEDICATIONS  (PRN):  oxyCODONE    IR 5 milliGRAM(s) Oral every 6 hours PRN Severe Pain (7 - 10)            Vital Signs Last 24 Hrs  T(C): 37.1 (18 Jul 2021 12:00), Max: 37.7 (18 Jul 2021 00:24)  T(F): 98.8 (18 Jul 2021 12:00), Max: 99.9 (18 Jul 2021 00:24)  HR: 76 (18 Jul 2021 12:00) (71 - 80)  BP: --  BP(mean): --  RR: 18 (18 Jul 2021 08:00) (18 - 18)  SpO2: 99% (18 Jul 2021 12:00) (88% - 100%)      ECG:NML    TTE:    LABS:                        7.5    11.39 )-----------( 171      ( 18 Jul 2021 13:22 )             22.9     07-18    128<L>  |  96<L>  |  20  ----------------------------<  115<H>  4.0   |  24  |  0.8    Ca    7.1<L>      18 Jul 2021 04:43  Phos  3.1     07-18  Mg     2.2     07-18    TPro  4.2<L>  /  Alb  2.7<L>  /  TBili  0.5  /  DBili  0.2  /  AST  19  /  ALT  11  /  AlkPhos  48  07-17    CARDIAC MARKERS ( 17 Jul 2021 20:09 )  x     / 0.03 ng/mL / 268 U/L / x     / 2.3 ng/mL  CARDIAC MARKERS ( 17 Jul 2021 01:30 )  x     / 0.04 ng/mL / 290 U/L / x     / 4.3 ng/mL  CARDIAC MARKERS ( 16 Jul 2021 19:33 )  x     / 0.03 ng/mL / 255 U/L / x     / 5.3 ng/mL      PT/INR - ( 18 Jul 2021 04:43 )   PT: 14.00 sec;   INR: 1.22 ratio         PTT - ( 18 Jul 2021 04:43 )  PTT:25.4 sec    I&O's Summary    17 Jul 2021 07:01  -  18 Jul 2021 07:00  --------------------------------------------------------  IN: 430 mL / OUT: 725 mL / NET: -295 mL    18 Jul 2021 07:01  -  18 Jul 2021 13:36  --------------------------------------------------------  IN: 300 mL / OUT: 120 mL / NET: 180 mL      BNP

## 2021-07-18 NOTE — PHYSICAL THERAPY INITIAL EVALUATION ADULT - GENERAL OBSERVATIONS, REHAB EVAL
PT will f/u when the pt is cleared for OOB activities.
Pt laying semifowler in bed in NAD. Agreeable to PT IE. Dtg at b/s for translation (pt speaks [place]onian). + radial A-line to RUE, + pulse ox, + anderson catheter, + O2 via NC @ 2L/min

## 2021-07-18 NOTE — PHYSICAL THERAPY INITIAL EVALUATION ADULT - RANGE OF MOTION EXAMINATION, REHAB EVAL
R shoulder < WFL 2/2 previous shoulder fx (non-op), R hip < WFL, R knee < WNL/deficits as listed below

## 2021-07-18 NOTE — PROGRESS NOTE ADULT - SUBJECTIVE AND OBJECTIVE BOX
ORAL WU  [id number]  85y Female    Indication for ICU admission: hypotension with Hb 6.2 - s/ p hemiarthroplasty of Right hip    Admit Date:07-13-21  ICU Date: 7/16/21  OR Date: 7/16/21    penicillin (Unknown)  pinapples (Unknown)    PAST MEDICAL & SURGICAL HISTORY:  HTN (hypertension)    High cholesterol    Osteoporosis    Fracture of right shoulder    Anxiety    Depression    H/O heart valve replacement with mechanical valve      Home Medications:  alendronate 35 mg oral tablet: 1 tab(s) orally once a week (07 Jun 2021 00:16)  ALPRAZolam 0.5 mg oral tablet: 1 tab(s) orally once a day (at bedtime), As Needed (07 Jun 2021 00:16)  aspirin 81 mg oral tablet, chewable: 1 tab(s) orally once a day (07 Jun 2021 00:16)  atenolol 25 mg oral tablet: 1 tab(s) orally once a day (07 Jun 2021 00:16)  atorvastatin 20 mg oral tablet: 1 tab(s) orally once a day (07 Jun 2021 00:16)  escitalopram 10 mg oral tablet: 1 tab(s) orally once a day (07 Jun 2021 00:16)  ezetimibe 10 mg oral tablet: 1 tab(s) orally once a day (07 Jun 2021 00:16)  iron gluconate: 27 milligram(s) orally once a day (07 Jun 2021 00:16)  lidocaine 5% patch: 1  transdermally once a day (07 Jun 2021 00:16)  valsartan 160 mg oral tablet: 1 tab(s) orally once a day (at bedtime) (07 Jun 2021 00:16)  warfarin 4 mg oral tablet: 1 tab(s) orally once a day (07 Jun 2021 00:16)        24HRS EVENT:  DAY  - repeat CXR after lasix = improved consolidations  - f/u CE 8PM / 8PM labs  - repeat Hb 8.2 < 9.2    7/17  NIGHT  - CE 0.03, 0.04, 0.03, no longer trending  -Hb 8.2, 8.2, 7.9- stable  - ortho ok to start hep gtt bridge to warfarin, started gtt @ 1000u/hr, and gave Coumodin 4mg  -hypertensive 160s, resumed home po Valsartan 160mg   -Naphos 15mmol, Mg 1g      DVT PTX: Hep sq  GI PTX:pantoprazole    Tablet 40 milliGRAM(s) Oral before breakfast    ***Tubes/Lines/Drains  ***  Peripheral IV    Arterial Line: right radial 		                Date 7/16/21    Urinary Catheter		Indication: Strict I&O    Date Placed: 7/16/21    REVIEW OF SYSTEMS  [x ] A ten-point review of systems was otherwise negative except as noted.  [ ] Due to altered mental status/intubation, subjective information were not able to be obtained from the patient. History was obtained, to the extent possible, from review of the chart and collateral sources of information.       ORAL WU  [id number]  85y Female    Indication for ICU admission: hypotension with Hb 6.2 - s/ p hemiarthroplasty of Right hip    Admit Date:21  ICU Date: 21  OR Date: 21    penicillin (Unknown)  pinapples (Unknown)    PAST MEDICAL & SURGICAL HISTORY:  HTN (hypertension)    High cholesterol    Osteoporosis    Fracture of right shoulder    Anxiety    Depression    H/O heart valve replacement with mechanical valve      Home Medications:  alendronate 35 mg oral tablet: 1 tab(s) orally once a week (:16)  ALPRAZolam 0.5 mg oral tablet: 1 tab(s) orally once a day (at bedtime), As Needed ()  aspirin 81 mg oral tablet, chewable: 1 tab(s) orally once a day ()  atenolol 25 mg oral tablet: 1 tab(s) orally once a day ()  atorvastatin 20 mg oral tablet: 1 tab(s) orally once a day ()  escitalopram 10 mg oral tablet: 1 tab(s) orally once a day (:)  ezetimibe 10 mg oral tablet: 1 tab(s) orally once a day (:16)  iron gluconate: 27 milligram(s) orally once a day (:16)  lidocaine 5% patch: 1  transdermally once a day (:16)  valsartan 160 mg oral tablet: 1 tab(s) orally once a day (at bedtime) (:16)  warfarin 4 mg oral tablet: 1 tab(s) orally once a day (:16)        24HRS EVENT:  DAY  - repeat CXR after lasix = improved consolidations  - f/u CE 8PM / 8PM labs  - repeat Hb 8.2 < 9.2      NIGHT  - CE 0.03, 0.04, 0.03, no longer trending  -Hb 8.2, 8.2, 7.9- stable  - ortho ok to start hep gtt bridge to warfarin, started gtt @ 1000u/hr, and gave Coumodin 4mg  -hypertensive 160s, resumed home po Valsartan 160mg   -Naphos 15mmol, Mg 1g      DVT PTX: Hep sq  GI PTX:pantoprazole    Tablet 40 milliGRAM(s) Oral before breakfast    ***Tubes/Lines/Drains  ***  Peripheral IV    Arterial Line: right radial 		                Date 21    Urinary Catheter		Indication: Strict I&O    Date Placed: 21    REVIEW OF SYSTEMS  [x ] A ten-point review of systems was otherwise negative except as noted.  [ ] Due to altered mental status/intubation, subjective information were not able to be obtained from the patient. History was obtained, to the extent possible, from review of the chart and collateral sources of information.    Daily     Daily Weight in k.2 (2021 05:00)    Diet, Regular:   DASH/TLC Sodium & Cholesterol Restricted (21 @ 11:19)      CURRENT MEDS:  Neurologic Medications  acetaminophen   Tablet .. 650 milliGRAM(s) Oral every 6 hours  escitalopram 10 milliGRAM(s) Oral daily  oxyCODONE    IR 5 milliGRAM(s) Oral every 6 hours PRN Severe Pain (7 - 10)    Respiratory Medications    Cardiovascular Medications  ATENolol  Tablet 25 milliGRAM(s) Oral daily  valsartan 160 milliGRAM(s) Oral daily    Gastrointestinal Medications  dextrose 5%. 1000 milliLiter(s) IV Continuous <Continuous>  pantoprazole    Tablet 40 milliGRAM(s) Oral before breakfast  senna 2 Tablet(s) Oral at bedtime    Genitourinary Medications    Hematologic/Oncologic Medications  heparin  Infusion. 1200 Unit(s)/Hr IV Continuous <Continuous>  warfarin 4 milliGRAM(s) Oral once    Antimicrobial/Immunologic Medications    Endocrine/Metabolic Medications  atorvastatin 20 milliGRAM(s) Oral at bedtime  dextrose 40% Gel 15 Gram(s) Oral once  dextrose 50% Injectable 25 Gram(s) IV Push once  glucagon  Injectable 1 milliGRAM(s) IntraMuscular once  insulin lispro (ADMELOG) corrective regimen sliding scale   SubCutaneous three times a day before meals    Topical/Other Medications  chlorhexidine 4% Liquid 1 Application(s) Topical <User Schedule>      ICU Vital Signs Last 24 Hrs  T(C): 37.4 (2021 08:00), Max: 37.7 (2021 00:24)  T(F): 99.4 (2021 08:00), Max: 99.9 (2021 00:24)  HR: 72 (2021 09:00) (70 - 80)  BP: --  BP(mean): --  ABP: 133/42 (2021 09:00) (133/42 - 180/68)  ABP(mean): 72 (2021 09:00) (72 - 105)  RR: 18 (2021 08:00) (18 - 18)  SpO2: 97% (2021 09:00) (88% - 100%)      Adult Advanced Hemodynamics Last 24 Hrs  CVP(mm Hg): --  CVP(cm H2O): --  CO: --  CI: --  PA: --  PA(mean): --  PCWP: --  SVR: --  SVRI: --  PVR: --  PVRI: --          I&O's Summary    2021 07:01  -  2021 07:00  --------------------------------------------------------  IN: 430 mL / OUT: 725 mL / NET: -295 mL    2021 07:01  -  2021 10:45  --------------------------------------------------------  IN: 24 mL / OUT: 0 mL / NET: 24 mL      I&O's Detail    2021 07:01  -  2021 07:00  --------------------------------------------------------  IN:    Heparin: 80 mL    IV PiggyBack: 350 mL  Total IN: 430 mL    OUT:    Ureteral Catheter (mL): 725 mL  Total OUT: 725 mL    Total NET: -295 mL      2021 07:  -  2021 10:45  --------------------------------------------------------  IN:    Heparin Infusion: 24 mL  Total IN: 24 mL    OUT:  Total OUT: 0 mL    Total NET: 24 mL          PHYSICAL EXAM:  General/Neuro   GCS: 15    = E 4  / V 5  / M    6  Deficits:   alert & oriented x 3, no focal deficits      Lungs:  clear to auscultation, Normal expansion/effort    Cardiovascular : S1, S2.  Regular rate and rhythm.  Peripheral edema   Cardiac Rhythm: Normal Sinus Rhythm    GI: Abdomen soft, Non-tender, Non-distended.      Extremities: Extremities warm, pink, well-perfused    Derm: Good skin turgor, no skin breakdown.      :  Zamudio catheter in place.      LABS:  CAPILLARY BLOOD GLUCOSE      POCT Blood Glucose.: 116 mg/dL (2021 08:29)  POCT Blood Glucose.: 132 mg/dL (2021 23:12)  POCT Blood Glucose.: 130 mg/dL (2021 17:01)  POCT Blood Glucose.: 167 mg/dL (2021 11:15)                          8.1    9.37  )-----------( 164      ( 2021 04:43 )             23.8       07-18    128<L>  |  96<L>  |  20  ----------------------------<  115<H>  4.0   |  24  |  0.8    Ca    7.1<L>      2021 04:43  Phos  3.1     07-18  Mg     2.2     07-18    TPro  4.2<L>  /  Alb  2.7<L>  /  TBili  0.5  /  DBili  0.2  /  AST  19  /  ALT  11  /  AlkPhos  48  07-17      PT/INR - ( 2021 04:43 )   PT: 14.00 sec;   INR: 1.22 ratio         PTT - ( 2021 04:43 )  PTT:25.4 sec  CARDIAC MARKERS ( 2021 20:09 )  x     / 0.03 ng/mL / 268 U/L / x     / 2.3 ng/mL  CARDIAC MARKERS ( 2021 01:30 )  x     / 0.04 ng/mL / 290 U/L / x     / 4.3 ng/mL  CARDIAC MARKERS ( 2021 19:33 )  x     / 0.03 ng/mL / 255 U/L / x     / 5.3 ng/mL

## 2021-07-18 NOTE — PHYSICAL THERAPY INITIAL EVALUATION ADULT - IMPAIRMENTS FOUND, PT EVAL
aerobic capacity/endurance/ergonomics and body mechanics/fine motor/gait, locomotion, and balance/gross motor/integumentary integrity/joint integrity and mobility/muscle strength/posture/ROM/ventilation and respiration/gas exchange

## 2021-07-18 NOTE — PROGRESS NOTE ADULT - SUBJECTIVE AND OBJECTIVE BOX
ORTHOPEDIC SURGERY PROGRESS NOTE    SUBJECTIVE: Patient seen and examined this morning. POD#2 s/p R hip hemo. Pain controlled.  No acute events overnight.  Heparin drip started yesterday per SICU team.  No f/c/n/v/cp/sob.     OBJECTIVE:  NAD  Vital Signs Last 24 Hrs  T(C): 37.7 (18 Jul 2021 04:00), Max: 37.7 (18 Jul 2021 00:24)  T(F): 99.8 (18 Jul 2021 04:00), Max: 99.9 (18 Jul 2021 00:24)  HR: 74 (18 Jul 2021 07:00) (68 - 80)  BP: 117/56 (17 Jul 2021 10:00) (117/56 - 117/56)  BP(mean): --  RR: --  SpO2: 100% (18 Jul 2021 07:00) (88% - 100%)    Affected extremity: Right LE         Dressing: clean/dry/intact            Sensation: SILT         Motor exam: 5/5 TA/GS/EHL         Compartments soft/nontender          warm well perfused; capillary refill <3 seconds                         8.1    9.37  )-----------( 164      ( 18 Jul 2021 04:43 )             23.8     07-18    128<L>  |  96<L>  |  20  ----------------------------<  115<H>  4.0   |  24  |  0.8    Ca    7.1<L>      18 Jul 2021 04:43  Phos  3.1     07-18  Mg     2.2     07-18    TPro  4.2<L>  /  Alb  2.7<L>  /  TBili  0.5  /  DBili  0.2  /  AST  19  /  ALT  11  /  AlkPhos  48  07-17    A/P :  Pt is a 84yo Female, POD#2 s/p R hip Aram      - WBAT  - pain control  - postop antibiotics  - DVT prophylaxis - Heparin drip per SICU team  - IS encouraged  - PT/rehab  - Dispo planning

## 2021-07-18 NOTE — PROGRESS NOTE ADULT - ATTENDING COMMENTS
Patient was started on Heparin drip yesterday and had Coumadin 4 mg last night.  PTT still subtherapeutic - drip at 1200 u/h now  Complains od some pain in surgery side.  Right thigh is soft, No significant hematoma.  Hb stable this am at 8.1.    ASSESSMENT:  86 y/o female, S/P Fall.  Closed head injury.  Forehead ecchymosis.  Closed Right Femoral Neck Fracture.  Acute pain due to trauma  Atelectasis  Mechanical AVR.  Acute blood loss anemia.  Hyponatremia.  At risk for hemodynamic instability.  Frail Elderly.    PLAN:  - pain control  - incentive spirometer; OOB to chair  - keep normotensive  - has mechanical AV - on Hep. drip at 1200 u/h; follow PTT to be at 60-80.  - give dose of Comadin tonight  - regular diet  - follow serum electrolytes and UOP  - continue following H&H  - GI prophylaxis

## 2021-07-18 NOTE — PHYSICAL THERAPY INITIAL EVALUATION ADULT - PERTINENT HX OF CURRENT PROBLEM, REHAB EVAL
Pt presents to [facility] s/p mechanical fall and R hip fx resulting in R hip hemiarthroplasty by Dr. Tiffanie MD.

## 2021-07-18 NOTE — PROGRESS NOTE ADULT - ATTENDING COMMENTS
Pt seen and examined.  Agree with resident exam and plan.  s/p right hip hemiarthroplasty  +serosanguinous drainage  nvi  plan: dressing change, pain control, OOB, PT, WBAT, IS, DVT proph

## 2021-07-18 NOTE — PROGRESS NOTE ADULT - ASSESSMENT
Assessment & Plan  84 y/o F w/ PMHx of HTN, HLD, CAD, h/o PCI/PO RCA 2014, 3V CABG 2003, hx of mechanical aortic valve 2003 (on coumadin), depression, anxiety, presents s/p mechanical fall, with Right femoral neck fx, s/p hemiarthroplasty of right hip.  Upgraded to SICU for hypotension, with Hb 6.2.    NEURO:   -Acute pain-controlled with tylenol  -h/o depession/anxiety - resume lexapro    RESP:     Oxygen insufficiency-wean off NC to RA as tolerated    Activity- as tolerated  -Encourage IS  -Repeat CXR after lasix = improved       CARDS:    -h/o HTN- resumed home atenolol and valsartan  -HLD -resume statin  -h/o CABG/PCI- resume ASA  -h/o mechanical AVR -resume AC - Coumodin 4mg given  -Trop 0.03->0.04 ->0.03, no longer trending  -ECHO 6/8/21: EF 55-60%, moderate MR and TR, mechanical AV, mild pulm HTN    GI/NUTR:     Diet, Regular    GI Prophylaxis- PPI    Bowel regimen-  senna 2 Tablet(s) Oral at bedtime    /RENAL:      Monitor UO-anderson in place, strict I and Os  Labs:          BUN/Cr- 19/0.8  -->22/1.0          Electrolytes-Na 126, K 4.1, IP 2.6, Mg 1.9  --Naphos 15mmol, Mg 1g    HEME/ONC:    DVT prophylaxis-heparin Injectable, SCDs  -Hold full dose AC for now, transfuse 2 units pRBCs for Hb 6.2--> rept 9.2->8.2  -INR 3.9 on admission, s/p 2.5mg of Vit K    Labs: Hb/Hct:  7.4/22.4  -->,  6.2/18.4  -->  9.2/26.9->8.2/23.9->8.2->8.2->7.9                    Plts:  137  -->,  152  -->  145               PTT/INR:  --/1.26  (07-16 @ 05:35) --->,  29.7/1.29  (07-16 @ 19:33) --->                 T&S: (07-14)    ID:  WBC- 9.67  --->>,  12.88  --->>,  10.92  --->>9.9->9.3->9.2  Temp trend- 24hrs T(F): afebrile    ENDO:     Glucose, Serum: 137 (07-16 @ 19:33)  -FS q6 while NPO    HA1C in am    LINES/DRAINS:  Whit FOSTER Foley     DISPO:    SICU

## 2021-07-18 NOTE — PHYSICAL THERAPY INITIAL EVALUATION ADULT - ADDITIONAL COMMENTS
PTA: pt lives in  c 1 NICK c dtg. Was amb s AD as a household ambulator. Needs assist c bathing (has tub bench), dressing. Unable to cook, clean, drive, shop (dtg assists). Pt was able to eat IND.

## 2021-07-19 LAB
ANION GAP SERPL CALC-SCNC: 9 MMOL/L — SIGNIFICANT CHANGE UP (ref 7–14)
APTT BLD: 144.2 SEC — CRITICAL HIGH (ref 27–39.2)
APTT BLD: 33.3 SEC — SIGNIFICANT CHANGE UP (ref 27–39.2)
APTT BLD: 34 SEC — SIGNIFICANT CHANGE UP (ref 27–39.2)
APTT BLD: 73.6 SEC — CRITICAL HIGH (ref 27–39.2)
BUN SERPL-MCNC: 22 MG/DL — HIGH (ref 10–20)
CALCIUM SERPL-MCNC: 7.1 MG/DL — LOW (ref 8.5–10.1)
CHLORIDE SERPL-SCNC: 95 MMOL/L — LOW (ref 98–110)
CO2 SERPL-SCNC: 24 MMOL/L — SIGNIFICANT CHANGE UP (ref 17–32)
CREAT SERPL-MCNC: 0.7 MG/DL — SIGNIFICANT CHANGE UP (ref 0.7–1.5)
GLUCOSE BLDC GLUCOMTR-MCNC: 109 MG/DL — HIGH (ref 70–99)
GLUCOSE BLDC GLUCOMTR-MCNC: 138 MG/DL — HIGH (ref 70–99)
GLUCOSE BLDC GLUCOMTR-MCNC: 93 MG/DL — SIGNIFICANT CHANGE UP (ref 70–99)
GLUCOSE SERPL-MCNC: 110 MG/DL — HIGH (ref 70–99)
HCT VFR BLD CALC: 22.7 % — LOW (ref 37–47)
HGB BLD-MCNC: 7.7 G/DL — LOW (ref 12–16)
INR BLD: 1.47 RATIO — HIGH (ref 0.65–1.3)
INR BLD: 1.61 RATIO — HIGH (ref 0.65–1.3)
INR BLD: 1.8 RATIO — HIGH (ref 0.65–1.3)
MAGNESIUM SERPL-MCNC: 2.3 MG/DL — SIGNIFICANT CHANGE UP (ref 1.8–2.4)
MCHC RBC-ENTMCNC: 29.8 PG — SIGNIFICANT CHANGE UP (ref 27–31)
MCHC RBC-ENTMCNC: 33.9 G/DL — SIGNIFICANT CHANGE UP (ref 32–37)
MCV RBC AUTO: 88 FL — SIGNIFICANT CHANGE UP (ref 81–99)
NRBC # BLD: 0 /100 WBCS — SIGNIFICANT CHANGE UP (ref 0–0)
PHOSPHATE SERPL-MCNC: 3.4 MG/DL — SIGNIFICANT CHANGE UP (ref 2.1–4.9)
PLATELET # BLD AUTO: 230 K/UL — SIGNIFICANT CHANGE UP (ref 130–400)
POTASSIUM SERPL-MCNC: 3.9 MMOL/L — SIGNIFICANT CHANGE UP (ref 3.5–5)
POTASSIUM SERPL-SCNC: 3.9 MMOL/L — SIGNIFICANT CHANGE UP (ref 3.5–5)
PROTHROM AB SERPL-ACNC: 16.9 SEC — HIGH (ref 9.95–12.87)
PROTHROM AB SERPL-ACNC: 18.5 SEC — HIGH (ref 9.95–12.87)
PROTHROM AB SERPL-ACNC: 20.7 SEC — HIGH (ref 9.95–12.87)
RBC # BLD: 2.58 M/UL — LOW (ref 4.2–5.4)
RBC # FLD: 13.7 % — SIGNIFICANT CHANGE UP (ref 11.5–14.5)
SODIUM SERPL-SCNC: 128 MMOL/L — LOW (ref 135–146)
WBC # BLD: 10.18 K/UL — SIGNIFICANT CHANGE UP (ref 4.8–10.8)
WBC # FLD AUTO: 10.18 K/UL — SIGNIFICANT CHANGE UP (ref 4.8–10.8)

## 2021-07-19 PROCEDURE — 99291 CRITICAL CARE FIRST HOUR: CPT

## 2021-07-19 PROCEDURE — 93010 ELECTROCARDIOGRAM REPORT: CPT

## 2021-07-19 PROCEDURE — 71045 X-RAY EXAM CHEST 1 VIEW: CPT | Mod: 26

## 2021-07-19 RX ORDER — HEPARIN SODIUM 5000 [USP'U]/ML
700 INJECTION INTRAVENOUS; SUBCUTANEOUS
Qty: 25000 | Refills: 0 | Status: DISCONTINUED | OUTPATIENT
Start: 2021-07-19 | End: 2021-07-19

## 2021-07-19 RX ORDER — METOPROLOL TARTRATE 50 MG
5 TABLET ORAL ONCE
Refills: 0 | Status: COMPLETED | OUTPATIENT
Start: 2021-07-19 | End: 2021-07-19

## 2021-07-19 RX ORDER — HEPARIN SODIUM 5000 [USP'U]/ML
7 INJECTION INTRAVENOUS; SUBCUTANEOUS
Qty: 25000 | Refills: 0 | Status: DISCONTINUED | OUTPATIENT
Start: 2021-07-19 | End: 2021-07-19

## 2021-07-19 RX ORDER — HEPARIN SODIUM 5000 [USP'U]/ML
900 INJECTION INTRAVENOUS; SUBCUTANEOUS
Qty: 25000 | Refills: 0 | Status: DISCONTINUED | OUTPATIENT
Start: 2021-07-19 | End: 2021-07-20

## 2021-07-19 RX ORDER — ASPIRIN/CALCIUM CARB/MAGNESIUM 324 MG
81 TABLET ORAL DAILY
Refills: 0 | Status: DISCONTINUED | OUTPATIENT
Start: 2021-07-19 | End: 2021-07-23

## 2021-07-19 RX ORDER — POTASSIUM CHLORIDE 20 MEQ
20 PACKET (EA) ORAL ONCE
Refills: 0 | Status: COMPLETED | OUTPATIENT
Start: 2021-07-19 | End: 2021-07-19

## 2021-07-19 RX ADMIN — Medication 0: at 08:04

## 2021-07-19 RX ADMIN — OXYCODONE HYDROCHLORIDE 5 MILLIGRAM(S): 5 TABLET ORAL at 11:33

## 2021-07-19 RX ADMIN — Medication 650 MILLIGRAM(S): at 11:03

## 2021-07-19 RX ADMIN — ATORVASTATIN CALCIUM 20 MILLIGRAM(S): 80 TABLET, FILM COATED ORAL at 22:21

## 2021-07-19 RX ADMIN — Medication 0: at 16:58

## 2021-07-19 RX ADMIN — OXYCODONE HYDROCHLORIDE 5 MILLIGRAM(S): 5 TABLET ORAL at 10:53

## 2021-07-19 RX ADMIN — CHLORHEXIDINE GLUCONATE 1 APPLICATION(S): 213 SOLUTION TOPICAL at 05:59

## 2021-07-19 RX ADMIN — Medication 5 MILLIGRAM(S): at 05:09

## 2021-07-19 RX ADMIN — Medication 0: at 12:49

## 2021-07-19 RX ADMIN — ESCITALOPRAM OXALATE 10 MILLIGRAM(S): 10 TABLET, FILM COATED ORAL at 13:32

## 2021-07-19 RX ADMIN — Medication 650 MILLIGRAM(S): at 11:33

## 2021-07-19 RX ADMIN — SENNA PLUS 2 TABLET(S): 8.6 TABLET ORAL at 22:21

## 2021-07-19 RX ADMIN — Medication 650 MILLIGRAM(S): at 17:30

## 2021-07-19 RX ADMIN — Medication 650 MILLIGRAM(S): at 06:00

## 2021-07-19 RX ADMIN — Medication 650 MILLIGRAM(S): at 17:00

## 2021-07-19 RX ADMIN — ATENOLOL 25 MILLIGRAM(S): 25 TABLET ORAL at 05:58

## 2021-07-19 RX ADMIN — PANTOPRAZOLE SODIUM 40 MILLIGRAM(S): 20 TABLET, DELAYED RELEASE ORAL at 06:14

## 2021-07-19 RX ADMIN — HEPARIN SODIUM 900 UNIT(S)/HR: 5000 INJECTION INTRAVENOUS; SUBCUTANEOUS at 13:27

## 2021-07-19 RX ADMIN — Medication 650 MILLIGRAM(S): at 05:57

## 2021-07-19 RX ADMIN — Medication 81 MILLIGRAM(S): at 13:32

## 2021-07-19 RX ADMIN — Medication 50 MILLIEQUIVALENT(S): at 00:00

## 2021-07-19 NOTE — PROGRESS NOTE ADULT - SUBJECTIVE AND OBJECTIVE BOX
ORAL WU  [id number]  85y Female    Indication for ICU admission: hypotension with Hb 6.2 - s/ p hemiarthroplasty of Right hip    Admit Date:07-13-21  ICU Date: 7/16/21  OR Date: 7/16/21    penicillin (Unknown)  pinapples (Unknown)    PAST MEDICAL & SURGICAL HISTORY:  HTN (hypertension)    High cholesterol    Osteoporosis    Fracture of right shoulder    Anxiety    Depression    H/O heart valve replacement with mechanical valve      Home Medications:  alendronate 35 mg oral tablet: 1 tab(s) orally once a week (07 Jun 2021 00:16)  ALPRAZolam 0.5 mg oral tablet: 1 tab(s) orally once a day (at bedtime), As Needed (07 Jun 2021 00:16)  aspirin 81 mg oral tablet, chewable: 1 tab(s) orally once a day (07 Jun 2021 00:16)  atenolol 25 mg oral tablet: 1 tab(s) orally once a day (07 Jun 2021 00:16)  atorvastatin 20 mg oral tablet: 1 tab(s) orally once a day (07 Jun 2021 00:16)  escitalopram 10 mg oral tablet: 1 tab(s) orally once a day (07 Jun 2021 00:16)  ezetimibe 10 mg oral tablet: 1 tab(s) orally once a day (07 Jun 2021 00:16)  iron gluconate: 27 milligram(s) orally once a day (07 Jun 2021 00:16)  lidocaine 5% patch: 1  transdermally once a day (07 Jun 2021 00:16)  valsartan 160 mg oral tablet: 1 tab(s) orally once a day (at bedtime) (07 Jun 2021 00:16)  warfarin 4 mg oral tablet: 1 tab(s) orally once a day (07 Jun 2021 00:16)        24HRS EVENT:    DVT PTX: Hep Drip w/ Coumadin Bridge  GI PTX:pantoprazole    Tablet 40 milliGRAM(s) Oral before breakfast    ***Tubes/Lines/Drains  ***  Peripheral IV    Arterial Line: right radial 		                Date 7/16/21    Urinary Catheter		Indication: Strict I&O    Date Placed: 7/16/21    REVIEW OF SYSTEMS  [x ] A ten-point review of systems was otherwise negative except as noted.  [ ] Due to altered mental status/intubation, subjective information were not able to be obtained from the patient. History was obtained, to the extent possible, from review of the chart and collateral sources of information.   ORAL WU  [id number]  85y Female    Indication for ICU admission: hypotension with Hb 6.2 - s/ p hemiarthroplasty of Right hip    Admit Date:21  ICU Date: 21  OR Date: 21    penicillin (Unknown)  pinapples (Unknown)    PAST MEDICAL & SURGICAL HISTORY:  HTN (hypertension)    High cholesterol    Osteoporosis    Fracture of right shoulder    Anxiety    Depression    H/O heart valve replacement with mechanical valve      Home Medications:  alendronate 35 mg oral tablet: 1 tab(s) orally once a week (:16)  ALPRAZolam 0.5 mg oral tablet: 1 tab(s) orally once a day (at bedtime), As Needed (:16)  aspirin 81 mg oral tablet, chewable: 1 tab(s) orally once a day (:16)  atenolol 25 mg oral tablet: 1 tab(s) orally once a day (:16)  atorvastatin 20 mg oral tablet: 1 tab(s) orally once a day (:16)  escitalopram 10 mg oral tablet: 1 tab(s) orally once a day (:16)  ezetimibe 10 mg oral tablet: 1 tab(s) orally once a day (:16)  iron gluconate: 27 milligram(s) orally once a day (:16)  lidocaine 5% patch: 1  transdermally once a day (:16)  valsartan 160 mg oral tablet: 1 tab(s) orally once a day (at bedtime) (:16)  warfarin 4 mg oral tablet: 1 tab(s) orally once a day (:16)        24HRS EVENT:    DVT PTX: Hep Drip w/ Coumadin Bridge  GI PTX:pantoprazole    Tablet 40 milliGRAM(s) Oral before breakfast    ***Tubes/Lines/Drains  ***  Peripheral IV    Arterial Line: right radial 		                Date 21    Urinary Catheter		Indication: Strict I&O    Date Placed: 21    REVIEW OF SYSTEMS  [x ] A ten-point review of systems was otherwise negative except as noted.  [ ] Due to altered mental status/intubation, subjective information were not able to be obtained from the patient. History was obtained, to the extent possible, from review of the chart and collateral sources of information.    Daily     Daily Weight in k (2021 06:00)    Diet, Regular:   DASH/TLC Sodium & Cholesterol Restricted (21 @ 11:19)      CURRENT MEDS:  Neurologic Medications  acetaminophen   Tablet .. 650 milliGRAM(s) Oral every 6 hours  escitalopram 10 milliGRAM(s) Oral daily  oxyCODONE    IR 5 milliGRAM(s) Oral every 6 hours PRN Severe Pain (7 - 10)    Respiratory Medications    Cardiovascular Medications  ATENolol  Tablet 25 milliGRAM(s) Oral daily  valsartan 160 milliGRAM(s) Oral daily    Gastrointestinal Medications  pantoprazole    Tablet 40 milliGRAM(s) Oral before breakfast  senna 2 Tablet(s) Oral at bedtime    Genitourinary Medications    Hematologic/Oncologic Medications  heparin  Infusion 700 Unit(s)/Hr IV Continuous <Continuous>    Antimicrobial/Immunologic Medications    Endocrine/Metabolic Medications  atorvastatin 20 milliGRAM(s) Oral at bedtime  insulin lispro (ADMELOG) corrective regimen sliding scale   SubCutaneous three times a day before meals    Topical/Other Medications  chlorhexidine 4% Liquid 1 Application(s) Topical <User Schedule>      ICU Vital Signs Last 24 Hrs  T(C): 36.9 (2021 07:15), Max: 37.2 (2021 15:30)  T(F): 98.5 (2021 07:15), Max: 99 (2021 15:30)  HR: 67 (2021 08:00) (66 - 126)  BP: 96/70 (2021 08:00) (96/70 - 128/55)  BP(mean): 78 (2021 08:00) (78 - 101)  ABP: 122/42 (2021 08:00) (101/29 - 136/40)  ABP(mean): 70 (2021 08:00) (51 - 74)  RR: 19 (2021 08:00) (19 - 19)  SpO2: 100% (2021 08:00) (96% - 100%)      Adult Advanced Hemodynamics Last 24 Hrs  CVP(mm Hg): --  CVP(cm H2O): --  CO: --  CI: --  PA: --  PA(mean): --  PCWP: --  SVR: --  SVRI: --  PVR: --  PVRI: --          I&O's Summary    2021 07:01  -  2021 07:00  --------------------------------------------------------  IN: 1148 mL / OUT: 610 mL / NET: 538 mL    2021 07:01  -  2021 08:55  --------------------------------------------------------  IN: 0 mL / OUT: 48 mL / NET: -48 mL      I&O's Detail    2021 07:01  -  2021 07:00  --------------------------------------------------------  IN:    Heparin: 40 mL    Heparin: 56 mL    Heparin Infusion: 72 mL    IV PiggyBack: 100 mL    Oral Fluid: 880 mL  Total IN: 1148 mL    OUT:    Voided (mL): 610 mL  Total OUT: 610 mL    Total NET: 538 mL      2021 07:  -  2021 08:55  --------------------------------------------------------  IN:  Total IN: 0 mL    OUT:    Ureteral Catheter (mL): 48 mL  Total OUT: 48 mL    Total NET: -48 mL          PHYSICAL EXAM:    General/Neuro    GCS: 15    = E 4  / V  5 / M  6    Deficits:      alert & oriented x 3, no focal deficits  Pupils: PERRLA    Lungs:      clear to auscultation, Normal expansion/effort.     Cardiovascular : S1, S2.  Regular rate and rhythm.  Peripheral edema     GI: Abdomen soft, Non-tender, Non-distended.      Extremities: Extremities warm, pink, well-perfused.     Derm: Good skin turgor, no skin breakdown.      :  Zamudio catheter in place.      CXR:     LABS:  CAPILLARY BLOOD GLUCOSE      POCT Blood Glucose.: 93 mg/dL (2021 07:57)  POCT Blood Glucose.: 115 mg/dL (2021 21:51)  POCT Blood Glucose.: 115 mg/dL (2021 17:13)                          7.3    9.66  )-----------( 189      ( 2021 23:35 )             22.3       07-18    128<L>  |  95<L>  |  22<H>  ----------------------------<  110<H>  3.9   |  24  |  0.7    Ca    7.1<L>      2021 23:35  Phos  3.4     07-18  Mg     2.3     07-18    TPro  4.2<L>  /  Alb  2.7<L>  /  TBili  0.5  /  DBili  0.2  /  AST  19  /  ALT  11  /  AlkPhos  48  07-17      PT/INR - ( 2021 04:40 )   PT: 18.50 sec;   INR: 1.61 ratio         PTT - ( 2021 04:40 )  PTT:144.2 sec  CARDIAC MARKERS ( 2021 20:09 )  x     / 0.03 ng/mL / 268 U/L / x     / 2.3 ng/mL

## 2021-07-19 NOTE — PROGRESS NOTE ADULT - ASSESSMENT
Assessment & Plan  84 y/o F w/ PMHx of HTN, HLD, CAD, h/o PCI/PO RCA 2014, 3V CABG 2003, hx of mechanical aortic valve 2003 (on coumadin), depression, anxiety, presents s/p mechanical fall, with Right femoral neck fx, s/p hemiarthroplasty of right hip.  Upgraded to SICU for hypotension, with Hb 6.2.    NEURO:   -Acute pain-controlled with tylenol atc  -h/o depession/anxiety - resume lexapro    RESP:     Oxygen insufficiency-wean off NC to RA as tolerated    Activity- as tolerated  -Encourage IS  -Repeat CXR after lasix = improved   AM CXR no changes      CARDS:    -h/o HTN- resumed home atenolol 25 qd and valsartan 160 qd  -HLD   -resume statin?  -h/o CABG/PCI- Takes ASA, being held  -h/o mechanical AVR -resume AC - Coumodin 4mg given 7/17, needs new dose 7/18  - PTT >74 @ hep 8. INR 1.37  -Trop 0.03->0.04 ->0.03, no longer trending  -ECHO 6/8/21: EF 55-60%, moderate MR and TR, mechanical AV, mild pulm HTN    GI/NUTR:     Diet, Regular    GI Prophylaxis- PPI    Bowel regimen-  senna 2 Tablet(s) Oral at bedtime  Bowel movements dark/melena appearing, f/u fecal occult    /RENAL:      Monitor UO-anderson in place, strict I and Os  Labs:          BUN/Cr- 22/1.0  -->,  20/0.8  -->,  22/0.7  -->          Electrolytes-Na 128 // K 3.9 // Mg 2.3 //  Phos 3.4 (07-18 @ 23:35)      HEME/ONC:    DVT prophylaxis-heparin Injectable, SCDs  -Post op, transfuse 2 units pRBCs for Hb 6.2--> rept 9.2->8.2  -INR 3.9 on admission, s/p 2.5mg of Vit K    DVT prophylaxis-heparin  Infusion  , SCDs    Labs: Hb/Hct:  7.4/22.5  -->,  7.3/22.3  -->                      Plts:  189  -->,  178  -->                 PTT/INR:  138.9/--  (07-18 @ 20:03) --->,  73.6/1.47  (07-18 @ 23:35) --->                 T&S:    ID:  WBC- 11.39  --->>,  10.93  --->>,  9.66  --->>  Temp trend- 24hrs T(F): 98.3 (07-18 @ 23:41), Max: 99.8 (07-18 @ 04:00)  Antibiotics-  Cultures:    ENDO:     Glucose, Serum: 115    HA1C 5.2    LINES/DRAINS:  Whit FOSTER Foley     DISPO:    SICU

## 2021-07-19 NOTE — PROGRESS NOTE ADULT - ATTENDING COMMENTS
84 y/o F w/ PMHx of HTN, HLD, CAD, h/o PCI/PO RCA 2014, 3V CABG 2003, hx of mechanical aortic valve 2003 (on coumadin), depression, anxiety, presents s/p mechanical fall, with Right femoral neck fx, s/p hemiarthroplasty of right hip.  Upgraded to SICU for hypotension, with Hb 6.2.    hgb stable, pain controlled, dc justin , rey madera, follow  INR , continue coumadin, Downgrade           -Acute pain-controlled with tylenol atc  -Oxygen insufficiency-wean off NC to RA as tolerated  -Encourage IS  -Repeat CXR after lasix = improved   AM CXR no changes      -h/o mechanical AVR -resume AC - Coumadin 4 -on hep gtt to bridge, inr - 1.6      Diet, Regular    GI Prophylaxis- PPI    Bowel regimen       Monitor UO-anderson in place, strict I and Os    SCDs  -Post op, transfuse 2 units pRBCs for Hb 6.2--> rept 9.2->8.2> 7.2> 7.2  -INR 3.9 on admission, s/p 2.5mg of Vit K    DVT prophylaxis- heparin Infusion  , SCDs  restart asa             LINES/DRAINS:  Whit FOSTER Foley     DISPO:    SICU 84 y/o F w/ PMHx of HTN, HLD, CAD, h/o PCI/PO RCA 2014, 3V CABG 2003, hx of mechanical aortic valve 2003 (on coumadin), depression, anxiety, presents s/p mechanical fall, with Right femoral neck fx, s/p hemiarthroplasty of right hip.  Upgraded to SICU for hypotension, with Hb 6.2.    hgb stable, pain controlled, dc justin , rey madera, follow  INR , continue coumadin, Downgrade           -Acute pain-controlled with tylenol atc    #Oxygen insufficiency related to hypoxia not related to pulmonary insufficiency   -wean off NC to RA as tolerated  -Encourage IS  -Repeat CXR after lasix = improved   AM CXR no changes      -h/o mechanical AVR -resume AC - Coumadin 4 -on hep gtt to bridge, inr - 1.6      Diet, Regular    GI Prophylaxis- PPI    Bowel regimen       Monitor UO-anderson in place, strict I and Os    SCDs  -Post op, transfuse 2 units pRBCs for Hb 6.2--> rept 9.2->8.2> 7.2> 7.2  -INR 3.9 on admission, s/p 2.5mg of Vit K    DVT prophylaxis- heparin Infusion  , SCDs  restart asa             LINES/DRAINS:  Whit FOSTER Foley     DISPO:    SICU

## 2021-07-19 NOTE — PROGRESS NOTE ADULT - ATTENDING COMMENTS
Orthopedic Attending    Patient seen and examined with resident and/or PA.  All new laboratory work-up and consults reviewed.  All new radiographs were reviewed.   Agree with findings, assessment and plan.

## 2021-07-19 NOTE — CHART NOTE - NSCHARTNOTEFT_GEN_A_CORE
SICU Transfer Note:    Transfer from: SICU  Transfer to:  (x) Surgery    (  ) Telemetry    (  ) Medicine    (  ) Palliative    (  ) Stroke Unit    (  ) _______________      SICU COURSE:  84 y/o F w/ PMHx of HTN, HLD, CAD, h/o PCI/PO RCA 2014, 3V CABG 2003, hx of mechanical aortic valve 2003 (on coumadin), depression, anxiety, osteoporosis presents s/p mechanical fall, +HT, -LOC, falling onto her right side as well as the right side of her head.   CT head and CT c-spine were negative.  Pt was found to have Right femoral neck fx.  INR 3.9 on admission, pt was given 2.5mg of Vitamin K, INR came down to 1.26.  Pt went to the OR this morning with ortho, s/p hemiarthroplasty of right hip.  Uneventful case, pt was successfully extubated.  SICU c/s was called later towards the evening due to pt being hypotensive with MAP of 45 and making 7-12cc of urine, bolus 250cc x 2 given.  Stat labs sent and revealed Hb 6.2, 2 units of pRBCs are being given.  Pt is upgraded to SICU.     Surgery Information  OR time: 3.5hrs      EBL:  150     UO:  375           IV Fluids:  2L     Blood Products: none          While admitted to SICU patient received 2U PRBC and hypotensive resolved. Patient blood remained stable s/p transfusion, continues on her home hypertension regimen. Additionally patient was diuresed x2, patient saturating adequately on room air. Patient remains on heparin gtt to bridge to home coumadin, cardiology following. Patient is alert and oriented, pain controlled. She is tolerating diet, voiding and having bowel movements. Patient cooperative with incentive spirometer, pulling 500-750. Patient was seen and examined on AM SICU rounds with attending, patient is stable and ready for downgrade at this time.       PAST MEDICAL & SURGICAL HISTORY:  HTN (hypertension)    High cholesterol    Osteoporosis    Fracture of right shoulder    Anxiety    Depression    H/O heart valve replacement with mechanical valve      Allergies    penicillin (Unknown)  pinapples (Unknown)    Intolerances      MEDICATIONS  (STANDING):  acetaminophen   Tablet .. 650 milliGRAM(s) Oral every 6 hours  ATENolol  Tablet 25 milliGRAM(s) Oral daily  atorvastatin 20 milliGRAM(s) Oral at bedtime  chlorhexidine 4% Liquid 1 Application(s) Topical <User Schedule>  escitalopram 10 milliGRAM(s) Oral daily  heparin  Infusion 700 Unit(s)/Hr (7 mL/Hr) IV Continuous <Continuous>  insulin lispro (ADMELOG) corrective regimen sliding scale   SubCutaneous three times a day before meals  pantoprazole    Tablet 40 milliGRAM(s) Oral before breakfast  senna 2 Tablet(s) Oral at bedtime  valsartan 160 milliGRAM(s) Oral daily    MEDICATIONS  (PRN):  oxyCODONE    IR 5 milliGRAM(s) Oral every 6 hours PRN Severe Pain (7 - 10)        Vital Signs Last 24 Hrs  T(C): 36.9 (19 Jul 2021 07:15), Max: 37.2 (18 Jul 2021 15:30)  T(F): 98.5 (19 Jul 2021 07:15), Max: 99 (18 Jul 2021 15:30)  HR: 70 (19 Jul 2021 06:00) (66 - 126)  BP: 102/67 (19 Jul 2021 06:00) (102/67 - 128/55)  BP(mean): 80 (19 Jul 2021 06:00) (79 - 101)  RR: --  SpO2: 100% (19 Jul 2021 06:00) (96% - 100%)  I&O's Summary    18 Jul 2021 07:01  -  19 Jul 2021 07:00  --------------------------------------------------------  IN: 1148 mL / OUT: 610 mL / NET: 538 mL        LABS                                            7.3                   Neurophils% (auto):   x      (07-18 @ 23:35):    9.66 )-----------(189          Lymphocytes% (auto):  x                                             22.3                   Eosinphils% (auto):   x        Manual%: Neutrophils x    ; Lymphocytes x    ; Eosinophils x    ; Bands%: x    ; Blasts x                                    128    |  95     |  22                  Calcium: 7.1   / iCa: x      (07-18 @ 23:35)    ----------------------------<  110       Magnesium: 2.3                              3.9     |  24     |  0.7              Phosphorous: 3.4        ( 07-19 @ 04:40 )   PT: 18.50 sec;   INR: 1.61 ratio  aPTT: 144.2 sec        ASSESSMENT/PLAN:   84 y/o F w/ PMHx of HTN, HLD, CAD, h/o PCI/PO RCA 2014, 3V CABG 2003, hx of mechanical aortic valve 2003 (on coumadin), depression, anxiety, presents s/p mechanical fall, with Right femoral neck fx, s/p hemiarthroplasty of right hip.  Upgraded to SICU for hypotension, with Hb 6.2.    NEURO:   -Acute pain-controlled with tylenol atc  -h/o depession/anxiety - resume lexapro    RESP:     Oxygen insufficiency-wean off NC to RA as tolerated    Activity- as tolerated  -Encourage IS  -Repeat CXR after lasix = improved   AM CXR no changes      CARDS:    -h/o HTN- resumed home atenolol 25 qd and valsartan 160 qd  -HLD   -resume statin?  -h/o CABG/PCI- Takes ASA, being held  -h/o mechanical AVR -resume AC - Coumodin 4mg given 7/17, needs new dose 7/18  - PTT >74 @ hep 8. INR 1.37  -Trop 0.03->0.04 ->0.03, no longer trending  -ECHO 6/8/21: EF 55-60%, moderate MR and TR, mechanical AV, mild pulm HTN    GI/NUTR:     Diet, Regular    GI Prophylaxis- PPI    Bowel regimen-  senna 2 Tablet(s) Oral at bedtime  Bowel movements dark/melena appearing, f/u fecal occult    /RENAL:      Monitor UO-anderson in place, strict I and Os  Labs:          BUN/Cr- 22/1.0  -->,  20/0.8  -->,  22/0.7  -->          Electrolytes-Na 128 // K 3.9 // Mg 2.3 //  Phos 3.4 (07-18 @ 23:35)      HEME/ONC:    DVT prophylaxis-heparin Injectable, SCDs  -Post op, transfuse 2 units pRBCs for Hb 6.2--> rept 9.2->8.2  -INR 3.9 on admission, s/p 2.5mg of Vit K    DVT prophylaxis-heparin  Infusion  , SCDs    Labs: Hb/Hct:  7.4/22.5  -->,  7.3/22.3  -->                      Plts:  189  -->,  178  -->                 PTT/INR:  138.9/--  (07-18 @ 20:03) --->,  73.6/1.47  (07-18 @ 23:35) --->                 T&S:    ID:  WBC- 11.39  --->>,  10.93  --->>,  9.66  --->>  Temp trend- 24hrs T(F): 98.3 (07-18 @ 23:41), Max: 99.8 (07-18 @ 04:00)  Antibiotics-  Cultures:    ENDO:     Glucose, Serum: 115    HA1C 5.2    LINES/DRAINS:  Whit FOSTER Foley     DISPO:    SICU SICU Transfer Note:    Transfer from: SICU  Transfer to:  (x) Surgery    (  ) Telemetry    (  ) Medicine    (  ) Palliative    (  ) Stroke Unit    (  ) _______________      SICU COURSE:  84 y/o F w/ PMHx of HTN, HLD, CAD, h/o PCI/PO RCA 2014, 3V CABG 2003, hx of mechanical aortic valve 2003 (on coumadin), depression, anxiety, osteoporosis presents s/p mechanical fall, +HT, -LOC, falling onto her right side as well as the right side of her head.   CT head and CT c-spine were negative.  Pt was found to have Right femoral neck fx.  INR 3.9 on admission, pt was given 2.5mg of Vitamin K, INR came down to 1.26.  Pt went to the OR this morning with ortho, s/p hemiarthroplasty of right hip.  Uneventful case, pt was successfully extubated.  SICU c/s was called later towards the evening due to pt being hypotensive with MAP of 45 and making 7-12cc of urine, bolus 250cc x 2 given.  Stat labs sent and revealed Hb 6.2, 2 units of pRBCs are being given.  Pt is upgraded to SICU.     Surgery Information  OR time: 3.5hrs      EBL:  150     UO:  375           IV Fluids:  2L     Blood Products: none          While admitted to SICU patient received 2U PRBC and hypotensive resolved. Patient blood remained stable s/p transfusion, continues on her home hypertension regimen. Additionally patient was diuresed x2, patient saturating adequately on room air. Patient remains on heparin gtt to bridge to home coumadin, cardiology following. Patient is alert and oriented, pain controlled. She is tolerating diet, voiding and having bowel movements. Patient cooperative with incentive spirometer. Patient was seen and examined on AM SICU rounds with attending, patient is stable and ready for downgrade at this time.       PAST MEDICAL & SURGICAL HISTORY:  HTN (hypertension)    High cholesterol    Osteoporosis    Fracture of right shoulder    Anxiety    Depression    H/O heart valve replacement with mechanical valve      Allergies    penicillin (Unknown)  pinapples (Unknown)    Intolerances      MEDICATIONS  (STANDING):  acetaminophen   Tablet .. 650 milliGRAM(s) Oral every 6 hours  ATENolol  Tablet 25 milliGRAM(s) Oral daily  atorvastatin 20 milliGRAM(s) Oral at bedtime  chlorhexidine 4% Liquid 1 Application(s) Topical <User Schedule>  escitalopram 10 milliGRAM(s) Oral daily  heparin  Infusion 700 Unit(s)/Hr (7 mL/Hr) IV Continuous <Continuous>  insulin lispro (ADMELOG) corrective regimen sliding scale   SubCutaneous three times a day before meals  pantoprazole    Tablet 40 milliGRAM(s) Oral before breakfast  senna 2 Tablet(s) Oral at bedtime  valsartan 160 milliGRAM(s) Oral daily    MEDICATIONS  (PRN):  oxyCODONE    IR 5 milliGRAM(s) Oral every 6 hours PRN Severe Pain (7 - 10)        Vital Signs Last 24 Hrs  T(C): 36.9 (19 Jul 2021 07:15), Max: 37.2 (18 Jul 2021 15:30)  T(F): 98.5 (19 Jul 2021 07:15), Max: 99 (18 Jul 2021 15:30)  HR: 70 (19 Jul 2021 06:00) (66 - 126)  BP: 102/67 (19 Jul 2021 06:00) (102/67 - 128/55)  BP(mean): 80 (19 Jul 2021 06:00) (79 - 101)  RR: --  SpO2: 100% (19 Jul 2021 06:00) (96% - 100%)  I&O's Summary    18 Jul 2021 07:01  -  19 Jul 2021 07:00  --------------------------------------------------------  IN: 1148 mL / OUT: 610 mL / NET: 538 mL        LABS                                            7.3                   Neurophils% (auto):   x      (07-18 @ 23:35):    9.66 )-----------(189          Lymphocytes% (auto):  x                                             22.3                   Eosinphils% (auto):   x        Manual%: Neutrophils x    ; Lymphocytes x    ; Eosinophils x    ; Bands%: x    ; Blasts x                                    128    |  95     |  22                  Calcium: 7.1   / iCa: x      (07-18 @ 23:35)    ----------------------------<  110       Magnesium: 2.3                              3.9     |  24     |  0.7              Phosphorous: 3.4        ( 07-19 @ 04:40 )   PT: 18.50 sec;   INR: 1.61 ratio  aPTT: 144.2 sec        ASSESSMENT/PLAN:   84 y/o F w/ PMHx of HTN, HLD, CAD, h/o PCI/PO RCA 2014, 3V CABG 2003, hx of mechanical aortic valve 2003 (on coumadin), depression, anxiety, presents s/p mechanical fall, with Right femoral neck fx, s/p hemiarthroplasty of right hip.  Upgraded to SICU for hypotension, with Hb 6.2.    NEURO:   -Acute pain-controlled with tylenol atc  -h/o depession/anxiety - resume lexapro, xanax prn    RESP:     Oxygen insufficiency-wean off NC to RA as tolerated    Activity- as tolerated  -Encourage IS  -Repeat CXR after lasix = improved   AM CXR no changes      CARDS:    -h/o HTN- resumed home atenolol 25 qd and valsartan 160 qd  -HLD   -resume statin  -h/o CABG/PCI- Takes ASA, being held  -h/o mechanical AVR -resume AC - Coumodin 4mg given 7/17, needs new dose 7/18  - PTT >74 @ hep 8. INR 1.37  -Trop 0.03->0.04 ->0.03, no longer trending  -ECHO 6/8/21: EF 55-60%, moderate MR and TR, mechanical AV, mild pulm HTN    GI/NUTR:     Diet, Regular    GI Prophylaxis- PPI    Bowel regimen-  senna 2 Tablet(s) Oral at bedtime  Bowel movements dark/melena appearing, f/u fecal occult    /RENAL:      Monitor UO     Zamudio discontinued, follow up TOV  Labs:          BUN/Cr- 22/1.0  -->,  20/0.8  -->,  22/0.7  -->          Electrolytes-Na 128 // K 3.9 // Mg 2.3 //  Phos 3.4 (07-18 @ 23:35)      HEME/ONC:    DVT prophylaxis-heparin gtt ----> bridge to coumadin  -Post op, transfuse 2 units pRBCs for Hb 6.2--> rept 9.2->8.2    DVT prophylaxis-heparin  Infusion  , SCDs    Labs: Hb/Hct:  7.4/22.5  -->,  7.3/22.3  -->                      Plts:  189  -->,  178  -->                 PTT/INR:  138.9/--  (07-18 @ 20:03) --->,  73.6/1.47  (07-18 @ 23:35) --->                 T&S:    ID:  WBC- 11.39  --->>,  10.93  --->>,  9.66  --->>  Temp trend- 24hrs T(F): 98.3 (07-18 @ 23:41), Max: 99.8 (07-18 @ 04:00)  Antibiotics-  Cultures:    ENDO:     Glucose, Serum: 115    HA1C 5.2    LINES/DRAINS:  Whit FOSTER Foley     DISPO:    SICU    Patient was seen and examined prior to transfer from SICU to floor.    T(F): 98.5 (07-19-21 @ 07:15), Max: 98.5 (07-19-21 @ 07:15)  HR: 71 (07-19-21 @ 12:00)  BP: 132/92 (07-19-21 @ 12:00)  RR: 18 (07-19-21 @ 12:00)  SpO2: 100% (07-19-21 @ 12:00)    Patient is stable for transfer, the receiving team was called at 07-19-21 @   Provider notified: SICU Transfer Note:    Transfer from: SICU  Transfer to:  (x) Surgery    (  ) Telemetry    (  ) Medicine    (  ) Palliative    (  ) Stroke Unit    (  ) _______________      SICU COURSE:  86 y/o F w/ PMHx of HTN, HLD, CAD, h/o PCI/PO RCA 2014, 3V CABG 2003, hx of mechanical aortic valve 2003 (on coumadin), depression, anxiety, osteoporosis presents s/p mechanical fall, +HT, -LOC, falling onto her right side as well as the right side of her head.   CT head and CT c-spine were negative.  Pt was found to have Right femoral neck fx.  INR 3.9 on admission, pt was given 2.5mg of Vitamin K, INR came down to 1.26.  Pt went to the OR this morning with ortho, s/p hemiarthroplasty of right hip.  Uneventful case, pt was successfully extubated.  SICU c/s was called later towards the evening due to pt being hypotensive with MAP of 45 and making 7-12cc of urine, bolus 250cc x 2 given.  Stat labs sent and revealed Hb 6.2, 2 units of pRBCs are being given.  Pt is upgraded to SICU.     Surgery Information  OR time: 3.5hrs      EBL:  150     UO:  375           IV Fluids:  2L     Blood Products: none          While admitted to SICU patient received 2U PRBC and hypotensive resolved. Patient blood remained stable s/p transfusion, continues on her home hypertension regimen. Additionally patient was diuresed x2, patient saturating adequately on room air. Patient remains on heparin gtt to bridge to home coumadin, cardiology following. Patient is alert and oriented, pain controlled. She is tolerating diet, voiding and having bowel movements. Patient cooperative with incentive spirometer. Patient was seen and examined on AM SICU rounds with attending, patient is stable and ready for downgrade at this time.       PAST MEDICAL & SURGICAL HISTORY:  HTN (hypertension)    High cholesterol    Osteoporosis    Fracture of right shoulder    Anxiety    Depression    H/O heart valve replacement with mechanical valve      Allergies    penicillin (Unknown)  pinapples (Unknown)    Intolerances      MEDICATIONS  (STANDING):  acetaminophen   Tablet .. 650 milliGRAM(s) Oral every 6 hours  ATENolol  Tablet 25 milliGRAM(s) Oral daily  atorvastatin 20 milliGRAM(s) Oral at bedtime  chlorhexidine 4% Liquid 1 Application(s) Topical <User Schedule>  escitalopram 10 milliGRAM(s) Oral daily  heparin  Infusion 700 Unit(s)/Hr (7 mL/Hr) IV Continuous <Continuous>  insulin lispro (ADMELOG) corrective regimen sliding scale   SubCutaneous three times a day before meals  pantoprazole    Tablet 40 milliGRAM(s) Oral before breakfast  senna 2 Tablet(s) Oral at bedtime  valsartan 160 milliGRAM(s) Oral daily    MEDICATIONS  (PRN):  oxyCODONE    IR 5 milliGRAM(s) Oral every 6 hours PRN Severe Pain (7 - 10)        Vital Signs Last 24 Hrs  T(C): 36.9 (19 Jul 2021 07:15), Max: 37.2 (18 Jul 2021 15:30)  T(F): 98.5 (19 Jul 2021 07:15), Max: 99 (18 Jul 2021 15:30)  HR: 70 (19 Jul 2021 06:00) (66 - 126)  BP: 102/67 (19 Jul 2021 06:00) (102/67 - 128/55)  BP(mean): 80 (19 Jul 2021 06:00) (79 - 101)  RR: --  SpO2: 100% (19 Jul 2021 06:00) (96% - 100%)  I&O's Summary    18 Jul 2021 07:01  -  19 Jul 2021 07:00  --------------------------------------------------------  IN: 1148 mL / OUT: 610 mL / NET: 538 mL        LABS                                            7.3                   Neurophils% (auto):   x      (07-18 @ 23:35):    9.66 )-----------(189          Lymphocytes% (auto):  x                                             22.3                   Eosinphils% (auto):   x        Manual%: Neutrophils x    ; Lymphocytes x    ; Eosinophils x    ; Bands%: x    ; Blasts x                                    128    |  95     |  22                  Calcium: 7.1   / iCa: x      (07-18 @ 23:35)    ----------------------------<  110       Magnesium: 2.3                              3.9     |  24     |  0.7              Phosphorous: 3.4        ( 07-19 @ 04:40 )   PT: 18.50 sec;   INR: 1.61 ratio  aPTT: 144.2 sec        ASSESSMENT/PLAN:   86 y/o F w/ PMHx of HTN, HLD, CAD, h/o PCI/PO RCA 2014, 3V CABG 2003, hx of mechanical aortic valve 2003 (on coumadin), depression, anxiety, presents s/p mechanical fall, with Right femoral neck fx, s/p hemiarthroplasty of right hip.  Upgraded to SICU for hypotension, with Hb 6.2.    NEURO:   -Acute pain-controlled with tylenol atc  -h/o depession/anxiety - resume lexapro, xanax prn    RESP:     Oxygen insufficiency-wean off NC to RA as tolerated    Activity- as tolerated  -Encourage IS  -Repeat CXR after lasix = improved   AM CXR no changes      CARDS:    -h/o HTN- resumed home atenolol 25 qd and valsartan 160 qd  -HLD   -resume statin  -h/o CABG/PCI- Takes ASA, being held  -h/o mechanical AVR -resume AC - Coumodin 4mg given 7/17, needs new dose 7/18  - PTT >74 @ hep 8. INR 1.37  -Trop 0.03->0.04 ->0.03, no longer trending  -ECHO 6/8/21: EF 55-60%, moderate MR and TR, mechanical AV, mild pulm HTN    GI/NUTR:     Diet, Regular    GI Prophylaxis- PPI    Bowel regimen-  senna 2 Tablet(s) Oral at bedtime  Bowel movements dark/melena appearing, f/u fecal occult    /RENAL:      Monitor UO     Zamudio discontinued, follow up TOV  Labs:          BUN/Cr- 22/1.0  -->,  20/0.8  -->,  22/0.7  -->          Electrolytes-Na 128 // K 3.9 // Mg 2.3 //  Phos 3.4 (07-18 @ 23:35)      HEME/ONC:    DVT prophylaxis-heparin gtt ----> bridge to coumadin  -Post op, transfuse 2 units pRBCs for Hb 6.2--> rept 9.2->8.2    DVT prophylaxis-heparin  Infusion  , SCDs    Labs: Hb/Hct:  7.4/22.5  -->,  7.3/22.3  -->                      Plts:  189  -->,  178  -->                 PTT/INR:  138.9/--  (07-18 @ 20:03) --->,  73.6/1.47  (07-18 @ 23:35) --->                 T&S:    ID:  WBC- 11.39  --->>,  10.93  --->>,  9.66  --->>  Temp trend- 24hrs T(F): 98.3 (07-18 @ 23:41), Max: 99.8 (07-18 @ 04:00)  Antibiotics-  Cultures:    ENDO:     Glucose, Serum: 115    HA1C 5.2    LINES/DRAINS:  KRISTIN, Lizz Sherman     DISPO:    SICU    Patient was seen and examined prior to transfer from SICU to floor.    T(F): 98.5 (07-19-21 @ 07:15), Max: 98.5 (07-19-21 @ 07:15)  HR: 71 (07-19-21 @ 12:00)  BP: 132/92 (07-19-21 @ 12:00)  RR: 18 (07-19-21 @ 12:00)  SpO2: 100% (07-19-21 @ 12:00)    Patient is stable for transfer, the receiving team was called at 07-19-21 @ approximately 3PM  Provider notified: Dr. Christensen in person

## 2021-07-19 NOTE — PROGRESS NOTE ADULT - SUBJECTIVE AND OBJECTIVE BOX
Orthopaedic Surgery Progress Note    ORAL WU  [id number]    S&E at bedside this AM. Pain well controlled. Per nursing team she had several runs of SVT yesterday and has been intermittently tachycardic. Remains in ICU 2/2 post-op anemia and hypotension (improving - last Hb 7.3, hypotension improved).    acetaminophen   Tablet .. 650 milliGRAM(s) Oral every 6 hours  ATENolol  Tablet 25 milliGRAM(s) Oral daily  atorvastatin 20 milliGRAM(s) Oral at bedtime  chlorhexidine 4% Liquid 1 Application(s) Topical <User Schedule>  escitalopram 10 milliGRAM(s) Oral daily  heparin  Infusion 700 Unit(s)/Hr IV Continuous <Continuous>  insulin lispro (ADMELOG) corrective regimen sliding scale   SubCutaneous three times a day before meals  oxyCODONE    IR 5 milliGRAM(s) Oral every 6 hours PRN  pantoprazole    Tablet 40 milliGRAM(s) Oral before breakfast  senna 2 Tablet(s) Oral at bedtime  valsartan 160 milliGRAM(s) Oral daily      T(C): 36.9 (07-19-21 @ 07:15), Max: 37.2 (07-18-21 @ 15:30)  HR: 68 (07-19-21 @ 09:00) (66 - 126)  BP: 144/60 (07-19-21 @ 09:00) (96/70 - 144/60)  RR: 20 (07-19-21 @ 09:00) (19 - 20)  SpO2: 100% (07-19-21 @ 09:00) (96% - 100%)    RLE:         Dressing: clean/dry/intact; mild spotting over aquacel         Sensation: SILT         Motor exam: 5/5 TA/GS/EHL         Compartments soft/nontender          warm well perfused; capillary refill <3 seconds     Labs                        7.3    9.66  )-----------( 189      ( 18 Jul 2021 23:35 )             22.3     07-18    128<L>  |  95<L>  |  22<H>  ----------------------------<  110<H>  3.9   |  24  |  0.7    Ca    7.1<L>      18 Jul 2021 23:35  Phos  3.4     07-18  Mg     2.3     07-18    TPro  4.2<L>  /  Alb  2.7<L>  /  TBili  0.5  /  DBili  0.2  /  AST  19  /  ALT  11  /  AlkPhos  48  07-17    LIVER FUNCTIONS - ( 17 Jul 2021 20:09 )  Alb: 2.7 g/dL / Pro: 4.2 g/dL / ALK PHOS: 48 U/L / ALT: 11 U/L / AST: 19 U/L / GGT: x           PT/INR - ( 19 Jul 2021 04:40 )   PT: 18.50 sec;   INR: 1.61 ratio         PTT - ( 19 Jul 2021 04:40 )  PTT:144.2 sec    A/P :  Pt is a 86yo Female, POD#3 s/p R hip Aram      - WBAT  - pain control  - postop antibiotics  - DVT prophylaxis - Heparin drip per SICU team  - IS encouraged  - PT/rehab  - Dispo planning

## 2021-07-20 LAB
ANION GAP SERPL CALC-SCNC: 6 MMOL/L — LOW (ref 7–14)
ANION GAP SERPL CALC-SCNC: 7 MMOL/L — SIGNIFICANT CHANGE UP (ref 7–14)
APTT BLD: 39.9 SEC — HIGH (ref 27–39.2)
APTT BLD: 68.6 SEC — HIGH (ref 27–39.2)
BASOPHILS # BLD AUTO: 0.04 K/UL — SIGNIFICANT CHANGE UP (ref 0–0.2)
BASOPHILS NFR BLD AUTO: 0.4 % — SIGNIFICANT CHANGE UP (ref 0–1)
BUN SERPL-MCNC: 23 MG/DL — HIGH (ref 10–20)
BUN SERPL-MCNC: 25 MG/DL — HIGH (ref 10–20)
CALCIUM SERPL-MCNC: 7.2 MG/DL — LOW (ref 8.5–10.1)
CALCIUM SERPL-MCNC: 7.3 MG/DL — LOW (ref 8.5–10.1)
CHLORIDE SERPL-SCNC: 95 MMOL/L — LOW (ref 98–110)
CHLORIDE SERPL-SCNC: 98 MMOL/L — SIGNIFICANT CHANGE UP (ref 98–110)
CO2 SERPL-SCNC: 25 MMOL/L — SIGNIFICANT CHANGE UP (ref 17–32)
CO2 SERPL-SCNC: 28 MMOL/L — SIGNIFICANT CHANGE UP (ref 17–32)
CREAT SERPL-MCNC: 0.8 MG/DL — SIGNIFICANT CHANGE UP (ref 0.7–1.5)
CREAT SERPL-MCNC: 0.8 MG/DL — SIGNIFICANT CHANGE UP (ref 0.7–1.5)
EOSINOPHIL # BLD AUTO: 0.63 K/UL — SIGNIFICANT CHANGE UP (ref 0–0.7)
EOSINOPHIL NFR BLD AUTO: 6.1 % — SIGNIFICANT CHANGE UP (ref 0–8)
GLUCOSE BLDC GLUCOMTR-MCNC: 119 MG/DL — HIGH (ref 70–99)
GLUCOSE BLDC GLUCOMTR-MCNC: 122 MG/DL — HIGH (ref 70–99)
GLUCOSE BLDC GLUCOMTR-MCNC: 133 MG/DL — HIGH (ref 70–99)
GLUCOSE SERPL-MCNC: 109 MG/DL — HIGH (ref 70–99)
GLUCOSE SERPL-MCNC: 133 MG/DL — HIGH (ref 70–99)
HCT VFR BLD CALC: 21.2 % — LOW (ref 37–47)
HCT VFR BLD CALC: 22.1 % — LOW (ref 37–47)
HCT VFR BLD CALC: 24.1 % — LOW (ref 37–47)
HGB BLD-MCNC: 7 G/DL — LOW (ref 12–16)
HGB BLD-MCNC: 7.3 G/DL — LOW (ref 12–16)
HGB BLD-MCNC: 7.7 G/DL — LOW (ref 12–16)
IMM GRANULOCYTES NFR BLD AUTO: 1 % — HIGH (ref 0.1–0.3)
INR BLD: 2.34 RATIO — HIGH (ref 0.65–1.3)
INR BLD: 2.6 RATIO — HIGH (ref 0.65–1.3)
LACTATE SERPL-SCNC: 1.5 MMOL/L — SIGNIFICANT CHANGE UP (ref 0.7–2)
LYMPHOCYTES # BLD AUTO: 1.42 K/UL — SIGNIFICANT CHANGE UP (ref 1.2–3.4)
LYMPHOCYTES # BLD AUTO: 13.8 % — LOW (ref 20.5–51.1)
MAGNESIUM SERPL-MCNC: 2.1 MG/DL — SIGNIFICANT CHANGE UP (ref 1.8–2.4)
MAGNESIUM SERPL-MCNC: 2.1 MG/DL — SIGNIFICANT CHANGE UP (ref 1.8–2.4)
MCHC RBC-ENTMCNC: 29.3 PG — SIGNIFICANT CHANGE UP (ref 27–31)
MCHC RBC-ENTMCNC: 29.5 PG — SIGNIFICANT CHANGE UP (ref 27–31)
MCHC RBC-ENTMCNC: 29.7 PG — SIGNIFICANT CHANGE UP (ref 27–31)
MCHC RBC-ENTMCNC: 32 G/DL — SIGNIFICANT CHANGE UP (ref 32–37)
MCHC RBC-ENTMCNC: 33 G/DL — SIGNIFICANT CHANGE UP (ref 32–37)
MCHC RBC-ENTMCNC: 33 G/DL — SIGNIFICANT CHANGE UP (ref 32–37)
MCV RBC AUTO: 89.5 FL — SIGNIFICANT CHANGE UP (ref 81–99)
MCV RBC AUTO: 89.8 FL — SIGNIFICANT CHANGE UP (ref 81–99)
MCV RBC AUTO: 91.6 FL — SIGNIFICANT CHANGE UP (ref 81–99)
MONOCYTES # BLD AUTO: 0.86 K/UL — HIGH (ref 0.1–0.6)
MONOCYTES NFR BLD AUTO: 8.3 % — SIGNIFICANT CHANGE UP (ref 1.7–9.3)
NEUTROPHILS # BLD AUTO: 7.27 K/UL — HIGH (ref 1.4–6.5)
NEUTROPHILS NFR BLD AUTO: 70.4 % — SIGNIFICANT CHANGE UP (ref 42.2–75.2)
NRBC # BLD: 0 /100 WBCS — SIGNIFICANT CHANGE UP (ref 0–0)
PHOSPHATE SERPL-MCNC: 2.7 MG/DL — SIGNIFICANT CHANGE UP (ref 2.1–4.9)
PHOSPHATE SERPL-MCNC: 3.2 MG/DL — SIGNIFICANT CHANGE UP (ref 2.1–4.9)
PLATELET # BLD AUTO: 234 K/UL — SIGNIFICANT CHANGE UP (ref 130–400)
PLATELET # BLD AUTO: 276 K/UL — SIGNIFICANT CHANGE UP (ref 130–400)
PLATELET # BLD AUTO: 280 K/UL — SIGNIFICANT CHANGE UP (ref 130–400)
POTASSIUM SERPL-MCNC: 3.7 MMOL/L — SIGNIFICANT CHANGE UP (ref 3.5–5)
POTASSIUM SERPL-MCNC: 4 MMOL/L — SIGNIFICANT CHANGE UP (ref 3.5–5)
POTASSIUM SERPL-SCNC: 3.7 MMOL/L — SIGNIFICANT CHANGE UP (ref 3.5–5)
POTASSIUM SERPL-SCNC: 4 MMOL/L — SIGNIFICANT CHANGE UP (ref 3.5–5)
PROTHROM AB SERPL-ACNC: 26.9 SEC — HIGH (ref 9.95–12.87)
PROTHROM AB SERPL-ACNC: 29.9 SEC — HIGH (ref 9.95–12.87)
RBC # BLD: 2.37 M/UL — LOW (ref 4.2–5.4)
RBC # BLD: 2.46 M/UL — LOW (ref 4.2–5.4)
RBC # BLD: 2.63 M/UL — LOW (ref 4.2–5.4)
RBC # FLD: 13.6 % — SIGNIFICANT CHANGE UP (ref 11.5–14.5)
RBC # FLD: 13.7 % — SIGNIFICANT CHANGE UP (ref 11.5–14.5)
RBC # FLD: 13.8 % — SIGNIFICANT CHANGE UP (ref 11.5–14.5)
SODIUM SERPL-SCNC: 129 MMOL/L — LOW (ref 135–146)
SODIUM SERPL-SCNC: 130 MMOL/L — LOW (ref 135–146)
WBC # BLD: 10.32 K/UL — SIGNIFICANT CHANGE UP (ref 4.8–10.8)
WBC # BLD: 8.78 K/UL — SIGNIFICANT CHANGE UP (ref 4.8–10.8)
WBC # BLD: 9.11 K/UL — SIGNIFICANT CHANGE UP (ref 4.8–10.8)
WBC # FLD AUTO: 10.32 K/UL — SIGNIFICANT CHANGE UP (ref 4.8–10.8)
WBC # FLD AUTO: 8.78 K/UL — SIGNIFICANT CHANGE UP (ref 4.8–10.8)
WBC # FLD AUTO: 9.11 K/UL — SIGNIFICANT CHANGE UP (ref 4.8–10.8)

## 2021-07-20 PROCEDURE — 99233 SBSQ HOSP IP/OBS HIGH 50: CPT

## 2021-07-20 PROCEDURE — 99231 SBSQ HOSP IP/OBS SF/LOW 25: CPT

## 2021-07-20 PROCEDURE — 71045 X-RAY EXAM CHEST 1 VIEW: CPT | Mod: 26,77

## 2021-07-20 PROCEDURE — 71045 X-RAY EXAM CHEST 1 VIEW: CPT | Mod: 26

## 2021-07-20 PROCEDURE — 93010 ELECTROCARDIOGRAM REPORT: CPT

## 2021-07-20 RX ORDER — WARFARIN SODIUM 2.5 MG/1
4 TABLET ORAL DAILY
Refills: 0 | Status: DISCONTINUED | OUTPATIENT
Start: 2021-07-20 | End: 2021-07-20

## 2021-07-20 RX ORDER — SODIUM CHLORIDE 9 MG/ML
250 INJECTION INTRAMUSCULAR; INTRAVENOUS; SUBCUTANEOUS ONCE
Refills: 0 | Status: COMPLETED | OUTPATIENT
Start: 2021-07-20 | End: 2021-07-20

## 2021-07-20 RX ORDER — SODIUM CHLORIDE 9 MG/ML
500 INJECTION, SOLUTION INTRAVENOUS ONCE
Refills: 0 | Status: COMPLETED | OUTPATIENT
Start: 2021-07-20 | End: 2021-07-20

## 2021-07-20 RX ORDER — POTASSIUM CHLORIDE 20 MEQ
20 PACKET (EA) ORAL
Refills: 0 | Status: COMPLETED | OUTPATIENT
Start: 2021-07-20 | End: 2021-07-20

## 2021-07-20 RX ORDER — WARFARIN SODIUM 2.5 MG/1
4 TABLET ORAL AT BEDTIME
Refills: 0 | Status: COMPLETED | OUTPATIENT
Start: 2021-07-20 | End: 2021-07-20

## 2021-07-20 RX ORDER — WARFARIN SODIUM 2.5 MG/1
4 TABLET ORAL AT BEDTIME
Refills: 0 | Status: DISCONTINUED | OUTPATIENT
Start: 2021-07-20 | End: 2021-07-20

## 2021-07-20 RX ORDER — WARFARIN SODIUM 2.5 MG/1
4 TABLET ORAL ONCE
Refills: 0 | Status: DISCONTINUED | OUTPATIENT
Start: 2021-07-20 | End: 2021-07-20

## 2021-07-20 RX ORDER — SODIUM CHLORIDE 9 MG/ML
2 INJECTION INTRAMUSCULAR; INTRAVENOUS; SUBCUTANEOUS ONCE
Refills: 0 | Status: COMPLETED | OUTPATIENT
Start: 2021-07-20 | End: 2021-07-20

## 2021-07-20 RX ADMIN — CHLORHEXIDINE GLUCONATE 1 APPLICATION(S): 213 SOLUTION TOPICAL at 06:08

## 2021-07-20 RX ADMIN — ESCITALOPRAM OXALATE 10 MILLIGRAM(S): 10 TABLET, FILM COATED ORAL at 12:29

## 2021-07-20 RX ADMIN — Medication 650 MILLIGRAM(S): at 01:00

## 2021-07-20 RX ADMIN — Medication 650 MILLIGRAM(S): at 06:56

## 2021-07-20 RX ADMIN — WARFARIN SODIUM 4 MILLIGRAM(S): 2.5 TABLET ORAL at 21:53

## 2021-07-20 RX ADMIN — Medication 81 MILLIGRAM(S): at 12:30

## 2021-07-20 RX ADMIN — Medication 650 MILLIGRAM(S): at 18:25

## 2021-07-20 RX ADMIN — Medication 650 MILLIGRAM(S): at 12:30

## 2021-07-20 RX ADMIN — Medication 650 MILLIGRAM(S): at 00:13

## 2021-07-20 RX ADMIN — SODIUM CHLORIDE 250 MILLILITER(S): 9 INJECTION INTRAMUSCULAR; INTRAVENOUS; SUBCUTANEOUS at 02:29

## 2021-07-20 RX ADMIN — SODIUM CHLORIDE 2 GRAM(S): 9 INJECTION INTRAMUSCULAR; INTRAVENOUS; SUBCUTANEOUS at 06:05

## 2021-07-20 RX ADMIN — Medication 20 MILLIEQUIVALENT(S): at 06:07

## 2021-07-20 RX ADMIN — ATENOLOL 25 MILLIGRAM(S): 25 TABLET ORAL at 06:06

## 2021-07-20 RX ADMIN — PANTOPRAZOLE SODIUM 40 MILLIGRAM(S): 20 TABLET, DELAYED RELEASE ORAL at 06:06

## 2021-07-20 RX ADMIN — Medication 650 MILLIGRAM(S): at 06:06

## 2021-07-20 RX ADMIN — VALSARTAN 160 MILLIGRAM(S): 80 TABLET ORAL at 06:06

## 2021-07-20 RX ADMIN — SENNA PLUS 2 TABLET(S): 8.6 TABLET ORAL at 21:55

## 2021-07-20 RX ADMIN — Medication 20 MILLIEQUIVALENT(S): at 10:09

## 2021-07-20 RX ADMIN — SODIUM CHLORIDE 500 MILLILITER(S): 9 INJECTION, SOLUTION INTRAVENOUS at 12:11

## 2021-07-20 RX ADMIN — ATORVASTATIN CALCIUM 20 MILLIGRAM(S): 80 TABLET, FILM COATED ORAL at 21:53

## 2021-07-20 NOTE — CDI QUERY NOTE - NSCDIOTHERTXTBX_GEN_ALL_CORE_HH
CLINICAL INDICATORS    7/16 Consult Note Adult-SICU Physician Assistant: RESP:  Oxygen insufficiency-wean off NC to RA as tolerate, Activity- bedrest tonight … 07-16 @ 10:38 -- 7.46 / 36 / 293 / 26 / 100 … Acute respiratory insufficiency ->continue to monitor    7/17 Progress Note Adult-SICU Physician Assistant/Attending: Indication for ICU admission: hypotension with Hb 6.2 - s/ p hemiarthroplasty of Right hip … Oxygen insufficiency-wean off NC to RA as tolerated … O2 desat to 86%, NRB placed, sat improved to 100%.     7/19 Progress Note Adult-SICU Resident: RESP: Oxygen insufficiency-wean off NC to RA as tolerated … Encourage IS, Repeat CXR after lasix = improved, AM CXR no changes  Per Nursing Vital Sign Flowsheet 7/16: @2245 SpO2 73, @2300 SpO2 84, @2315 SpO2 82, @ 2330 100 with NRB placed.     7/17 Nursing Adult Assessment and Intervention: Respiratory: WDL except Rhythm/Pattern, Respiratory: dyspneic; dyspnea on exertion    Based on your professional judgment and the clinical indicators, please clarify if acute respiratory insufficiency can be further specified as:   • Acute respiratory insufficiency related to hypoxia and acute pulmonary insufficiency   • Acute respiratory insufficiency related to hypoxia without acute pulmonary insufficiency   • Other (please specify):   • Clinically unable to further specify acute respiratory insufficiency      Thank you,  Evette Munoz RN [facility]  [phone]

## 2021-07-20 NOTE — PROGRESS NOTE ADULT - SUBJECTIVE AND OBJECTIVE BOX
`TRAUMA SURGERY PROGRESS NOTE    Patient: ORAL WU , 85y ([birth year])Female   MRN: [id number]  Location: [address]  Visit: 07-13-21 Inpatient  Date: 07-20-21 @ 12:37    Hospital Day #:8  Post-Op Day #:4    Dx: Femoral neck Fx    Events of past 24 hours: No acuteevents    PAST MEDICAL & SURGICAL HISTORY:  HTN (hypertension)    High cholesterol    Osteoporosis    Fracture of right shoulder    Anxiety    Depression    H/O heart valve replacement with mechanical valve        Vitals:   T(F): 96.3 (07-20-21 @ 05:00), Max: 97.9 (07-19-21 @ 16:00)  HR: 71 (07-20-21 @ 05:00)  BP: 144/61 (07-20-21 @ 05:00)  RR: 18 (07-20-21 @ 05:00)  SpO2: 100% (07-19-21 @ 19:05)      Diet, Regular:   DASH/TLC Sodium & Cholesterol Restricted      Fluids:     I & O's:    07-19-21 @ 07:01  -  07-20-21 @ 07:00  --------------------------------------------------------  IN:    Heparin: 28 mL    Heparin Infusion: 54 mL    Oral Fluid: 360 mL    Sodium Chloride 0.9% Bolus: 250 mL  Total IN: 692 mL    OUT:    Incontinent per Collection Bag (mL): 100 mL    Ureteral Catheter (mL): 94 mL  Total OUT: 194 mL    Total NET: 498 mL        Bowel Movement: : [x] YES [] NO  Flatus: : [x] YES [] NO    PHYSICAL EXAM:  General: NAD  HEENT: Normocephalic, atraumatic, EOMI, no scalp lacerations   Neck: Soft, midline trachea. no c-spine tenderness  Chest: No chest wall tenderness, no subcutaneous emphysema   Cardiac: S1, S2, RRR  Respiratory: Bilateral breath sounds, clear and equal bilaterally  Abdomen: Soft, non-distended, non-tender, no rebound, no guarding.  Groin: Normal appearing, pelvis stable   Ext:  Moving b/l upper and lower extremities. Palpable Radial b/l UE, b/l DP palpable in LE.   Back: No T/L/S spine tenderness, No palpable runoff/stepoff/deformity      MEDICATIONS  (STANDING):  acetaminophen   Tablet .. 650 milliGRAM(s) Oral every 6 hours  aspirin  chewable 81 milliGRAM(s) Oral daily  ATENolol  Tablet 25 milliGRAM(s) Oral daily  atorvastatin 20 milliGRAM(s) Oral at bedtime  chlorhexidine 4% Liquid 1 Application(s) Topical <User Schedule>  escitalopram 10 milliGRAM(s) Oral daily  heparin  Infusion. 900 Unit(s)/Hr (9 mL/Hr) IV Continuous <Continuous>  insulin lispro (ADMELOG) corrective regimen sliding scale   SubCutaneous three times a day before meals  pantoprazole    Tablet 40 milliGRAM(s) Oral before breakfast  senna 2 Tablet(s) Oral at bedtime  valsartan 160 milliGRAM(s) Oral daily    MEDICATIONS  (PRN):  oxyCODONE    IR 5 milliGRAM(s) Oral every 6 hours PRN Severe Pain (7 - 10)      DVT PROPHYLAXIS: SCDs, heparin  Infusion. 900 Unit(s)/Hr IV Continuous <Continuous>    GI PROPHYLAXIS: pantoprazole    Tablet 40 milliGRAM(s) Oral before breakfast    ANTICOAGULATION:   ANTIBIOTICS:           LAB/STUDIES:  Labs:  CAPILLARY BLOOD GLUCOSE      POCT Blood Glucose.: 133 mg/dL (20 Jul 2021 08:08)  POCT Blood Glucose.: 138 mg/dL (19 Jul 2021 16:39)  POCT Blood Glucose.: 109 mg/dL (19 Jul 2021 12:43)                          7.7    9.11  )-----------( 276      ( 20 Jul 2021 06:14 )             24.1         07-20    129<L>  |  95<L>  |  25<H>  ----------------------------<  109<H>  3.7   |  28  |  0.8      eGFR if Non African American: 67 mL/min/1.73M2 (07-20-21 @ 02:42)      LFTs:       ABG - ( 16 Jul 2021 10:38 )  pH: 7.46  /  pCO2: 36    /  pO2: 293   / HCO3: 26    / Base Excess: 1.9   /  SaO2: 100               Coags:     26.90  ----< 2.34    ( 20 Jul 2021 02:42 )     39.9          ACCESS DEVICES:  [x] Peripheral IV  [ ] Central Venous Line	[ ] R	[ ] L	[ ] IJ	[ ] Fem	[ ] SC	Placed:   [ ] Arterial Line		[ ] R	[ ] L	[ ] Fem	[ ] Rad	[ ] Ax	Placed:   [ ] PICC:					[ ] Mediport  [ ] Urinary Catheter,  Date Placed:   [ ] Chest tube: [ ] Right, [ ] Left  [ ] NAKIA/Gonzalez Drains

## 2021-07-20 NOTE — PROGRESS NOTE ADULT - ASSESSMENT
pt w/o cp,sob. rehab as tolerated. inr therapeutic. follow on coumadin. hgb low and being followed w/plan as per surgery.

## 2021-07-20 NOTE — PROGRESS NOTE ADULT - SUBJECTIVE AND OBJECTIVE BOX
T H I S   I S    N O  T   A    F I N A L I Z E D   N O T E      ORAL WU  85y, Female  Allergy: penicillin (Unknown)  pinapples (Unknown)    Hospital Day: 7d    Patient seen and examined earlier today.     PMH/PSH:  PAST MEDICAL & SURGICAL HISTORY:  HTN (hypertension)    High cholesterol    Osteoporosis    Fracture of right shoulder    Anxiety    Depression    H/O heart valve replacement with mechanical valve        LAST 24-Hr EVENTS:    VITALS:  T(F): 96.3 (07-20-21 @ 05:00), Max: 98 (07-19-21 @ 12:00)  HR: 71 (07-20-21 @ 05:00)  BP: 144/61 (07-20-21 @ 05:00) (114/48 - 153/63)  RR: 18 (07-20-21 @ 05:00)  SpO2: 100% (07-19-21 @ 19:05)          TESTS & MEASUREMENTS:    BMI (kg/m2): 25.3 (07-16)    07-18-21 @ 07:01  -  07-19-21 @ 07:00  --------------------------------------------------------  IN: 1148 mL / OUT: 610 mL / NET: 538 mL    07-19-21 @ 07:01  -  07-20-21 @ 07:00  --------------------------------------------------------  IN: 692 mL / OUT: 194 mL / NET: 498 mL                            7.0    8.78  )-----------( 234      ( 20 Jul 2021 02:42 )             21.2     PT/INR - ( 20 Jul 2021 02:42 )   PT: 26.90 sec;   INR: 2.34 ratio         PTT - ( 20 Jul 2021 02:42 )  PTT:39.9 sec  INR: 2.34 ratio (07-20-21 @ 02:42)  INR: 1.80 ratio (07-19-21 @ 10:50)    07-20    129<L>  |  95<L>  |  25<H>  ----------------------------<  109<H>  3.7   |  28  |  0.8    Ca    7.2<L>      20 Jul 2021 02:42  Phos  3.2     07-20  Mg     2.1     07-20                        COVID-19 PCR: NotDetec (07-16-21 @ 00:00)  COVID-19 PCR: NotDetec (07-13-21 @ 14:20)          Indwelling Urethral Catheter:     Connect To:  Straight Drainage/Gravity    Indication:  Urine Output Monitoring in Critically Ill (07-18-21 @ 16:57) (not performed)  Indwelling Urethral Catheter:     Connect To:  Straight Drainage/Gravity    Indication:  Urine Output Monitoring in Critically Ill    Additional Instructions:  DO NOT remove for the next 48 hours, at which time a new order is required to either remove or keep the indwelling urinary catheter (07-13-21 @ 21:57) (not performed)      RADIOLOGY, ECG, & ADDITIONAL TESTS:  12 Lead ECG:   Ventricular Rate 62 BPM    Atrial Rate 62 BPM    P-R Interval 184 ms    QRS Duration 86 ms    Q-T Interval 436 ms    QTC Calculation(Bazett) 442 ms    P Axis 51 degrees    R Axis 42 degrees    T Axis 52 degrees    Diagnosis Line Normal sinus rhythm  Nonspecific ST abnormality  Abnormal ECG    Confirmed by Funmilayo Carmona MD ([id number]) on 7/19/2021 11:19:56 AM (07-19-21 @ 04:34)      RECENT DIAGNOSTIC ORDERS:  Complete Blood Count: AM Sched. Collection: 20-Jul-2021 04:30 (07-20-21 @ 00:01)  Complete Blood Count: STAT (07-19-21 @ 18:58)  Complete Blood Count: Repeat From: 20-Jul-2021 00:00 To: 22-Jul-2021 04:30, Every 1 day(s) (07-19-21 @ 18:58)  Complete Blood Count: Repeat From: 23-Jul-2021 00:00 To: 25-Jul-2021 04:30, Every 1 day(s) (07-19-21 @ 18:58)  Complete Blood Count: Repeat From: 26-Jul-2021 00:00 To: 28-Jul-2021 04:30, Every 1 day(s) (07-19-21 @ 18:58)  Complete Blood Count: Repeat From: 29-Jul-2021 00:00 To: 31-Jul-2021 04:30, Every 1 day(s) (07-19-21 @ 18:58)  Complete Blood Count: Repeat From: 01-Aug-2021 00:00 To: 03-Aug-2021 04:30, Every 1 day(s) (07-19-21 @ 18:58)  Activated Partial Thromboplastin Time:  Start Date:19-Jul-2021. STAT (07-19-21 @ 18:58)      MEDICATIONS:  MEDICATIONS  (STANDING):  acetaminophen   Tablet .. 650 milliGRAM(s) Oral every 6 hours  aspirin  chewable 81 milliGRAM(s) Oral daily  ATENolol  Tablet 25 milliGRAM(s) Oral daily  atorvastatin 20 milliGRAM(s) Oral at bedtime  chlorhexidine 4% Liquid 1 Application(s) Topical <User Schedule>  escitalopram 10 milliGRAM(s) Oral daily  heparin  Infusion. 900 Unit(s)/Hr (9 mL/Hr) IV Continuous <Continuous>  insulin lispro (ADMELOG) corrective regimen sliding scale   SubCutaneous three times a day before meals  pantoprazole    Tablet 40 milliGRAM(s) Oral before breakfast  potassium chloride    Tablet ER 20 milliEquivalent(s) Oral every 2 hours  senna 2 Tablet(s) Oral at bedtime  valsartan 160 milliGRAM(s) Oral daily    MEDICATIONS  (PRN):  oxyCODONE    IR 5 milliGRAM(s) Oral every 6 hours PRN Severe Pain (7 - 10)      HOME MEDICATIONS:  alendronate 35 mg oral tablet (06-07)  ALPRAZolam 0.5 mg oral tablet (06-07)  aspirin 81 mg oral tablet, chewable (06-07)  atenolol 25 mg oral tablet (06-07)  atorvastatin 20 mg oral tablet (06-07)  escitalopram 10 mg oral tablet (06-07)  ezetimibe 10 mg oral tablet (06-07)  iron gluconate (06-07)  lidocaine 5% patch (06-07)  valsartan 160 mg oral tablet (06-07)  warfarin 4 mg oral tablet (06-07)      PHYSICAL EXAM:  GENERAL:   CHEST/LUNG:   HEART:   ABDOMEN:   EXTREMITIES:               BRYCEORAL  85y, Female  Allergy: penicillin (Unknown)  pinapples (Unknown)    Hospital Day: 7d    Patient seen and examined earlier today.     PMH/PSH:    HTN (hypertension)  High cholesterol  Osteoporosis  Fracture of right shoulder  Anxiety  Depression    H/O aortic heart valve replacement with mechanical valve    LAST 24-Hr EVENTS:  Patient's daughter at bedside, upset regarding frequent blood draws.     VITALS:  T(F): 96.3 (07-20-21 @ 05:00), Max: 98 (07-19-21 @ 12:00)  HR: 71 (07-20-21 @ 05:00)  BP: 144/61 (07-20-21 @ 05:00) (114/48 - 153/63)  RR: 18 (07-20-21 @ 05:00)  SpO2: 100% (07-19-21 @ 19:05) on RA      TESTS & MEASUREMENTS:    BMI (kg/m2): 25.3 (07-16)    07-18-21 @ 07:01  -  07-19-21 @ 07:00  --------------------------------------------------------  IN: 1148 mL / OUT: 610 mL / NET: 538 mL    07-19-21 @ 07:01  -  07-20-21 @ 07:00  --------------------------------------------------------  IN: 692 mL / OUT: 194 mL / NET: 498 mL                            7.0    8.78  )-----------( 234      ( 20 Jul 2021 02:42 )             21.2     PT/INR - ( 20 Jul 2021 02:42 )   PT: 26.90 sec;   INR: 2.34 ratio         PTT - ( 20 Jul 2021 02:42 )  PTT:39.9 sec  INR: 2.34 ratio (07-20-21 @ 02:42)  INR: 1.80 ratio (07-19-21 @ 10:50)    07-20    129<L>  |  95<L>  |  25<H>  ----------------------------<  109<H>  3.7   |  28  |  0.8    Ca    7.2<L>      20 Jul 2021 02:42  Phos  3.2     07-20  Mg     2.1     07-20      COVID-19 PCR: NotDetec (07-16-21 @ 00:00)  COVID-19 PCR: NotDetec (07-13-21 @ 14:20)      RADIOLOGY, ECG, & ADDITIONAL TESTS:  12 Lead ECG:   Ventricular Rate 62 BPM  Atrial Rate 62 BPM  P-R Interval 184 ms  QRS Duration 86 ms  Q-T Interval 436 ms  QTC Calculation(Bazett) 442 ms  P Axis 51 degrees  R Axis 42 degrees  T Axis 52 degrees    Diagnosis Line Normal sinus rhythm  Nonspecific ST abnormality  Abnormal ECG    Confirmed by Funmilayo Carmona MD ([id number]) on 7/19/2021 11:19:56 AM (07-19-21 @ 04:34)      MEDICATIONS:  MEDICATIONS  (STANDING):  acetaminophen   Tablet .. 650 milliGRAM(s) Oral every 6 hours  aspirin  chewable 81 milliGRAM(s) Oral daily  ATENolol  Tablet 25 milliGRAM(s) Oral daily  atorvastatin 20 milliGRAM(s) Oral at bedtime  chlorhexidine 4% Liquid 1 Application(s) Topical <User Schedule>  escitalopram 10 milliGRAM(s) Oral daily  heparin  Infusion. 900 Unit(s)/Hr (9 mL/Hr) IV Continuous <Continuous>  insulin lispro (ADMELOG) corrective regimen sliding scale   SubCutaneous three times a day before meals  pantoprazole    Tablet 40 milliGRAM(s) Oral before breakfast  potassium chloride    Tablet ER 20 milliEquivalent(s) Oral every 2 hours  senna 2 Tablet(s) Oral at bedtime  valsartan 160 milliGRAM(s) Oral daily    MEDICATIONS  (PRN):  oxyCODONE    IR 5 milliGRAM(s) Oral every 6 hours PRN Severe Pain (7 - 10)      HOME MEDICATIONS:  alendronate 35 mg oral tablet (06-07)  ALPRAZolam 0.5 mg oral tablet (06-07)  aspirin 81 mg oral tablet, chewable (06-07)  atenolol 25 mg oral tablet (06-07)  atorvastatin 20 mg oral tablet (06-07)  escitalopram 10 mg oral tablet (06-07)  ezetimibe 10 mg oral tablet (06-07)  iron gluconate (06-07)  lidocaine 5% patch (06-07)  valsartan 160 mg oral tablet (06-07)  warfarin 4 mg oral tablet (06-07)      PHYSICAL EXAM:  GENERAL: multiple ecchymosis (face, neck)  CHEST/LUNG: Clear to auscultation bilaterally (ant ausc)  HEART: S1S2  ABDOMEN: BS+/ no tenderness  EXTREMITIES:  edema and wet dressing (right hip); findings discussed with ortho resident at bedside

## 2021-07-20 NOTE — PROGRESS NOTE ADULT - ATTENDING COMMENTS
Trauma/ACS Attending  Note Attestation    Patient is examined and evaluated at the bedside with the residents/PAs. Treatment plan discussed with the team, nurses, and consulting physicians and consulting teams. Medications, radiological studies and all other relevant studies reviewed. I reviewed the resident/PA note and agreed with above assessment and plan with following additions and corrections.    ORAL WU Patient is a 85y old  Female who presents with a chief complaint of S/p mechanical fall, +HT, -LOC, +AC (coumadin) for mechanical valve. Downgraded from SICU, Hg/Hct stable      Vital Signs Last 24 Hrs  T(C): 36.7 (20 Jul 2021 20:23), Max: 36.7 (20 Jul 2021 20:23)  T(F): 98 (20 Jul 2021 20:23), Max: 98 (20 Jul 2021 20:23)  HR: 91 (20 Jul 2021 20:23) (71 - 91)  BP: 131/57 (20 Jul 2021 20:23) (131/57 - 144/61)  BP(mean): --  RR: 18 (20 Jul 2021 20:23) (18 - 18)  SpO2: --                        7.3    10.32 )-----------( 280      ( 20 Jul 2021 11:11 )             22.1     07-20    130<L>  |  98  |  23<H>  ----------------------------<  133<H>  4.0   |  25  |  0.8    Ca    7.3<L>      20 Jul 2021 11:11  Phos  2.7     07-20  Mg     2.1     07-20        Diagnosis: hip fracture    Plan:	  - supportive care  - GI/DVT prophylaxis  - pain management  - incentive spirometer    - follow up consults  - repeat studies as needed  - PT evaluation and follow up  - replace electrolytes  - case management evaluation     Taylor Michaels MD, FACS  Trauma/ACS/Surgical Critical Care Attending

## 2021-07-20 NOTE — PROGRESS NOTE ADULT - NUTRITIONAL ASSESSMENT
ASSESSMENT:  85F w/PMHx of HTN, HLD, hx of mechanical aortic valve on coumadin, depression, anxiety, osteoporosis presents s/p mechanical fall, +HT, -LOC, +AC (coumadin). Patient was in her bathroom attempting to get out of the bathtub when she fell, falling onto her right side as well as the right side of her head. Denies any loss of consciousness. Was not ambulatory following the incident. In the ED she is GCS 15, A&Ox3. Primary survey negative. On exam her RLE is shortened and externally rotated. Endorses pain at her right hip. Unable to flex at the right knee joint. No other external signs of injury, denies pain at the chest, abdomen, and spine.     PLAN:   -F/U Bladder Scan Q 6    -F/U labs  -F/U Coagulogram      Lines/Tubes: PIV.    TRAUMA SPECTRA: [id number]

## 2021-07-20 NOTE — PROGRESS NOTE ADULT - ASSESSMENT
·	Mechanical aortic valve, on coumadin chronically; currently on intravenous Heparin continuos infusion for bridging purposes   ·	Right fem neck fx   ·	Cystitis  ·	CAD, stable  ·	Osteoporosis      REC  ·	Recommend to temporarily discontinue Alendronate therapy (for at least 4-6 months) to allow optimal bone healing after right fem neck fx. As Alendronate can inhibit the activity of osteoclasts, needed for bone remodeling and healing.    ·	Aortic mechanical aortic valve, on coumadin chronically; currently on intravenous Heparin continuos infusion for bridging purposes   ·	Right fem neck fx s/p surgical correction; nursing staff and daughter at bedside concerned re: surgical wound status.  ·	Urinary retention, acute  ·	Acute postoperative blood loss anemia  ·	presumed Cystitis, on therapy   ·	CAD, stable; high risk for angina with worsening anemia.  ·	Osteoporosis    REC  ·	Recommend to temporarily discontinue Alendronate therapy (for at least 4-6 months) to allow optimal bone healing after right fem neck fx. As Alendronate can inhibit the activity of osteoclasts, needed for bone remodeling and healing.     ·	Regarding anticoagulation, recommend judicious use of intravenous Heparin in the setting of INR>2.3, blood loss anemia, high risk for hematoma formation (surgical site), and the fact that the mechanical valve is Aortic and not Mitral prosthetic  ·	Recommend blood transfusion to keep Hb around 8 at least, in this patient with >2 units of Hb loss from baseline, advanced age, CAD, blood loss on anticoagulants.  ·	Recommend DC cardiac telemonitoring whenever it's permitted by Cardiology team following  ·	Would avoid indwelling Zamudio at this stage (high risk for CAUTI) and recommend to start CIC and follow up with Q8hr bladder scan threshold for straight to be >300 CC    Case discussed earlier today with the trauma resident on [phone] regarding the intravenous heparin therapy (concern regarding INR>2.3, decreased Hb)  Case discussed with surgery attending regarding overall care and recommendations above   case discussed with nursing staff assigned at bedside and all concerns addressed with patient's daughter at length  Case discussed with ortho resident at bedside, re: concerns related to surgical wound appearance and increasing edema (also discussed with surgical attending)  Case discussed with 3C supervising nurse manager.    Please call or text for any questions,   Spectra# [phone]  Or Microsoft Teams      ·	Aortic mechanical aortic valve, on coumadin chronically; currently on intravenous Heparin continuos infusion for bridging purposes   ·	Right fem neck fx s/p surgical correction; nursing staff and daughter at bedside concerned re: surgical wound status.  ·	Urinary retention, acute  ·	Acute postoperative blood loss anemia  ·	Increased right lung opacity on CXR, differential diagnoses include reactive effusion, atelectasis, or infectious etiology.  ·	presumed Cystitis, on therapy   ·	CAD, stable; high risk for angina with worsening anemia.  ·	Osteoporosis    REC  ·	Repeat chest imaging in 48 hours; consider antibiotics therapy if: new fever, new cough or dyspnea, or leucocytosis.   ·	Recommend to temporarily discontinue Alendronate therapy (for at least 4-6 months) to allow optimal bone healing after right fem neck fx. As Alendronate can inhibit the activity of osteoclasts, needed for bone remodeling and healing.     ·	Regarding anticoagulation, recommend judicious use of intravenous Heparin in the setting of INR>2.3, blood loss anemia, high risk for hematoma formation (surgical site), and the fact that the mechanical valve is Aortic and not Mitral prosthetic  ·	Recommend blood transfusion to keep Hb around 8 at least, in this patient with >2 units of Hb loss from baseline, advanced age, CAD, blood loss on anticoagulants.  ·	Recommend DC cardiac telemonitoring whenever it's permitted by Cardiology team following  ·	Would avoid indwelling Zamudio at this stage (high risk for CAUTI) and recommend to start CIC and follow up with Q8hr bladder scan threshold for straight to be >300 CC    Case discussed earlier today with the trauma resident on [phone] regarding the intravenous heparin therapy (concern regarding INR>2.3, decreased Hb)  Case discussed with surgery attending regarding overall care and recommendations above   case discussed with nursing staff assigned at bedside and all concerns addressed with patient's daughter at length  Case discussed with ortho resident at bedside, re: concerns related to surgical wound appearance and increasing edema (also discussed with surgical attending)  Case discussed with 3C supervising nurse manager.    Please call or text for any questions,   Spectra# [phone]  Or Microsoft Teams      ·	Aortic mechanical aortic valve, on coumadin chronically; currently on intravenous Heparin continuos infusion for bridging purposes   ·	Right fem neck fx s/p surgical correction; nursing staff and daughter at bedside concerned re: surgical wound status.  ·	Urinary retention, acute  ·	Acute postoperative blood loss anemia  ·	Increased right lung opacity on CXR, differential diagnoses include reactive effusion, atelectasis, or infectious etiology.  ·	presumed Cystitis, on therapy   ·	CAD, stable; high risk for angina with worsening anemia.  ·	Osteoporosis    REC  ·	Repeat chest imaging in 48 hours; consider antibiotics therapy if: new fever, new cough or dyspnea, or leucocytosis.   ·	Recommend to temporarily discontinue Alendronate therapy (for at least 4-6 months) to allow optimal bone healing after right fem neck fx. As Alendronate can inhibit the activity of osteoclasts, needed for bone remodeling and healing.     ·	Regarding anticoagulation, recommend judicious use of intravenous Heparin in the setting of INR>2.3, blood loss anemia, high risk for hematoma formation (surgical site), and the fact that the mechanical valve is Aortic and not Mitral prosthetic  ·	Recommend blood transfusion to keep Hb around 8 at least, in this patient with >2 units of Hb loss from baseline, advanced age, CAD, blood loss on anticoagulants.  ·	Recommend DC cardiac telemonitoring whenever it's permitted by Cardiology team following  ·	Would avoid indwelling Zamudio at this stage (high risk for CAUTI) and recommend to start CIC and follow up with Q8hr bladder scan threshold for straight to be >300 CC    Case discussed earlier today with the trauma resident on [phone] regarding the intravenous heparin therapy (concern regarding INR>2.3, decreased Hb, fluctuating PTT >140 on 7/19 hours and currently <40 over the past 24 hours)  Case discussed with surgery attending regarding overall care and recommendations above   case discussed with nursing staff assigned at bedside and all concerns addressed with patient's daughter at length  Case discussed with ortho resident at bedside, re: concerns related to surgical wound appearance and increasing edema (also discussed with surgical attending)  Case discussed with 3C supervising nurse manager.    Please call or text for any questions,   Spectra# [phone]  Or Microsoft Teams

## 2021-07-20 NOTE — PHARMACOTHERAPY INTERVENTION NOTE - COMMENTS
85yFemale      Indication: AVR, A fib  INR Goal: 2.5-3.5  Home Dose: Warfarin 4 mg PO Sun, Mon, Tues, Thur, Fri, Warfarin 2 mg PO Wed, Sat  Bridge Therapy: n/a      acetaminophen   Tablet .. 650 milliGRAM(s) Oral every 6 hours  aspirin  chewable 81 milliGRAM(s) Oral daily  ATENolol  Tablet 25 milliGRAM(s) Oral daily  atorvastatin 20 milliGRAM(s) Oral at bedtime  chlorhexidine 4% Liquid 1 Application(s) Topical <User Schedule>  escitalopram 10 milliGRAM(s) Oral daily  insulin lispro (ADMELOG) corrective regimen sliding scale   SubCutaneous three times a day before meals  oxyCODONE    IR 5 milliGRAM(s) Oral every 6 hours PRN  pantoprazole    Tablet 40 milliGRAM(s) Oral before breakfast  senna 2 Tablet(s) Oral at bedtime  valsartan 160 milliGRAM(s) Oral daily  warfarin 4 milliGRAM(s) Oral once        Drug Interactions:       H/H: 7.3/22.1  PLT: 280  GFR: 67    Date---------------INR-----------------Dose    INR: 2.60 ratio (07-20-21 @ 11:11)  INR: 2.34 ratio (07-20-21 @ 02:42)  INR: 1.80 ratio (07-19-21 @ 10:50)  INR: 1.61 ratio (07-19-21 @ 04:40)  INR: 1.47 ratio (07-18-21 @ 23:35)  INR: 1.37 ratio (07-18-21 @ 13:22)  INR: 1.22 ratio (07-18-21 @ 04:43)  INR: 1.29 ratio (07-16-21 @ 19:33)  INR: 1.26 ratio (07-16-21 @ 05:35)  INR: 1.57 ratio (07-15-21 @ 20:00)  INR: 1.99 ratio (07-15-21 @ 17:53)  INR: 2.91 ratio (07-15-21 @ 11:32)  INR: 3.82 ratio (07-15-21 @ 01:02)  INR: 3.63 ratio (07-14-21 @ 11:10)  INR: 3.62 ratio (07-14-21 @ 07:50)  INR: 3.56 ratio (07-14-21 @ 00:37)    7/17----------1.26-----------4 mg  7/18----------1.37-----------4 mg  7/19---------1.6,1.8---------X  7/20----------2.34----------4 mg    INR trending up quickly dose held yesterday.  Informed team of patients home dose which will be continued.  Home med list updated.  Will review INR tomorrow to see if dose reduction is required moving forward     1. Recommend Warfarin   4 mg   PO x 1   2. Obtain INR tomorrow AM
85yFemale      Indication:  INR Goal:  Home Dose:  Bridge Therapy:      acetaminophen   Tablet .. 650 milliGRAM(s) Oral every 6 hours  aspirin  chewable 81 milliGRAM(s) Oral daily  ATENolol  Tablet 25 milliGRAM(s) Oral daily  atorvastatin 20 milliGRAM(s) Oral at bedtime  chlorhexidine 4% Liquid 1 Application(s) Topical <User Schedule>  escitalopram 10 milliGRAM(s) Oral daily  heparin  Infusion. 900 Unit(s)/Hr IV Continuous <Continuous>  insulin lispro (ADMELOG) corrective regimen sliding scale   SubCutaneous three times a day before meals  oxyCODONE    IR 5 milliGRAM(s) Oral every 6 hours PRN  pantoprazole    Tablet 40 milliGRAM(s) Oral before breakfast  senna 2 Tablet(s) Oral at bedtime  valsartan 160 milliGRAM(s) Oral daily        Drug Interactions: Bridging with heparin      H/H: 7.7/22.7  PLT: 230  GFR: 79    Date---------------INR-----------------Dose    INR: 1.80 ratio (07-19-21 @ 10:50)  INR: 1.61 ratio (07-19-21 @ 04:40)  INR: 1.47 ratio (07-18-21 @ 23:35)  INR: 1.37 ratio (07-18-21 @ 13:22)  INR: 1.22 ratio (07-18-21 @ 04:43)  INR: 1.29 ratio (07-16-21 @ 19:33)  INR: 1.26 ratio (07-16-21 @ 05:35)  INR: 1.57 ratio (07-15-21 @ 20:00)  INR: 1.99 ratio (07-15-21 @ 17:53)  INR: 2.91 ratio (07-15-21 @ 11:32)  INR: 3.82 ratio (07-15-21 @ 01:02)  INR: 3.63 ratio (07-14-21 @ 11:10)  INR: 3.62 ratio (07-14-21 @ 07:50)  INR: 3.56 ratio (07-14-21 @ 00:37)  INR: 3.90 ratio (07-13-21 @ 14:15)    Recommended continuing with warfarin 4mg dose while bridging with heparin.  1. Recommend Warfarin  4mg    PO x 1   2. Obtain INR tomorrow AM
Currently holding home dronedarone due to inability to swallow meds; may consider amiodarone if tachy and AF

## 2021-07-20 NOTE — PROGRESS NOTE ADULT - SUBJECTIVE AND OBJECTIVE BOX
SUBJ:No chest pain or shortness of breath      MEDICATIONS  (STANDING):  acetaminophen   Tablet .. 650 milliGRAM(s) Oral every 6 hours  aspirin  chewable 81 milliGRAM(s) Oral daily  ATENolol  Tablet 25 milliGRAM(s) Oral daily  atorvastatin 20 milliGRAM(s) Oral at bedtime  chlorhexidine 4% Liquid 1 Application(s) Topical <User Schedule>  escitalopram 10 milliGRAM(s) Oral daily  insulin lispro (ADMELOG) corrective regimen sliding scale   SubCutaneous three times a day before meals  pantoprazole    Tablet 40 milliGRAM(s) Oral before breakfast  senna 2 Tablet(s) Oral at bedtime  valsartan 160 milliGRAM(s) Oral daily  warfarin 4 milliGRAM(s) Oral daily    MEDICATIONS  (PRN):  oxyCODONE    IR 5 milliGRAM(s) Oral every 6 hours PRN Severe Pain (7 - 10)            Vital Signs Last 24 Hrs  T(C): 36.6 (20 Jul 2021 12:36), Max: 36.6 (19 Jul 2021 16:00)  T(F): 97.8 (20 Jul 2021 12:36), Max: 97.9 (19 Jul 2021 16:00)  HR: 73 (20 Jul 2021 12:36) (71 - 73)  BP: 132/62 (20 Jul 2021 12:36) (114/48 - 144/61)  BP(mean): 80 (19 Jul 2021 18:00) (80 - 96)  RR: 18 (20 Jul 2021 12:36) (18 - 20)  SpO2: 100% (19 Jul 2021 19:05) (100% - 100%)      ECG:NML    TTE:    LABS:                        7.3    10.32 )-----------( 280      ( 20 Jul 2021 11:11 )             22.1     07-20    130<L>  |  98  |  23<H>  ----------------------------<  133<H>  4.0   |  25  |  0.8    Ca    7.3<L>      20 Jul 2021 11:11  Phos  2.7     07-20  Mg     2.1     07-20          PT/INR - ( 20 Jul 2021 11:11 )   PT: 29.90 sec;   INR: 2.60 ratio         PTT - ( 20 Jul 2021 11:11 )  PTT:68.6 sec    I&O's Summary    19 Jul 2021 07:01  -  20 Jul 2021 07:00  --------------------------------------------------------  IN: 692 mL / OUT: 194 mL / NET: 498 mL      BNP

## 2021-07-20 NOTE — PROGRESS NOTE ADULT - SUBJECTIVE AND OBJECTIVE BOX
ORTHOPEDIC SURGERY PROGRESS NOTE    SUBJECTIVE: Patient seen and examined this morning. POD #4 s/p R hip carley.  No acute events overnight. Pain controlled.  No f/c/n/v/cp/sob.     OBJECTIVE:  NAD  Vital Signs Last 24 Hrs  T(C): 35.7 (20 Jul 2021 05:00), Max: 36.7 (19 Jul 2021 12:00)  T(F): 96.3 (20 Jul 2021 05:00), Max: 98 (19 Jul 2021 12:00)  HR: 71 (20 Jul 2021 05:00) (69 - 72)  BP: 144/61 (20 Jul 2021 05:00) (114/48 - 149/61)  BP(mean): 80 (19 Jul 2021 18:00) (80 - 107)  RR: 18 (20 Jul 2021 05:00) (15 - 20)  SpO2: 100% (19 Jul 2021 19:05) (100% - 100%)    Affected extremity: Right LE         Dressing: changed this morning.  Aquacel in place            Sensation: SILT         Motor exam: 5/5 TA/GS/EHL         warm well perfused         capillary refill <3 seconds                         7.7    9.11  )-----------( 276      ( 20 Jul 2021 06:14 )             24.1     07-20    129<L>  |  95<L>  |  25<H>  ----------------------------<  109<H>  3.7   |  28  |  0.8    Ca    7.2<L>      20 Jul 2021 02:42  Phos  3.2     07-20  Mg     2.1     07-20      A/P :  Pt is a 86yo Female POD#4 s/p R hip carley.     -    Pain control  -    DVT ppx: per primary team  -    Weight bearing status: WBAT   -    Physical Therapy  -    Dispo: Rehab

## 2021-07-21 ENCOUNTER — APPOINTMENT (OUTPATIENT)
Dept: MEDICATION MANAGEMENT | Facility: CLINIC | Age: 86
End: 2021-07-21

## 2021-07-21 LAB
ANION GAP SERPL CALC-SCNC: 5 MMOL/L — LOW (ref 7–14)
BASOPHILS # BLD AUTO: 0.04 K/UL — SIGNIFICANT CHANGE UP (ref 0–0.2)
BASOPHILS NFR BLD AUTO: 0.4 % — SIGNIFICANT CHANGE UP (ref 0–1)
BLD GP AB SCN SERPL QL: SIGNIFICANT CHANGE UP
BUN SERPL-MCNC: 21 MG/DL — HIGH (ref 10–20)
CALCIUM SERPL-MCNC: 7.6 MG/DL — LOW (ref 8.5–10.1)
CHLORIDE SERPL-SCNC: 99 MMOL/L — SIGNIFICANT CHANGE UP (ref 98–110)
CO2 SERPL-SCNC: 28 MMOL/L — SIGNIFICANT CHANGE UP (ref 17–32)
CREAT SERPL-MCNC: 0.7 MG/DL — SIGNIFICANT CHANGE UP (ref 0.7–1.5)
EOSINOPHIL # BLD AUTO: 0.23 K/UL — SIGNIFICANT CHANGE UP (ref 0–0.7)
EOSINOPHIL NFR BLD AUTO: 2.4 % — SIGNIFICANT CHANGE UP (ref 0–8)
GLUCOSE BLDC GLUCOMTR-MCNC: 115 MG/DL — HIGH (ref 70–99)
GLUCOSE BLDC GLUCOMTR-MCNC: 119 MG/DL — HIGH (ref 70–99)
GLUCOSE BLDC GLUCOMTR-MCNC: 119 MG/DL — HIGH (ref 70–99)
GLUCOSE BLDC GLUCOMTR-MCNC: 126 MG/DL — HIGH (ref 70–99)
GLUCOSE BLDC GLUCOMTR-MCNC: 129 MG/DL — HIGH (ref 70–99)
GLUCOSE SERPL-MCNC: 106 MG/DL — HIGH (ref 70–99)
HCT VFR BLD CALC: 20.6 % — LOW (ref 37–47)
HCT VFR BLD CALC: 20.9 % — LOW (ref 37–47)
HGB BLD-MCNC: 6.7 G/DL — CRITICAL LOW (ref 12–16)
HGB BLD-MCNC: 6.7 G/DL — CRITICAL LOW (ref 12–16)
IMM GRANULOCYTES NFR BLD AUTO: 1.5 % — HIGH (ref 0.1–0.3)
INR BLD: 2.43 RATIO — HIGH (ref 0.65–1.3)
LYMPHOCYTES # BLD AUTO: 1.59 K/UL — SIGNIFICANT CHANGE UP (ref 1.2–3.4)
LYMPHOCYTES # BLD AUTO: 16.9 % — LOW (ref 20.5–51.1)
MAGNESIUM SERPL-MCNC: 2 MG/DL — SIGNIFICANT CHANGE UP (ref 1.8–2.4)
MCHC RBC-ENTMCNC: 28.6 PG — SIGNIFICANT CHANGE UP (ref 27–31)
MCHC RBC-ENTMCNC: 29.3 PG — SIGNIFICANT CHANGE UP (ref 27–31)
MCHC RBC-ENTMCNC: 32.1 G/DL — SIGNIFICANT CHANGE UP (ref 32–37)
MCHC RBC-ENTMCNC: 32.5 G/DL — SIGNIFICANT CHANGE UP (ref 32–37)
MCV RBC AUTO: 89.3 FL — SIGNIFICANT CHANGE UP (ref 81–99)
MCV RBC AUTO: 90 FL — SIGNIFICANT CHANGE UP (ref 81–99)
MONOCYTES # BLD AUTO: 0.85 K/UL — HIGH (ref 0.1–0.6)
MONOCYTES NFR BLD AUTO: 9 % — SIGNIFICANT CHANGE UP (ref 1.7–9.3)
NEUTROPHILS # BLD AUTO: 6.57 K/UL — HIGH (ref 1.4–6.5)
NEUTROPHILS NFR BLD AUTO: 69.8 % — SIGNIFICANT CHANGE UP (ref 42.2–75.2)
NRBC # BLD: 0 /100 WBCS — SIGNIFICANT CHANGE UP (ref 0–0)
NRBC # BLD: 0 /100 WBCS — SIGNIFICANT CHANGE UP (ref 0–0)
PHOSPHATE SERPL-MCNC: 2.6 MG/DL — SIGNIFICANT CHANGE UP (ref 2.1–4.9)
PHOSPHATE SERPL-MCNC: 2.8 MG/DL — SIGNIFICANT CHANGE UP (ref 2.1–4.9)
PLATELET # BLD AUTO: 268 K/UL — SIGNIFICANT CHANGE UP (ref 130–400)
PLATELET # BLD AUTO: 317 K/UL — SIGNIFICANT CHANGE UP (ref 130–400)
POTASSIUM SERPL-MCNC: 4.3 MMOL/L — SIGNIFICANT CHANGE UP (ref 3.5–5)
POTASSIUM SERPL-SCNC: 4.3 MMOL/L — SIGNIFICANT CHANGE UP (ref 3.5–5)
PROTHROM AB SERPL-ACNC: 28 SEC — HIGH (ref 9.95–12.87)
RBC # BLD: 2.29 M/UL — LOW (ref 4.2–5.4)
RBC # BLD: 2.34 M/UL — LOW (ref 4.2–5.4)
RBC # FLD: 13.7 % — SIGNIFICANT CHANGE UP (ref 11.5–14.5)
RBC # FLD: 13.7 % — SIGNIFICANT CHANGE UP (ref 11.5–14.5)
SODIUM SERPL-SCNC: 132 MMOL/L — LOW (ref 135–146)
T4 AB SER-ACNC: 5.3 UG/DL — SIGNIFICANT CHANGE UP (ref 4.6–12)
WBC # BLD: 8.86 K/UL — SIGNIFICANT CHANGE UP (ref 4.8–10.8)
WBC # BLD: 9.42 K/UL — SIGNIFICANT CHANGE UP (ref 4.8–10.8)
WBC # FLD AUTO: 8.86 K/UL — SIGNIFICANT CHANGE UP (ref 4.8–10.8)
WBC # FLD AUTO: 9.42 K/UL — SIGNIFICANT CHANGE UP (ref 4.8–10.8)

## 2021-07-21 PROCEDURE — 99232 SBSQ HOSP IP/OBS MODERATE 35: CPT

## 2021-07-21 RX ORDER — SODIUM CHLORIDE 9 MG/ML
1 INJECTION INTRAMUSCULAR; INTRAVENOUS; SUBCUTANEOUS ONCE
Refills: 0 | Status: COMPLETED | OUTPATIENT
Start: 2021-07-21 | End: 2021-07-21

## 2021-07-21 RX ORDER — SODIUM,POTASSIUM PHOSPHATES 278-250MG
1 POWDER IN PACKET (EA) ORAL
Refills: 0 | Status: DISCONTINUED | OUTPATIENT
Start: 2021-07-21 | End: 2021-07-21

## 2021-07-21 RX ORDER — WARFARIN SODIUM 2.5 MG/1
2 TABLET ORAL ONCE
Refills: 0 | Status: COMPLETED | OUTPATIENT
Start: 2021-07-21 | End: 2021-07-21

## 2021-07-21 RX ORDER — SODIUM,POTASSIUM PHOSPHATES 278-250MG
1 POWDER IN PACKET (EA) ORAL ONCE
Refills: 0 | Status: COMPLETED | OUTPATIENT
Start: 2021-07-21 | End: 2021-07-21

## 2021-07-21 RX ADMIN — Medication 650 MILLIGRAM(S): at 07:30

## 2021-07-21 RX ADMIN — PANTOPRAZOLE SODIUM 40 MILLIGRAM(S): 20 TABLET, DELAYED RELEASE ORAL at 06:41

## 2021-07-21 RX ADMIN — Medication 81 MILLIGRAM(S): at 12:18

## 2021-07-21 RX ADMIN — Medication 650 MILLIGRAM(S): at 00:06

## 2021-07-21 RX ADMIN — Medication 650 MILLIGRAM(S): at 12:48

## 2021-07-21 RX ADMIN — SENNA PLUS 2 TABLET(S): 8.6 TABLET ORAL at 22:21

## 2021-07-21 RX ADMIN — Medication 650 MILLIGRAM(S): at 12:18

## 2021-07-21 RX ADMIN — Medication 650 MILLIGRAM(S): at 17:54

## 2021-07-21 RX ADMIN — Medication 650 MILLIGRAM(S): at 17:20

## 2021-07-21 RX ADMIN — VALSARTAN 160 MILLIGRAM(S): 80 TABLET ORAL at 06:42

## 2021-07-21 RX ADMIN — Medication 650 MILLIGRAM(S): at 06:41

## 2021-07-21 RX ADMIN — WARFARIN SODIUM 2 MILLIGRAM(S): 2.5 TABLET ORAL at 22:22

## 2021-07-21 RX ADMIN — SODIUM CHLORIDE 1 GRAM(S): 9 INJECTION INTRAMUSCULAR; INTRAVENOUS; SUBCUTANEOUS at 06:40

## 2021-07-21 RX ADMIN — CHLORHEXIDINE GLUCONATE 1 APPLICATION(S): 213 SOLUTION TOPICAL at 06:40

## 2021-07-21 RX ADMIN — ATENOLOL 25 MILLIGRAM(S): 25 TABLET ORAL at 06:41

## 2021-07-21 RX ADMIN — Medication 650 MILLIGRAM(S): at 01:00

## 2021-07-21 RX ADMIN — Medication 1 PACKET(S): at 03:15

## 2021-07-21 RX ADMIN — ATORVASTATIN CALCIUM 20 MILLIGRAM(S): 80 TABLET, FILM COATED ORAL at 22:21

## 2021-07-21 RX ADMIN — ESCITALOPRAM OXALATE 10 MILLIGRAM(S): 10 TABLET, FILM COATED ORAL at 12:18

## 2021-07-21 NOTE — PROGRESS NOTE ADULT - SUBJECTIVE AND OBJECTIVE BOX
SUBJECTIVE: Patient seen and examined this morning. POD#5 s/p R hip carley.  No acute events overnight. Pain well controlled. No f/c/n/v/cp/sob.     OBJECTIVE:  NAD  Vital Signs Last 24 Hrs  T(C): 37.2 (21 Jul 2021 05:00), Max: 37.2 (21 Jul 2021 05:00)  T(F): 98.9 (21 Jul 2021 05:00), Max: 98.9 (21 Jul 2021 05:00)  HR: 83 (21 Jul 2021 05:00) (73 - 91)  BP: 137/65 (21 Jul 2021 05:00) (131/57 - 137/65)  BP(mean): --  RR: 18 (21 Jul 2021 05:00) (18 - 18)  SpO2: 99% (20 Jul 2021 20:25) (99% - 99%)    Affected extremity: Right LE         Dressing:  changed this morning.  Aquacel in place         Sensation: SILT         Motor exam: 5/5 TA/GS/EHL         warm well perfused         capillary refill <3 seconds                         6.7    9.42  )-----------( 268      ( 21 Jul 2021 01:23 )             20.9     07-21    132<L>  |  99  |  21<H>  ----------------------------<  106<H>  4.3   |  28  |  0.7    Ca    7.6<L>      21 Jul 2021 01:23  Phos  2.6     07-21  Mg     2.0     07-21      A/P : Pt is a 84yo Female POD#4 s/p R hip carley.     -    Pain control  -    DVT ppx: per primary team  -    Weight bearing status: WBAT   -    Physical Therapy  -    Dispo: Rehab SUBJECTIVE: Patient seen and examined this morning. POD#5 s/p R hip carley.  No acute events overnight. Pain well controlled. No f/c/n/v/cp/sob.     OBJECTIVE:  NAD  Vital Signs Last 24 Hrs  T(C): 37.2 (21 Jul 2021 05:00), Max: 37.2 (21 Jul 2021 05:00)  T(F): 98.9 (21 Jul 2021 05:00), Max: 98.9 (21 Jul 2021 05:00)  HR: 83 (21 Jul 2021 05:00) (73 - 91)  BP: 137/65 (21 Jul 2021 05:00) (131/57 - 137/65)  BP(mean): --  RR: 18 (21 Jul 2021 05:00) (18 - 18)  SpO2: 99% (20 Jul 2021 20:25) (99% - 99%)    Affected extremity: Right LE         Dressing:  changed this morning.  Aquacel in place         Sensation: SILT         Motor exam: 5/5 TA/GS/EHL         warm well perfused         capillary refill <3 seconds                         6.7    9.42  )-----------( 268      ( 21 Jul 2021 01:23 )             20.9     07-21    132<L>  |  99  |  21<H>  ----------------------------<  106<H>  4.3   |  28  |  0.7    Ca    7.6<L>      21 Jul 2021 01:23  Phos  2.6     07-21  Mg     2.0     07-21      A/P : Pt is a 86yo Female POD#5 s/p R hip carley.     -    Pain control  -    DVT ppx: per primary team  -    Weight bearing status: WBAT   -    Physical Therapy  -    Dispo: Rehab

## 2021-07-21 NOTE — PROGRESS NOTE ADULT - SUBJECTIVE AND OBJECTIVE BOX
TRAUMA SURGERY PROGRESS NOTE    Patient: ORAL WU , 85y ([birth year])Female   MRN: [id number]  Location: [address]  Visit: 07-13-21 Inpatient  Date: 07-21-21 @ 07:18    Hospital Day #: 9  Post-Op Day #: 5    Procedure/Dx/Injuries: Right femoral neck fracture s/p hemiarthroplasty of right hip on 7/16.     Events of past 24 hours: Patient's HgB was 6.6. Patient will be getting a unit of blood today. Patient was given coumadin with an INR of 2.43.     PAST MEDICAL & SURGICAL HISTORY:  HTN (hypertension)    High cholesterol    Osteoporosis    Fracture of right shoulder    Anxiety    Depression    H/O heart valve replacement with mechanical valve        Vitals:   T(F): 98.9 (07-21-21 @ 05:00), Max: 98.9 (07-21-21 @ 05:00)  HR: 83 (07-21-21 @ 05:00)  BP: 137/65 (07-21-21 @ 05:00)  RR: 18 (07-21-21 @ 05:00)  SpO2: 99% (07-20-21 @ 20:25)      Diet, Regular:   DASH/TLC Sodium & Cholesterol Restricted      Fluids:     I & O's:    07-20-21 @ 07:01  -  07-21-21 @ 07:00  --------------------------------------------------------  IN:    Oral Fluid: 120 mL  Total IN: 120 mL    OUT:    Incontinent per Collection Bag (mL): 300 mL    Voided (mL): 720 mL  Total OUT: 1020 mL    Total NET: -900 mL        Bowel Movement: : [x] YES [] NO  Flatus: : [x] YES [] NO    PHYSICAL EXAM:  General: NAD  HEENT: Normocephalic, atraumatic, EOMI, PEERLA. no scalp lacerations   Neck: Soft, midline trachea. no c-spine tenderness  Chest: No chest wall tenderness, no subcutaneous emphysema   Cardiac: S1, S2, RRR  Respiratory: Bilateral breath sounds, clear and equal bilaterally  Abdomen: Soft, non-distended, non-tender, no rebound, no guarding.  Groin: Normal appearing, pelvis stable   Ext:  Moving b/l upper and lower extremities. Palpable Radial b/l UE, b/l DP palpable in LE.       MEDICATIONS  (STANDING):  acetaminophen   Tablet .. 650 milliGRAM(s) Oral every 6 hours  aspirin  chewable 81 milliGRAM(s) Oral daily  ATENolol  Tablet 25 milliGRAM(s) Oral daily  atorvastatin 20 milliGRAM(s) Oral at bedtime  chlorhexidine 4% Liquid 1 Application(s) Topical <User Schedule>  escitalopram 10 milliGRAM(s) Oral daily  insulin lispro (ADMELOG) corrective regimen sliding scale   SubCutaneous three times a day before meals  pantoprazole    Tablet 40 milliGRAM(s) Oral before breakfast  senna 2 Tablet(s) Oral at bedtime  valsartan 160 milliGRAM(s) Oral daily    MEDICATIONS  (PRN):  oxyCODONE    IR 5 milliGRAM(s) Oral every 6 hours PRN Severe Pain (7 - 10)      DVT PROPHYLAXIS: SCDs,   GI PROPHYLAXIS: pantoprazole    Tablet 40 milliGRAM(s) Oral before breakfast    ANTICOAGULATION:   ANTIBIOTICS:           LAB/STUDIES:  Labs:  CAPILLARY BLOOD GLUCOSE      POCT Blood Glucose.: 122 mg/dL (20 Jul 2021 21:20)  POCT Blood Glucose.: 119 mg/dL (20 Jul 2021 16:37)  POCT Blood Glucose.: 133 mg/dL (20 Jul 2021 08:08)                          6.7    9.42  )-----------( 268      ( 21 Jul 2021 01:23 )             20.9       Auto Neutrophil %: 69.8 % (07-21-21 @ 01:23)  Auto Immature Granulocyte %: 1.5 % (07-21-21 @ 01:23)  Auto Neutrophil %: 70.4 % (07-20-21 @ 11:11)  Auto Immature Granulocyte %: 1.0 % (07-20-21 @ 11:11)    07-21    132<L>  |  99  |  21<H>  ----------------------------<  106<H>  4.3   |  28  |  0.7      Calcium, Total Serum: 7.6 mg/dL (07-21-21 @ 01:23)      LFTs:     Lactate, Blood: 1.5 mmol/L (07-20-21 @ 11:11)    ABG - ( 16 Jul 2021 10:38 )  pH: 7.46  /  pCO2: 36    /  pO2: 293   / HCO3: 26    / Base Excess: 1.9   /  SaO2: 100               Coags:     28.00  ----< 2.43    ( 21 Jul 2021 01:23 )     x                               IMAGING:      ACCESS DEVICES:  [x ] Peripheral IV

## 2021-07-21 NOTE — PROGRESS NOTE ADULT - ASSESSMENT
ASSESSMENT:  85y Female  hospital day 9 and POD 5 admitted for right femoral neck fracture and s/p hemiarthroplasty of right hip. Patient has a Hgb of 6.6 and will be getting 1 unit of blood. Patient INR at 2.43 and is on home coumadin dose.     PLAN:   - F/u blood consent, blood transfusion  - F/U INR   - PT/ Rehab   - Dispo     Lines/Tubes: PIV    TRAUMA SPECTRA: [id number]

## 2021-07-21 NOTE — PROGRESS NOTE ADULT - ATTENDING COMMENTS
Trauma/ACS Attending  Note Attestation    Patient is examined and evaluated at the bedside with the residents/PAs. Treatment plan discussed with the team, nurses, and consulting physicians and consulting teams. Medications, radiological studies and all other relevant studies reviewed. I reviewed the resident/PA note and agreed with above assessment and plan with following additions and corrections.    ORAL WU Patient is a 85y old  Female who presents with a chief complaint of S/p mechanical fall, +HT, -LOC, +AC (coumadin) for mechanical valve. Downgraded from SICU, Hg/Hct decrease this AM -> will transfuse PRBC    Vital Signs Last 24 Hrs  T(C): 36.3 (21 Jul 2021 20:36), Max: 37.2 (21 Jul 2021 05:00)  T(F): 97.4 (21 Jul 2021 20:36), Max: 98.9 (21 Jul 2021 05:00)  HR: 77 (21 Jul 2021 20:36) (60 - 83)  BP: 192/77 (21 Jul 2021 20:36) (127/67 - 192/77)  BP(mean): --  RR: 18 (21 Jul 2021 20:36) (16 - 18)  SpO2: 100% (21 Jul 2021 16:54) (98% - 100%)                          6.7    8.86  )-----------( 317      ( 21 Jul 2021 06:02 )             20.6   07-21    132<L>  |  99  |  21<H>  ----------------------------<  106<H>  4.3   |  28  |  0.7    Ca    7.6<L>      21 Jul 2021 01:23  Phos  2.8     07-21  Mg     2.0     07-21    Diagnosis: hip fracture    Plan:	  - continue diet  - transfuse 1 unit of PRBCs followed by repeat CBC  - supportive care  - GI/DVT prophylaxis  - pain management  - incentive spirometer    - follow up consults  - repeat studies as needed  - PT evaluation and follow up  - replace electrolytes  - case management evaluation     Taylor Michaels MD, FACS  Trauma/ACS/Surgical Critical Care Attending

## 2021-07-22 LAB
ANION GAP SERPL CALC-SCNC: 7 MMOL/L — SIGNIFICANT CHANGE UP (ref 7–14)
ANISOCYTOSIS BLD QL: SLIGHT — SIGNIFICANT CHANGE UP
APTT BLD: 44.4 SEC — HIGH (ref 27–39.2)
BASOPHILS # BLD AUTO: 0.07 K/UL — SIGNIFICANT CHANGE UP (ref 0–0.2)
BASOPHILS # BLD AUTO: 0.15 K/UL — SIGNIFICANT CHANGE UP (ref 0–0.2)
BASOPHILS NFR BLD AUTO: 0.7 % — SIGNIFICANT CHANGE UP (ref 0–1)
BASOPHILS NFR BLD AUTO: 1.8 % — HIGH (ref 0–1)
BUN SERPL-MCNC: 22 MG/DL — HIGH (ref 10–20)
BURR CELLS BLD QL SMEAR: PRESENT — SIGNIFICANT CHANGE UP
CALCIUM SERPL-MCNC: 7.4 MG/DL — LOW (ref 8.5–10.1)
CHLORIDE SERPL-SCNC: 98 MMOL/L — SIGNIFICANT CHANGE UP (ref 98–110)
CO2 SERPL-SCNC: 25 MMOL/L — SIGNIFICANT CHANGE UP (ref 17–32)
CREAT SERPL-MCNC: 0.8 MG/DL — SIGNIFICANT CHANGE UP (ref 0.7–1.5)
EOSINOPHIL # BLD AUTO: 0.57 K/UL — SIGNIFICANT CHANGE UP (ref 0–0.7)
EOSINOPHIL # BLD AUTO: 0.64 K/UL — SIGNIFICANT CHANGE UP (ref 0–0.7)
EOSINOPHIL NFR BLD AUTO: 5.9 % — SIGNIFICANT CHANGE UP (ref 0–8)
EOSINOPHIL NFR BLD AUTO: 7.9 % — SIGNIFICANT CHANGE UP (ref 0–8)
GIANT PLATELETS BLD QL SMEAR: PRESENT — SIGNIFICANT CHANGE UP
GLUCOSE BLDC GLUCOMTR-MCNC: 117 MG/DL — HIGH (ref 70–99)
GLUCOSE BLDC GLUCOMTR-MCNC: 119 MG/DL — HIGH (ref 70–99)
GLUCOSE BLDC GLUCOMTR-MCNC: 122 MG/DL — HIGH (ref 70–99)
GLUCOSE BLDC GLUCOMTR-MCNC: 123 MG/DL — HIGH (ref 70–99)
GLUCOSE SERPL-MCNC: 108 MG/DL — HIGH (ref 70–99)
HCT VFR BLD CALC: 23.3 % — LOW (ref 37–47)
HCT VFR BLD CALC: 24 % — LOW (ref 37–47)
HCT VFR BLD CALC: 24.4 % — LOW (ref 37–47)
HGB BLD-MCNC: 7.6 G/DL — LOW (ref 12–16)
HGB BLD-MCNC: 7.8 G/DL — LOW (ref 12–16)
HGB BLD-MCNC: 7.9 G/DL — LOW (ref 12–16)
HYPOCHROMIA BLD QL: SLIGHT — SIGNIFICANT CHANGE UP
IMM GRANULOCYTES NFR BLD AUTO: 2.5 % — HIGH (ref 0.1–0.3)
INR BLD: 4.22 RATIO — HIGH (ref 0.65–1.3)
LYMPHOCYTES # BLD AUTO: 1.44 K/UL — SIGNIFICANT CHANGE UP (ref 1.2–3.4)
LYMPHOCYTES # BLD AUTO: 1.8 K/UL — SIGNIFICANT CHANGE UP (ref 1.2–3.4)
LYMPHOCYTES # BLD AUTO: 17.7 % — LOW (ref 20.5–51.1)
LYMPHOCYTES # BLD AUTO: 18.6 % — LOW (ref 20.5–51.1)
MAGNESIUM SERPL-MCNC: 1.9 MG/DL — SIGNIFICANT CHANGE UP (ref 1.8–2.4)
MANUAL SMEAR VERIFICATION: SIGNIFICANT CHANGE UP
MCHC RBC-ENTMCNC: 28.6 PG — SIGNIFICANT CHANGE UP (ref 27–31)
MCHC RBC-ENTMCNC: 28.8 PG — SIGNIFICANT CHANGE UP (ref 27–31)
MCHC RBC-ENTMCNC: 28.8 PG — SIGNIFICANT CHANGE UP (ref 27–31)
MCHC RBC-ENTMCNC: 32.4 G/DL — SIGNIFICANT CHANGE UP (ref 32–37)
MCHC RBC-ENTMCNC: 32.5 G/DL — SIGNIFICANT CHANGE UP (ref 32–37)
MCHC RBC-ENTMCNC: 32.6 G/DL — SIGNIFICANT CHANGE UP (ref 32–37)
MCV RBC AUTO: 88.3 FL — SIGNIFICANT CHANGE UP (ref 81–99)
MCV RBC AUTO: 88.4 FL — SIGNIFICANT CHANGE UP (ref 81–99)
MCV RBC AUTO: 88.6 FL — SIGNIFICANT CHANGE UP (ref 81–99)
METAMYELOCYTES # FLD: 1.8 % — HIGH (ref 0–0)
MICROCYTES BLD QL: SLIGHT — SIGNIFICANT CHANGE UP
MONOCYTES # BLD AUTO: 0.36 K/UL — SIGNIFICANT CHANGE UP (ref 0.1–0.6)
MONOCYTES # BLD AUTO: 0.82 K/UL — HIGH (ref 0.1–0.6)
MONOCYTES NFR BLD AUTO: 4.4 % — SIGNIFICANT CHANGE UP (ref 1.7–9.3)
MONOCYTES NFR BLD AUTO: 8.5 % — SIGNIFICANT CHANGE UP (ref 1.7–9.3)
MYELOCYTES NFR BLD: 0.9 % — HIGH (ref 0–0)
NEUTROPHILS # BLD AUTO: 5.2 K/UL — SIGNIFICANT CHANGE UP (ref 1.4–6.5)
NEUTROPHILS # BLD AUTO: 6.2 K/UL — SIGNIFICANT CHANGE UP (ref 1.4–6.5)
NEUTROPHILS NFR BLD AUTO: 62.8 % — SIGNIFICANT CHANGE UP (ref 42.2–75.2)
NEUTROPHILS NFR BLD AUTO: 63.8 % — SIGNIFICANT CHANGE UP (ref 42.2–75.2)
NEUTS BAND # BLD: 0.9 % — SIGNIFICANT CHANGE UP (ref 0–6)
NEUTS HYPERSEG # BLD: PRESENT — SIGNIFICANT CHANGE UP
NRBC # BLD: 0 /100 WBCS — SIGNIFICANT CHANGE UP (ref 0–0)
NRBC # BLD: 0 /100 WBCS — SIGNIFICANT CHANGE UP (ref 0–0)
PHOSPHATE SERPL-MCNC: 2.8 MG/DL — SIGNIFICANT CHANGE UP (ref 2.1–4.9)
PLAT MORPH BLD: NORMAL — SIGNIFICANT CHANGE UP
PLATELET # BLD AUTO: 307 K/UL — SIGNIFICANT CHANGE UP (ref 130–400)
PLATELET # BLD AUTO: 334 K/UL — SIGNIFICANT CHANGE UP (ref 130–400)
PLATELET # BLD AUTO: 406 K/UL — HIGH (ref 130–400)
POLYCHROMASIA BLD QL SMEAR: SLIGHT — SIGNIFICANT CHANGE UP
POTASSIUM SERPL-MCNC: 4.5 MMOL/L — SIGNIFICANT CHANGE UP (ref 3.5–5)
POTASSIUM SERPL-SCNC: 4.5 MMOL/L — SIGNIFICANT CHANGE UP (ref 3.5–5)
PROMYELOCYTES # FLD: 0.9 % — HIGH (ref 0–0)
PROTHROM AB SERPL-ACNC: >40 SEC — HIGH (ref 9.95–12.87)
RBC # BLD: 2.64 M/UL — LOW (ref 4.2–5.4)
RBC # BLD: 2.71 M/UL — LOW (ref 4.2–5.4)
RBC # BLD: 2.76 M/UL — LOW (ref 4.2–5.4)
RBC # FLD: 14.2 % — SIGNIFICANT CHANGE UP (ref 11.5–14.5)
RBC # FLD: 14.3 % — SIGNIFICANT CHANGE UP (ref 11.5–14.5)
RBC # FLD: 14.4 % — SIGNIFICANT CHANGE UP (ref 11.5–14.5)
RBC BLD AUTO: ABNORMAL
SARS-COV-2 RNA SPEC QL NAA+PROBE: SIGNIFICANT CHANGE UP
SCHISTOCYTES BLD QL AUTO: SLIGHT — SIGNIFICANT CHANGE UP
SODIUM SERPL-SCNC: 130 MMOL/L — LOW (ref 135–146)
SURGICAL PATHOLOGY STUDY: SIGNIFICANT CHANGE UP
TROPONIN T SERPL-MCNC: <0.01 NG/ML — SIGNIFICANT CHANGE UP
VARIANT LYMPHS # BLD: 0.9 % — SIGNIFICANT CHANGE UP (ref 0–5)
WBC # BLD: 8.16 K/UL — SIGNIFICANT CHANGE UP (ref 4.8–10.8)
WBC # BLD: 8.93 K/UL — SIGNIFICANT CHANGE UP (ref 4.8–10.8)
WBC # BLD: 9.7 K/UL — SIGNIFICANT CHANGE UP (ref 4.8–10.8)
WBC # FLD AUTO: 8.16 K/UL — SIGNIFICANT CHANGE UP (ref 4.8–10.8)
WBC # FLD AUTO: 8.93 K/UL — SIGNIFICANT CHANGE UP (ref 4.8–10.8)
WBC # FLD AUTO: 9.7 K/UL — SIGNIFICANT CHANGE UP (ref 4.8–10.8)

## 2021-07-22 PROCEDURE — 99231 SBSQ HOSP IP/OBS SF/LOW 25: CPT

## 2021-07-22 RX ADMIN — Medication 650 MILLIGRAM(S): at 05:56

## 2021-07-22 RX ADMIN — PANTOPRAZOLE SODIUM 40 MILLIGRAM(S): 20 TABLET, DELAYED RELEASE ORAL at 05:56

## 2021-07-22 RX ADMIN — Medication 650 MILLIGRAM(S): at 11:00

## 2021-07-22 RX ADMIN — Medication 650 MILLIGRAM(S): at 06:43

## 2021-07-22 RX ADMIN — CHLORHEXIDINE GLUCONATE 1 APPLICATION(S): 213 SOLUTION TOPICAL at 05:56

## 2021-07-22 RX ADMIN — ESCITALOPRAM OXALATE 10 MILLIGRAM(S): 10 TABLET, FILM COATED ORAL at 11:48

## 2021-07-22 RX ADMIN — SENNA PLUS 2 TABLET(S): 8.6 TABLET ORAL at 22:59

## 2021-07-22 RX ADMIN — Medication 81 MILLIGRAM(S): at 11:48

## 2021-07-22 RX ADMIN — ATORVASTATIN CALCIUM 20 MILLIGRAM(S): 80 TABLET, FILM COATED ORAL at 22:13

## 2021-07-22 RX ADMIN — Medication 650 MILLIGRAM(S): at 00:00

## 2021-07-22 RX ADMIN — ATENOLOL 25 MILLIGRAM(S): 25 TABLET ORAL at 05:56

## 2021-07-22 RX ADMIN — VALSARTAN 160 MILLIGRAM(S): 80 TABLET ORAL at 05:56

## 2021-07-22 RX ADMIN — Medication 650 MILLIGRAM(S): at 00:51

## 2021-07-22 NOTE — PROGRESS NOTE ADULT - ASSESSMENT
ASSESSMENT:  85F w/PMHx of HTN, HLD, hx of mechanical aortic valve on coumadin, depression, anxiety, osteoporosis presents s/p mechanical fall, +HT, -LOC, +AC (coumadin). Patient was in her bathroom attempting to get out of the bathtub when she fell, falling onto her right side as well as the right side of her head. Denies any loss of consciousness. Was not ambulatory following the incident. In the ED she is GCS 15, A&Ox3. Primary survey negative. On exam her RLE is shortened and externally rotated. Endorses pain at her right hip. Unable to flex at the right knee joint. No other external signs of injury, denies pain at the chest, abdomen, and spine.   PT is stable, no complains. Tolerating diet.     PLAN:   - F/U morning labs  - Prepare for DC to rehabilitation facility   -  consult    Lines/Tubes: PIV.    TRAUMA SPECTRA: [id number]

## 2021-07-22 NOTE — PROGRESS NOTE ADULT - ATTENDING COMMENTS
Trauma/ACS Attending  Note Attestation    Patient is examined and evaluated at the bedside with the residents/PAs. Treatment plan discussed with the team, nurses, and consulting physicians and consulting teams. Medications, radiological studies and all other relevant studies reviewed. I reviewed the resident/PA note and agreed with above assessment and plan with following additions and corrections.    ORAL WU Patient is a 85y old  Female who presents with a chief complaint of S/p mechanical fall, +HT, -LOC, +AC (coumadin) for mechanical valve. Downgraded from SICU, Hg/Hct now improved and stable    Vital Signs Last 24 Hrs  T(C): 36 (22 Jul 2021 20:00), Max: 36.6 (22 Jul 2021 14:03)  T(F): 96.8 (22 Jul 2021 20:00), Max: 97.9 (22 Jul 2021 14:03)  HR: 77 (22 Jul 2021 20:00) (70 - 77)  BP: 153/70 (22 Jul 2021 20:00) (136/59 - 153/70)  BP(mean): --  RR: 18 (22 Jul 2021 20:00) (18 - 18)  SpO2: 99% (22 Jul 2021 04:46) (99% - 99%)                          7.9    9.70  )-----------( 406      ( 22 Jul 2021 23:30 )             24.4   07-21    130<L>  |  98  |  22<H>  ----------------------------<  108<H>  4.5   |  25  |  0.8    Ca    7.4<L>      21 Jul 2021 23:25  Phos  2.8     07-21  Mg     1.9     07-21      Diagnosis: hip fracture after mechanical fall                  mechanical valve    Plan:	  - continue diet  - supportive care  - GI/DVT prophylaxis  - pain management  - incentive spirometer    - follow up consults  - repeat studies as needed  - PT evaluation and follow up  - replace electrolytes  - case management evaluation     Taylor Michaels MD, FACS  Trauma/ACS/Surgical Critical Care Attending

## 2021-07-22 NOTE — PROGRESS NOTE ADULT - ASSESSMENT
Orthopaedic Surgery Progress Note    SUBJECTIVE  No acute events overnight.   Pt was seen and examined by the orthopedic surgery team during morning rounds. Doing well. Normal Vitals. Tolerating diet. Making adequate urine. Denies n/v, abdominal pain, diarrhea or any other major complaints.    MEDS  MEDICATIONS  (STANDING):  acetaminophen   Tablet .. 650 milliGRAM(s) Oral every 6 hours  aspirin  chewable 81 milliGRAM(s) Oral daily  ATENolol  Tablet 25 milliGRAM(s) Oral daily  atorvastatin 20 milliGRAM(s) Oral at bedtime  chlorhexidine 4% Liquid 1 Application(s) Topical <User Schedule>  escitalopram 10 milliGRAM(s) Oral daily  insulin lispro (ADMELOG) corrective regimen sliding scale   SubCutaneous three times a day before meals  pantoprazole    Tablet 40 milliGRAM(s) Oral before breakfast  senna 2 Tablet(s) Oral at bedtime  valsartan 160 milliGRAM(s) Oral daily    COUMADIN ordered daily,     MEDICATIONS  (PRN):  oxyCODONE    IR 5 milliGRAM(s) Oral every 6 hours PRN Severe Pain (7 - 10)    --------------------------------------    T(C): 36.4 (07-22-21 @ 04:46), Max: 36.6 (07-21-21 @ 13:09)  HR: 73 (07-22-21 @ 04:46) (60 - 77)  BP: 140/58 (07-22-21 @ 04:46) (127/67 - 192/77)  RR: 18 (07-22-21 @ 04:46) (16 - 18)  SpO2: 99% (07-22-21 @ 04:46) (99% - 100%)    P/E:  AOx3, NAD  Nonlabored breathing    Right LE         Dressing:  changed this morning.  Aquacel in place         Sensation: SILT         Motor exam: 5/5 TA/GS/EHL         warm well perfused         capillary refill <3 seconds   --------------------------------------    Labs                        7.8    8.16  )-----------( 307      ( 22 Jul 2021 06:42 )             24.0     07-21    130<L>  |  98  |  22<H>  ----------------------------<  108<H>  4.5   |  25  |  0.8    Ca    7.4<L>      21 Jul 2021 23:25  Phos  2.8     07-21  Mg     1.9     07-21    PT/INR - ( 21 Jul 2021 01:23 )   PT: 28.00 sec;   INR: 2.43 ratio    PTT - ( 20 Jul 2021 11:11 )  PTT:68.6 sec    --------------------------------------    A/P: 84yo Female w/ R FN fx s/p R hip carley-arthroplast on 7/16    Weightbearing status: WBAT RLE  - OOBTC twice per day w/ assistance    Pain control: per protocol;  DVT ppx: scd's + CHEM DVT PPX PER PRIMARY TEAM   Labs/Drains: trend H/H  - Repeat labs in AM  Dressing changes: to be performed by the orthopaedic surgery service  Home Meds: please reinstate as appropriate

## 2021-07-22 NOTE — PROGRESS NOTE ADULT - SUBJECTIVE AND OBJECTIVE BOX
`TRAUMA SURGERY PROGRESS NOTE    Patient: ORAL WU , 85y ([birth year])Female   MRN: [id number]  Location: [place] [address] A  Visit: 07-13-21 Inpatient  Date: 07-22-21 @ 11:38    Hospital Day #:10  Post-Op Day #:6    Dx: RT Femoral Neck Fracture S/P Hemiarthroplasty of R hip    Events of past 24 hours: No acute events.    PAST MEDICAL & SURGICAL HISTORY:  HTN (hypertension)    High cholesterol    Osteoporosis    Fracture of right shoulder    Anxiety    Depression    H/O heart valve replacement with mechanical valve        Vitals:   T(F): 97.6 (07-22-21 @ 04:46), Max: 97.8 (07-21-21 @ 13:09)  HR: 73 (07-22-21 @ 04:46)  BP: 137/85 (07-22-21 @ 10:24)  RR: 18 (07-22-21 @ 04:46)  SpO2: 99% (07-22-21 @ 04:46)      Diet, Regular:   DASH/TLC Sodium & Cholesterol Restricted      Fluids:     I & O's:    07-21-21 @ 07:01  -  07-22-21 @ 07:00  --------------------------------------------------------  IN:    Oral Fluid: 290 mL    PRBCs (Packed Red Blood Cells): 250 mL  Total IN: 540 mL    OUT:    Incontinent per Collection Bag (mL): 300 mL    Voided (mL): 550 mL  Total OUT: 850 mL    Total NET: -310 mL        Bowel Movement: : [x] YES [] NO  Flatus: : [x] YES [] NO    PHYSICAL EXAM:  General: NAD  HEENT: Normocephalic, atraumatic, EOMI. no scalp lacerations   Neck: Soft, midline trachea. no c-spine tenderness  Chest: No chest wall tenderness, no subcutaneous emphysema   Cardiac: S1, S2, RRR  Respiratory: Bilateral breath sounds, clear and equal bilaterally  Abdomen: Soft, non-distended, non-tender, no rebound, no guarding.  Groin: Normal appearing, pelvis stable   Ext:  Moving b/l upper and lower extremities. Palpable Radial b/l UE, b/l DP palpable in LE.   Back: No T/L/S spine tenderness, No palpable runoff/stepoff/deformity      MEDICATIONS  (STANDING):  acetaminophen   Tablet .. 650 milliGRAM(s) Oral every 6 hours  aspirin  chewable 81 milliGRAM(s) Oral daily  ATENolol  Tablet 25 milliGRAM(s) Oral daily  atorvastatin 20 milliGRAM(s) Oral at bedtime  chlorhexidine 4% Liquid 1 Application(s) Topical <User Schedule>  escitalopram 10 milliGRAM(s) Oral daily  insulin lispro (ADMELOG) corrective regimen sliding scale   SubCutaneous three times a day before meals  pantoprazole    Tablet 40 milliGRAM(s) Oral before breakfast  senna 2 Tablet(s) Oral at bedtime  valsartan 160 milliGRAM(s) Oral daily    MEDICATIONS  (PRN):  oxyCODONE    IR 5 milliGRAM(s) Oral every 6 hours PRN Severe Pain (7 - 10)      DVT PROPHYLAXIS: SCDs,   GI PROPHYLAXIS: pantoprazole    Tablet 40 milliGRAM(s) Oral before breakfast    ANTICOAGULATION:   ANTIBIOTICS:           LAB/STUDIES:  Labs:  CAPILLARY BLOOD GLUCOSE      POCT Blood Glucose.: 119 mg/dL (22 Jul 2021 11:22)  POCT Blood Glucose.: 122 mg/dL (22 Jul 2021 07:50)  POCT Blood Glucose.: 129 mg/dL (21 Jul 2021 21:34)  POCT Blood Glucose.: 119 mg/dL (21 Jul 2021 16:27)  POCT Blood Glucose.: 126 mg/dL (21 Jul 2021 11:43)                          7.8    8.16  )-----------( 307      ( 22 Jul 2021 06:42 )             24.0       Auto Neutrophil %: 62.8 % (07-22-21 @ 06:42)  Band Neutrophils %: 0.9 % (07-22-21 @ 06:42)    07-21    130<L>  |  98  |  22<H>  ----------------------------<  108<H>  4.5   |  25  |  0.8      Calcium, Total Serum: 7.4 mg/dL (07-21-21 @ 23:25)      LFTs:     Lactate, Blood: 1.5 mmol/L (07-20-21 @ 11:11)    ABG - ( 16 Jul 2021 10:38 )  pH: 7.46  /  pCO2: 36    /  pO2: 293   / HCO3: 26    / Base Excess: 1.9   /  SaO2: 100               Coags:     28.00  ----< 2.43    ( 21 Jul 2021 01:23 )     x           CARDIAC MARKERS ( 21 Jul 2021 23:25 )  x     / <0.01 ng/mL / x     / x     / x            ACCESS DEVICES:  [ x Peripheral IV  [ ] Central Venous Line	[ ] R	[ ] L	[ ] IJ	[ ] Fem	[ ] SC	Placed:   [ ] Arterial Line		[ ] R	[ ] L	[ ] Fem	[ ] Rad	[ ] Ax	Placed:   [ ] PICC:					[ ] Mediport  [ ] Urinary Catheter,  Date Placed:   [ ] Chest tube: [ ] Right, [ ] Left  [ ] NAKIA/Gonzalez Drains     `TRAUMA SURGERY PROGRESS NOTE    Patient: ORAL WU , 85y ([birth year])Female   MRN: [id number]  Location: [place] [address] A  Visit: 07-13-21 Inpatient  Date: 07-22-21 @ 11:38    Hospital Day #:10  Post-Op Day #:6    Dx: RT Femoral Neck Fracture S/P Hemiarthroplasty of R hip    Events of past 24 hours: No acute events.    PAST MEDICAL & SURGICAL HISTORY:  HTN (hypertension)  High cholesterol  Osteoporosis  Fracture of right shoulder  Anxiety  Depression  H/O heart valve replacement with mechanical valve    Vitals:   T(F): 97.6 (07-22-21 @ 04:46), Max: 97.8 (07-21-21 @ 13:09)  HR: 73 (07-22-21 @ 04:46)  BP: 137/85 (07-22-21 @ 10:24)  RR: 18 (07-22-21 @ 04:46)  SpO2: 99% (07-22-21 @ 04:46)      Diet, Regular:   DASH/TLC Sodium & Cholesterol Restricted    Fluids:     I & O's:    07-21-21 @ 07:01  -  07-22-21 @ 07:00  --------------------------------------------------------  IN:    Oral Fluid: 290 mL    PRBCs (Packed Red Blood Cells): 250 mL  Total IN: 540 mL    OUT:    Incontinent per Collection Bag (mL): 300 mL    Voided (mL): 550 mL  Total OUT: 850 mL    Total NET: -310 mL    Bowel Movement: : [x] YES [] NO  Flatus: : [x] YES [] NO    PHYSICAL EXAM:  General: NAD  HEENT: Normocephalic, atraumatic, EOMI. no scalp lacerations   Neck: Soft, midline trachea. no c-spine tenderness  Chest: No chest wall tenderness, no subcutaneous emphysema   Cardiac: S1, S2, RRR  Respiratory: Bilateral breath sounds, clear and equal bilaterally  Abdomen: Soft, non-distended, non-tender, no rebound, no guarding.  Groin: Normal appearing, pelvis stable   Ext:  Moving b/l upper and lower extremities. Palpable Radial b/l UE, b/l DP palpable in LE.   Back: No T/L/S spine tenderness, No palpable runoff/stepoff/deformity      MEDICATIONS  (STANDING):  acetaminophen   Tablet .. 650 milliGRAM(s) Oral every 6 hours  aspirin  chewable 81 milliGRAM(s) Oral daily  ATENolol  Tablet 25 milliGRAM(s) Oral daily  atorvastatin 20 milliGRAM(s) Oral at bedtime  chlorhexidine 4% Liquid 1 Application(s) Topical <User Schedule>  escitalopram 10 milliGRAM(s) Oral daily  insulin lispro (ADMELOG) corrective regimen sliding scale   SubCutaneous three times a day before meals  pantoprazole    Tablet 40 milliGRAM(s) Oral before breakfast  senna 2 Tablet(s) Oral at bedtime  valsartan 160 milliGRAM(s) Oral daily    MEDICATIONS  (PRN):  oxyCODONE    IR 5 milliGRAM(s) Oral every 6 hours PRN Severe Pain (7 - 10)    DVT PROPHYLAXIS: SCDs,   GI PROPHYLAXIS: pantoprazole    Tablet 40 milliGRAM(s) Oral before breakfast    ANTICOAGULATION:   ANTIBIOTICS:   LAB/STUDIES:  Labs:  CAPILLARY BLOOD GLUCOSE  POCT Blood Glucose.: 119 mg/dL (22 Jul 2021 11:22)  POCT Blood Glucose.: 122 mg/dL (22 Jul 2021 07:50)  POCT Blood Glucose.: 129 mg/dL (21 Jul 2021 21:34)  POCT Blood Glucose.: 119 mg/dL (21 Jul 2021 16:27)  POCT Blood Glucose.: 126 mg/dL (21 Jul 2021 11:43)                        7.8    8.16  )-----------( 307      ( 22 Jul 2021 06:42 )             24.0       Auto Neutrophil %: 62.8 % (07-22-21 @ 06:42)  Band Neutrophils %: 0.9 % (07-22-21 @ 06:42)    07-21    130<L>  |  98  |  22<H>  ----------------------------<  108<H>  4.5   |  25  |  0.8    Calcium, Total Serum: 7.4 mg/dL (07-21-21 @ 23:25)    LFTs:     Lactate, Blood: 1.5 mmol/L (07-20-21 @ 11:11)    ABG - ( 16 Jul 2021 10:38 )  pH: 7.46  /  pCO2: 36    /  pO2: 293   / HCO3: 26    / Base Excess: 1.9   /  SaO2: 100       Coags:     28.00  ----< 2.43    ( 21 Jul 2021 01:23 )     x         CARDIAC MARKERS ( 21 Jul 2021 23:25 )  x     / <0.01 ng/mL / x     / x     / x        ACCESS DEVICES:  [ x Peripheral IV  [ ] Central Venous Line	[ ] R	[ ] L	[ ] IJ	[ ] Fem	[ ] SC	Placed:   [ ] Arterial Line		[ ] R	[ ] L	[ ] Fem	[ ] Rad	[ ] Ax	Placed:   [ ] PICC:					[ ] Mediport  [ ] Urinary Catheter,  Date Placed:   [ ] Chest tube: [ ] Right, [ ] Left  [ ] NAKIA/Gonzalez Drains

## 2021-07-23 ENCOUNTER — TRANSCRIPTION ENCOUNTER (OUTPATIENT)
Age: 86
End: 2021-07-23

## 2021-07-23 VITALS
DIASTOLIC BLOOD PRESSURE: 85 MMHG | RESPIRATION RATE: 18 BRPM | HEART RATE: 86 BPM | SYSTOLIC BLOOD PRESSURE: 137 MMHG | TEMPERATURE: 97 F

## 2021-07-23 LAB
ANION GAP SERPL CALC-SCNC: 6 MMOL/L — LOW (ref 7–14)
ANION GAP SERPL CALC-SCNC: 8 MMOL/L — SIGNIFICANT CHANGE UP (ref 7–14)
BASOPHILS # BLD AUTO: 0.08 K/UL — SIGNIFICANT CHANGE UP (ref 0–0.2)
BASOPHILS NFR BLD AUTO: 0.9 % — SIGNIFICANT CHANGE UP (ref 0–1)
BUN SERPL-MCNC: 17 MG/DL — SIGNIFICANT CHANGE UP (ref 10–20)
BUN SERPL-MCNC: 19 MG/DL — SIGNIFICANT CHANGE UP (ref 10–20)
CALCIUM SERPL-MCNC: 7.6 MG/DL — LOW (ref 8.5–10.1)
CALCIUM SERPL-MCNC: 7.8 MG/DL — LOW (ref 8.5–10.1)
CHLORIDE SERPL-SCNC: 95 MMOL/L — LOW (ref 98–110)
CHLORIDE SERPL-SCNC: 98 MMOL/L — SIGNIFICANT CHANGE UP (ref 98–110)
CO2 SERPL-SCNC: 27 MMOL/L — SIGNIFICANT CHANGE UP (ref 17–32)
CO2 SERPL-SCNC: 28 MMOL/L — SIGNIFICANT CHANGE UP (ref 17–32)
CREAT SERPL-MCNC: 0.7 MG/DL — SIGNIFICANT CHANGE UP (ref 0.7–1.5)
CREAT SERPL-MCNC: 0.7 MG/DL — SIGNIFICANT CHANGE UP (ref 0.7–1.5)
EOSINOPHIL # BLD AUTO: 0.73 K/UL — HIGH (ref 0–0.7)
EOSINOPHIL NFR BLD AUTO: 7.9 % — SIGNIFICANT CHANGE UP (ref 0–8)
GLUCOSE BLDC GLUCOMTR-MCNC: 112 MG/DL — HIGH (ref 70–99)
GLUCOSE BLDC GLUCOMTR-MCNC: 119 MG/DL — HIGH (ref 70–99)
GLUCOSE SERPL-MCNC: 105 MG/DL — HIGH (ref 70–99)
GLUCOSE SERPL-MCNC: 112 MG/DL — HIGH (ref 70–99)
HCT VFR BLD CALC: 24.4 % — LOW (ref 37–47)
HGB BLD-MCNC: 7.9 G/DL — LOW (ref 12–16)
IMM GRANULOCYTES NFR BLD AUTO: 2.8 % — HIGH (ref 0.1–0.3)
INR BLD: 4.05 RATIO — HIGH (ref 0.65–1.3)
LYMPHOCYTES # BLD AUTO: 1.62 K/UL — SIGNIFICANT CHANGE UP (ref 1.2–3.4)
LYMPHOCYTES # BLD AUTO: 17.5 % — LOW (ref 20.5–51.1)
MAGNESIUM SERPL-MCNC: 1.8 MG/DL — SIGNIFICANT CHANGE UP (ref 1.8–2.4)
MAGNESIUM SERPL-MCNC: 2.5 MG/DL — HIGH (ref 1.8–2.4)
MCHC RBC-ENTMCNC: 28.8 PG — SIGNIFICANT CHANGE UP (ref 27–31)
MCHC RBC-ENTMCNC: 32.4 G/DL — SIGNIFICANT CHANGE UP (ref 32–37)
MCV RBC AUTO: 89.1 FL — SIGNIFICANT CHANGE UP (ref 81–99)
MONOCYTES # BLD AUTO: 0.79 K/UL — HIGH (ref 0.1–0.6)
MONOCYTES NFR BLD AUTO: 8.5 % — SIGNIFICANT CHANGE UP (ref 1.7–9.3)
NEUTROPHILS # BLD AUTO: 5.8 K/UL — SIGNIFICANT CHANGE UP (ref 1.4–6.5)
NEUTROPHILS NFR BLD AUTO: 62.4 % — SIGNIFICANT CHANGE UP (ref 42.2–75.2)
NRBC # BLD: 0 /100 WBCS — SIGNIFICANT CHANGE UP (ref 0–0)
PHOSPHATE SERPL-MCNC: 2.9 MG/DL — SIGNIFICANT CHANGE UP (ref 2.1–4.9)
PHOSPHATE SERPL-MCNC: 3.1 MG/DL — SIGNIFICANT CHANGE UP (ref 2.1–4.9)
PLATELET # BLD AUTO: 403 K/UL — HIGH (ref 130–400)
POTASSIUM SERPL-MCNC: 4.3 MMOL/L — SIGNIFICANT CHANGE UP (ref 3.5–5)
POTASSIUM SERPL-MCNC: 4.8 MMOL/L — SIGNIFICANT CHANGE UP (ref 3.5–5)
POTASSIUM SERPL-SCNC: 4.3 MMOL/L — SIGNIFICANT CHANGE UP (ref 3.5–5)
POTASSIUM SERPL-SCNC: 4.8 MMOL/L — SIGNIFICANT CHANGE UP (ref 3.5–5)
PROTHROM AB SERPL-ACNC: >40 SEC — HIGH (ref 9.95–12.87)
RBC # BLD: 2.74 M/UL — LOW (ref 4.2–5.4)
RBC # FLD: 14.2 % — SIGNIFICANT CHANGE UP (ref 11.5–14.5)
SODIUM SERPL-SCNC: 130 MMOL/L — LOW (ref 135–146)
SODIUM SERPL-SCNC: 132 MMOL/L — LOW (ref 135–146)
WBC # BLD: 9.28 K/UL — SIGNIFICANT CHANGE UP (ref 4.8–10.8)
WBC # FLD AUTO: 9.28 K/UL — SIGNIFICANT CHANGE UP (ref 4.8–10.8)

## 2021-07-23 PROCEDURE — 99231 SBSQ HOSP IP/OBS SF/LOW 25: CPT

## 2021-07-23 RX ORDER — OXYCODONE HYDROCHLORIDE 5 MG/1
1 TABLET ORAL
Qty: 0 | Refills: 0 | DISCHARGE
Start: 2021-07-23

## 2021-07-23 RX ORDER — MAGNESIUM SULFATE 500 MG/ML
2 VIAL (ML) INJECTION ONCE
Refills: 0 | Status: COMPLETED | OUTPATIENT
Start: 2021-07-23 | End: 2021-07-23

## 2021-07-23 RX ORDER — ACETAMINOPHEN 500 MG
2 TABLET ORAL
Qty: 0 | Refills: 0 | DISCHARGE
Start: 2021-07-23

## 2021-07-23 RX ADMIN — Medication 81 MILLIGRAM(S): at 13:06

## 2021-07-23 RX ADMIN — ESCITALOPRAM OXALATE 10 MILLIGRAM(S): 10 TABLET, FILM COATED ORAL at 13:06

## 2021-07-23 RX ADMIN — VALSARTAN 160 MILLIGRAM(S): 80 TABLET ORAL at 05:39

## 2021-07-23 RX ADMIN — PANTOPRAZOLE SODIUM 40 MILLIGRAM(S): 20 TABLET, DELAYED RELEASE ORAL at 06:51

## 2021-07-23 RX ADMIN — Medication 650 MILLIGRAM(S): at 05:38

## 2021-07-23 RX ADMIN — ATENOLOL 25 MILLIGRAM(S): 25 TABLET ORAL at 05:38

## 2021-07-23 RX ADMIN — Medication 50 GRAM(S): at 04:33

## 2021-07-23 NOTE — PROGRESS NOTE ADULT - ATTENDING COMMENTS
Trauma/ACS Attending  Note Attestation    Patient is examined and evaluated at the bedside with the residents/PAs. Treatment plan discussed with the team, nurses, and consulting physicians and consulting teams. Medications, radiological studies and all other relevant studies reviewed. I reviewed the resident/PA note and agreed with above assessment and plan with following additions and corrections.    ORAL WU Patient is a 85y old  Female who presents with a chief complaint of S/p mechanical fall, +HT, -LOC, +AC (coumadin) for mechanical valve. Downgraded from SICU, Hg/Hct now improved and stable at this time    Vital Signs Last 24 Hrs  T(C): 36 (22 Jul 2021 20:00), Max: 36.6 (22 Jul 2021 14:03)  T(F): 96.8 (22 Jul 2021 20:00), Max: 97.9 (22 Jul 2021 14:03)  HR: 77 (22 Jul 2021 20:00) (70 - 77)  BP: 153/70 (22 Jul 2021 20:00) (136/59 - 153/70)  BP(mean): --  RR: 18 (22 Jul 2021 20:00) (18 - 18)  SpO2: 99% (22 Jul 2021 04:46) (99% - 99%)                      7.9    9.70  )-----------( 406      ( 22 Jul 2021 23:30 )             24.4   07-21    130<L>  |  98  |  22<H>  ----------------------------<  108<H>  4.5   |  25  |  0.8    Ca    7.4<L>      21 Jul 2021 23:25  Phos  2.8     07-21  Mg     1.9     07-21    Diagnosis: hip fracture after mechanical fall                  mechanical valve    Plan:	  - patient is stable from the trauma stand poin for D/C to rehab  - continue diet  - supportive care  - GI/DVT prophylaxis  - pain management  - incentive spirometer    - follow up consults  - repeat studies as needed  - PT evaluation and follow up  - replace electrolytes  - case management evaluation     Taylor Michaels MD, FACS  Trauma/ACS/Surgical Critical Care Attending

## 2021-07-23 NOTE — PROGRESS NOTE ADULT - NSICDXPILOT_GEN_ALL_CORE
[place]
[employer]
[place]

## 2021-07-23 NOTE — DISCHARGE NOTE PROVIDER - NSDCMRMEDTOKEN_GEN_ALL_CORE_FT
acetaminophen 325 mg oral tablet: 2 tab(s) orally every 6 hours  alendronate 35 mg oral tablet: 1 tab(s) orally once a week  ALPRAZolam 0.5 mg oral tablet: 1 tab(s) orally once a day (at bedtime), As Needed  aspirin 81 mg oral tablet, chewable: 1 tab(s) orally once a day  atenolol 25 mg oral tablet: 1 tab(s) orally once a day  atorvastatin 20 mg oral tablet: 1 tab(s) orally once a day  escitalopram 10 mg oral tablet: 1 tab(s) orally once a day  ezetimibe 10 mg oral tablet: 1 tab(s) orally once a day  iron gluconate: 27 milligram(s) orally once a day  lidocaine 5% patch: 1  transdermally once a day  oxyCODONE 5 mg oral tablet: 1 tab(s) orally every 6 hours, As needed, Severe Pain (7 - 10)  valsartan 160 mg oral tablet: 1 tab(s) orally once a day (at bedtime)  warfarin 4 mg oral tablet: 1 tab(s) orally once a day on Sun, Mon, Tues, Thur, Fri and half a tablet on Wed, Sat

## 2021-07-23 NOTE — DISCHARGE NOTE PROVIDER - CARE PROVIDER_API CALL
Mike Moreno)  Orthopaedic Surgery  [address] [place] [address]  Phone: [phone]  Fax: [phone]  Follow Up Time: 2 weeks

## 2021-07-23 NOTE — DISCHARGE NOTE PROVIDER - HOSPITAL COURSE
07/13/21 85F w/PMHx of HTN, HLD, hx of mechanical aortic valve on coumadin, depression, anxiety, osteoporosis presents s/p mechanical fall, +HT, -LOC, +AC (coumadin). Patient was in her bathroom attempting to get out of the bathtub when she fell, falling onto her right side as well as the right side of her head. Denies any loss of consciousness. Was not ambulatory following the incident. In the ED she is GCS 15, A&Ox3. Primary survey negative. On exam her RLE is shortened and externally rotated. Endorses pain at her right hip. Unable to flex at the right knee joint. No other external signs of injury, denies pain at the chest, abdomen, and spine.   Xray of R femur and pelvis: Acute impacted, displaced subcapital right femoral neck fracture. This findings was confirmed with CT of abdomen and pelvis w/ IV Contrast.   CT cervical:Extracalvarial scalp fluid collection measuring 0.9 cm in thickness overlies the frontal and biparietal calvarium likely representing subgaleal hematoma. No underlying calvarial fracture.  All other radiologic exams and specialist consults were negative for Acute trauma.  Orthopedics planed for OR.  07/16/21 Displaced R subcapital femoral neck fracture was reduced w hemiarthroplasty of R hip .  PT was stable in postop period, no nausea or vomiting.   07/17/21 Physical therapy consult was ordered.  07/18/21 PT is stable, tolerating diet, no complains.  07/23/21 PT is on hold for Coumadin since 21:00 20 Jul 21 11 07/23/2021 INR read 4.05. Ready to be discharged to rehabilitation facility.

## 2021-07-23 NOTE — PROGRESS NOTE ADULT - REASON FOR ADMISSION
S/p mechanical fall, +HT, -LOC, +AC (coumadin)

## 2021-07-23 NOTE — DISCHARGE NOTE PROVIDER - NSDCCPCAREPLAN_GEN_ALL_CORE_FT
PRINCIPAL DISCHARGE DIAGNOSIS  Diagnosis: Hip fracture  Assessment and Plan of Treatment: Patient should be discharged to rehabilitation center. Weight bearing as tolerated. Follow up with primary care physician as soon as possible for INR labs to restart Coumadin. Patient was on hold for Coumadin since 21:00 20 Jul 21 11am 07/23/2021 INR read 4.05. Pain medication should be given for pain as needed  Ready to be discharged to rehabilitation facility.

## 2021-07-23 NOTE — PROGRESS NOTE ADULT - ASSESSMENT
ASSESSMENT:  85F w/PMHx of HTN, HLD, hx of mechanical aortic valve on coumadin, depression, anxiety, osteoporosis presents s/p mechanical fall, +HT, -LOC, +AC (coumadin). Patient was in her bathroom attempting to get out of the bathtub when she fell, falling onto her right side as well as the right side of her head. Denies any loss of consciousness. Was not ambulatory following the incident. In the ED she is GCS 15, A&Ox3. Primary survey negative. On exam her RLE is shortened and externally rotated. Endorses pain at her right hip. Unable to flex at the right knee joint. No other external signs of injury, denies pain at the chest, abdomen, and spine.   PT was interviewed w  services  (Yoruba) # [id number]: PT is stable, no complains. No nausea, vomiting. PT agreed to be discharged to rehabilitation facility.   PLAN:   - F/U W PCP with INR ASAP, to restart coumadin.  -F/U 11 am labs   -DC to rehab facility      Lines/Tubes: PIV   services used (Yoruba) # [id number]    TRAUMA SPECTRA: [id number]

## 2021-07-23 NOTE — DISCHARGE NOTE NURSING/CASE MANAGEMENT/SOCIAL WORK - NSDCPEPTCOWADR_GEN_ALL_CORE
110
Warfarin/Coumadin increases your risk for bleeding. Notify your doctor if you see any bleeding or any of the side effects listed in the Warfarin/Coumadin Booklet. Diet and medications can affect the PT/INR blood level. When Warfarin/Coumadin is taken with other medicines it can change the way other medicines work. Other medicines can also change the way Warfarin/Coumadin works. It is very important to tell your health care provider about all other medicines, including over-the-counter medications, herbs, diet supplements, or products containing vitamin K. Call your doctor before starting, stopping, or changing the dose of any prescription or over-the-counter medications. Any product containing aspirin lessens the blood's ability to form clots and adds to the effect of Warfarin/Coumadin. Never take aspirin without speaking with your health care provider.

## 2021-07-23 NOTE — DISCHARGE NOTE PROVIDER - NSDCFUSCHEDAPPT_GEN_ALL_CORE_FT
ORAL WU ; 07/28/2021 ; [facility] ; 07/28/2021 ; [facility] ; 10/15/2021 ; [facility] CARDIOLOGY [facility] [address]

## 2021-07-23 NOTE — PROGRESS NOTE ADULT - SUBJECTIVE AND OBJECTIVE BOX
`TRAUMA SURGERY PROGRESS NOTE   services used (Frisian) # [id number]    Patient: ORAL WU , 85y ([birth year])Female   MRN: [id number]  Location: [address]  Visit: 07-13-21 Inpatient  Date: 07-23-21 @ 10:02    Hospital Day #:11  Post-Op Day #:7    Procedure/Dx: R Femoral Neck Fracture S/P Hemiarthroplasty of Right hip    Events of past 24 hours: No acute events.    PAST MEDICAL & SURGICAL HISTORY:  HTN (hypertension)    High cholesterol    Osteoporosis    Fracture of right shoulder    Anxiety    Depression    H/O heart valve replacement with mechanical valve        Vitals:   T(F): 97.7 (07-23-21 @ 05:00), Max: 97.9 (07-22-21 @ 14:03)  HR: 66 (07-23-21 @ 05:00)  BP: 150/66 (07-23-21 @ 05:00)  RR: 18 (07-23-21 @ 05:00)  SpO2: --      Diet, Regular:   DASH/TLC Sodium & Cholesterol Restricted      Fluids:     I & O's:    07-22-21 @ 07:01  -  07-23-21 @ 07:00  --------------------------------------------------------  IN:    Oral Fluid: 200 mL  Total IN: 200 mL    OUT:    Voided (mL): 550 mL  Total OUT: 550 mL    Total NET: -350 mL        Bowel Movement: : [x] YES [] NO  Flatus: : [x] YES [] NO    PHYSICAL EXAM:  General: NAD  HEENT: Normocephalic, atraumatic, EOMI,no scalp lacerations   Neck: Soft, midline trachea. no c-spine tenderness  Chest: No chest wall tenderness, no subcutaneous emphysema   Cardiac: S1, S2, RRR  Respiratory: Bilateral breath sounds, clear and equal bilaterally  Abdomen: Soft, non-distended, non-tender, no rebound, no guarding.  Groin: Normal appearing, pelvis stable   Ext:  Moving b/l upper and lower extremities. Palpable Radial b/l UE, b/l DP palpable in LE.   Back: No T/L/S spine tenderness, No palpable runoff/stepoff/deformity      MEDICATIONS  (STANDING):  acetaminophen   Tablet .. 650 milliGRAM(s) Oral every 6 hours  aspirin  chewable 81 milliGRAM(s) Oral daily  ATENolol  Tablet 25 milliGRAM(s) Oral daily  atorvastatin 20 milliGRAM(s) Oral at bedtime  chlorhexidine 4% Liquid 1 Application(s) Topical <User Schedule>  escitalopram 10 milliGRAM(s) Oral daily  insulin lispro (ADMELOG) corrective regimen sliding scale   SubCutaneous three times a day before meals  pantoprazole    Tablet 40 milliGRAM(s) Oral before breakfast  senna 2 Tablet(s) Oral at bedtime  valsartan 160 milliGRAM(s) Oral daily    MEDICATIONS  (PRN):  oxyCODONE    IR 5 milliGRAM(s) Oral every 6 hours PRN Severe Pain (7 - 10)      DVT PROPHYLAXIS: SCDs,   GI PROPHYLAXIS: pantoprazole    Tablet 40 milliGRAM(s) Oral before breakfast    ANTICOAGULATION:   ANTIBIOTICS:           LAB/STUDIES:  Labs:  CAPILLARY BLOOD GLUCOSE      POCT Blood Glucose.: 112 mg/dL (23 Jul 2021 08:01)  POCT Blood Glucose.: 123 mg/dL (22 Jul 2021 21:54)  POCT Blood Glucose.: 117 mg/dL (22 Jul 2021 16:59)  POCT Blood Glucose.: 119 mg/dL (22 Jul 2021 11:22)                          7.9    9.28  )-----------( 403      ( 23 Jul 2021 05:22 )             24.4       Auto Neutrophil %: 62.4 % (07-23-21 @ 05:22)  Auto Immature Granulocyte %: 2.8 % (07-23-21 @ 05:22)  Auto Immature Granulocyte %: 2.5 % (07-22-21 @ 23:30)  Auto Neutrophil %: 63.8 % (07-22-21 @ 23:30)    07-23    132<L>  |  98  |  17  ----------------------------<  105<H>  4.3   |  28  |  0.7      Calcium, Total Serum: 7.6 mg/dL (07-23-21 @ 05:22)      LFTs:     Lactate, Blood: 1.5 mmol/L (07-20-21 @ 11:11)    ABG - ( 16 Jul 2021 10:38 )  pH: 7.46  /  pCO2: 36    /  pO2: 293   / HCO3: 26    / Base Excess: 1.9   /  SaO2: 100               Coags:     >40.00  ----< 4.05    ( 23 Jul 2021 05:22 )     x           CARDIAC MARKERS ( 21 Jul 2021 23:25 )  x     / <0.01 ng/mL / x     / x     / x          ACCESS DEVICES:  [x] Peripheral IV  [ ] Central Venous Line	[ ] R	[ ] L	[ ] IJ	[ ] Fem	[ ] SC	Placed:   [ ] Arterial Line		[ ] R	[ ] L	[ ] Fem	[ ] Rad	[ ] Ax	Placed:   [ ] PICC:					[ ] Mediport  [ ] Urinary Catheter,  Date Placed:   [ ] Chest tube: [ ] Right, [ ] Left  [ ] NAKIA/Gonzalez Drains     `TRAUMA SURGERY PROGRESS NOTE   services used (Belarusian) # [id number]    Patient: ORAL WU , 85y ([birth year])Female   MRN: [id number]  Location: [address]  Visit: 07-13-21 Inpatient  Date: 07-23-21 @ 10:02    Hospital Day #:11  Post-Op Day #:7    Procedure/Dx: R Femoral Neck Fracture S/P Hemiarthroplasty of Right hip    Events of past 24 hours: No acute events.    PAST MEDICAL & SURGICAL HISTORY:  HTN (hypertension)  High cholesterol  Osteoporosis  Fracture of right shoulder  Anxiety  Depression  H/O heart valve replacement with mechanical valve      Vitals:   T(F): 97.7 (07-23-21 @ 05:00), Max: 97.9 (07-22-21 @ 14:03)  HR: 66 (07-23-21 @ 05:00)  BP: 150/66 (07-23-21 @ 05:00)  RR: 18 (07-23-21 @ 05:00)  SpO2: --    Diet, Regular:   DASH/TLC Sodium & Cholesterol Restricted    Fluids:     I & O's:    07-22-21 @ 07:01  -  07-23-21 @ 07:00  --------------------------------------------------------  IN:    Oral Fluid: 200 mL  Total IN: 200 mL  OUT:    Voided (mL): 550 mL  Total OUT: 550 mL    Total NET: -350 mL    Bowel Movement: : [x] YES [] NO  Flatus: : [x] YES [] NO    PHYSICAL EXAM:  General: NAD  HEENT: Normocephalic, atraumatic, EOMI,no scalp lacerations   Neck: Soft, midline trachea. no c-spine tenderness  Chest: No chest wall tenderness, no subcutaneous emphysema   Cardiac: S1, S2, RRR  Respiratory: Bilateral breath sounds, clear and equal bilaterally  Abdomen: Soft, non-distended, non-tender, no rebound, no guarding.  Groin: Normal appearing, pelvis stable   Ext:  Moving b/l upper and lower extremities. Palpable Radial b/l UE, b/l DP palpable in LE.   Back: No T/L/S spine tenderness, No palpable runoff/stepoff/deformity    MEDICATIONS  (STANDING):  acetaminophen   Tablet .. 650 milliGRAM(s) Oral every 6 hours  aspirin  chewable 81 milliGRAM(s) Oral daily  ATENolol  Tablet 25 milliGRAM(s) Oral daily  atorvastatin 20 milliGRAM(s) Oral at bedtime  chlorhexidine 4% Liquid 1 Application(s) Topical <User Schedule>  escitalopram 10 milliGRAM(s) Oral daily  insulin lispro (ADMELOG) corrective regimen sliding scale   SubCutaneous three times a day before meals  pantoprazole    Tablet 40 milliGRAM(s) Oral before breakfast  senna 2 Tablet(s) Oral at bedtime  valsartan 160 milliGRAM(s) Oral daily    MEDICATIONS  (PRN):  oxyCODONE    IR 5 milliGRAM(s) Oral every 6 hours PRN Severe Pain (7 - 10)    DVT PROPHYLAXIS: SCDs,   GI PROPHYLAXIS: pantoprazole    Tablet 40 milliGRAM(s) Oral before breakfast    ANTICOAGULATION:   ANTIBIOTICS:     LAB/STUDIES:  Labs:  CAPILLARY BLOOD GLUCOSE    POCT Blood Glucose.: 112 mg/dL (23 Jul 2021 08:01)  POCT Blood Glucose.: 123 mg/dL (22 Jul 2021 21:54)  POCT Blood Glucose.: 117 mg/dL (22 Jul 2021 16:59)  POCT Blood Glucose.: 119 mg/dL (22 Jul 2021 11:22)                       7.9    9.28  )-----------( 403      ( 23 Jul 2021 05:22 )             24.4       Auto Neutrophil %: 62.4 % (07-23-21 @ 05:22)  Auto Immature Granulocyte %: 2.8 % (07-23-21 @ 05:22)  Auto Immature Granulocyte %: 2.5 % (07-22-21 @ 23:30)  Auto Neutrophil %: 63.8 % (07-22-21 @ 23:30)    07-23    132<L>  |  98  |  17  ----------------------------<  105<H>  4.3   |  28  |  0.7    Calcium, Total Serum: 7.6 mg/dL (07-23-21 @ 05:22)    LFTs:   Lactate, Blood: 1.5 mmol/L (07-20-21 @ 11:11)    ABG - ( 16 Jul 2021 10:38 )  pH: 7.46  /  pCO2: 36    /  pO2: 293   / HCO3: 26    / Base Excess: 1.9   /  SaO2: 100       Coags:     >40.00  ----< 4.05    ( 23 Jul 2021 05:22 )     x        CARDIAC MARKERS ( 21 Jul 2021 23:25 )  x     / <0.01 ng/mL / x     / x     / x        ACCESS DEVICES:  [x] Peripheral IV  [ ] Central Venous Line	[ ] R	[ ] L	[ ] IJ	[ ] Fem	[ ] SC	Placed:   [ ] Arterial Line		[ ] R	[ ] L	[ ] Fem	[ ] Rad	[ ] Ax	Placed:   [ ] PICC:					[ ] Mediport  [ ] Urinary Catheter,  Date Placed:   [ ] Chest tube: [ ] Right, [ ] Left  [ ] NAKIA/Gonzalez Drains

## 2021-07-23 NOTE — PROGRESS NOTE ADULT - PROVIDER SPECIALTY LIST ADULT
Anesthesia
Cardiology
Orthopedics
SICU
Trauma Surgery
Hospitalist
Orthopedics
Orthopedics
Physiatry
Surgery
Trauma Surgery
Trauma Surgery
Cardiology
Internal Medicine
Orthopedics
SICU
Trauma Surgery
Trauma Surgery
SICU
Trauma Surgery
Cardiology
Cardiology

## 2021-07-26 ENCOUNTER — APPOINTMENT (OUTPATIENT)
Dept: CARDIOLOGY | Facility: CLINIC | Age: 86
End: 2021-07-26

## 2021-07-28 DIAGNOSIS — E87.1 HYPO-OSMOLALITY AND HYPONATREMIA: ICD-10-CM

## 2021-07-28 DIAGNOSIS — F32.9 MAJOR DEPRESSIVE DISORDER, SINGLE EPISODE, UNSPECIFIED: ICD-10-CM

## 2021-07-28 DIAGNOSIS — Z88.0 ALLERGY STATUS TO PENICILLIN: ICD-10-CM

## 2021-07-28 DIAGNOSIS — N39.0 URINARY TRACT INFECTION, SITE NOT SPECIFIED: ICD-10-CM

## 2021-07-28 DIAGNOSIS — I95.9 HYPOTENSION, UNSPECIFIED: ICD-10-CM

## 2021-07-28 DIAGNOSIS — E78.00 PURE HYPERCHOLESTEROLEMIA, UNSPECIFIED: ICD-10-CM

## 2021-07-28 DIAGNOSIS — I10 ESSENTIAL (PRIMARY) HYPERTENSION: ICD-10-CM
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
## 2021-07-28 DIAGNOSIS — Z82.49 FAMILY HISTORY OF ISCHEMIC HEART DISEASE AND OTHER DISEASES OF THE CIRCULATORY SYSTEM: ICD-10-CM

## 2021-07-28 DIAGNOSIS — J98.11 ATELECTASIS: ICD-10-CM

## 2021-07-28 DIAGNOSIS — G89.11 ACUTE PAIN DUE TO TRAUMA: ICD-10-CM

## 2021-07-28 DIAGNOSIS — E87.70 FLUID OVERLOAD, UNSPECIFIED: ICD-10-CM

## 2021-07-28 DIAGNOSIS — S72.011A UNSPECIFIED INTRACAPSULAR FRACTURE OF RIGHT FEMUR, INITIAL ENCOUNTER FOR CLOSED FRACTURE: ICD-10-CM
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
## 2021-07-28 DIAGNOSIS — F41.9 ANXIETY DISORDER, UNSPECIFIED: ICD-10-CM

## 2021-07-28 DIAGNOSIS — Z79.01 LONG TERM (CURRENT) USE OF ANTICOAGULANTS: ICD-10-CM

## 2021-07-28 DIAGNOSIS — I47.1 SUPRAVENTRICULAR TACHYCARDIA: ICD-10-CM

## 2021-07-28 DIAGNOSIS — E83.42 HYPOMAGNESEMIA: ICD-10-CM

## 2021-07-28 DIAGNOSIS — Z95.1 PRESENCE OF AORTOCORONARY BYPASS GRAFT: ICD-10-CM

## 2021-07-28 DIAGNOSIS — M17.0 BILATERAL PRIMARY OSTEOARTHRITIS OF KNEE: ICD-10-CM

## 2021-07-28 DIAGNOSIS — D62 ACUTE POSTHEMORRHAGIC ANEMIA: ICD-10-CM

## 2021-07-28 DIAGNOSIS — R01.1 CARDIAC MURMUR, UNSPECIFIED: ICD-10-CM
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
## 2021-07-28 DIAGNOSIS — R06.89 OTHER ABNORMALITIES OF BREATHING: ICD-10-CM

## 2021-07-28 DIAGNOSIS — Z91.018 ALLERGY TO OTHER FOODS: ICD-10-CM
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
## 2021-07-28 DIAGNOSIS — I25.10 ATHEROSCLEROTIC HEART DISEASE OF NATIVE CORONARY ARTERY WITHOUT ANGINA PECTORIS: ICD-10-CM

## 2021-07-28 DIAGNOSIS — M19.90 UNSPECIFIED OSTEOARTHRITIS, UNSPECIFIED SITE: ICD-10-CM

## 2021-07-28 DIAGNOSIS — Z79.82 LONG TERM (CURRENT) USE OF ASPIRIN: ICD-10-CM

## 2021-07-28 DIAGNOSIS — Z20.822 CONTACT WITH AND (SUSPECTED) EXPOSURE TO COVID-19: ICD-10-CM

## 2021-07-31 ENCOUNTER — INPATIENT (INPATIENT)
Facility: HOSPITAL | Age: 86
LOS: 5 days | Discharge: HOME HEALTH | End: 2021-08-06
Attending: INTERNAL MEDICINE | Admitting: INTERNAL MEDICINE
Payer: MEDICARE

## 2021-07-31 VITALS
RESPIRATION RATE: 18 BRPM | DIASTOLIC BLOOD PRESSURE: 77 MMHG | OXYGEN SATURATION: 99 % | HEART RATE: 149 BPM | SYSTOLIC BLOOD PRESSURE: 84 MMHG

## 2021-07-31 DIAGNOSIS — Z95.2 PRESENCE OF PROSTHETIC HEART VALVE: Chronic | ICD-10-CM

## 2021-07-31 DIAGNOSIS — I95.1 ORTHOSTATIC HYPOTENSION: ICD-10-CM

## 2021-07-31 DIAGNOSIS — Z95.2 PRESENCE OF PROSTHETIC HEART VALVE: ICD-10-CM

## 2021-07-31 DIAGNOSIS — R09.89 OTHER SPECIFIED SYMPTOMS AND SIGNS INVOLVING THE CIRCULATORY AND RESPIRATORY SYSTEMS: ICD-10-CM

## 2021-07-31 DIAGNOSIS — I25.10 ATHEROSCLEROTIC HEART DISEASE OF NATIVE CORONARY ARTERY WITHOUT ANGINA PECTORIS: ICD-10-CM

## 2021-07-31 DIAGNOSIS — I50.31 ACUTE DIASTOLIC (CONGESTIVE) HEART FAILURE: ICD-10-CM

## 2021-07-31 DIAGNOSIS — I48.92 UNSPECIFIED ATRIAL FLUTTER: ICD-10-CM

## 2021-07-31 DIAGNOSIS — E78.00 PURE HYPERCHOLESTEROLEMIA, UNSPECIFIED: ICD-10-CM

## 2021-07-31 DIAGNOSIS — Z88.0 ALLERGY STATUS TO PENICILLIN: ICD-10-CM

## 2021-07-31 DIAGNOSIS — Z79.82 LONG TERM (CURRENT) USE OF ASPIRIN: ICD-10-CM

## 2021-07-31 DIAGNOSIS — M81.0 AGE-RELATED OSTEOPOROSIS WITHOUT CURRENT PATHOLOGICAL FRACTURE: ICD-10-CM

## 2021-07-31 DIAGNOSIS — Z96.649 PRESENCE OF UNSPECIFIED ARTIFICIAL HIP JOINT: Chronic | ICD-10-CM

## 2021-07-31 DIAGNOSIS — K21.9 GASTRO-ESOPHAGEAL REFLUX DISEASE WITHOUT ESOPHAGITIS: ICD-10-CM

## 2021-07-31 DIAGNOSIS — Z95.1 PRESENCE OF AORTOCORONARY BYPASS GRAFT: ICD-10-CM

## 2021-07-31 DIAGNOSIS — Z91.018 ALLERGY TO OTHER FOODS: ICD-10-CM

## 2021-07-31 DIAGNOSIS — I71.2 THORACIC AORTIC ANEURYSM, WITHOUT RUPTURE: ICD-10-CM

## 2021-07-31 DIAGNOSIS — D64.9 ANEMIA, UNSPECIFIED: ICD-10-CM

## 2021-07-31 DIAGNOSIS — R07.9 CHEST PAIN, UNSPECIFIED: ICD-10-CM

## 2021-07-31 DIAGNOSIS — I47.1 SUPRAVENTRICULAR TACHYCARDIA: ICD-10-CM

## 2021-07-31 DIAGNOSIS — Z79.01 LONG TERM (CURRENT) USE OF ANTICOAGULANTS: ICD-10-CM

## 2021-07-31 DIAGNOSIS — I11.0 HYPERTENSIVE HEART DISEASE WITH HEART FAILURE: ICD-10-CM

## 2021-07-31 DIAGNOSIS — I21.A1 MYOCARDIAL INFARCTION TYPE 2: ICD-10-CM

## 2021-07-31 LAB
ALBUMIN SERPL ELPH-MCNC: 3.2 G/DL — LOW (ref 3.5–5.2)
ALP SERPL-CCNC: 85 U/L — SIGNIFICANT CHANGE UP (ref 30–115)
ALT FLD-CCNC: 12 U/L — SIGNIFICANT CHANGE UP (ref 0–41)
ANION GAP SERPL CALC-SCNC: 12 MMOL/L — SIGNIFICANT CHANGE UP (ref 7–14)
APTT BLD: 44.9 SEC — HIGH (ref 27–39.2)
AST SERPL-CCNC: 29 U/L — SIGNIFICANT CHANGE UP (ref 0–41)
BASOPHILS # BLD AUTO: 0.06 K/UL — SIGNIFICANT CHANGE UP (ref 0–0.2)
BASOPHILS NFR BLD AUTO: 0.7 % — SIGNIFICANT CHANGE UP (ref 0–1)
BILIRUB SERPL-MCNC: 1 MG/DL — SIGNIFICANT CHANGE UP (ref 0.2–1.2)
BUN SERPL-MCNC: 14 MG/DL — SIGNIFICANT CHANGE UP (ref 10–20)
CALCIUM SERPL-MCNC: 8.4 MG/DL — LOW (ref 8.5–10.1)
CHLORIDE SERPL-SCNC: 93 MMOL/L — LOW (ref 98–110)
CK MB CFR SERPL CALC: 1.9 NG/ML — SIGNIFICANT CHANGE UP (ref 0.6–6.3)
CO2 SERPL-SCNC: 26 MMOL/L — SIGNIFICANT CHANGE UP (ref 17–32)
CREAT SERPL-MCNC: 0.7 MG/DL — SIGNIFICANT CHANGE UP (ref 0.7–1.5)
EOSINOPHIL # BLD AUTO: 0.25 K/UL — SIGNIFICANT CHANGE UP (ref 0–0.7)
EOSINOPHIL NFR BLD AUTO: 3.1 % — SIGNIFICANT CHANGE UP (ref 0–8)
GLUCOSE SERPL-MCNC: 118 MG/DL — HIGH (ref 70–99)
HCT VFR BLD CALC: 30.3 % — LOW (ref 37–47)
HGB BLD-MCNC: 9.7 G/DL — LOW (ref 12–16)
IMM GRANULOCYTES NFR BLD AUTO: 0.5 % — HIGH (ref 0.1–0.3)
INR BLD: 2.98 RATIO — HIGH (ref 0.65–1.3)
LACTATE SERPL-SCNC: 1.1 MMOL/L — SIGNIFICANT CHANGE UP (ref 0.7–2)
LACTATE SERPL-SCNC: 2.1 MMOL/L — HIGH (ref 0.7–2)
LYMPHOCYTES # BLD AUTO: 1.22 K/UL — SIGNIFICANT CHANGE UP (ref 1.2–3.4)
LYMPHOCYTES # BLD AUTO: 15 % — LOW (ref 20.5–51.1)
MAGNESIUM SERPL-MCNC: 1.8 MG/DL — SIGNIFICANT CHANGE UP (ref 1.8–2.4)
MCHC RBC-ENTMCNC: 29.3 PG — SIGNIFICANT CHANGE UP (ref 27–31)
MCHC RBC-ENTMCNC: 32 G/DL — SIGNIFICANT CHANGE UP (ref 32–37)
MCV RBC AUTO: 91.5 FL — SIGNIFICANT CHANGE UP (ref 81–99)
MONOCYTES # BLD AUTO: 0.64 K/UL — HIGH (ref 0.1–0.6)
MONOCYTES NFR BLD AUTO: 7.9 % — SIGNIFICANT CHANGE UP (ref 1.7–9.3)
NEUTROPHILS # BLD AUTO: 5.92 K/UL — SIGNIFICANT CHANGE UP (ref 1.4–6.5)
NEUTROPHILS NFR BLD AUTO: 72.8 % — SIGNIFICANT CHANGE UP (ref 42.2–75.2)
NRBC # BLD: 0 /100 WBCS — SIGNIFICANT CHANGE UP (ref 0–0)
NT-PROBNP SERPL-SCNC: 4004 PG/ML — HIGH (ref 0–300)
PLATELET # BLD AUTO: 584 K/UL — HIGH (ref 130–400)
POTASSIUM SERPL-MCNC: 4.9 MMOL/L — SIGNIFICANT CHANGE UP (ref 3.5–5)
POTASSIUM SERPL-SCNC: 4.9 MMOL/L — SIGNIFICANT CHANGE UP (ref 3.5–5)
PROT SERPL-MCNC: 5.5 G/DL — LOW (ref 6–8)
PROTHROM AB SERPL-ACNC: 33.9 SEC — HIGH (ref 9.95–12.87)
RBC # BLD: 3.31 M/UL — LOW (ref 4.2–5.4)
RBC # FLD: 16 % — HIGH (ref 11.5–14.5)
SARS-COV-2 RNA SPEC QL NAA+PROBE: SIGNIFICANT CHANGE UP
SODIUM SERPL-SCNC: 131 MMOL/L — LOW (ref 135–146)
TROPONIN T SERPL-MCNC: 0.02 NG/ML — HIGH
TROPONIN T SERPL-MCNC: 0.04 NG/ML — CRITICAL HIGH
WBC # BLD: 8.13 K/UL — SIGNIFICANT CHANGE UP (ref 4.8–10.8)
WBC # FLD AUTO: 8.13 K/UL — SIGNIFICANT CHANGE UP (ref 4.8–10.8)

## 2021-07-31 PROCEDURE — 93970 EXTREMITY STUDY: CPT | Mod: 26

## 2021-07-31 PROCEDURE — 71275 CT ANGIOGRAPHY CHEST: CPT | Mod: 26,MA

## 2021-07-31 PROCEDURE — 99291 CRITICAL CARE FIRST HOUR: CPT | Mod: CS,GC

## 2021-07-31 PROCEDURE — 93010 ELECTROCARDIOGRAM REPORT: CPT | Mod: 76

## 2021-07-31 PROCEDURE — 71045 X-RAY EXAM CHEST 1 VIEW: CPT | Mod: 26

## 2021-07-31 RX ORDER — ALPRAZOLAM 0.25 MG
0.5 TABLET ORAL AT BEDTIME
Refills: 0 | Status: DISCONTINUED | OUTPATIENT
Start: 2021-07-31 | End: 2021-08-06

## 2021-07-31 RX ORDER — WARFARIN SODIUM 2.5 MG/1
2 TABLET ORAL ONCE
Refills: 0 | Status: COMPLETED | OUTPATIENT
Start: 2021-07-31 | End: 2021-07-31

## 2021-07-31 RX ORDER — ATORVASTATIN CALCIUM 80 MG/1
20 TABLET, FILM COATED ORAL AT BEDTIME
Refills: 0 | Status: DISCONTINUED | OUTPATIENT
Start: 2021-07-31 | End: 2021-08-06

## 2021-07-31 RX ORDER — ATENOLOL 25 MG/1
100 TABLET ORAL DAILY
Refills: 0 | Status: DISCONTINUED | OUTPATIENT
Start: 2021-07-31 | End: 2021-07-31

## 2021-07-31 RX ORDER — ATENOLOL 25 MG/1
25 TABLET ORAL DAILY
Refills: 0 | Status: DISCONTINUED | OUTPATIENT
Start: 2021-07-31 | End: 2021-08-01

## 2021-07-31 RX ORDER — ASPIRIN/CALCIUM CARB/MAGNESIUM 324 MG
81 TABLET ORAL DAILY
Refills: 0 | Status: DISCONTINUED | OUTPATIENT
Start: 2021-07-31 | End: 2021-08-06

## 2021-07-31 RX ORDER — SODIUM CHLORIDE 9 MG/ML
1000 INJECTION INTRAMUSCULAR; INTRAVENOUS; SUBCUTANEOUS ONCE
Refills: 0 | Status: COMPLETED | OUTPATIENT
Start: 2021-07-31 | End: 2021-07-31

## 2021-07-31 RX ORDER — FERROUS SULFATE 325(65) MG
325 TABLET ORAL DAILY
Refills: 0 | Status: DISCONTINUED | OUTPATIENT
Start: 2021-07-31 | End: 2021-08-06

## 2021-07-31 RX ORDER — WARFARIN SODIUM 2.5 MG/1
1 TABLET ORAL
Qty: 0 | Refills: 0 | DISCHARGE

## 2021-07-31 RX ORDER — LOSARTAN POTASSIUM 100 MG/1
100 TABLET, FILM COATED ORAL DAILY
Refills: 0 | Status: DISCONTINUED | OUTPATIENT
Start: 2021-07-31 | End: 2021-08-01

## 2021-07-31 RX ORDER — DILTIAZEM HCL 120 MG
10 CAPSULE, EXT RELEASE 24 HR ORAL ONCE
Refills: 0 | Status: COMPLETED | OUTPATIENT
Start: 2021-07-31 | End: 2021-07-31

## 2021-07-31 RX ORDER — OXYCODONE HYDROCHLORIDE 5 MG/1
5 TABLET ORAL EVERY 6 HOURS
Refills: 0 | Status: DISCONTINUED | OUTPATIENT
Start: 2021-07-31 | End: 2021-08-06

## 2021-07-31 RX ORDER — ESCITALOPRAM OXALATE 10 MG/1
10 TABLET, FILM COATED ORAL AT BEDTIME
Refills: 0 | Status: DISCONTINUED | OUTPATIENT
Start: 2021-07-31 | End: 2021-08-06

## 2021-07-31 RX ADMIN — Medication 10 MILLIGRAM(S): at 13:35

## 2021-07-31 RX ADMIN — ATORVASTATIN CALCIUM 20 MILLIGRAM(S): 80 TABLET, FILM COATED ORAL at 22:35

## 2021-07-31 RX ADMIN — SODIUM CHLORIDE 1000 MILLILITER(S): 9 INJECTION INTRAMUSCULAR; INTRAVENOUS; SUBCUTANEOUS at 13:35

## 2021-07-31 RX ADMIN — WARFARIN SODIUM 2 MILLIGRAM(S): 2.5 TABLET ORAL at 22:35

## 2021-07-31 RX ADMIN — ATENOLOL 100 MILLIGRAM(S): 25 TABLET ORAL at 22:35

## 2021-07-31 RX ADMIN — ESCITALOPRAM OXALATE 10 MILLIGRAM(S): 10 TABLET, FILM COATED ORAL at 22:35

## 2021-07-31 RX ADMIN — Medication 325 MILLIGRAM(S): at 22:35

## 2021-07-31 NOTE — ED PROVIDER NOTE - PMH
Anxiety    Depression    Fracture of right shoulder    High cholesterol    HTN (hypertension)    Osteoporosis

## 2021-07-31 NOTE — H&P ADULT - HISTORY OF PRESENT ILLNESS
85-year-old, female patient, Hypertension, DL, CAD s/p CABG in 2003 with concomitant Metallic Aortic valve replacement and osteoporosis, and displaced right subcapital femoral neck fracture s/p Hemiarthroplasty on 7/16/2021 ( last week) was brought in from [facility] by EMS for Chest discomfort. The patient was discharged from the hospital on 7/23 after a fall complicated by the fracture. Today she was attempting to work with PT at the NH, the patient could not participate due to pains in her right hip, so she laid back to rest. Afterwards she had an acute onset of chest discomfort, pressure like, retrosternal/ epigastric in nature. No alleviating factors, no aggravating factors ( she was sitting at that time). No cough, no pleuritic chest pain. No tenderness upon palpation. The RN at the [facility] (NH) took the patient's vital signs and she had Tachycardia (140's). The patient did not feel any palpitations. No syncope.

## 2021-07-31 NOTE — ED ADULT NURSE NOTE - PMH
Anxiety    Depression    Fracture of right shoulder    High cholesterol    HTN (hypertension)    Osteoporosis     Anxiety    Atrial fibrillation    Depression    Fracture of right shoulder    High cholesterol    HTN (hypertension)    Osteoporosis

## 2021-07-31 NOTE — ED PROVIDER NOTE - PROGRESS NOTE DETAILS
PP: Signed patient out to [facility]. ACS rule out and workup of Afib, may need to be started on medications. No PE on CTA. Patient broke after 10 cardizem. No drip started. Patient was very sensitive to cardizem, HR dropped to 50s. Inpatient team aware (Maddi). Family (daughter) aware.

## 2021-07-31 NOTE — CONSULT NOTE ADULT - SUBJECTIVE AND OBJECTIVE BOX
Patient is a 85y old  Female who presents with a chief complaint of Chest Discomfort/ Tachyarrhythmia (31 Jul 2021 18:34)      HPI:  Patient of Dr Lynn,, Dr Rojas asked me to see her during weekend.  Patient with below Hx presented for a chest pain, occurred suddenly, not associated with the dyspnea, palpitation, nausea, vomiting or diaphoresis but found to have an irregular fast heart beat at 150/min.  In ED Troponin was slightly elevated, BNP as well, anemia, chest xray showed a mild congestion.    85-year-old, female patient, Hypertension, DL, CAD s/p CABG in 2003 with concomitant Metallic Aortic valve replacement and osteoporosis, and displaced right subcapital femoral neck fracture s/p Hemiarthroplasty on 7/16/2021 ( last week) was brought in from [facility] by EMS for Chest discomfort. The patient was discharged from the hospital on 7/23 after a fall complicated by the fracture. Today she was attempting to work with PT at the NH, the patient could not participate due to pains in her right hip, so she laid back to rest. Afterwards she had an acute onset of chest discomfort, pressure like, retrosternal/ epigastric in nature. No alleviating factors, no aggravating factors ( she was sitting at that time). No cough, no pleuritic chest pain. No tenderness upon palpation. The RN at the [facility] (NH) took the patient's vital signs and she had Tachycardia (140's). The patient did not feel any palpitations. No syncope.      (31 Jul 2021 18:34)      PAST MEDICAL & SURGICAL HISTORY:  HTN (hypertension)    High cholesterol    Osteoporosis    Fracture of right shoulder    Anxiety    Depression    Atrial fibrillation    H/O heart valve replacement with mechanical valve    S/P hip hemiarthroplasty  on 7/16/2021 for right subcapital femoral neck fracture        PREVIOUS DIAGNOSTIC TESTING:      ECHO  FINDINGS: < from: TTE Echo Complete w/ Contrast w/ Doppler (06.08.21 @ 09:03) >   1. Left ventricular ejection fraction, by visual estimation, is 55 to 60%.   2. Spectral Doppler shows impaired relaxation pattern of left ventricular myocardial filling (Grade I diastolic dysfunction).   3. Mildly enlarged left atrium.   4. Mildly enlarged right atrium.   5. Mild mitral annular calcification.   6. Moderate mitral valve regurgitation.   7. Moderate tricuspid regurgitation.   8. Mechanical prosthesis in the aortic valve position.   9. Estimated pulmonary artery systolic pressure is 44.5 mmHg assuming a right atrial pressure of 3 mmHg, which is consistent with mild pulmonary hypertension.    < end of copied text >        MEDICATIONS  (STANDING):  aspirin  chewable 81 milliGRAM(s) Oral daily  ATENolol  Tablet 100 milliGRAM(s) Oral daily  atorvastatin 20 milliGRAM(s) Oral at bedtime  escitalopram 10 milliGRAM(s) Oral at bedtime  ferrous    sulfate 325 milliGRAM(s) Oral daily    MEDICATIONS  (PRN):  ALPRAZolam 0.5 milliGRAM(s) Oral at bedtime PRN Anxiety/Agitation  oxyCODONE    IR 5 milliGRAM(s) Oral every 6 hours PRN Severe Pain (7 - 10)      FAMILY HISTORY:  FH: lung cancer (Sibling)        SOCIAL HISTORY:  CIGARETTES: no  ALCOHOL: no  DRUGS: no                      REVIEW OF SYSTEMS:  CONSTITUTIONAL: No distress  NECK: No pain or stiffnes  RESPIRATORY: No cough, wheezing, shortness of breath  CARDIOVASCULAR: initial chest pain, no more, no palpitations, b/l eg swelling  GASTROINTESTINAL: No abdominal or epigastric pain. No nausea, vomiting, or hematemesis;  No melena.  NEUROLOGICAL: No dizziness, headaches,  SKIN:  ecchymosis   ENDOCRINE: No heat or cold intolerance  MUSCULOSKELETAL: joint pain, limited motion due to recent fx and surgery  PSYCHIATRIC: anxiety  ALLERGY: No hives, itching, rash          Vital Signs Last 24 Hrs  T(C): 36.2 (31 Jul 2021 18:58), Max: 37.7 (31 Jul 2021 13:32)  T(F): 97.2 (31 Jul 2021 18:58), Max: 99.8 (31 Jul 2021 13:32)  HR: 71 (31 Jul 2021 18:58) (65 - 149)  BP: 199/74 (31 Jul 2021 18:58) (84/77 - 199/74)  BP(mean): --  RR: 18 (31 Jul 2021 18:58) (18 - 18)  SpO2: 98% (31 Jul 2021 18:58) (97% - 100%)                      PHYSICAL EXAM:  GENERAL: No distress  HEAD:  Atraumatic, Normocephalic  NECK: Supple, No JVD, No Bruit  NERVOUS SYSTEM:  Alert, Awake, E[place] speaker  CHEST/LUNG: Normal air entry to lung base bilaterally; No wheeze, but crackle  HEART: Irregular heart beat, S1, A2, P2, No S3, No gallop, 2/6 systolic ejection murmur at the apex and the base 2nd ICS  ABDOMEN: Soft, Non tender, Non distended; Bowel sounds present  EXTREMITIES:  1+ Peripheral Pulses, No clubbing, No edema  SKIN: No rashes or lesions, but ecchymosis of b/l upper extremities    TELEMETRY:    ECG:     I&O's Detail      LABS:                        9.7    8.13  )-----------( 584      ( 31 Jul 2021 13:50 )             30.3     07-31    131<L>  |  93<L>  |  14  ----------------------------<  118<H>  4.9   |  26  |  0.7    Ca    8.4<L>      31 Jul 2021 13:50  Mg     1.8     07-31    TPro  5.5<L>  /  Alb  3.2<L>  /  TBili  1.0  /  DBili  x   /  AST  29  /  ALT  12  /  AlkPhos  85  07-31    CARDIAC MARKERS ( 31 Jul 2021 20:38 )  x     / 0.04 ng/mL / x     / x     / 1.9 ng/mL  CARDIAC MARKERS ( 31 Jul 2021 13:50 )  x     / 0.02 ng/mL / x     / x     / x          PT/INR - ( 31 Jul 2021 13:50 )   PT: 33.90 sec;   INR: 2.98 ratio         PTT - ( 31 Jul 2021 13:50 )  PTT:44.9 sec    I&O's Summary      RADIOLOGY & ADDITIONAL STUDIES: < from: Xray Chest 1 View- PORTABLE-Urgent (Xray Chest 1 View- PORTABLE-Urgent .) (07.20.21 @ 13:06) >  Support devices:  Leads overlie thorax obscuring anatomy.    Cardiac/mediastinum/hilum: Stable cardiac silhouette post sternotomy    Lung parenchyma/ Pleura: Stable opacities. No pneumothorax.      Skeleton/soft tissues: Stable degenerative changes thoracic spine, right shoulder      Impression:    Stable opacities. No pneumothorax.    < end of copied text >  < from: CT Angio Chest PE Protocol w/ IV Cont (07.31.21 @ 15:59) >  1.  No pulmonary embolism.  2.  Bilateral small pleural effusions and probable left interfissural fluid.  3.  Right heart enlargement, stable on 11/20/2020.  4.  Bilateral small airways disease.  5.  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    < end of copied text >   Patient is a 85y old  Female who presents with a chief complaint of Chest Discomfort/ Tachyarrhythmia (31 Jul 2021 18:34)      HPI:  Patient of Dr Lynn,, Dr Lee asked me to see her during the weekend. Her daughter at the bedside translated for me.  Patient with below Hx presented for a chest pain, occurred suddenly, not associated with the dyspnea, palpitation, nausea, vomiting or diaphoresis, however it was found to be in SVT at the HR of 149/min, ( short R-P tachy, looks AVNRT).  In ED Troponin was slightly elevated, BNP as well, anemia, chest xray showed a mild congestion.    85-year-old, female patient, Hypertension, DL, CAD s/p CABG in 2003 with concomitant Metallic Aortic valve replacement and osteoporosis, and displaced right subcapital femoral neck fracture s/p Hemiarthroplasty on 7/16/2021 ( last week) was brought in from [facility] by EMS for Chest discomfort. The patient was discharged from the hospital on 7/23 after a fall complicated by the fracture. Today she was attempting to work with PT at the NH, the patient could not participate due to pains in her right hip, so she laid back to rest. Afterwards she had an acute onset of chest discomfort, pressure like, retrosternal/ epigastric in nature. No alleviating factors, no aggravating factors ( she was sitting at that time). No cough, no pleuritic chest pain. No tenderness upon palpation. The RN at the [facility] (NH) took the patient's vital signs and she had Tachycardia (140's). The patient did not feel any palpitations. No syncope.      (31 Jul 2021 18:34)      PAST MEDICAL & SURGICAL HISTORY:  HTN (hypertension)    High cholesterol    Osteoporosis    Fracture of right shoulder    Anxiety    Depression    Atrial fibrillation    H/O heart valve replacement with mechanical valve    S/P hip hemiarthroplasty  on 7/16/2021 for right subcapital femoral neck fracture        PREVIOUS DIAGNOSTIC TESTING:      ECHO  FINDINGS: < from: TTE Echo Complete w/ Contrast w/ Doppler (06.08.21 @ 09:03) >   1. Left ventricular ejection fraction, by visual estimation, is 55 to 60%.   2. Spectral Doppler shows impaired relaxation pattern of left ventricular myocardial filling (Grade I diastolic dysfunction).   3. Mildly enlarged left atrium.   4. Mildly enlarged right atrium.   5. Mild mitral annular calcification.   6. Moderate mitral valve regurgitation.   7. Moderate tricuspid regurgitation.   8. Mechanical prosthesis in the aortic valve position.   9. Estimated pulmonary artery systolic pressure is 44.5 mmHg assuming a right atrial pressure of 3 mmHg, which is consistent with mild pulmonary hypertension.    < end of copied text >        MEDICATIONS  (STANDING):  aspirin  chewable 81 milliGRAM(s) Oral daily  ATENolol  Tablet 100 milliGRAM(s) Oral daily  atorvastatin 20 milliGRAM(s) Oral at bedtime  escitalopram 10 milliGRAM(s) Oral at bedtime  ferrous    sulfate 325 milliGRAM(s) Oral daily    MEDICATIONS  (PRN):  ALPRAZolam 0.5 milliGRAM(s) Oral at bedtime PRN Anxiety/Agitation  oxyCODONE    IR 5 milliGRAM(s) Oral every 6 hours PRN Severe Pain (7 - 10)      FAMILY HISTORY:  FH: lung cancer (Sibling)        SOCIAL HISTORY:  CIGARETTES: no  ALCOHOL: no  DRUGS: no                      REVIEW OF SYSTEMS:  CONSTITUTIONAL: No distress, in supine, elderly female  NECK: No pain   RESPIRATORY: No cough, wheezing, shortness of breath  CARDIOVASCULAR: initial chest pain, no more, no palpitations, b/l eg swelling  GASTROINTESTINAL: No abdominal or epigastric pain. No nausea, vomiting, or hematemesis;  No melena.  NEUROLOGICAL: No dizziness, headaches,  SKIN:  ecchymosis   ENDOCRINE: No heat or cold intolerance  MUSCULOSKELETAL: joint pain, limited motion due to recent fx and surgery  PSYCHIATRIC: anxiety  ALLERGY: No hives, itching, rash          Vital Signs Last 24 Hrs  T(C): 36.2 (31 Jul 2021 18:58), Max: 37.7 (31 Jul 2021 13:32)  T(F): 97.2 (31 Jul 2021 18:58), Max: 99.8 (31 Jul 2021 13:32)  HR: 71 (31 Jul 2021 18:58) (65 - 149)  BP: 199/74 (31 Jul 2021 18:58) (84/77 - 199/74)  BP(mean): --  RR: 18 (31 Jul 2021 18:58) (18 - 18)  SpO2: 98% (31 Jul 2021 18:58) (97% - 100%)                      PHYSICAL EXAM:  GENERAL: No distress  HEAD:  Atraumatic, Normocephalic  NECK: Supple, No JVD, No Bruit  NERVOUS SYSTEM:  Alert, Awake, Swazi speaker  CHEST/LUNG: Normal air entry to lung base bilaterally; No wheeze, but crackle  HEART: Irregular heart beat, S1, A2, P2, No S3, No gallop, 2/6 systolic ejection murmur at the apex and the base 2nd ICS  ABDOMEN: Soft, Non tender, Non distended; Bowel sounds present  EXTREMITIES:  1+ Peripheral Pulses, No clubbing, No edema  SKIN: No rashes or lesions, but ecchymosis of b/l upper extremities    TELEMETRY:    ECG:     I&O's Detail      LABS:                        9.7    8.13  )-----------( 584      ( 31 Jul 2021 13:50 )             30.3     07-31    131<L>  |  93<L>  |  14  ----------------------------<  118<H>  4.9   |  26  |  0.7    Ca    8.4<L>      31 Jul 2021 13:50  Mg     1.8     07-31    TPro  5.5<L>  /  Alb  3.2<L>  /  TBili  1.0  /  DBili  x   /  AST  29  /  ALT  12  /  AlkPhos  85  07-31    CARDIAC MARKERS ( 31 Jul 2021 20:38 )  x     / 0.04 ng/mL / x     / x     / 1.9 ng/mL  CARDIAC MARKERS ( 31 Jul 2021 13:50 )  x     / 0.02 ng/mL / x     / x     / x          PT/INR - ( 31 Jul 2021 13:50 )   PT: 33.90 sec;   INR: 2.98 ratio         PTT - ( 31 Jul 2021 13:50 )  PTT:44.9 sec    I&O's Summary      RADIOLOGY & ADDITIONAL STUDIES: < from: Xray Chest 1 View- PORTABLE-Urgent (Xray Chest 1 View- PORTABLE-Urgent .) (07.20.21 @ 13:06) >  Support devices:  Leads overlie thorax obscuring anatomy.    Cardiac/mediastinum/hilum: Stable cardiac silhouette post sternotomy    Lung parenchyma/ Pleura: Stable opacities. No pneumothorax.      Skeleton/soft tissues: Stable degenerative changes thoracic spine, right shoulder      Impression:    Stable opacities. No pneumothorax.    < end of copied text >  < from: CT Angio Chest PE Protocol w/ IV Cont (07.31.21 @ 15:59) >  1.  No pulmonary embolism.  2.  Bilateral small pleural effusions and probable left interfissural fluid.  3.  Right heart enlargement, stable on 11/20/2020.  4.  Bilateral small airways disease.  5.  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    < end of copied text >

## 2021-07-31 NOTE — CONSULT NOTE ADULT - ASSESSMENT
84yo F hx Hypertension, DL, CAD s/p CABG in 2003 with concomitant Metallic Aortic valve replacement and osteoporosis, and displaced right subcapital femoral neck fracture s/p Hemiarthroplasty on 7/16/2021 ( last week) was brought in from [facility] by EMS for Chest discomfort. Cardiology consulted for troponin elevation and EKG changes.    Troponemia  HF exacerbation  SVT    - Patient currently in sinus. Continue with tele monitoring.  - Repeat TTE ordered placed per primary team.  - Patient current denied cp and trop elevation likely 2/2 HF exacerbation vs. earlier SVT. Will not treat with ACS protocol at this time uncless further EKG changes or recurrent/worsening chest pain symptoms.  - Continue to trend CE and repeat serial EKG if recurrent symptoms.  - Will not use cardiazem to treat SVT at this time as patient likely in HF exacerbation.  - Diuresis with lasix 40mg IVP overnight and closely monitor I&Os, renal functions and electrolytes.   - Will need adequate BP control. Restart home dose atenolol or switch to lopressor.  - Will discuss plan of care with attending cardiologist in the am. 84yo F hx Hypertension, DL, CAD s/p CABG in 2003 with concomitant Metallic Aortic valve replacement and osteoporosis, and displaced right subcapital femoral neck fracture s/p Hemiarthroplasty on 7/16/2021 ( last week) was brought in from [facility] by EMS for Chest discomfort. Cardiology consulted for troponin elevation and EKG changes.    Troponemia  HF exacerbation  SVT    - Patient currently in sinus. Continue with tele monitoring.  - Repeat TTE ordered placed per primary team.  - Patient current denied cp and trop elevation likely 2/2 HF exacerbation vs. earlier SVT. Will not treat with ACS protocol at this time uncless further EKG changes or recurrent/worsening chest pain symptoms.  - Continue to trend CE and repeat serial EKG if recurrent symptoms.  - Will not use cardiazem to treat SVT at this time as patient likely in HF exacerbation.  - Diuresis with lasix 40mg IVP overnight and closely monitor I&Os, renal functions and electrolytes.   - Will need adequate BP control. Restart home dose atenolol or switch to lopressor.  - C/w coumadin and ASA for mechanical valve.  - Will discuss plan of care with attending cardiologist in the am. 86yo F hx Hypertension, DL, CAD s/p CABG in 2003 with concomitant Metallic Aortic valve replacement and osteoporosis, and displaced right subcapital femoral neck fracture s/p Hemiarthroplasty on 7/16/2021 ( last week) was brought in from [facility] by EMS for Chest discomfort. Cardiology consulted for troponin elevation and EKG changes.    Troponemia  HF exacerbation  aflutter    - Patient currently in sinus. Continue with tele monitoring.  - Repeat TTE ordered placed per primary team.  - Patient current denied cp and trop elevation likely 2/2 HF exacerbation vs. aflutter. Will not treat with ACS protocol at this time uncless further EKG changes or recurrent/worsening chest pain symptoms.  - Continue to trend CE and repeat serial EKG if recurrent symptoms.  - Will not use cardiazem to treat Aflutter at this time as patient likely in HF exacerbation.  - Diuresis with lasix 40mg IVP overnight and closely monitor I&Os, renal functions and electrolytes.   - Will need adequate BP control. Restart home dose atenolol or switch to lopressor.  - C/w coumadin and ASA for mechanical valve.  - Will discuss plan of care with attending cardiologist in the am.

## 2021-07-31 NOTE — H&P ADULT - ASSESSMENT
85-year-old, female patient, Hypertension, DL, CAD s/p CABG in 2003 with concomitant Metallic Aortic valve replacement and osteoporosis, and displaced right subcapital femoral neck fracture s/p Hemiarthroplasty on 7/16/2021 ( last week) was brought in from [facility] by EMS for Chest discomfort. The patient was discharged from the hospital on 7/23 after a fall complicated by the fracture. Today she was attempting to work with PT at the NH, the patient could not participate due to pains in her right hip, so she laid back to rest. Afterwards she had an acute onset of chest discomfort, pressure like, retrosternal/ epigastric in nature. No alleviating factors, no aggravating factors ( she was sitting at that time). No cough, no pleuritic chest pain. No tenderness upon palpation. The RN at the [facility] (NH) took the patient's vital signs and she had Tachycardia (140's). The patient did not feel any palpitations. No syncope.     #Chest Discomfort  #Tachyarrhythmia  #Atrial Flutter vs Acute Onset Afib:   - Responded to Cardizem 10mg IV push once, but patient went into asymptomatic bradycardia afterwards  - EKG: My assessment more in favor of Atrial Flutter ?   - Increase Atenolol to 100mg po once daily ( from 50mg), BP ok/high  - CHADSVASc2: 5 ( Female, Age > 75, HTN, Vascular disease ( CAD))   - Warfarin 2mg po once daily, please follow INR daily  - Repeat EKG post Cardizem: NSR  - Troponin: 0.02, elevated, likely secondary to tachyarrhythmia Will need to trend second set at 20:00.   - Consult Cardiology  (Dr. Crow Nagy)     #CAD  - s/p CABG in 2003  - Continue with Atorvastatin  - Continue with Atenolol ( increase to 100mg po once daily)   - Continue with Losartan 100mg po once daily  - Continue with Aspirin 81mg po once daily    #Aortic Stenosis:   - S/p Metallic Valve replacement in 2003   - Avoid fluid overload  - Continue with Warfarin 2mg po once daily, Daily INR    #HTN:   - Continue with Atenolol (increase to 100mg po once daily)   - Continue with Losartan 100mg po once daily    #DLD:   - Continue with Atorvastatin 20mg po once daily at bedtime    #Misc:   - DVT proph: warfarin 2mg po once daily  - GI proph: Pantoprazole 40mg po once daily  - Diet: DASH diet            85-year-old, female patient, Hypertension, DL, CAD s/p CABG in 2003 with concomitant Metallic Aortic valve replacement and osteoporosis, and displaced right subcapital femoral neck fracture s/p Hemiarthroplasty on 7/16/2021 ( last week) was brought in from [facility] by EMS for Chest discomfort. The patient was discharged from the hospital on 7/23 after a fall complicated by the fracture. Today she was attempting to work with PT at the NH, the patient could not participate due to pains in her right hip, so she laid back to rest. Afterwards she had an acute onset of chest discomfort, pressure like, retrosternal/ epigastric in nature. No alleviating factors, no aggravating factors ( she was sitting at that time). No cough, no pleuritic chest pain. No tenderness upon palpation. The RN at the [facility] (NH) took the patient's vital signs and she had Tachycardia (140's). The patient did not feel any palpitations. No syncope.     #Chest Discomfort  #Tachyarrhythmia  #Atrial Flutter vs Acute Onset Afib:   - Responded to Cardizem 10mg IV push once, but patient went into asymptomatic bradycardia afterwards  - EKG: My assessment more in favor of Atrial Flutter ?   - Increase Atenolol to 100mg po once daily ( from 50mg), BP ok/high  - CHADSVASc2: 5 ( Female, Age > 75, HTN, Vascular disease ( CAD))   - Warfarin 2mg po once daily, please follow INR daily  - Repeat EKG post Cardizem: NSR  - Troponin: 0.02, elevated, likely secondary to tachyarrhythmia Will need to trend second set at 20:00.   - Consult Cardiology  (Dr. Crow Nagy)   - TTE: Done in June 2021:   a. Normal EF  b. Impaired LV diastolic relaxation with G1DD   c. Mild right and left atrial enlargements  d. Mild pulmonary hypertension    #CAD  - s/p CABG in 2003  - Continue with Atorvastatin  - Continue with Atenolol ( increase to 100mg po once daily)   - Continue with Losartan 100mg po once daily  - Continue with Aspirin 81mg po once daily    #Aortic Stenosis:   - S/p Metallic Valve replacement in 2003   - Avoid fluid overload  - Continue with Warfarin 2mg po once daily, Daily INR    #HTN:   - Continue with Atenolol (increase to 100mg po once daily)   - Continue with Losartan 100mg po once daily    #DLD:   - Continue with Atorvastatin 20mg po once daily at bedtime    #Misc:   - DVT proph: warfarin 2mg po once daily  - GI proph: Pantoprazole 40mg po once daily  - Diet: DASH diet            85-year-old, female patient, Hypertension, DL, CAD s/p CABG in  with concomitant Metallic Aortic valve replacement and osteoporosis, and displaced right subcapital femoral neck fracture s/p Hemiarthroplasty on 2021 ( last week) was brought in from [facility] by EMS for Chest discomfort. The patient was discharged from the hospital on  after a fall complicated by the fracture. Today she was attempting to work with PT at the NH, the patient could not participate due to pains in her right hip, so she laid back to rest. Afterwards she had an acute onset of chest discomfort, pressure like, retrosternal/ epigastric in nature. No alleviating factors, no aggravating factors ( she was sitting at that time). No cough, no pleuritic chest pain. No tenderness upon palpation. The RN at the [facility] (NH) took the patient's vital signs and she had Tachycardia (140's). The patient did not feel any palpitations. No syncope.     #Chest Discomfort  #Tachyarrhythmia  #Atrial Flutter vs Acute Onset Afib:   - Responded to Cardizem 10mg IV push once, but patient went into asymptomatic bradycardia afterwards  - EKG: My assessment more in favor of Atrial Flutter ?   - Increase Atenolol to 100mg po once daily ( from 50mg), BP ok/high  - CHADSVASc2: 5 ( Female, Age > 75, HTN, Vascular disease ( CAD))   - Warfarin 2mg po once daily, please follow INR daily  - Repeat EKG post Cardizem: NSR  - Troponin: 0.02, elevated, likely secondary to tachyarrhythmia Will need to trend second set at 20:00.   - CTA done to Rule out PE( Tachycardia, recently had surgery): negative, check full report above.   - The patient does have rt lower extremity edema, bilateral ( right > left). Follow up Venous Duplex.  - Consult Cardiology  (Dr. Crow Nagy)   - TTE: Done in 2021:   a. Normal EF  b. Impaired LV diastolic relaxation with G1DD   c. Mild right and left atrial enlargements  d. Mild pulmonary hypertension    #CAD  - s/p CABG in   - Continue with Atorvastatin  - Continue with Atenolol ( increase to 100mg po once daily)   - Continue with Losartan 100mg po once daily  - Continue with Aspirin 81mg po once daily    #Aortic Stenosis:   - S/p Metallic Valve replacement in    - Avoid fluid overload  - Continue with Warfarin 2mg po once daily, Daily INR    #HTN:   - Continue with Atenolol (increase to 100mg po once daily)   - Continue with Losartan 100mg po once daily    #DLD:   - Continue with Atorvastatin 20mg po once daily at bedtime    #Thoracic Aortic Aneurysm   - Seen on CTA scan  - Measurin.3cm  - Likely needs serial follow up.     #Misc:   - DVT proph: warfarin 2mg po once daily  - GI proph: Pantoprazole 40mg po once daily  - Diet: DASH diet            85-year-old, female patient, Hypertension, DL, CAD s/p CABG in  with concomitant Metallic Aortic valve replacement and osteoporosis, and displaced right subcapital femoral neck fracture s/p Hemiarthroplasty on 2021 ( last week) was brought in from [facility] by EMS for Chest discomfort. The patient was discharged from the hospital on  after a fall complicated by the fracture. Today she was attempting to work with PT at the NH, the patient could not participate due to pains in her right hip, so she laid back to rest. Afterwards she had an acute onset of chest discomfort, pressure like, retrosternal/ epigastric in nature. No alleviating factors, no aggravating factors ( she was sitting at that time). No cough, no pleuritic chest pain. No tenderness upon palpation. The RN at the [facility] (NH) took the patient's vital signs and she had Tachycardia (140's). The patient did not feel any palpitations. No syncope.     #Chest Discomfort  #Tachyarrhythmia  #Atrial Flutter vs Acute Onset Afib:   - Responded to Cardizem 10mg IV push once, but patient went into asymptomatic bradycardia afterwards  - EKG: My assessment more in favor of Atrial Flutter ?   - Increase Atenolol to 100mg po once daily ( from 50mg), BP ok/high  - CHADSVASc2: 5 ( Female, Age > 75, HTN, Vascular disease ( CAD))   - Warfarin 2mg po once daily, please follow INR daily  - Repeat EKG post Cardizem: NSR  - Troponin: 0.02, elevated, likely secondary to tachyarrhythmia Will need to trend second set at 20:00.   - CTA done to Rule out PE( Tachycardia, recently had surgery): negative, check full report above.   - The patient does have rt lower extremity edema, bilateral ( right > left). Follow up Venous Duplex.  - Consult Cardiology  (Dr. Crow aNgy)   - TTE: Done in 2021:   a. Normal EF  b. Impaired LV diastolic relaxation with G1DD   c. Mild right and left atrial enlargements  d. Mild pulmonary hypertension    #CAD  - s/p CABG in   - Continue with Atorvastatin  - Continue with Atenolol ( increase to 100mg po once daily)   - Continue with Losartan 100mg po once daily  - Continue with Aspirin 81mg po once daily    #Aortic Stenosis:   - S/p Metallic Valve replacement in    - Avoid fluid overload  - Continue with Warfarin 2mg po once daily, Daily INR    #HTN:   - Continue with Atenolol (increase to 100mg po once daily)   - Continue with Losartan 100mg po once daily    #DLD:   - Continue with Atorvastatin 20mg po once daily at bedtime  - Continue with Ezetimibe 10mg po once daily    #Thoracic Aortic Aneurysm   - Seen on CTA scan  - Measurin.3cm  - Likely needs serial follow up.     #Misc:   - DVT proph: warfarin 2mg po once daily  - GI proph: Pantoprazole 40mg po once daily  - Diet: DASH diet

## 2021-07-31 NOTE — CONSULT NOTE ADULT - SUBJECTIVE AND OBJECTIVE BOX
HISTORY OF PRESENT ILLNESS:   84yo F hx Hypertension, DL, CAD s/p CABG in 2003 with concomitant Metallic Aortic valve replacement and osteoporosis, and displaced right subcapital femoral neck fracture s/p Hemiarthroplasty on 7/16/2021 ( last week) was brought in from [facility] by EMS for Chest discomfort. Cardiology consulted for troponin elevation and EKG changes. The patient was discharged from the hospital on 7/23 after a fall complicated by the fracture. Today she was attempting to work with PT at the NH, the patient could not participate due to pains in her right hip, so she laid back to rest. Afterwards she had an acute onset of chest discomfort, pressure like, retrosternal/ epigastric in nature. No alleviating factors, no aggravating factors ( she was sitting at that time). No cough, no pleuritic chest pain. No tenderness upon palpation. The RN at the [facility] (NH) took the patient's vital signs and she had Tachycardia (140's). The patient did not feel any palpitations. No syncope. At the time of this eval, patient denied sob, cp, n/v, palpitations.    PAST MEDICAL & SURGICAL HISTORY  HTN (hypertension)    High cholesterol    Osteoporosis    Fracture of right shoulder    Anxiety    Depression    Atrial fibrillation    H/O heart valve replacement with mechanical valve    S/P hip hemiarthroplasty  on 7/16/2021 for right subcapital femoral neck fracture        FAMILY HISTORY:  FAMILY HISTORY:  FH: lung cancer (Sibling)        SOCIAL HISTORY:  []smoker  []Alcohol  []Drug    ROS:  Negative except as mentioned in HPI    ALLERGIES:  penicillin (Unknown)  pinapples (Unknown)      MEDICATIONS:  MEDICATIONS  (STANDING):  aspirin  chewable 81 milliGRAM(s) Oral daily  ATENolol  Tablet 100 milliGRAM(s) Oral daily  atorvastatin 20 milliGRAM(s) Oral at bedtime  escitalopram 10 milliGRAM(s) Oral at bedtime  ferrous    sulfate 325 milliGRAM(s) Oral daily    MEDICATIONS  (PRN):  ALPRAZolam 0.5 milliGRAM(s) Oral at bedtime PRN Anxiety/Agitation  oxyCODONE    IR 5 milliGRAM(s) Oral every 6 hours PRN Severe Pain (7 - 10)      HOME MEDICATIONS:  Home Medications:  acetaminophen 325 mg oral tablet: 2 tab(s) orally every 6 hours (23 Jul 2021 11:48)  alendronate 35 mg oral tablet: 1 tab(s) orally once a week (07 Jun 2021 00:16)  ALPRAZolam 0.5 mg oral tablet: 1 tab(s) orally once a day (at bedtime), As Needed (07 Jun 2021 00:16)  aspirin 81 mg oral tablet, chewable: 1 tab(s) orally once a day (07 Jun 2021 00:16)  atenolol 25 mg oral tablet: 1 tab(s) orally once a day (07 Jun 2021 00:16)  atorvastatin 20 mg oral tablet: 1 tab(s) orally once a day (07 Jun 2021 00:16)  escitalopram 10 mg oral tablet: 1 tab(s) orally once a day (07 Jun 2021 00:16)  ezetimibe 10 mg oral tablet: 1 tab(s) orally once a day (07 Jun 2021 00:16)  iron gluconate: 27 milligram(s) orally once a day (07 Jun 2021 00:16)  lidocaine 5% patch: 1  transdermally once a day (07 Jun 2021 00:16)  losartan 100 mg oral tablet: 1 tab(s) orally once a day (31 Jul 2021 18:45)  oxyCODONE 5 mg oral tablet: 1 tab(s) orally every 6 hours, As needed, Severe Pain (7 - 10) (23 Jul 2021 11:48)  warfarin 4 mg oral tablet: 0.5 tab(s) orally once a day (31 Jul 2021 19:24)      VITALS:   T(F): 97.2 (07-31 @ 18:58), Max: 99.8 (07-31 @ 13:32)  HR: 71 (07-31 @ 18:58) (65 - 149)  BP: 199/74 (07-31 @ 18:58) (84/77 - 199/74)  BP(mean): --  RR: 18 (07-31 @ 18:58) (18 - 18)  SpO2: 98% (07-31 @ 18:58) (97% - 100%)    I&O's Summary      PHYSICAL EXAM:  GEN: Not in acute distress  HEENT: NCAT, PERRL, EOMI  LUNGS: Clear to auscultation bilaterally   CARDIOVASCULAR: RRR, S1/S2 present, no murmurs, rubs or gallops, no JVD, + PP bilaterally  ABD: Soft, non-tender, non-distended  EXT: Significant bilateral LE swelling  SKIN: Intact  NEURO: AAOx3    LABS:                        9.7    8.13  )-----------( 584      ( 31 Jul 2021 13:50 )             30.3     07-31    131<L>  |  93<L>  |  14  ----------------------------<  118<H>  4.9   |  26  |  0.7    Ca    8.4<L>      31 Jul 2021 13:50  Mg     1.8     07-31    TPro  5.5<L>  /  Alb  3.2<L>  /  TBili  1.0  /  DBili  x   /  AST  29  /  ALT  12  /  AlkPhos  85  07-31    PT/INR - ( 31 Jul 2021 13:50 )   PT: 33.90 sec;   INR: 2.98 ratio         PTT - ( 31 Jul 2021 13:50 )  PTT:44.9 sec  Troponin T, Serum: 0.04 ng/mL *HH* (07-31-21 @ 20:38)  Lactate, Blood: 1.1 mmol/L (07-31-21 @ 20:38)  Troponin T, Serum: 0.02 ng/mL *H* (07-31-21 @ 13:50)  Lactate, Blood: 2.1 mmol/L *H* (07-31-21 @ 13:50)    Troponin 0.04, CKMB 1.9, CK --/ 07-31-21 @ 20:38  Troponin 0.02, CKMB --, CK --/ 07-31-21 @ 13:50  Troponin <0.01, CKMB --, CK --/ 07-21-21 @ 23:25  Troponin 0.03, CKMB 2.3, / 07-17-21 @ 20:09  Troponin 0.04, CKMB 4.3, / 07-17-21 @ 01:30  Troponin 0.03, CKMB 5.3, / 07-16-21 @ 19:33    Serum Pro-Brain Natriuretic Peptide: 4004 pg/mL (07-31-21 @ 13:50)      Hemoglobin A1C   Thyroid    TTE 06/2021:  1. Left ventricular ejection fraction, by visual estimation, is 55 to 60%.   2. Spectral Doppler shows impaired relaxation pattern of left ventricular myocardial filling (Grade I diastolic dysfunction).   3. Mildly enlarged left atrium.   4. Mildly enlarged right atrium.   5. Mild mitral annular calcification.   6. Moderate mitral valve regurgitation.   7. Moderate tricuspid regurgitation.   8. Mechanical prosthesis in the aortic valve position.   9. Estimated pulmonary artery systolic pressure is 44.5 mmHg assuming a right atrial pressure of 3 mmHg, which is consistent with mild pulmonary hypertension.    EKG:   Initial: SVT  Repeat EKG: sinus w/ TWI in III, AVF HISTORY OF PRESENT ILLNESS:   86yo F hx Hypertension, DL, CAD s/p CABG in 2003 with concomitant Metallic Aortic valve replacement and osteoporosis, and displaced right subcapital femoral neck fracture s/p Hemiarthroplasty on 7/16/2021 ( last week) was brought in from [facility] by EMS for Chest discomfort. Cardiology consulted for troponin elevation and EKG changes. The patient was discharged from the hospital on 7/23 after a fall complicated by the fracture. Today she was attempting to work with PT at the NH, the patient could not participate due to pains in her right hip, so she laid back to rest. Afterwards she had an acute onset of chest discomfort, pressure like, retrosternal/ epigastric in nature. No alleviating factors, no aggravating factors ( she was sitting at that time). No cough, no pleuritic chest pain. No tenderness upon palpation. The RN at the [facility] (NH) took the patient's vital signs and she had Tachycardia (140's). The patient did not feel any palpitations. No syncope. At the time of this eval, patient denied sob, cp, n/v, palpitations.    PAST MEDICAL & SURGICAL HISTORY  HTN (hypertension)    High cholesterol    Osteoporosis    Fracture of right shoulder    Anxiety    Depression    Atrial fibrillation    H/O heart valve replacement with mechanical valve    S/P hip hemiarthroplasty  on 7/16/2021 for right subcapital femoral neck fracture        FAMILY HISTORY:  FAMILY HISTORY:  FH: lung cancer (Sibling)        SOCIAL HISTORY:  []smoker  []Alcohol  []Drug    ROS:  Negative except as mentioned in HPI    ALLERGIES:  penicillin (Unknown)  pinapples (Unknown)      MEDICATIONS:  MEDICATIONS  (STANDING):  aspirin  chewable 81 milliGRAM(s) Oral daily  ATENolol  Tablet 100 milliGRAM(s) Oral daily  atorvastatin 20 milliGRAM(s) Oral at bedtime  escitalopram 10 milliGRAM(s) Oral at bedtime  ferrous    sulfate 325 milliGRAM(s) Oral daily    MEDICATIONS  (PRN):  ALPRAZolam 0.5 milliGRAM(s) Oral at bedtime PRN Anxiety/Agitation  oxyCODONE    IR 5 milliGRAM(s) Oral every 6 hours PRN Severe Pain (7 - 10)      HOME MEDICATIONS:  Home Medications:  acetaminophen 325 mg oral tablet: 2 tab(s) orally every 6 hours (23 Jul 2021 11:48)  alendronate 35 mg oral tablet: 1 tab(s) orally once a week (07 Jun 2021 00:16)  ALPRAZolam 0.5 mg oral tablet: 1 tab(s) orally once a day (at bedtime), As Needed (07 Jun 2021 00:16)  aspirin 81 mg oral tablet, chewable: 1 tab(s) orally once a day (07 Jun 2021 00:16)  atenolol 25 mg oral tablet: 1 tab(s) orally once a day (07 Jun 2021 00:16)  atorvastatin 20 mg oral tablet: 1 tab(s) orally once a day (07 Jun 2021 00:16)  escitalopram 10 mg oral tablet: 1 tab(s) orally once a day (07 Jun 2021 00:16)  ezetimibe 10 mg oral tablet: 1 tab(s) orally once a day (07 Jun 2021 00:16)  iron gluconate: 27 milligram(s) orally once a day (07 Jun 2021 00:16)  lidocaine 5% patch: 1  transdermally once a day (07 Jun 2021 00:16)  losartan 100 mg oral tablet: 1 tab(s) orally once a day (31 Jul 2021 18:45)  oxyCODONE 5 mg oral tablet: 1 tab(s) orally every 6 hours, As needed, Severe Pain (7 - 10) (23 Jul 2021 11:48)  warfarin 4 mg oral tablet: 0.5 tab(s) orally once a day (31 Jul 2021 19:24)      VITALS:   T(F): 97.2 (07-31 @ 18:58), Max: 99.8 (07-31 @ 13:32)  HR: 71 (07-31 @ 18:58) (65 - 149)  BP: 199/74 (07-31 @ 18:58) (84/77 - 199/74)  BP(mean): --  RR: 18 (07-31 @ 18:58) (18 - 18)  SpO2: 98% (07-31 @ 18:58) (97% - 100%)    I&O's Summary      PHYSICAL EXAM:  GEN: Not in acute distress  HEENT: NCAT, PERRL, EOMI  LUNGS: Clear to auscultation bilaterally   CARDIOVASCULAR: RRR, S1/S2 present, no murmurs, rubs or gallops, no JVD, + PP bilaterally  ABD: Soft, non-tender, non-distended  EXT: Significant bilateral LE swelling  SKIN: Intact  NEURO: AAOx3    LABS:                        9.7    8.13  )-----------( 584      ( 31 Jul 2021 13:50 )             30.3     07-31    131<L>  |  93<L>  |  14  ----------------------------<  118<H>  4.9   |  26  |  0.7    Ca    8.4<L>      31 Jul 2021 13:50  Mg     1.8     07-31    TPro  5.5<L>  /  Alb  3.2<L>  /  TBili  1.0  /  DBili  x   /  AST  29  /  ALT  12  /  AlkPhos  85  07-31    PT/INR - ( 31 Jul 2021 13:50 )   PT: 33.90 sec;   INR: 2.98 ratio         PTT - ( 31 Jul 2021 13:50 )  PTT:44.9 sec  Troponin T, Serum: 0.04 ng/mL *HH* (07-31-21 @ 20:38)  Lactate, Blood: 1.1 mmol/L (07-31-21 @ 20:38)  Troponin T, Serum: 0.02 ng/mL *H* (07-31-21 @ 13:50)  Lactate, Blood: 2.1 mmol/L *H* (07-31-21 @ 13:50)    Troponin 0.04, CKMB 1.9, CK --/ 07-31-21 @ 20:38  Troponin 0.02, CKMB --, CK --/ 07-31-21 @ 13:50  Troponin <0.01, CKMB --, CK --/ 07-21-21 @ 23:25  Troponin 0.03, CKMB 2.3, / 07-17-21 @ 20:09  Troponin 0.04, CKMB 4.3, / 07-17-21 @ 01:30  Troponin 0.03, CKMB 5.3, / 07-16-21 @ 19:33    Serum Pro-Brain Natriuretic Peptide: 4004 pg/mL (07-31-21 @ 13:50)      Hemoglobin A1C   Thyroid    TTE 06/2021:  1. Left ventricular ejection fraction, by visual estimation, is 55 to 60%.   2. Spectral Doppler shows impaired relaxation pattern of left ventricular myocardial filling (Grade I diastolic dysfunction).   3. Mildly enlarged left atrium.   4. Mildly enlarged right atrium.   5. Mild mitral annular calcification.   6. Moderate mitral valve regurgitation.   7. Moderate tricuspid regurgitation.   8. Mechanical prosthesis in the aortic valve position.   9. Estimated pulmonary artery systolic pressure is 44.5 mmHg assuming a right atrial pressure of 3 mmHg, which is consistent with mild pulmonary hypertension.    EKG:   Initial: Aflutter  Repeat EKG: sinus w/ TWI in III, AVF

## 2021-07-31 NOTE — H&P ADULT - NSICDXPASTSURGICALHX_GEN_ALL_CORE_FT
PAST SURGICAL HISTORY:  H/O heart valve replacement with mechanical valve      PAST SURGICAL HISTORY:  H/O heart valve replacement with mechanical valve     S/P hip hemiarthroplasty on 7/16/2021 for right subcapital femoral neck fracture

## 2021-07-31 NOTE — ED PROVIDER NOTE - CLINICAL SUMMARY MEDICAL DECISION MAKING FREE TEXT BOX
Patient presented with tachycardia, chest pain, (+) extensive cardiac history and high risk for ACS. On arrival patient normotensive, no acute respiratory distress but (+) tachycardic in 140s-150s, likely aflutter. Given cardizem 10mg IVP which converted to sinus and improved patient's symptoms. Serial EKG without significant ischemic changes, no evidence of STEMI. Labs remarkable for mildly elevated troponin to 0.02 (likely demand) and probnp >4k but otherwise grossly unremarkable including no significant leukocytosis, anemia, signs of dehydration/SHOBHA, transaminitis or significant electrolyte abnormalities. CXR showed (+) worsening b/l congestion but no PNA. CTA chest negative for PE. Patient remained HD stable during ED course s/p cardizem. Given the above, will admit for further monitoring and management. Patient agreeable with plan. HD stable at time of admission.

## 2021-07-31 NOTE — H&P ADULT - NSHPLABSRESULTS_GEN_ALL_CORE
WBC Count: 8.13 K/uL   RBC Count: 3.31 M/uL   Hemoglobin: 9.7 g/dL   Hematocrit: 30.3 %   Mean Cell Volume: 91.5 fL   Mean Cell Hemoglobin: 29.3 pg   Mean Cell Hemoglobin Conc: 32.0 g/dL   Red Cell Distrib Width: 16.0 %   Platelet Count - Automated: 584 K/uL   Auto Neutrophil #: 5.92 K/uL   Auto Lymphocyte #: 1.22 K/uL   Auto Monocyte #: 0.64 K/uL   Auto Eosinophil #: 0.25 K/uL   Auto Basophil #: 0.06 K/uL   Auto Neutrophil %: 72.8: Differential percentages must be correlated with absolute numbers for   clinical significance. %   Auto Lymphocyte %: 15.0 %   Auto Monocyte %: 7.9 %   Auto Eosinophil %: 3.1 %   Auto Basophil %: 0.7 %   Auto Immature Granulocyte %: 0.5: (Includes meta, myelo and promyelocytes) %   Nucleated RBC: 0 /100 WBCs     Sodium, Serum: 131 mmol/L   Potassium, Serum: 4.9 mmol/L   Chloride, Serum: 93 mmol/L   Carbon Dioxide, Serum: 26 mmol/L   Anion Gap, Serum: 12 mmol/L   Blood Urea Nitrogen, Serum: 14 mg/dL   Creatinine, Serum: 0.7 mg/dL   Glucose, Serum: 118 mg/dL   Calcium, Total Serum: 8.4 mg/dL   Protein Total, Serum: 5.5 g/dL   Albumin, Serum: 3.2 g/dL   Bilirubin Total, Serum: 1.0 mg/dL   Alkaline Phosphatase, Serum: 85 U/L   Aspartate Aminotransferase (AST/SGOT): 29: Hemolyzed. Interpret with caution U/L   Alanine Aminotransferase (ALT/SGPT): 12 U/L   eGFR if Non : 79: Interpretative comment   The units for eGFR are mL/min/1.73M2 (normalized body surface area). WBC Count: 8.13 K/uL   RBC Count: 3.31 M/uL   Hemoglobin: 9.7 g/dL   Hematocrit: 30.3 %   Mean Cell Volume: 91.5 fL   Mean Cell Hemoglobin: 29.3 pg   Mean Cell Hemoglobin Conc: 32.0 g/dL   Red Cell Distrib Width: 16.0 %   Platelet Count - Automated: 584 K/uL   Auto Neutrophil #: 5.92 K/uL   Auto Lymphocyte #: 1.22 K/uL   Auto Monocyte #: 0.64 K/uL   Auto Eosinophil #: 0.25 K/uL   Auto Basophil #: 0.06 K/uL   Auto Neutrophil %: 72.8: Differential percentages must be correlated with absolute numbers for   clinical significance. %   Auto Lymphocyte %: 15.0 %   Auto Monocyte %: 7.9 %   Auto Eosinophil %: 3.1 %   Auto Basophil %: 0.7 %   Auto Immature Granulocyte %: 0.5: (Includes meta, myelo and promyelocytes) %   Nucleated RBC: 0 /100 WBCs     Sodium, Serum: 131 mmol/L   Potassium, Serum: 4.9 mmol/L   Chloride, Serum: 93 mmol/L   Carbon Dioxide, Serum: 26 mmol/L   Anion Gap, Serum: 12 mmol/L   Blood Urea Nitrogen, Serum: 14 mg/dL   Creatinine, Serum: 0.7 mg/dL   Glucose, Serum: 118 mg/dL   Calcium, Total Serum: 8.4 mg/dL   Protein Total, Serum: 5.5 g/dL   Albumin, Serum: 3.2 g/dL   Bilirubin Total, Serum: 1.0 mg/dL   Alkaline Phosphatase, Serum: 85 U/L   Aspartate Aminotransferase (AST/SGOT): 29: Hemolyzed. Interpret with caution U/L   Alanine Aminotransferase (ALT/SGPT): 12 U/L   eGFR if Non : 79: Interpretative comment   The units for eGFR are mL/min/1.73M2 (normalized body surface area).    < from: CT Angio Chest PE Protocol w/ IV Cont (07.31.21 @ 15:59) >  IMPRESSION:    1.  No pulmonary embolism.  2.  Bilateral small pleural effusions and probable left interfissural fluid.  3.  Right heart enlargement, stable on 11/20/2020.  4.  Bilateral small airways disease.  5.  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    < end of copied text >    TT< from: TTE Echo Complete w/ Contrast w/ Doppler (06.08.21 @ 09:03) >  Summary:   1. Left ventricular ejection fraction, by visual estimation, is 55 to 60%.   2. Spectral Doppler shows impaired relaxation pattern of left ventricular myocardial filling (Grade I diastolic dysfunction).   3. Mildly enlarged left atrium.   4. Mildly enlarged right atrium.   5. Mild mitral annular calcification.   6. Moderate mitral valve regurgitation.   7. Moderate tricuspid regurgitation.   8. Mechanical prosthesis in the aortic valve position.   9. Estimated pulmonary artery systolic pressure is 44.5 mmHg assuming a right atrial pressure of 3 mmHg, which is consistent with mild pulmonary hypertension.

## 2021-07-31 NOTE — ED PROVIDER NOTE - CARE PLAN
Principal Discharge DX:	Chest pain  Secondary Diagnosis:	Atrial fibrillation   Principal Discharge DX:	Chest pain  Secondary Diagnosis:	Atrial flutter

## 2021-07-31 NOTE — ED PROVIDER NOTE - PHYSICAL EXAMINATION
VITALS: Reviewed  CONSTITUTIONAL: well developed, well nourished, in no acute distress, speaking in full sentences, nontoxic appearing  SKIN: warm, dry, no rash  HEAD: normocephalic, atraumatic  ENT: patent airway, moist mucous membranes  NECK: supple, no masses  CV:  irregular, tachycardic, 2+ radial pulses bilaterally  RESP: no wheezes, no rales, no rhonchi, normal work of breathing  ABD: soft, nontender, nondistended, no rebound, no guarding  MSK: normal ROM, no cyanosis, no edema  NEURO: alert, oriented x3  PSYCH: cooperative, appropriate

## 2021-07-31 NOTE — CONSULT NOTE ADULT - ASSESSMENT
chest pain, unstable angina due to Afib-flutter with HR of 150, severe HTN, significant anemia, in a patient with underlying Hx of CAD-CABG  Volume overload, CHF ( HFpEF) b/l leg edema, crackle in the lung exam and chest x-ray suggestive of a mild congestion along with a small pleural effusion,   Uncontrolled HTN  High risk patient for the invasive cardiac w/u    Admit to telemetry, f/u with the 3rd Troponin  Decrease Atenolol to 50 mg daily, add Amlodipine 5 mg ( high K, unable to give ARB)  Lasix 40 mg IV for just 2 days, then needs reassessment for more maintenance therapy  Plan at this point will be optimizing medical management  chest pain, unstable angina due to Afib-flutter with HR of 150, severe HTN, significant anemia, in a patient with underlying Hx of CAD-CABG  Volume overload, CHF ( HFpEF) b/l leg edema, crackle in the lung exam and chest x-ray suggestive of a mild congestion along with a small pleural effusion,   Uncontrolled HTN  High risk patient for the invasive cardiac w/u    Admit to telemetry, f/u with the 3rd Troponin  Decrease Atenolol to 50 mg daily, add Amlodipine 5 mg ( high K, unable to give ARB)  Lasix 40 mg IV for just 2 days, then needs reassessment for more maintenance therapy  Plan at this point will be optimizing medical management   Daughter is asking to consider cardiac Cath, i will defer to dr Lee

## 2021-07-31 NOTE — ED PROVIDER NOTE - OBJECTIVE STATEMENT
85Y F with PMHx of HTN, HLD, hx of paroxysmal Afib, mechanical aortic valve on coumadin, depression, anxiety, osteoporosis presents s/p mechanical fall, +HT, -LOC, +AC (coumadin) presents from [facility], was recently admitted after a fall with a fem fracture and was in for rehab, today patient complained of chest pain, found to have elevated HR to 157. Patient here speaking in full sentences, had initial stable BP, tachycardic to 140s. Patient denies other complaints. Patient is Sammarinese speaking, hx obtained in conjunction with daughter at bedside.

## 2021-07-31 NOTE — ED PROVIDER NOTE - ATTENDING CONTRIBUTION TO CARE
85 year old female, pmhx as documented presenting from NH with chest pain and elevated HR to 150s. On arrival patient states chest pain started today, described as tight, substernal, non-radiating, no palliative or provocative factors, moderate severity. Otherwise denies fevers, cough, URI symptoms, dyspnea, N/V/D, blood in stool, abdominal pain or leg swelling.    Vital Signs: I have reviewed the initial vital signs.  Constitutional: NAD, well-nourished, appears stated age, no acute distress.  HEENT: Airway patent, moist MM, no erythema/swelling/deformity of oral structures. EOMI, PERRLA.  CV: (+) regular tachycardia, well-perfused extremities, 2+ b/l DP and radial pulses equal.  Lungs: BCTA, no increased WOB.  ABD: NTND, no guarding or rebound, no pulsatile mass, no hernias.   MSK: Neck supple, nontender, nl ROM, no stepoff. Chest nontender. Back nontender in TLS spine or to b/l bony structures or flanks. Ext nontender, nl rom, no deformity.   INTEG: Skin warm, dry, no rash.  NEURO: A&Ox3, normal strength, nl sensation throughout, normal speech.   PSYCH: Calm, cooperative, normal affect and interaction.    EKG shows likely aflutter with 2:1 block. Given cardizem 10mg IVP which converted to sinus rhythm and improved patient's symptoms. Will obtain labs, serial EKG, CXR, re-eval.

## 2021-07-31 NOTE — H&P ADULT - NSICDXPASTMEDICALHX_GEN_ALL_CORE_FT
PAST MEDICAL HISTORY:  Anxiety     Atrial fibrillation     Depression     Fracture of right shoulder     High cholesterol     HTN (hypertension)     Osteoporosis

## 2021-07-31 NOTE — ED PROVIDER NOTE - NS ED ROS FT
Review of Systems:  CONSTITUTIONAL - No fever  SKIN - No rash  HEMATOLOGIC - No abnormal bleeding or bruising  RESPIRATORY - No shortness of breath, No cough  GI - No abdominal pain, No nausea, No vomiting  MUSCULOSKELETAL - No joint paint, No swelling, No back pain  NEUROLOGIC - No numbness, No focal weakness, No headache, No dizziness  All other systems negative, unless specified in HPI

## 2021-08-01 LAB
ALBUMIN SERPL ELPH-MCNC: 3.3 G/DL — LOW (ref 3.5–5.2)
ALP SERPL-CCNC: 86 U/L — SIGNIFICANT CHANGE UP (ref 30–115)
ALT FLD-CCNC: 13 U/L — SIGNIFICANT CHANGE UP (ref 0–41)
ANION GAP SERPL CALC-SCNC: 12 MMOL/L — SIGNIFICANT CHANGE UP (ref 7–14)
AST SERPL-CCNC: 28 U/L — SIGNIFICANT CHANGE UP (ref 0–41)
BILIRUB SERPL-MCNC: 1.1 MG/DL — SIGNIFICANT CHANGE UP (ref 0.2–1.2)
BUN SERPL-MCNC: 12 MG/DL — SIGNIFICANT CHANGE UP (ref 10–20)
CALCIUM SERPL-MCNC: 8.2 MG/DL — LOW (ref 8.5–10.1)
CHLORIDE SERPL-SCNC: 97 MMOL/L — LOW (ref 98–110)
CO2 SERPL-SCNC: 25 MMOL/L — SIGNIFICANT CHANGE UP (ref 17–32)
CREAT SERPL-MCNC: 0.6 MG/DL — LOW (ref 0.7–1.5)
GLUCOSE SERPL-MCNC: 103 MG/DL — HIGH (ref 70–99)
HCT VFR BLD CALC: 30.3 % — LOW (ref 37–47)
HGB BLD-MCNC: 9.5 G/DL — LOW (ref 12–16)
INR BLD: 2.54 RATIO — HIGH (ref 0.65–1.3)
MAGNESIUM SERPL-MCNC: 1.8 MG/DL — SIGNIFICANT CHANGE UP (ref 1.8–2.4)
MCHC RBC-ENTMCNC: 29.1 PG — SIGNIFICANT CHANGE UP (ref 27–31)
MCHC RBC-ENTMCNC: 31.4 G/DL — LOW (ref 32–37)
MCV RBC AUTO: 92.9 FL — SIGNIFICANT CHANGE UP (ref 81–99)
NRBC # BLD: 0 /100 WBCS — SIGNIFICANT CHANGE UP (ref 0–0)
PLATELET # BLD AUTO: 507 K/UL — HIGH (ref 130–400)
POTASSIUM SERPL-MCNC: 4.3 MMOL/L — SIGNIFICANT CHANGE UP (ref 3.5–5)
POTASSIUM SERPL-SCNC: 4.3 MMOL/L — SIGNIFICANT CHANGE UP (ref 3.5–5)
PROT SERPL-MCNC: 5.5 G/DL — LOW (ref 6–8)
PROTHROM AB SERPL-ACNC: 28.9 SEC — HIGH (ref 9.95–12.87)
RBC # BLD: 3.26 M/UL — LOW (ref 4.2–5.4)
RBC # FLD: 16.3 % — HIGH (ref 11.5–14.5)
SODIUM SERPL-SCNC: 134 MMOL/L — LOW (ref 135–146)
TROPONIN T SERPL-MCNC: 0.04 NG/ML — CRITICAL HIGH
WBC # BLD: 7.01 K/UL — SIGNIFICANT CHANGE UP (ref 4.8–10.8)
WBC # FLD AUTO: 7.01 K/UL — SIGNIFICANT CHANGE UP (ref 4.8–10.8)

## 2021-08-01 PROCEDURE — 73521 X-RAY EXAM HIPS BI 2 VIEWS: CPT | Mod: 26

## 2021-08-01 PROCEDURE — 93010 ELECTROCARDIOGRAM REPORT: CPT

## 2021-08-01 RX ORDER — DILTIAZEM HCL 120 MG
10 CAPSULE, EXT RELEASE 24 HR ORAL ONCE
Refills: 0 | Status: COMPLETED | OUTPATIENT
Start: 2021-08-01 | End: 2021-08-01

## 2021-08-01 RX ORDER — ADENOSINE 3 MG/ML
6 INJECTION INTRAVENOUS ONCE
Refills: 0 | Status: COMPLETED | OUTPATIENT
Start: 2021-08-01 | End: 2021-08-01

## 2021-08-01 RX ORDER — AMLODIPINE BESYLATE 2.5 MG/1
5 TABLET ORAL DAILY
Refills: 0 | Status: DISCONTINUED | OUTPATIENT
Start: 2021-08-01 | End: 2021-08-01

## 2021-08-01 RX ORDER — WARFARIN SODIUM 2.5 MG/1
2 TABLET ORAL ONCE
Refills: 0 | Status: COMPLETED | OUTPATIENT
Start: 2021-08-01 | End: 2021-08-01

## 2021-08-01 RX ORDER — ATENOLOL 25 MG/1
50 TABLET ORAL DAILY
Refills: 0 | Status: DISCONTINUED | OUTPATIENT
Start: 2021-08-01 | End: 2021-08-01

## 2021-08-01 RX ORDER — FUROSEMIDE 40 MG
40 TABLET ORAL DAILY
Refills: 0 | Status: COMPLETED | OUTPATIENT
Start: 2021-08-01 | End: 2021-08-03

## 2021-08-01 RX ORDER — METOPROLOL TARTRATE 50 MG
5 TABLET ORAL ONCE
Refills: 0 | Status: COMPLETED | OUTPATIENT
Start: 2021-08-01 | End: 2021-08-01

## 2021-08-01 RX ORDER — DILTIAZEM HCL 120 MG
30 CAPSULE, EXT RELEASE 24 HR ORAL EVERY 6 HOURS
Refills: 0 | Status: DISCONTINUED | OUTPATIENT
Start: 2021-08-01 | End: 2021-08-02

## 2021-08-01 RX ORDER — METOPROLOL TARTRATE 50 MG
50 TABLET ORAL ONCE
Refills: 0 | Status: COMPLETED | OUTPATIENT
Start: 2021-08-01 | End: 2021-08-01

## 2021-08-01 RX ORDER — METOPROLOL TARTRATE 50 MG
50 TABLET ORAL
Refills: 0 | Status: DISCONTINUED | OUTPATIENT
Start: 2021-08-01 | End: 2021-08-02

## 2021-08-01 RX ADMIN — WARFARIN SODIUM 2 MILLIGRAM(S): 2.5 TABLET ORAL at 23:24

## 2021-08-01 RX ADMIN — Medication 50 MILLIGRAM(S): at 15:28

## 2021-08-01 RX ADMIN — Medication 81 MILLIGRAM(S): at 11:27

## 2021-08-01 RX ADMIN — Medication 30 MILLIGRAM(S): at 19:40

## 2021-08-01 RX ADMIN — Medication 5 MILLIGRAM(S): at 06:19

## 2021-08-01 RX ADMIN — ESCITALOPRAM OXALATE 10 MILLIGRAM(S): 10 TABLET, FILM COATED ORAL at 23:24

## 2021-08-01 RX ADMIN — Medication 50 MILLIGRAM(S): at 19:02

## 2021-08-01 RX ADMIN — Medication 40 MILLIGRAM(S): at 11:27

## 2021-08-01 RX ADMIN — Medication 30 MILLIGRAM(S): at 23:25

## 2021-08-01 RX ADMIN — Medication 10 MILLIGRAM(S): at 06:54

## 2021-08-01 RX ADMIN — AMLODIPINE BESYLATE 5 MILLIGRAM(S): 2.5 TABLET ORAL at 11:27

## 2021-08-01 RX ADMIN — LOSARTAN POTASSIUM 100 MILLIGRAM(S): 100 TABLET, FILM COATED ORAL at 05:19

## 2021-08-01 RX ADMIN — Medication 325 MILLIGRAM(S): at 11:28

## 2021-08-01 RX ADMIN — ADENOSINE 6 MILLIGRAM(S): 3 INJECTION INTRAVENOUS at 15:04

## 2021-08-01 RX ADMIN — ATORVASTATIN CALCIUM 20 MILLIGRAM(S): 80 TABLET, FILM COATED ORAL at 23:24

## 2021-08-01 RX ADMIN — ADENOSINE 6 MILLIGRAM(S): 3 INJECTION INTRAVENOUS at 13:27

## 2021-08-01 RX ADMIN — Medication 10 MILLIGRAM(S): at 19:39

## 2021-08-01 RX ADMIN — LOSARTAN POTASSIUM 100 MILLIGRAM(S): 100 TABLET, FILM COATED ORAL at 00:27

## 2021-08-01 RX ADMIN — ATENOLOL 25 MILLIGRAM(S): 25 TABLET ORAL at 05:19

## 2021-08-01 NOTE — PROGRESS NOTE ADULT - SUBJECTIVE AND OBJECTIVE BOX
ORAL WU 85y Female  MRN#: [id number]   CODE STATUS: Full    Hospital Day: 1d    Pt is currently admitted with the primary diagnosis of chest pain    SUBJECTIVE  Hospital Course  Pt seen and examined at bedside. Currently no chest pain or SOB.    Overnight events   Patient was tachycardic twice overnight and needed 5mg IVP metoprolol and 10mg IVP cardezam.    Subjective complaints   None    Present Today:   - Zamudio:  No [x], Yes [   ] : Indication:     - Type of IV Access:       .. CVC/Piccline:  No [  ], Yes [   ] : Indication:       .. Midline: No [  ], Yes [   ] : Indication:                                             ----------------------------------------------------------  OBJECTIVE  PAST MEDICAL & SURGICAL HISTORY  HTN (hypertension)    High cholesterol    Osteoporosis    Fracture of right shoulder    Anxiety    Depression    Atrial fibrillation    H/O heart valve replacement with mechanical valve    S/P hip hemiarthroplasty  on 7/16/2021 for right subcapital femoral neck fracture                                              -----------------------------------------------------------  ALLERGIES:  penicillin (Unknown)  pinapples (Unknown)                                            ------------------------------------------------------------    HOME MEDICATIONS  Home Medications:  acetaminophen 325 mg oral tablet: 2 tab(s) orally every 6 hours (23 Jul 2021 11:48)  alendronate 35 mg oral tablet: 1 tab(s) orally once a week (07 Jun 2021 00:16)  ALPRAZolam 0.5 mg oral tablet: 1 tab(s) orally once a day (at bedtime), As Needed (07 Jun 2021 00:16)  aspirin 81 mg oral tablet, chewable: 1 tab(s) orally once a day (07 Jun 2021 00:16)  atenolol 25 mg oral tablet: 1 tab(s) orally once a day (07 Jun 2021 00:16)  atorvastatin 20 mg oral tablet: 1 tab(s) orally once a day (07 Jun 2021 00:16)  escitalopram 10 mg oral tablet: 1 tab(s) orally once a day (07 Jun 2021 00:16)  ezetimibe 10 mg oral tablet: 1 tab(s) orally once a day (07 Jun 2021 00:16)  iron gluconate: 27 milligram(s) orally once a day (07 Jun 2021 00:16)  lidocaine 5% patch: 1  transdermally once a day (07 Jun 2021 00:16)  losartan 100 mg oral tablet: 1 tab(s) orally once a day (31 Jul 2021 18:45)  oxyCODONE 5 mg oral tablet: 1 tab(s) orally every 6 hours, As needed, Severe Pain (7 - 10) (23 Jul 2021 11:48)  warfarin 4 mg oral tablet: 0.5 tab(s) orally once a day (31 Jul 2021 19:24)                           MEDICATIONS:  STANDING MEDICATIONS  amLODIPine   Tablet 5 milliGRAM(s) Oral daily  aspirin  chewable 81 milliGRAM(s) Oral daily  ATENolol  Tablet 50 milliGRAM(s) Oral daily  atorvastatin 20 milliGRAM(s) Oral at bedtime  escitalopram 10 milliGRAM(s) Oral at bedtime  ferrous    sulfate 325 milliGRAM(s) Oral daily  furosemide   Injectable 40 milliGRAM(s) IV Push daily  losartan 100 milliGRAM(s) Oral daily    PRN MEDICATIONS  ALPRAZolam 0.5 milliGRAM(s) Oral at bedtime PRN  oxyCODONE    IR 5 milliGRAM(s) Oral every 6 hours PRN                                            ------------------------------------------------------------  VITAL SIGNS: Last 24 Hours  T(C): 36.1 (01 Aug 2021 05:46), Max: 37.7 (31 Jul 2021 13:32)  T(F): 97 (01 Aug 2021 05:46), Max: 99.8 (31 Jul 2021 13:32)  HR: 65 (01 Aug 2021 06:54) (63 - 149)  BP: 118/60 (01 Aug 2021 06:54) (84/77 - 199/74)  BP(mean): --  RR: 18 (01 Aug 2021 05:46) (18 - 19)  SpO2: 99% (01 Aug 2021 00:24) (97% - 100%)                                             --------------------------------------------------------------  LABS:                        9.7    8.13  )-----------( 584      ( 31 Jul 2021 13:50 )             30.3     07-31    131<L>  |  93<L>  |  14  ----------------------------<  118<H>  4.9   |  26  |  0.7    Ca    8.4<L>      31 Jul 2021 13:50  Mg     1.8     07-31    TPro  5.5<L>  /  Alb  3.2<L>  /  TBili  1.0  /  DBili  x   /  AST  29  /  ALT  12  /  AlkPhos  85  07-31    PT/INR - ( 31 Jul 2021 13:50 )   PT: 33.90 sec;   INR: 2.98 ratio         PTT - ( 31 Jul 2021 13:50 )  PTT:44.9 sec      Troponin T, Serum: 0.04 ng/mL *HH* (07-31-21 @ 20:38)  Lactate, Blood: 1.1 mmol/L (07-31-21 @ 20:38)  Troponin T, Serum: 0.02 ng/mL *H* (07-31-21 @ 13:50)  Lactate, Blood: 2.1 mmol/L *H* (07-31-21 @ 13:50)          CARDIAC MARKERS ( 31 Jul 2021 20:38 )  x     / 0.04 ng/mL / x     / x     / 1.9 ng/mL  CARDIAC MARKERS ( 31 Jul 2021 13:50 )  x     / 0.02 ng/mL / x     / x     / x                                                  -------------------------------------------------------------  RADIOLOGY:                                            --------------------------------------------------------------    PHYSICAL EXAM:  General:   HEENT:  LUNGS:  HEART:  ABDOMEN:  EXT:  NEURO:  SKIN:                                           --------------------------------------------------------------    ASSESSMENT & PLAN    Past medical history and hospital course                                                                                                           ----------------------------------------------------  # DVT prophylaxis     # GI prophylaxis     # Diet     # Activity Score (AM-PAC)    # Code status     # Disposition                                                                              --------------------------------------------------------    # Handoff      ORAL WU 85y Female  MRN#: [id number]   CODE STATUS: Full    Hospital Day: 1d    Pt is currently admitted with the primary diagnosis of chest pain    SUBJECTIVE  Hospital Course  Pt seen and examined at bedside. Currently no chest pain or SOB.    Overnight events   Patient was tachycardic twice overnight and needed 5mg IVP metoprolol and 10mg IVP cardezam.    Subjective complaints   None    Present Today:   - Zamudio:  No [x], Yes [   ] : Indication:     - Type of IV Access:       .. CVC/Piccline:  No [  ], Yes [   ] : Indication:       .. Midline: No [  ], Yes [   ] : Indication:                                             ----------------------------------------------------------  OBJECTIVE  PAST MEDICAL & SURGICAL HISTORY  HTN (hypertension)    High cholesterol    Osteoporosis    Fracture of right shoulder    Anxiety    Depression    Atrial fibrillation    H/O heart valve replacement with mechanical valve    S/P hip hemiarthroplasty  on 7/16/2021 for right subcapital femoral neck fracture                                              -----------------------------------------------------------  ALLERGIES:  penicillin (Unknown)  pinapples (Unknown)                                            ------------------------------------------------------------    HOME MEDICATIONS  Home Medications:  acetaminophen 325 mg oral tablet: 2 tab(s) orally every 6 hours (23 Jul 2021 11:48)  alendronate 35 mg oral tablet: 1 tab(s) orally once a week (07 Jun 2021 00:16)  ALPRAZolam 0.5 mg oral tablet: 1 tab(s) orally once a day (at bedtime), As Needed (07 Jun 2021 00:16)  aspirin 81 mg oral tablet, chewable: 1 tab(s) orally once a day (07 Jun 2021 00:16)  atenolol 25 mg oral tablet: 1 tab(s) orally once a day (07 Jun 2021 00:16)  atorvastatin 20 mg oral tablet: 1 tab(s) orally once a day (07 Jun 2021 00:16)  escitalopram 10 mg oral tablet: 1 tab(s) orally once a day (07 Jun 2021 00:16)  ezetimibe 10 mg oral tablet: 1 tab(s) orally once a day (07 Jun 2021 00:16)  iron gluconate: 27 milligram(s) orally once a day (07 Jun 2021 00:16)  lidocaine 5% patch: 1  transdermally once a day (07 Jun 2021 00:16)  losartan 100 mg oral tablet: 1 tab(s) orally once a day (31 Jul 2021 18:45)  oxyCODONE 5 mg oral tablet: 1 tab(s) orally every 6 hours, As needed, Severe Pain (7 - 10) (23 Jul 2021 11:48)  warfarin 4 mg oral tablet: 0.5 tab(s) orally once a day (31 Jul 2021 19:24)                           MEDICATIONS:  STANDING MEDICATIONS  amLODIPine   Tablet 5 milliGRAM(s) Oral daily  aspirin  chewable 81 milliGRAM(s) Oral daily  ATENolol  Tablet 50 milliGRAM(s) Oral daily  atorvastatin 20 milliGRAM(s) Oral at bedtime  escitalopram 10 milliGRAM(s) Oral at bedtime  ferrous    sulfate 325 milliGRAM(s) Oral daily  furosemide   Injectable 40 milliGRAM(s) IV Push daily  losartan 100 milliGRAM(s) Oral daily    PRN MEDICATIONS  ALPRAZolam 0.5 milliGRAM(s) Oral at bedtime PRN  oxyCODONE    IR 5 milliGRAM(s) Oral every 6 hours PRN                                            ------------------------------------------------------------  VITAL SIGNS: Last 24 Hours  T(C): 36.1 (01 Aug 2021 05:46), Max: 37.7 (31 Jul 2021 13:32)  T(F): 97 (01 Aug 2021 05:46), Max: 99.8 (31 Jul 2021 13:32)  HR: 65 (01 Aug 2021 06:54) (63 - 149)  BP: 118/60 (01 Aug 2021 06:54) (84/77 - 199/74)  BP(mean): --  RR: 18 (01 Aug 2021 05:46) (18 - 19)  SpO2: 99% (01 Aug 2021 00:24) (97% - 100%)                                             --------------------------------------------------------------  LABS:                        9.7    8.13  )-----------( 584      ( 31 Jul 2021 13:50 )             30.3     07-31    131<L>  |  93<L>  |  14  ----------------------------<  118<H>  4.9   |  26  |  0.7    Ca    8.4<L>      31 Jul 2021 13:50  Mg     1.8     07-31    TPro  5.5<L>  /  Alb  3.2<L>  /  TBili  1.0  /  DBili  x   /  AST  29  /  ALT  12  /  AlkPhos  85  07-31    PT/INR - ( 31 Jul 2021 13:50 )   PT: 33.90 sec;   INR: 2.98 ratio         PTT - ( 31 Jul 2021 13:50 )  PTT:44.9 sec      Troponin T, Serum: 0.04 ng/mL *HH* (07-31-21 @ 20:38)  Lactate, Blood: 1.1 mmol/L (07-31-21 @ 20:38)  Troponin T, Serum: 0.02 ng/mL *H* (07-31-21 @ 13:50)  Lactate, Blood: 2.1 mmol/L *H* (07-31-21 @ 13:50)          CARDIAC MARKERS ( 31 Jul 2021 20:38 )  x     / 0.04 ng/mL / x     / x     / 1.9 ng/mL  CARDIAC MARKERS ( 31 Jul 2021 13:50 )  x     / 0.02 ng/mL / x     / x     / x                                                  -------------------------------------------------------------  RADIOLOGY:    < from: CT Angio Chest PE Protocol w/ IV Cont (07.31.21 @ 15:59) >  IMPRESSION:    1.  No pulmonary embolism.  2.  Bilateral small pleural effusions and probable left interfissural fluid.  3.  Right heart enlargement, stable on 11/20/2020.  4.  Bilateral small airways disease.  5.  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    < end of copied text >    TT< from: TTE Echo Complete w/ Contrast w/ Doppler (06.08.21 @ 09:03) >  Summary:   1. Left ventricular ejection fraction, by visual estimation, is 55 to 60%.   2. Spectral Doppler shows impaired relaxation pattern of left ventricular myocardial filling (Grade I diastolic dysfunction).   3. Mildly enlarged left atrium.   4. Mildly enlarged right atrium.   5. Mild mitral annular calcification.   6. Moderate mitral valve regurgitation.   7. Moderate tricuspid regurgitation.   8. Mechanical prosthesis in the aortic valve position.   9. Estimated pulmonary artery systolic pressure is 44.5 mmHg assuming a right atrial pressure of 3 mmHg, which is consistent with mild pulmonary hypertension.                                            --------------------------------------------------------------    PHYSICAL EXAM:  General: NAD, AOx3  HEENT: normocephalic, atraumatic  LUNGS: Normal breath sounds, no wheezes/crackles  HEART: RRR, no murmurs, rubs or gallops. Systolic click  ABDOMEN: Soft, NT/ND. No rigidity/guarding  EXT: Peripheral pulses +2, no cyanosis. b/l LE edema +2.   NEURO: grossly normal  SKIN: no rashes or brusies                                           --------------------------------------------------------------

## 2021-08-01 NOTE — CHART NOTE - NSCHARTNOTEFT_GEN_A_CORE
Patient having recurrent episodes of SVTs, case discussed with cardiac fellow and Dr. Butterfield. Plan discussed with RN and staff    Plan:  - Continue with Metoprolol  - Will give IV Cardizem 10 and then 30 q6 oral  - Monitor for any blood pressure drop  - Will discontinue amlodipine and Losartan for now  - EP Consulted for possible Ablation  - Monitor on the floor for now  - If drop in pressure will start on a drip to titrate to lower normal BP

## 2021-08-01 NOTE — CHART NOTE - NSCHARTNOTEFT_GEN_A_CORE
called to see pt - another episode of SVT, hemodynamically stable     12 lead performed     pt given adenosine 6mg with break of SVT

## 2021-08-01 NOTE — CHART NOTE - NSCHARTNOTEFT_GEN_A_CORE
pt noted to be in SVT in the am given adenosine 6mg iV push which broke the SVT     pt again in SVT at 3pm -> hemodynamically stable, adenosine 6mg given with return to normal HR 70-80, pt noted to be in SVT in the am given adenosine 6mg iV push which broke the SVT and started on lopressor 50mg BID     pt again in SVT at 3pm -> hemodynamically stable, adenosine 6mg given with return to normal HR 70-80,

## 2021-08-01 NOTE — PHARMACOTHERAPY INTERVENTION NOTE - COMMENTS
85yFemale      Indication: a fib  INR Goal: 2-3  Home Dose: 2mg wed, sat, 4mg sun mon tues, thurs fri  Bridge Therapy: no      aDENosine Injectable (ADENOCARD) 6 milliGRAM(s) IV Push once  ALPRAZolam 0.5 milliGRAM(s) Oral at bedtime PRN  amLODIPine   Tablet 5 milliGRAM(s) Oral daily  aspirin  chewable 81 milliGRAM(s) Oral daily  atorvastatin 20 milliGRAM(s) Oral at bedtime  escitalopram 10 milliGRAM(s) Oral at bedtime  ferrous    sulfate 325 milliGRAM(s) Oral daily  furosemide   Injectable 40 milliGRAM(s) IV Push daily  losartan 100 milliGRAM(s) Oral daily  metoprolol tartrate 50 milliGRAM(s) Oral two times a day  oxyCODONE    IR 5 milliGRAM(s) Oral every 6 hours PRN  warfarin 2 milliGRAM(s) Oral once        Drug Interactions:       H/H: 9.5/30.3  PLT: 507  GFR: 83    Date---------------INR-----------------Dose    INR: 2.54 ratio (08-01-21 @ 06:55)  INR: 2.98 ratio (07-31-21 @ 13:50)    Recommended to MD to continue home dose of 4mg. MD wanted to give 2mg dose  1. Recommend Warfarin      PO x 1   2. Obtain INR tomorrow AM

## 2021-08-01 NOTE — PROGRESS NOTE ADULT - SUBJECTIVE AND OBJECTIVE BOX
ORAL WU 85y Femalesent from  [facility] after experiencing  R hip while trying to do PT, ff by CP, retrosternal and epigastric.  VS taken by SNF and pt's HR 140s.  The pt was transferred to the ER for further w/up and tx.  Pt's tachyarrhythmia tx with IVP of  cardizem, adenosine and pt placed on metoprolol.  Pt had negative CT angio for PE.  The pt was evaluated by Cardio and is being ad to tele for cardiac monitoring.  The PMHx includes:  HTN, ASHD, CAD, sp CABG 2003, sp AVR metallic, cardiac arrhythmia, parox afib,  AC with warfarin, Anemia, GERD, diverticulosis,  OA, mobility dysfunction, falls,  DDD, DJD, osteoporosis, sp recent fx of R hip with RTHR 7/21,  sp shoulder fx, depression.    INTERVAL HPI/OVERNIGHT EVENTS:  pt evaluated by Cardio, to be ad to tele    MEDICATIONS  (STANDING):  aDENosine Injectable (ADENOCARD) 6 milliGRAM(s) IV Push once  amLODIPine   Tablet 5 milliGRAM(s) Oral daily  aspirin  chewable 81 milliGRAM(s) Oral daily  atorvastatin 20 milliGRAM(s) Oral at bedtime  escitalopram 10 milliGRAM(s) Oral at bedtime  ferrous    sulfate 325 milliGRAM(s) Oral daily  furosemide   Injectable 40 milliGRAM(s) IV Push daily  losartan 100 milliGRAM(s) Oral daily  metoprolol tartrate 50 milliGRAM(s) Oral two times a day  warfarin 2 milliGRAM(s) Oral once    MEDICATIONS  (PRN):  ALPRAZolam 0.5 milliGRAM(s) Oral at bedtime PRN Anxiety/Agitation  oxyCODONE    IR 5 milliGRAM(s) Oral every 6 hours PRN Severe Pain (7 - 10)      Allergies    penicillin (Unknown)  pinapples (Unknown)        Vital Signs Last 24 Hrs  T(C): 35.7 (01 Aug 2021 12:53), Max: 36.2 (31 Jul 2021 16:18)  T(F): 96.2 (01 Aug 2021 12:53), Max: 97.2 (31 Jul 2021 18:58)  HR: 77 (01 Aug 2021 14:00) (63 - 128)  BP: 145/62 (01 Aug 2021 14:00) (112/57 - 199/74)  BP(mean): --  RR: 17 (01 Aug 2021 12:53) (17 - 19)  SpO2: 97% (01 Aug 2021 11:31) (97% - 99%)    PHYSICAL EXAM:      Constitutional: Alert, elderly WF, chronically ill looking but in NAD    Eyes: nonicteric    ENMT: dry oral mucosa, dental defects    Neck:n supple, no JVD, no bruits    Back:  Th kyphosis    Respiratory: shallow resp, scattered rhonchi    Cardiovascular: S1S2,  II/VI BELINDA    Gastrointestinal: soft and benign,  + BS    Genitourinary:  no anderson    Extremities: moves all ext, = arthritic changes, + healing incision R hip    Vascular: dec pulses    Neurological: nonfocal    Skin: no rash      Psychiatric: anxious but stable        LABS:                        9.5    7.01  )-----------( 507      ( 01 Aug 2021 06:55 )             30.3     08-01    134<L>  |  97<L>  |  12  ----------------------------<  103<H>  4.3   |  25  |  0.6<L>    Ca    8.2<L>      01 Aug 2021 06:55  Mg     1.8     08-01    TPro  5.5<L>  /  Alb  3.3<L>  /  TBili  1.1  /  DBili  x   /  AST  28  /  ALT  13  /  AlkPhos  86  08-01    PT/INR - ( 01 Aug 2021 06:55 )   PT: 28.90 sec;   INR: 2.54 ratio         PTT - ( 31 Jul 2021 13:50 )  PTT:44.9 sec      RADIOLOGY & ADDITIONAL TESTS:  CT angio of chest:  NO PE, BL sm pl effusions, BL sm airway disease,  stable dilatation of aescending aorta 4.3cm, Bovine arch, no pul consolidation, no pneumo

## 2021-08-01 NOTE — PROGRESS NOTE ADULT - ASSESSMENT
ASSESSMENT & PLAN    85-year-old, female patient, Hypertension, DL, CAD s/p CABG in  with concomitant Metallic Aortic valve replacement and osteoporosis, and displaced right subcapital femoral neck fracture s/p Hemiarthroplasty on 2021 ( last week) was brought in from [facility] by EMS for Chest discomfort. The patient was discharged from the hospital on  after a fall complicated by the fracture. Today she was attempting to work with PT at the NH, the patient could not participate due to pains in her right hip, so she laid back to rest. Afterwards she had an acute onset of chest discomfort, pressure like, retrosternal/ epigastric in nature. No alleviating factors, no aggravating factors ( she was sitting at that time). No cough, no pleuritic chest pain. No tenderness upon palpation. The RN at the [facility] (NH) took the patient's vital signs and she had Tachycardia (140's). The patient did not feel any palpitations. No syncope.     #Chest Discomfort  #Tachyarrhythmia  # HTN, DLD  #CAD, s/p CABG in   #Atrial Flutter vs Acute Onset Afib:   - Responded to Cardizem 10mg IV push once, but patient went into asymptomatic bradycardia afterwards  - Atenolol 50mg po once daily  - CHADSVASc2: 5 ( Female, Age > 75, HTN, Vascular disease ( CAD))   - Warfarin 2mg po once daily, please follow INR daily  - Repeat EKG post Cardizem: NSR  - Troponin: 0.02, elevated, likely secondary to tachyarrhythmia. 2nd set 0.04. F/u 3rd set in AM.  - CTA done to Rule out PE( Tachycardia, recently had surgery): negative, check full report above.   - The patient does have rt lower extremity edema, bilateral ( right > left). Follow up Venous Duplex.  - Consult Cardiology  (Dr. Crow Nagy)   - TTE: Done in 2021:   a. Normal EF  b. Impaired LV diastolic relaxation with G1DD   c. Mild right and left atrial enlargements  d. Mild pulmonary hypertension    **As per Dr Butterfield, patient on Atenolol 50mg, Amlodipine 5mg, 2 days of 40mg IV Lasix, then reassess.    #Aortic Stenosis:   - S/p Metallic Valve replacement in    - Avoid fluid overload  - Continue with Warfarin 2mg po once daily, Daily INR    #Thoracic Aortic Aneurysm   - Seen on CTA scan  - Measurin.3cm  - Likely needs serial follow up.     Handoff: F/u daily INR, LE Venous duplex, Troponin in the AM.                                                                              ----------------------------------------------------  # DVT prophylaxis Warf[place] 2mg PO daily    # GI prophylaxis pantoprazole PO    # Diet DASH/TLC    # Activity Score (AM-PAC)    # Code status Full    # Disposition

## 2021-08-01 NOTE — PROGRESS NOTE ADULT - ASSESSMENT
CP, tachyarrhythmias, R/O ACS  R hip pain, sp fall and recent R THR, ( D/C from hosp 7/23)  Hx of HTN, ASHD, CAD, sp CABG,2003, sp metallic AVR, Ac/warfarin, Afib  hx of DLD  Hx of OA, DDD, DJD, osteoporosis, sp R shoulder fx, sp recent R hip fx, sp THR 7/21, mobility dysfunction  Hx of Anemia  Hx of GERD, HH, diverticulosis  Hx of depression    pt being ad to tele for further cardiac care and monitoring  pt had CT angio of chest which is negative for PE  Vasc doppler of lower ext neg for DVT  pt req several pushes of IV cardizem and adeosine for tachyarrhythmia  Cardio consult : Dr Del Angel for Dr Lee/Yakov:  probable SVT, metoprolol tartrate 50mg BID, add amlodipine 5mg , lasix 40mg  x 2 days for pul congestion then re-evaluate  EPS consult  trend troponins:  <0.01, 0.02, 0.04  BNP  4004  keep Mg > 2  monitor INR and adjust wafarin, keep around 2.5-3  ECHO  cont home meds

## 2021-08-02 LAB
ALBUMIN SERPL ELPH-MCNC: 3.4 G/DL — LOW (ref 3.5–5.2)
ALP SERPL-CCNC: 85 U/L — SIGNIFICANT CHANGE UP (ref 30–115)
ALT FLD-CCNC: 12 U/L — SIGNIFICANT CHANGE UP (ref 0–41)
ANION GAP SERPL CALC-SCNC: 10 MMOL/L — SIGNIFICANT CHANGE UP (ref 7–14)
AST SERPL-CCNC: 25 U/L — SIGNIFICANT CHANGE UP (ref 0–41)
BILIRUB SERPL-MCNC: 1 MG/DL — SIGNIFICANT CHANGE UP (ref 0.2–1.2)
BUN SERPL-MCNC: 11 MG/DL — SIGNIFICANT CHANGE UP (ref 10–20)
CALCIUM SERPL-MCNC: 8.5 MG/DL — SIGNIFICANT CHANGE UP (ref 8.5–10.1)
CHLORIDE SERPL-SCNC: 94 MMOL/L — LOW (ref 98–110)
CO2 SERPL-SCNC: 30 MMOL/L — SIGNIFICANT CHANGE UP (ref 17–32)
COVID-19 SPIKE DOMAIN AB INTERP: POSITIVE
COVID-19 SPIKE DOMAIN ANTIBODY RESULT: >250 U/ML — HIGH
CREAT SERPL-MCNC: 0.7 MG/DL — SIGNIFICANT CHANGE UP (ref 0.7–1.5)
GLUCOSE SERPL-MCNC: 101 MG/DL — HIGH (ref 70–99)
HCT VFR BLD CALC: 30.4 % — LOW (ref 37–47)
HGB BLD-MCNC: 9.6 G/DL — LOW (ref 12–16)
INR BLD: 2.43 RATIO — HIGH (ref 0.65–1.3)
MAGNESIUM SERPL-MCNC: 1.7 MG/DL — LOW (ref 1.8–2.4)
MCHC RBC-ENTMCNC: 28.5 PG — SIGNIFICANT CHANGE UP (ref 27–31)
MCHC RBC-ENTMCNC: 31.6 G/DL — LOW (ref 32–37)
MCV RBC AUTO: 90.2 FL — SIGNIFICANT CHANGE UP (ref 81–99)
NRBC # BLD: 0 /100 WBCS — SIGNIFICANT CHANGE UP (ref 0–0)
PLATELET # BLD AUTO: 474 K/UL — HIGH (ref 130–400)
POTASSIUM SERPL-MCNC: 4.2 MMOL/L — SIGNIFICANT CHANGE UP (ref 3.5–5)
POTASSIUM SERPL-SCNC: 4.2 MMOL/L — SIGNIFICANT CHANGE UP (ref 3.5–5)
PROT SERPL-MCNC: 5.7 G/DL — LOW (ref 6–8)
PROTHROM AB SERPL-ACNC: 27.7 SEC — HIGH (ref 9.95–12.87)
RBC # BLD: 3.37 M/UL — LOW (ref 4.2–5.4)
RBC # FLD: 16.1 % — HIGH (ref 11.5–14.5)
SARS-COV-2 IGG+IGM SERPL QL IA: >250 U/ML — HIGH
SARS-COV-2 IGG+IGM SERPL QL IA: POSITIVE
SODIUM SERPL-SCNC: 134 MMOL/L — LOW (ref 135–146)
WBC # BLD: 7.29 K/UL — SIGNIFICANT CHANGE UP (ref 4.8–10.8)
WBC # FLD AUTO: 7.29 K/UL — SIGNIFICANT CHANGE UP (ref 4.8–10.8)

## 2021-08-02 PROCEDURE — 99223 1ST HOSP IP/OBS HIGH 75: CPT

## 2021-08-02 RX ORDER — MAGNESIUM SULFATE 500 MG/ML
2 VIAL (ML) INJECTION ONCE
Refills: 0 | Status: COMPLETED | OUTPATIENT
Start: 2021-08-02 | End: 2021-08-02

## 2021-08-02 RX ORDER — DILTIAZEM HCL 120 MG
60 CAPSULE, EXT RELEASE 24 HR ORAL EVERY 6 HOURS
Refills: 0 | Status: DISCONTINUED | OUTPATIENT
Start: 2021-08-02 | End: 2021-08-03

## 2021-08-02 RX ORDER — WARFARIN SODIUM 2.5 MG/1
2 TABLET ORAL ONCE
Refills: 0 | Status: COMPLETED | OUTPATIENT
Start: 2021-08-02 | End: 2021-08-02

## 2021-08-02 RX ADMIN — WARFARIN SODIUM 2 MILLIGRAM(S): 2.5 TABLET ORAL at 22:07

## 2021-08-02 RX ADMIN — ESCITALOPRAM OXALATE 10 MILLIGRAM(S): 10 TABLET, FILM COATED ORAL at 22:07

## 2021-08-02 RX ADMIN — Medication 81 MILLIGRAM(S): at 11:33

## 2021-08-02 RX ADMIN — Medication 30 MILLIGRAM(S): at 11:33

## 2021-08-02 RX ADMIN — Medication 40 MILLIGRAM(S): at 05:12

## 2021-08-02 RX ADMIN — Medication 50 MILLIGRAM(S): at 05:12

## 2021-08-02 RX ADMIN — Medication 325 MILLIGRAM(S): at 11:34

## 2021-08-02 RX ADMIN — ATORVASTATIN CALCIUM 20 MILLIGRAM(S): 80 TABLET, FILM COATED ORAL at 22:07

## 2021-08-02 RX ADMIN — Medication 30 MILLIGRAM(S): at 05:12

## 2021-08-02 RX ADMIN — Medication 30 MILLIGRAM(S): at 17:42

## 2021-08-02 RX ADMIN — Medication 50 MILLIGRAM(S): at 17:42

## 2021-08-02 RX ADMIN — Medication 25 GRAM(S): at 11:59

## 2021-08-02 RX ADMIN — Medication 0.5 MILLIGRAM(S): at 22:06

## 2021-08-02 NOTE — PROGRESS NOTE ADULT - ASSESSMENT
CP, tachyarrhythmias, R/O ACS  R hip pain, sp fall and recent R THR, ( D/C from hosp 7/23)  Hx of HTN, ASHD, CAD, sp CABG,2003, sp metallic AVR, Ac/warfarin, Afib  hx of DLD  Hx of OA, DDD, DJD, osteoporosis, sp R shoulder fx, sp recent R hip fx, sp THR 7/21, mobility dysfunction  Hx of Anemia  Hx of GERD, HH, diverticulosis  Hx of depression    pt on tele, cont runs of SVT overnight  pt had CT angio of chest which is negative for PE  Vasc doppler of lower ext neg for DVT  pt on ad  req several pushes of IV cardizem and adeosine for tachyarrhythmia  Cardio consult : Dr Del Angel for Dr Lee/Yakov:  probable SVT, metoprolol tartrate 50mg BID, add amlodipine 5mg , lasix 40mg  x 2 days for pul congestion then re-evaluate  EPS consult  trend troponins:  <0.01, 0.02, 0.04, sugg of NSTEMI  BNP  4004  keep Mg > 2  check TSH, monitor electrolytes  monitor INR and adjust wafarin, keep around 2.5-3  ECHO  cont home meds

## 2021-08-02 NOTE — CONSULT NOTE ADULT - SUBJECTIVE AND OBJECTIVE BOX
HPI:  85-year-old, female patient, Hypertension, DL, CAD s/p CABG in 2003 with concomitant Metallic Aortic valve replacement and osteoporosis, and displaced right subcapital femoral neck fracture s/p Hemiarthroplasty on 7/16/2021 ( last week) was brought in from [facility] by EMS for Chest discomfort. The patient was discharged from the hospital on 7/23 after a fall complicated by the fracture. she was attempting to work with PT at the NH, the patient could not participate due to pains in her right hip, so she laid back to rest. Afterwards she had an acute onset of chest discomfort, pressure like, retrosternal/ epigastric in nature. No alleviating factors, no aggravating factors ( she was sitting at that time). No cough, no pleuritic chest pain. No tenderness upon palpation. The RN at the [facility] (NH) took the patient's vital signs and she had Tachycardia (140's). The patient did not feel any palpitations. No syncope.  (31 Jul 2021 18:34)  In ED Troponin was slightly elevated, BNP as well, anemia, chest xray showed a mild congestion. EKG revealed SVT. EPS consulted for further care.     PAST MEDICAL & SURGICAL HISTORY  HTN (hypertension)    High cholesterol    Osteoporosis    Fracture of right shoulder    Anxiety    Depression    Atrial fibrillation    H/O heart valve replacement with mechanical valve    S/P hip hemiarthroplasty  on 7/16/2021 for right subcapital femoral neck fracture        FAMILY HISTORY:  FAMILY HISTORY:  FH: lung cancer (Sibling)        SOCIAL HISTORY:  []smoker  []Alcohol  []Drug    ALLERGIES:  penicillin (Unknown)  pinapples (Unknown)      MEDICATIONS:  MEDICATIONS  (STANDING):  aspirin  chewable 81 milliGRAM(s) Oral daily  atorvastatin 20 milliGRAM(s) Oral at bedtime  diltiazem    Tablet 30 milliGRAM(s) Oral every 6 hours  escitalopram 10 milliGRAM(s) Oral at bedtime  ferrous    sulfate 325 milliGRAM(s) Oral daily  furosemide   Injectable 40 milliGRAM(s) IV Push daily  metoprolol tartrate 50 milliGRAM(s) Oral two times a day  warfarin 2 milliGRAM(s) Oral once    MEDICATIONS  (PRN):  ALPRAZolam 0.5 milliGRAM(s) Oral at bedtime PRN Anxiety/Agitation  oxyCODONE    IR 5 milliGRAM(s) Oral every 6 hours PRN Severe Pain (7 - 10)      HOME MEDICATIONS:  Home Medications:  acetaminophen 325 mg oral tablet: 2 tab(s) orally every 6 hours (23 Jul 2021 11:48)  alendronate 35 mg oral tablet: 1 tab(s) orally once a week (07 Jun 2021 00:16)  ALPRAZolam 0.5 mg oral tablet: 1 tab(s) orally once a day (at bedtime), As Needed (07 Jun 2021 00:16)  aspirin 81 mg oral tablet, chewable: 1 tab(s) orally once a day (07 Jun 2021 00:16)  atenolol 25 mg oral tablet: 1 tab(s) orally once a day (07 Jun 2021 00:16)  atorvastatin 20 mg oral tablet: 1 tab(s) orally once a day (07 Jun 2021 00:16)  escitalopram 10 mg oral tablet: 1 tab(s) orally once a day (07 Jun 2021 00:16)  ezetimibe 10 mg oral tablet: 1 tab(s) orally once a day (07 Jun 2021 00:16)  iron gluconate: 27 milligram(s) orally once a day (07 Jun 2021 00:16)  lidocaine 5% patch: 1  transdermally once a day (07 Jun 2021 00:16)  losartan 100 mg oral tablet: 1 tab(s) orally once a day (31 Jul 2021 18:45)  oxyCODONE 5 mg oral tablet: 1 tab(s) orally every 6 hours, As needed, Severe Pain (7 - 10) (23 Jul 2021 11:48)  warfarin 4 mg oral tablet: 0.5 tab(s) orally once a day (31 Jul 2021 19:24)      VITALS:   T(F): 97.6 (08-02 @ 05:40), Max: 99.8 (07-31 @ 13:32)  HR: 75 (08-02 @ 05:40) (63 - 149)  BP: 138/62 (08-02 @ 05:40) (84/77 - 199/74)  BP(mean): --  RR: 19 (08-02 @ 05:40) (17 - 19)  SpO2: 97% (08-01 @ 11:31) (97% - 100%)    I&O's Summary    01 Aug 2021 07:01  -  02 Aug 2021 07:00  --------------------------------------------------------  IN: 860 mL / OUT: 2200 mL / NET: -1340 mL        REVIEW OF SYSTEMS:  CONSTITUTIONAL: No weakness, fevers or chills  EYES: No visual changes  ENT: No vertigo or throat pain   NECK: No pain or stiffness  RESPIRATORY: No cough, wheezing, hemoptysis; No shortness of breath  CARDIOVASCULAR: No chest pain or palpitations  GASTROINTESTINAL: No abdominal or epigastric pain. No nausea, vomiting, or hematemesis; No diarrhea or constipation. No melena or hematochezia.  GENITOURINARY: No dysuria, frequency or hematuria  NEUROLOGICAL: No numbness or weakness  SKIN: No itching, no rashes  MSK: No pain    PHYSICAL EXAM:  NEURO: patient is awake , alert and oriented  GEN: Not in acute distress  NECK: no thyroid enlargement, no JVD  LUNGS: Clear to auscultation bilaterally   CARDIOVASCULAR: S1/S2 present, RRR , no murmurs or rubs, no carotid bruits,  + PP bilaterally  ABD: Soft, non-tender, non-distended, +BS  EXT: No SANDRO  SKIN: Intact    LABS:                        9.6    7.29  )-----------( 474      ( 02 Aug 2021 05:23 )             30.4     08-02    134<L>  |  94<L>  |  11  ----------------------------<  101<H>  4.2   |  30  |  0.7    Ca    8.5      02 Aug 2021 05:23  Mg     1.7     08-02    TPro  5.7<L>  /  Alb  3.4<L>  /  TBili  1.0  /  DBili  x   /  AST  25  /  ALT  12  /  AlkPhos  85  08-02    PT/INR - ( 02 Aug 2021 05:23 )   PT: 27.70 sec;   INR: 2.43 ratio         PTT - ( 31 Jul 2021 13:50 )  PTT:44.9 sec  Troponin T, Serum: 0.04 ng/mL *HH* (08-01-21 @ 11:48)    CARDIAC MARKERS ( 01 Aug 2021 11:48 )  x     / 0.04 ng/mL / x     / x     / x      CARDIAC MARKERS ( 31 Jul 2021 20:38 )  x     / 0.04 ng/mL / x     / x     / 1.9 ng/mL  CARDIAC MARKERS ( 31 Jul 2021 13:50 )  x     / 0.02 ng/mL / x     / x     / x            Troponin trend:    Serum Pro-Brain Natriuretic Peptide: 4004 pg/mL (07-31-21 @ 13:50)          RADIOLOGY:  -CXR:  < from: Xray Chest 1 View-PORTABLE IMMEDIATE (Xray Chest 1 View-PORTABLE IMMEDIATE .) (07.31.21 @ 14:08) >  Impression:    Stable lung opacities    < end of copied text >    -TTE:  < from: TTE Echo Complete w/ Contrast w/ Doppler (06.08.21 @ 09:03) >  Summary:   1. Left ventricular ejection fraction, by visual estimation, is 55 to 60%.   2. Spectral Doppler shows impaired relaxation pattern of left ventricular myocardial filling (Grade I diastolic dysfunction).   3. Mildly enlarged left atrium.   4. Mildly enlarged right atrium.   5. Mild mitral annular calcification.   6. Moderate mitral valve regurgitation.   7. Moderate tricuspid regurgitation.   8. Mechanical prosthesis in the aortic valve position.   9. Estimated pulmonary artery systolic pressure is 44.5 mmHg assuming a right atrial pressure of 3 mmHg, which is consistent with mild pulmonary hypertension.    < end of copied text >      ECG:    < from: 12 Lead ECG (08.01.21 @ 18:48) >  Diagnosis Line Supraventricular tachycardia  Marked ST abnormality, possible lateral subendocardial injury  Abnormal ECG    < end of copied text >     HPI:  85-year-old, female patient, Hypertension, DL, CAD s/p CABG in 2003 with concomitant Metallic Aortic valve replacement and osteoporosis, and displaced right subcapital femoral neck fracture s/p Hemiarthroplasty on 7/16/2021 ( last week) was brought in from [facility] by EMS for Chest discomfort. The patient was discharged from the hospital on 7/23 after a fall complicated by the fracture. she was attempting to work with PT at the NH, the patient could not participate due to pains in her right hip, so she laid back to rest. Afterwards she had an acute onset of chest discomfort, pressure like, retrosternal/ epigastric in nature. No alleviating factors, no aggravating factors ( she was sitting at that time). No cough, no pleuritic chest pain. No tenderness upon palpation. The RN at the [facility] ([facility]) took the patient's vital signs and she had Tachycardia (140's). The patient did not feel any palpitations. No syncope.  (31 Jul 2021 18:34)  In ED Troponin was slightly elevated, BNP as well, anemia, chest xray showed a mild congestion. EKG revealed SVT. EPS consulted for further care.   patient seen and examined at the bedside. patient currently comfortable with no complaints     PAST MEDICAL & SURGICAL HISTORY  HTN (hypertension)    High cholesterol    Osteoporosis    Fracture of right shoulder    Anxiety    Depression    Atrial fibrillation    H/O heart valve replacement with mechanical valve    S/P hip hemiarthroplasty  on 7/16/2021 for right subcapital femoral neck fracture        FAMILY HISTORY:  FAMILY HISTORY:  FH: lung cancer (Sibling)        SOCIAL HISTORY:  []smoker  []Alcohol  []Drug    ALLERGIES:  penicillin (Unknown)  pinapples (Unknown)      MEDICATIONS:  MEDICATIONS  (STANDING):  aspirin  chewable 81 milliGRAM(s) Oral daily  atorvastatin 20 milliGRAM(s) Oral at bedtime  diltiazem    Tablet 30 milliGRAM(s) Oral every 6 hours  escitalopram 10 milliGRAM(s) Oral at bedtime  ferrous    sulfate 325 milliGRAM(s) Oral daily  furosemide   Injectable 40 milliGRAM(s) IV Push daily  metoprolol tartrate 50 milliGRAM(s) Oral two times a day  warfarin 2 milliGRAM(s) Oral once    MEDICATIONS  (PRN):  ALPRAZolam 0.5 milliGRAM(s) Oral at bedtime PRN Anxiety/Agitation  oxyCODONE    IR 5 milliGRAM(s) Oral every 6 hours PRN Severe Pain (7 - 10)      HOME MEDICATIONS:  Home Medications:  acetaminophen 325 mg oral tablet: 2 tab(s) orally every 6 hours (23 Jul 2021 11:48)  alendronate 35 mg oral tablet: 1 tab(s) orally once a week (07 Jun 2021 00:16)  ALPRAZolam 0.5 mg oral tablet: 1 tab(s) orally once a day (at bedtime), As Needed (07 Jun 2021 00:16)  aspirin 81 mg oral tablet, chewable: 1 tab(s) orally once a day (07 Jun 2021 00:16)  atenolol 25 mg oral tablet: 1 tab(s) orally once a day (07 Jun 2021 00:16)  atorvastatin 20 mg oral tablet: 1 tab(s) orally once a day (07 Jun 2021 00:16)  escitalopram 10 mg oral tablet: 1 tab(s) orally once a day (07 Jun 2021 00:16)  ezetimibe 10 mg oral tablet: 1 tab(s) orally once a day (07 Jun 2021 00:16)  iron gluconate: 27 milligram(s) orally once a day (07 Jun 2021 00:16)  lidocaine 5% patch: 1  transdermally once a day (07 Jun 2021 00:16)  losartan 100 mg oral tablet: 1 tab(s) orally once a day (31 Jul 2021 18:45)  oxyCODONE 5 mg oral tablet: 1 tab(s) orally every 6 hours, As needed, Severe Pain (7 - 10) (23 Jul 2021 11:48)  warfarin 4 mg oral tablet: 0.5 tab(s) orally once a day (31 Jul 2021 19:24)      VITALS:   T(F): 97.6 (08-02 @ 05:40), Max: 99.8 (07-31 @ 13:32)  HR: 75 (08-02 @ 05:40) (63 - 149)  BP: 138/62 (08-02 @ 05:40) (84/77 - 199/74)  BP(mean): --  RR: 19 (08-02 @ 05:40) (17 - 19)  SpO2: 97% (08-01 @ 11:31) (97% - 100%)    I&O's Summary    01 Aug 2021 07:01  -  02 Aug 2021 07:00  --------------------------------------------------------  IN: 860 mL / OUT: 2200 mL / NET: -1340 mL        REVIEW OF SYSTEMS:  CONSTITUTIONAL: No weakness, fevers or chills  EYES: No visual changes  ENT: No vertigo or throat pain   NECK: No pain or stiffness  RESPIRATORY: No cough, wheezing, hemoptysis; No shortness of breath  CARDIOVASCULAR: No chest pain or palpitations  GASTROINTESTINAL: No abdominal or epigastric pain. No nausea, vomiting, or hematemesis; No diarrhea or constipation. No melena or hematochezia.  GENITOURINARY: No dysuria, frequency or hematuria  NEUROLOGICAL: No numbness or weakness  SKIN: No itching, no rashes  MSK: No pain    PHYSICAL EXAM:  NEURO: patient is awake , alert and oriented  GEN: Not in acute distress  NECK: no thyroid enlargement, no JVD  LUNGS: Clear to auscultation bilaterally   CARDIOVASCULAR: S1/S2 present, RRR , no murmurs or rubs, no carotid bruits,  + PP bilaterally  ABD: Soft, non-tender, non-distended, +BS  EXT: No SANDRO  SKIN: Intact    LABS:                        9.6    7.29  )-----------( 474      ( 02 Aug 2021 05:23 )             30.4     08-02    134<L>  |  94<L>  |  11  ----------------------------<  101<H>  4.2   |  30  |  0.7    Ca    8.5      02 Aug 2021 05:23  Mg     1.7     08-02    TPro  5.7<L>  /  Alb  3.4<L>  /  TBili  1.0  /  DBili  x   /  AST  25  /  ALT  12  /  AlkPhos  85  08-02    PT/INR - ( 02 Aug 2021 05:23 )   PT: 27.70 sec;   INR: 2.43 ratio         PTT - ( 31 Jul 2021 13:50 )  PTT:44.9 sec  Troponin T, Serum: 0.04 ng/mL *HH* (08-01-21 @ 11:48)    CARDIAC MARKERS ( 01 Aug 2021 11:48 )  x     / 0.04 ng/mL / x     / x     / x      CARDIAC MARKERS ( 31 Jul 2021 20:38 )  x     / 0.04 ng/mL / x     / x     / 1.9 ng/mL  CARDIAC MARKERS ( 31 Jul 2021 13:50 )  x     / 0.02 ng/mL / x     / x     / x            Troponin trend:    Serum Pro-Brain Natriuretic Peptide: 4004 pg/mL (07-31-21 @ 13:50)          RADIOLOGY:  -CXR:  < from: Xray Chest 1 View-PORTABLE IMMEDIATE (Xray Chest 1 View-PORTABLE IMMEDIATE .) (07.31.21 @ 14:08) >  Impression:    Stable lung opacities    < end of copied text >    -TTE:  < from: TTE Echo Complete w/ Contrast w/ Doppler (06.08.21 @ 09:03) >  Summary:   1. Left ventricular ejection fraction, by visual estimation, is 55 to 60%.   2. Spectral Doppler shows impaired relaxation pattern of left ventricular myocardial filling (Grade I diastolic dysfunction).   3. Mildly enlarged left atrium.   4. Mildly enlarged right atrium.   5. Mild mitral annular calcification.   6. Moderate mitral valve regurgitation.   7. Moderate tricuspid regurgitation.   8. Mechanical prosthesis in the aortic valve position.   9. Estimated pulmonary artery systolic pressure is 44.5 mmHg assuming a right atrial pressure of 3 mmHg, which is consistent with mild pulmonary hypertension.    < end of copied text >      ECG:    < from: 12 Lead ECG (08.01.21 @ 18:48) >  Diagnosis Line Supraventricular tachycardia  Marked ST abnormality, possible lateral subendocardial injury  Abnormal ECG    < end of copied text >

## 2021-08-02 NOTE — PROGRESS NOTE ADULT - SUBJECTIVE AND OBJECTIVE BOX
ORAL WU 85y Female  MRN#: [id number]   CODE STATUS: Full    Hospital Day: 2d    Pt is currently admitted with the primary diagnosis of chest pain    SUBJECTIVE  Hospital Course  Pt seen and examined at bedside. No CP or SOB. Increased cardizam to 60mg q6h, stopped metoprolol. as per EP. If stable can be managed OPD.    Overnight events   None    Subjective complaints   None    Present Today:   - Zamudio:  No [x], Yes [   ] : Indication:     - Type of IV Access:       .. CVC/Piccline:  No [  ], Yes [   ] : Indication:       .. Midline: No [  ], Yes [   ] : Indication:                                             ----------------------------------------------------------  OBJECTIVE  PAST MEDICAL & SURGICAL HISTORY  HTN (hypertension)    High cholesterol    Osteoporosis    Fracture of right shoulder    Anxiety    Depression    Atrial fibrillation    H/O heart valve replacement with mechanical valve    S/P hip hemiarthroplasty  on 7/16/2021 for right subcapital femoral neck fracture                                              -----------------------------------------------------------  ALLERGIES:  penicillin (Unknown)  pinapples (Unknown)                                            ------------------------------------------------------------    HOME MEDICATIONS  Home Medications:  acetaminophen 325 mg oral tablet: 2 tab(s) orally every 6 hours (23 Jul 2021 11:48)  alendronate 35 mg oral tablet: 1 tab(s) orally once a week (07 Jun 2021 00:16)  ALPRAZolam 0.5 mg oral tablet: 1 tab(s) orally once a day (at bedtime), As Needed (07 Jun 2021 00:16)  aspirin 81 mg oral tablet, chewable: 1 tab(s) orally once a day (07 Jun 2021 00:16)  atenolol 25 mg oral tablet: 1 tab(s) orally once a day (07 Jun 2021 00:16)  atorvastatin 20 mg oral tablet: 1 tab(s) orally once a day (07 Jun 2021 00:16)  escitalopram 10 mg oral tablet: 1 tab(s) orally once a day (07 Jun 2021 00:16)  ezetimibe 10 mg oral tablet: 1 tab(s) orally once a day (07 Jun 2021 00:16)  iron gluconate: 27 milligram(s) orally once a day (07 Jun 2021 00:16)  lidocaine 5% patch: 1  transdermally once a day (07 Jun 2021 00:16)  losartan 100 mg oral tablet: 1 tab(s) orally once a day (31 Jul 2021 18:45)  oxyCODONE 5 mg oral tablet: 1 tab(s) orally every 6 hours, As needed, Severe Pain (7 - 10) (23 Jul 2021 11:48)  warfarin 4 mg oral tablet: 0.5 tab(s) orally once a day (31 Jul 2021 19:24)                           MEDICATIONS:  STANDING MEDICATIONS  aspirin  chewable 81 milliGRAM(s) Oral daily  atorvastatin 20 milliGRAM(s) Oral at bedtime  diltiazem    Tablet 60 milliGRAM(s) Oral every 6 hours  escitalopram 10 milliGRAM(s) Oral at bedtime  ferrous    sulfate 325 milliGRAM(s) Oral daily  furosemide   Injectable 40 milliGRAM(s) IV Push daily  warfarin 2 milliGRAM(s) Oral once    PRN MEDICATIONS  ALPRAZolam 0.5 milliGRAM(s) Oral at bedtime PRN  oxyCODONE    IR 5 milliGRAM(s) Oral every 6 hours PRN                                            ------------------------------------------------------------  VITAL SIGNS: Last 24 Hours  T(C): 36.3 (02 Aug 2021 12:59), Max: 36.4 (02 Aug 2021 05:40)  T(F): 97.3 (02 Aug 2021 12:59), Max: 97.6 (02 Aug 2021 05:40)  HR: 75 (02 Aug 2021 17:41) (72 - 80)  BP: 158/65 (02 Aug 2021 17:41) (132/59 - 181/72)  BP(mean): --  RR: 20 (02 Aug 2021 12:59) (17 - 20)  SpO2: 97% (02 Aug 2021 10:50) (97% - 97%)      08-01-21 @ 07:01  -  08-02-21 @ 07:00  --------------------------------------------------------  IN: 860 mL / OUT: 2200 mL / NET: -1340 mL    08-02-21 @ 07:01  -  08-02-21 @ 19:06  --------------------------------------------------------  IN: 440 mL / OUT: 1050 mL / NET: -610 mL                                             --------------------------------------------------------------  LABS:                        9.6    7.29  )-----------( 474      ( 02 Aug 2021 05:23 )             30.4     08-02    134<L>  |  94<L>  |  11  ----------------------------<  101<H>  4.2   |  30  |  0.7    Ca    8.5      02 Aug 2021 05:23  Mg     1.7     08-02    TPro  5.7<L>  /  Alb  3.4<L>  /  TBili  1.0  /  DBili  x   /  AST  25  /  ALT  12  /  AlkPhos  85  08-02    PT/INR - ( 02 Aug 2021 05:23 )   PT: 27.70 sec;   INR: 2.43 ratio                       CARDIAC MARKERS ( 01 Aug 2021 11:48 )  x     / 0.04 ng/mL / x     / x     / x      CARDIAC MARKERS ( 31 Jul 2021 20:38 )  x     / 0.04 ng/mL / x     / x     / 1.9 ng/mL                                              -------------------------------------------------------------  RADIOLOGY:      EXAM:  XR HIPS BI 2V            PROCEDURE DATE:  08/01/2021            INTERPRETATION:  Clinical History / Reason for exam: Hip pain. Status post recent RTHR 7/21.    Technique: 2 views of left hip and 2 views of the right hip.    Comparison: Hip x-ray 7/16/2021    Findings:    Right hip:  No acute fracture or dislocation.  Status post right hip hemiarthroplasty, stable since prior. No evidence of hardware complication.    Left hip:  No acute fracture or dislocation.  Moderate degenerative changes of the left hip.    Other: Stable uterine calcified fibroids. Moderate bilateral SI joint osteoarthritis, stable.    Impression:    No acute fracture or dislocation.    Status post right hip hemiarthroplasty without evidence of hardware complication.    Moderate degenerative changes of the left hip.    --- End of Report ---    < from: CT Angio Chest PE Protocol w/ IV Cont (07.31.21 @ 15:59) >  IMPRESSION:    1.  No pulmonary embolism.  2.  Bilateral small pleural effusions and probable left interfissural fluid.  3.  Right heart enlargement, stable on 11/20/2020.  4.  Bilateral small airways disease.  5.  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    < end of copied text >    TT< from: TTE Echo Complete w/ Contrast w/ Doppler (06.08.21 @ 09:03) >  Summary:   1. Left ventricular ejection fraction, by visual estimation, is 55 to 60%.   2. Spectral Doppler shows impaired relaxation pattern of left ventricular myocardial filling (Grade I diastolic dysfunction).   3. Mildly enlarged left atrium.   4. Mildly enlarged right atrium.   5. Mild mitral annular calcification.   6. Moderate mitral valve regurgitation.   7. Moderate tricuspid regurgitation.   8. Mechanical prosthesis in the aortic valve position.   9. Estimated pulmonary artery systolic pressure is 44.5 mmHg assuming a right atrial pressure of 3 mmHg, which is consistent with mild pulmonary hypertension.                                          --------------------------------------------------------------    PHYSICAL EXAM:  General: NAD, AOx3  HEENT: normocephalic, atraumatic  LUNGS: Normal breath sounds, no wheezes/crackles  HEART: RRR, no murmurs, rubs or gallops. Systolic click  ABDOMEN: Soft, NT/ND. No rigidity/guarding  EXT: Peripheral pulses +2, no cyanosis. Rt leg edema +2.  NEURO: grossly normal  SKIN: no rashes or brusies                                           --------------------------------------------------------------

## 2021-08-02 NOTE — PROGRESS NOTE ADULT - SUBJECTIVE AND OBJECTIVE BOX
ORAL WU 85y Femalesent from  [facility] after experiencing  R hip while trying to do PT, ff by CP, retrosternal and epigastric.  VS taken by SNF and pt's HR 140s.  The pt was transferred to the ER for further w/up and tx.  Pt's tachyarrhythmia tx with IVP of  cardizem, adenosine and pt placed on metoprolol. Pt's trop 0.02, 0.04, possible NSTEMI.  Pt had negative CT angio for PE.  The pt was evaluated by Cardio and is being ad to tele for cardiac monitoring.  The PMHx includes:  HTN, ASHD, CAD, sp CABG 2003, sp AVR metallic, cardiac arrhythmia, parox afib,  AC with warfarin, Anemia, GERD, diverticulosis,  OA, mobility dysfunction, falls,  DDD, DJD, osteoporosis, sp recent fx of R hip with RTHR 7/21,  sp shoulder fx, depression.    INTERVAL HPI/OVERNIGHT EVENTS:  cont runs of SVT, on metoprolol and cardizem, being evaluated by EPS, low Mg 1.7, replete and keep >2    MEDICATIONS  (STANDING):  aDENosine Injectable (ADENOCARD) 6 milliGRAM(s) IV Push once  amLODIPine   Tablet 5 milliGRAM(s) Oral daily  aspirin  chewable 81 milliGRAM(s) Oral daily  atorvastatin 20 milliGRAM(s) Oral at bedtime  escitalopram 10 milliGRAM(s) Oral at bedtime  ferrous    sulfate 325 milliGRAM(s) Oral daily  furosemide   Injectable 40 milliGRAM(s) IV Push daily  losartan 100 milliGRAM(s) Oral daily  metoprolol tartrate 50 milliGRAM(s) Oral two times a day  cardizem  warfarin 2 milliGRAM(s) Oral once    MEDICATIONS  (PRN):  ALPRAZolam 0.5 milliGRAM(s) Oral at bedtime PRN Anxiety/Agitation  oxyCODONE    IR 5 milliGRAM(s) Oral every 6 hours PRN Severe Pain (7 - 10)      Allergies    penicillin (Unknown)  pinapples (Unknown)        Vital Signs Last 24 Hrs    T(F): 97.6  HR: 75  BP: 137/62  BP(mean): --  RR: 19  SpO2: 97% 2-3LNC    PHYSICAL EXAM:      Constitutional: Alert, elderly WF, chronically ill looking but in NAD, + O2    Eyes: nonicteric    ENMT: dry oral mucosa, dental defects    Neck:n supple, + JVD, no bruits    Back:  Th kyphosis    Respiratory: shallow resp, scattered rhonchi    Cardiovascular: S1S2,  II/VI BELINDA    Gastrointestinal: soft and benign,  + BS    Genitourinary:  no anderson    Extremities: moves all ext, + arthritic changes, + healing incision R hip    Vascular: dec pulses    Neurological: nonfocal    Skin: no rash      Psychiatric: anxious but stable        LABS:                        9.6  7  )-----------( 474             30     134 |  94  |  11  ----------------------------<  101  4.2   |  30  |  0.7    Ca    8.2<L>        Mg     1.8     08-01    TPro  5.5<L>  /  Alb  3.3<L>  /  TBili  1.1  /  DBili  x   /  AST  28  /  ALT  13  /  AlkPhos  86  08-01    PT/INR -    PT: 28.90 sec;   INR: 2.54 ratio, 2.43       PTT - ( 31 Jul 2021 13:50 )  PTT:44.9 sec      RADIOLOGY & ADDITIONAL TESTS:  CT angio of chest:  NO PE, BL sm pl effusions, BL sm airway disease,  stable dilatation of aescending aorta 4.3cm, Bovine arch, no pul consolidation, no pneumo    EKG:  /min, ST-T changes     ORAL WU 85y Femalesent from  [facility] after experiencing  R hip while trying to do PT, ff by CP, retrosternal and epigastric.  VS taken by SNF and pt's HR 140s.  The pt was transferred to the ER for further w/up and tx.  Pt's tachyarrhythmia tx with IVP of  cardizem, adenosine and pt placed on metoprolol. Pt's trop 0.02, 0.04, possible NSTEMI.  Pt had negative CT angio for PE.  The pt was evaluated by Cardio and is being ad to tele for cardiac monitoring.  The PMHx includes:  HTN, ASHD, CAD, sp CABG 2003, sp AVR metallic, cardiac arrhythmia, parox afib,  AC with warfarin, Anemia, GERD, diverticulosis,  OA, mobility dysfunction, falls,  DDD, DJD, osteoporosis, sp recent fx of R hip with RTHR 7/21,  sp shoulder fx, depression.    INTERVAL HPI/OVERNIGHT EVENTS:  cont runs of SVT, on metoprolol and cardizem, being evaluated by EPS, low Mg 1.7, replete and keep >2    MEDICATIONS  (STANDING):  aDENosine Injectable (ADENOCARD) 6 milliGRAM(s) IV Push once  amLODIPine   Tablet 5 milliGRAM(s) Oral daily  aspirin  chewable 81 milliGRAM(s) Oral daily  atorvastatin 20 milliGRAM(s) Oral at bedtime  escitalopram 10 milliGRAM(s) Oral at bedtime  ferrous    sulfate 325 milliGRAM(s) Oral daily  furosemide   Injectable 40 milliGRAM(s) IV Push daily  losartan 100 milliGRAM(s) Oral daily  metoprolol tartrate 50 milliGRAM(s) Oral two times a day  cardizem 30mg po q 6 hrs  warfarin 2 milliGRAM(s) Oral once    MEDICATIONS  (PRN):  ALPRAZolam 0.5 milliGRAM(s) Oral at bedtime PRN Anxiety/Agitation  oxyCODONE    IR 5 milliGRAM(s) Oral every 6 hours PRN Severe Pain (7 - 10)      Allergies    penicillin (Unknown)  pinapples (Unknown)        Vital Signs Last 24 Hrs    T(F): 97.6  HR: 75  BP: 137/62  BP(mean): --  RR: 19  SpO2: 97% 2-3LNC    PHYSICAL EXAM:      Constitutional: Alert, elderly WF, chronically ill looking but in NAD, + O2    Eyes: nonicteric    ENMT: dry oral mucosa, dental defects    Neck:n supple, + JVD, no bruits    Back:  Th kyphosis    Respiratory: shallow resp, scattered rhonchi    Cardiovascular: S1S2,  II/VI BELINDA    Gastrointestinal: soft and benign,  + BS    Genitourinary:  no anderson    Extremities: moves all ext, + arthritic changes, + healing incision R hip    Vascular: dec pulses    Neurological: nonfocal    Skin: no rash      Psychiatric: anxious but stable        LABS:                        9.6  7  )-----------( 474             30     134 |  94  |  11  ----------------------------<  101  4.2   |  30  |  0.7    Ca    8.2<L>        Mg     1.8     08-01    TPro  5.5<L>  /  Alb  3.3<L>  /  TBili  1.1  /  DBili  x   /  AST  28  /  ALT  13  /  AlkPhos  86  08-01    PT/INR -    PT: 28.90 sec;   INR: 2.54 ratio, 2.43       PTT - ( 31 Jul 2021 13:50 )  PTT:44.9 sec      RADIOLOGY & ADDITIONAL TESTS:  CT angio of chest:  NO PE, BL sm pl effusions, BL sm airway disease,  stable dilatation of aescending aorta 4.3cm, Bovine arch, no pul consolidation, no pneumo    EKG:  /min, ST-T changes

## 2021-08-02 NOTE — CONSULT NOTE ADULT - ATTENDING COMMENTS
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 1st known episode    Discussed meds vs ablation options. They are open to both approaches.  At this moment, we will try medical management.  Change short acting cardizem to Cardizem 180 mg Q24  DC metoprolol  Continue tele overnight  Anticipate DC from EP standpoint of view if no overnight events

## 2021-08-02 NOTE — PROGRESS NOTE ADULT - SUBJECTIVE AND OBJECTIVE BOX
SUBJ:No chest pain or shortness of breath      MEDICATIONS  (STANDING):  aspirin  chewable 81 milliGRAM(s) Oral daily  atorvastatin 20 milliGRAM(s) Oral at bedtime  diltiazem    Tablet 30 milliGRAM(s) Oral every 6 hours  escitalopram 10 milliGRAM(s) Oral at bedtime  ferrous    sulfate 325 milliGRAM(s) Oral daily  furosemide   Injectable 40 milliGRAM(s) IV Push daily  metoprolol tartrate 50 milliGRAM(s) Oral two times a day  warfarin 2 milliGRAM(s) Oral once    MEDICATIONS  (PRN):  ALPRAZolam 0.5 milliGRAM(s) Oral at bedtime PRN Anxiety/Agitation  oxyCODONE    IR 5 milliGRAM(s) Oral every 6 hours PRN Severe Pain (7 - 10)            Vital Signs Last 24 Hrs  T(C): 36.3 (02 Aug 2021 12:59), Max: 36.4 (02 Aug 2021 05:40)  T(F): 97.3 (02 Aug 2021 12:59), Max: 97.6 (02 Aug 2021 05:40)  HR: 75 (02 Aug 2021 12:59) (72 - 80)  BP: 152/68 (02 Aug 2021 12:59) (132/59 - 181/72)  BP(mean): --  RR: 20 (02 Aug 2021 12:59) (17 - 20)  SpO2: 97% (02 Aug 2021 10:50) (97% - 97%)      ECG:NML    TTE:    LABS:                        9.6    7.29  )-----------( 474      ( 02 Aug 2021 05:23 )             30.4     08-02    134<L>  |  94<L>  |  11  ----------------------------<  101<H>  4.2   |  30  |  0.7    Ca    8.5      02 Aug 2021 05:23  Mg     1.7     08-02    TPro  5.7<L>  /  Alb  3.4<L>  /  TBili  1.0  /  DBili  x   /  AST  25  /  ALT  12  /  AlkPhos  85  08-02    CARDIAC MARKERS ( 01 Aug 2021 11:48 )  x     / 0.04 ng/mL / x     / x     / x      CARDIAC MARKERS ( 31 Jul 2021 20:38 )  x     / 0.04 ng/mL / x     / x     / 1.9 ng/mL      PT/INR - ( 02 Aug 2021 05:23 )   PT: 27.70 sec;   INR: 2.43 ratio             I&O's Summary    01 Aug 2021 07:01  -  02 Aug 2021 07:00  --------------------------------------------------------  IN: 860 mL / OUT: 2200 mL / NET: -1340 mL    02 Aug 2021 07:01  -  02 Aug 2021 14:18  --------------------------------------------------------  IN: 200 mL / OUT: 1050 mL / NET: -850 mL      BNP

## 2021-08-02 NOTE — PROGRESS NOTE ADULT - ASSESSMENT
ASSESSMENT & PLAN    85-year-old, female patient, Hypertension, DL, CAD s/p CABG in  with concomitant Metallic Aortic valve replacement and osteoporosis, and displaced right subcapital femoral neck fracture s/p Hemiarthroplasty on 2021 ( last week) was brought in from [facility] by EMS for Chest discomfort. The patient was discharged from the hospital on  after a fall complicated by the fracture. Today she was attempting to work with PT at the NH, the patient could not participate due to pains in her right hip, so she laid back to rest. Afterwards she had an acute onset of chest discomfort, pressure like, retrosternal/ epigastric in nature. No alleviating factors, no aggravating factors ( she was sitting at that time). No cough, no pleuritic chest pain. No tenderness upon palpation. The RN at the [facility] (NH) took the patient's vital signs and she had Tachycardia (140's). The patient did not feel any palpitations. No syncope.     #Chest Discomfort  #Tachyarrhythmia  # HTN, DLD  #CAD, s/p CABG in   #Atrial Flutter vs Acute Onset Afib:   - Responded to Cardizem 10mg IV push once, but patient went into asymptomatic bradycardia afterwards  - Stopped BB by EP, Started on cardizam 60mg q6h.   - CHADSVASc2: 5 ( Female, Age > 75, HTN, Vascular disease ( CAD))   - Warfarin 2mg po once daily, please follow INR daily  - Repeat EKG post Cardizem: NSR  - Troponin: 0.02, elevated, likely secondary to tachyarrhythmia. 2nd set 0.04. 3rd 0.04. stable.  - CTA done to Rule out PE( Tachycardia, recently had surgery): negative, check full report above.   - The patient does have rt lower extremity edema, bilateral ( right > left). Follow up Venous Duplex - No DVT.  - Consult Cardiology  (Dr. Crow Nagy)   - TTE: Done in 2021:   a. Normal EF  b. Impaired LV diastolic relaxation with G1DD   c. Mild right and left atrial enlargements  d. Mild pulmonary hypertension    **As per Dr Butterfield, patient on Atenolol 50mg, Amlodipine 5mg, 2 days of 40mg IV Lasix, then reassess.    #Aortic Stenosis:   - S/p Metallic Valve replacement in    - Avoid fluid overload  - Continue with Warfarin 2mg po once daily, Daily INR    #Thoracic Aortic Aneurysm   - Seen on CTA scan  - Measurin.3cm  - Likely needs serial follow up.     Handoff: F/u daily INR. EP recs OPD management. Started on cardizam 60mg q6h, stopped metoprolol. F/u cardio.                                                                            ----------------------------------------------------  # DVT prophylaxis Warfarming 2mg PO daily    # GI prophylaxis pantoprazole PO    # Diet DASH/TLC    # Activity Score (AM-PAC)    # Code status Full    # Disposition    ASSESSMENT & PLAN    85-year-old, female patient, Hypertension, DL, CAD s/p CABG in  with concomitant Metallic Aortic valve replacement and osteoporosis, and displaced right subcapital femoral neck fracture s/p Hemiarthroplasty on 2021 ( last week) was brought in from [facility] by EMS for Chest discomfort. The patient was discharged from the hospital on  after a fall complicated by the fracture. Today she was attempting to work with PT at the NH, the patient could not participate due to pains in her right hip, so she laid back to rest. Afterwards she had an acute onset of chest discomfort, pressure like, retrosternal/ epigastric in nature. No alleviating factors, no aggravating factors ( she was sitting at that time). No cough, no pleuritic chest pain. No tenderness upon palpation. The RN at the [facility] (NH) took the patient's vital signs and she had Tachycardia (140's). The patient did not feel any palpitations. No syncope.     #Chest Discomfort  #Tachyarrhythmia  # HTN, DLD  #CAD, s/p CABG in   #Atrial Flutter vs Acute Onset Afib:   - Responded to Cardizem 10mg IV push once, but patient went into asymptomatic bradycardia afterwards  - Stopped BB by EP, Started on cardizam 60mg q6h.   - CHADSVASc2: 5 ( Female, Age > 75, HTN, Vascular disease ( CAD))   - Warfarin 2mg po once daily, please follow INR daily  - Repeat EKG post Cardizem: NSR  - Troponin: 0.02, elevated, likely secondary to tachyarrhythmia. 2nd set 0.04. 3rd 0.04. stable.  - CTA done to Rule out PE( Tachycardia, recently had surgery): negative, check full report above.   - The patient does have rt lower extremity edema, bilateral ( right > left). Follow up Venous Duplex - No DVT.  - Consult Cardiology  (Dr. Crow Nagy)   - TTE: Done in 2021:   a. Normal EF  b. Impaired LV diastolic relaxation with G1DD   c. Mild right and left atrial enlargements  d. Mild pulmonary hypertension    **As per Dr Butterfield, patient on Atenolol 50mg, Amlodipine 5mg, 2 days of 40mg IV Lasix, then reassess.    #Aortic Stenosis:   - S/p Metallic Valve replacement in    - Avoid fluid overload  - Continue with Warfarin 2mg po once daily, Daily INR    #Thoracic Aortic Aneurysm   - Seen on CTA scan  - Measurin.3cm  - Likely needs serial follow up.     Handoff: F/u daily INR. EP recs OPD management. Started on cardizam 60mg q6h, stopped metoprolol. F/u cardio. F/u Echo. Keep Mg >2.                                                                            ----------------------------------------------------  # DVT prophylaxis Warfarming 2mg PO daily    # GI prophylaxis pantoprazole PO    # Diet DASH/TLC    # Activity Score (AM-PAC)    # Code status Full    # Disposition

## 2021-08-02 NOTE — CONSULT NOTE ADULT - ASSESSMENT
Impression   -SVT, Aflutter vs AVNRT  -Elevated troponin/ NSTEMI II  -CAD s/p CABG  -AVR, mechanical   -HTN  -DLD    Recommendations   -  -  -  -   Impression   -AVNRT  -Elevated troponin/ NSTEMI II  -CAD s/p CABG  -AVR, mechanical   -HTN  -DLD    Recommendations   -telemetry monitor   -increase Cardizem to 60 q6hr   -d/c metoprolol   -if patient with persistent symptoms despite medical treatment can get ablation as outpatient

## 2021-08-03 ENCOUNTER — TRANSCRIPTION ENCOUNTER (OUTPATIENT)
Age: 86
End: 2021-08-03

## 2021-08-03 LAB
APTT BLD: 34.1 SEC — SIGNIFICANT CHANGE UP (ref 27–39.2)
GLUCOSE BLDC GLUCOMTR-MCNC: 104 MG/DL — HIGH (ref 70–99)
INR BLD: 2.59 RATIO — HIGH (ref 0.65–1.3)
PROTHROM AB SERPL-ACNC: 29.5 SEC — HIGH (ref 9.95–12.87)

## 2021-08-03 RX ORDER — WARFARIN SODIUM 2.5 MG/1
2 TABLET ORAL ONCE
Refills: 0 | Status: COMPLETED | OUTPATIENT
Start: 2021-08-03 | End: 2021-08-03

## 2021-08-03 RX ORDER — MAGNESIUM SULFATE 500 MG/ML
2 VIAL (ML) INJECTION ONCE
Refills: 0 | Status: COMPLETED | OUTPATIENT
Start: 2021-08-03 | End: 2021-08-03

## 2021-08-03 RX ORDER — DILTIAZEM HCL 120 MG
180 CAPSULE, EXT RELEASE 24 HR ORAL DAILY
Refills: 0 | Status: DISCONTINUED | OUTPATIENT
Start: 2021-08-03 | End: 2021-08-06

## 2021-08-03 RX ADMIN — ATORVASTATIN CALCIUM 20 MILLIGRAM(S): 80 TABLET, FILM COATED ORAL at 21:30

## 2021-08-03 RX ADMIN — Medication 180 MILLIGRAM(S): at 11:38

## 2021-08-03 RX ADMIN — Medication 40 MILLIGRAM(S): at 05:49

## 2021-08-03 RX ADMIN — Medication 60 MILLIGRAM(S): at 05:49

## 2021-08-03 RX ADMIN — WARFARIN SODIUM 2 MILLIGRAM(S): 2.5 TABLET ORAL at 21:30

## 2021-08-03 RX ADMIN — Medication 60 MILLIGRAM(S): at 00:28

## 2021-08-03 RX ADMIN — Medication 81 MILLIGRAM(S): at 11:38

## 2021-08-03 RX ADMIN — ESCITALOPRAM OXALATE 10 MILLIGRAM(S): 10 TABLET, FILM COATED ORAL at 21:30

## 2021-08-03 RX ADMIN — Medication 25 GRAM(S): at 12:35

## 2021-08-03 RX ADMIN — Medication 325 MILLIGRAM(S): at 11:38

## 2021-08-03 NOTE — PROGRESS NOTE ADULT - ASSESSMENT
CP, tachyarrhythmias, R/O ACS  R hip pain, sp fall and recent R THR, ( D/C from hosp 7/23)  Hx of HTN, ASHD, CAD, sp CABG,2003, sp metallic AVR, Ac/warfarin, Afib  hx of DLD  Hx of OA, DDD, DJD, osteoporosis, sp R shoulder fx, sp recent R hip fx, sp THR 7/21, mobility dysfunction  Hx of Anemia  Hx of GERD, HH, diverticulosis  Hx of depression    pt on tele, rate control with metoprolol and cardizem  pt had CT angio of chest which is negative for PE  Vasc doppler of lower ext neg for DVT  pt on ad  req several pushes of IV cardizem and adeosine for tachyarrhythmia  Cardio consult : Dr Del Angel for Dr Lee/Yakov:  probable SVT, metoprolol tartrate 50mg BID, add amlodipine 5mg , lasix 40mg  x 2 days for pul congestion then re-evaluate  EPS consult  trend troponins:  <0.01, 0.02, 0.04, sugg of NSTEMI, med management  BNP  4004  keep Mg > 2  check TSH, monitor electrolytes  monitor INR and adjust wafarin, keep around 2.5-3  ECHO  cont home meds  Rehab consult  repeat Covid Swab  social svc to plan safe return to SNF     CP, tachyarrhythmias, R/O ACS  R hip pain, sp fall and recent R THR, ( D/C from hosp 7/23)  Hx of HTN, ASHD, CAD, sp CABG,2003, sp metallic AVR, Ac/warfarin, Afib  hx of DLD  Hx of OA, DDD, DJD, osteoporosis, sp R shoulder fx, sp recent R hip fx, sp THR 7/21, mobility dysfunction  Hx of Anemia  Hx of GERD, HH, diverticulosis  Hx of depression    pt on tele, rate control with metoprolol and cardizem  no further cardiac intervention planned, thus, plan for return to SNF    pt had CT angio of chest which is negative for PE  Vasc doppler of lower ext neg for DVT  pt on ad  req several pushes of IV cardizem and adeosine for tachyarrhythmia  Cardio consult : Dr Del Angel for Dr Lee/Yakov:  probable SVT, metoprolol tartrate 50mg BID, add amlodipine 5mg , lasix 40mg  x 2 days for pul congestion then re-evaluate  EPS consult  trend troponins:  <0.01, 0.02, 0.04, sugg of NSTEMI, med management  BNP  4004  keep Mg > 2  check TSH, monitor electrolytes  monitor INR and adjust wafarin, keep around 2.5-3  ECHO  cont home meds  Rehab consult  repeat Covid Swab  social svc to plan safe return to SNF

## 2021-08-03 NOTE — PROGRESS NOTE ADULT - SUBJECTIVE AND OBJECTIVE BOX
ORAL WU 85y Femalesent from  [facility] after experiencing  R hip while trying to do PT, ff by CP, retrosternal and epigastric.  VS taken by SNF and pt's HR 140s.  The pt was transferred to the ER for further w/up and tx.  Pt's tachyarrhythmia tx with IVP of  cardizem, adenosine and pt placed on metoprolol. Pt's trop 0.02, 0.04, possible NSTEMI.  Pt had negative CT angio for PE.  The pt was evaluated by Cardio and is being ad to tele for cardiac monitoring.  The PMHx includes:  HTN, ASHD, CAD, sp CABG 2003, sp AVR metallic, cardiac arrhythmia, parox afib,  AC with warfarin, Anemia, GERD, diverticulosis,  OA, mobility dysfunction, falls,  DDD, DJD, osteoporosis, sp recent fx of R hip with RTHR 7/21,  sp shoulder fx, depression.    INTERVAL HPI/OVERNIGHT EVENTS:  pt on tele, resting comfortably, no untoward events, monitor electrolytes    MEDICATIONS  (STANDING):  aDENosine Injectable (ADENOCARD) 6 milliGRAM(s) IV Push once  amLODIPine   Tablet 5 milliGRAM(s) Oral daily  aspirin  chewable 81 milliGRAM(s) Oral daily  atorvastatin 20 milliGRAM(s) Oral at bedtime  escitalopram 10 milliGRAM(s) Oral at bedtime  ferrous    sulfate 325 milliGRAM(s) Oral daily  furosemide   Injectable 40 milliGRAM(s) IV Push daily  losartan 100 milliGRAM(s) Oral daily  metoprolol tartrate 50 milliGRAM(s) Oral two times a day  cardizem 30mg po q 6 hrs  warfarin 2 milliGRAM(s) Oral once    MEDICATIONS  (PRN):  ALPRAZolam 0.5 milliGRAM(s) Oral at bedtime PRN Anxiety/Agitation  oxyCODONE    IR 5 milliGRAM(s) Oral every 6 hours PRN Severe Pain (7 - 10)      Allergies    penicillin (Unknown)  pineapple (Unknown)        Vital Signs Last 24 Hrs    T(F): 98.3  HR: 69  BP: 139/63  BP(mean): --  RR: 19  SpO2: 97% 2-3LNC    PHYSICAL EXAM:      Constitutional: Alert, elderly WF, chronically ill looking but in NAD, + O2    Eyes: nonicteric    ENMT: dry oral mucosa, dental defects    Neck:n supple, + JVD, no bruits    Back:  Th kyphosis    Respiratory: shallow resp, scattered rhonchi    Cardiovascular: S1S2,  II/VI BELINDA    Gastrointestinal: soft and benign,  + BS    Genitourinary:  no anderson    Extremities: moves all ext, + arthritic changes, + healing incision R hip    Vascular: dec pulses    Neurological: nonfocal    Skin: no rash      Psychiatric: anxious but stable        LABS:    8/1                    9.6  7  )-----------( 474             30     134 |  94  |  11  ----------------------------<  101  4.2   |  30  |  0.7    Ca    8.2<L>        Mg     1.8     08-01    TPro  5.5<L>  /  Alb  3.3<L>  /  TBili  1.1  /  DBili  x   /  AST  28  /  ALT  13  /  AlkPhos  86  08-01    PT/INR -    PT: 28.90 sec;   INR: 2.54 ratio, 2.43       PTT - ( 31 Jul 2021 13:50 )  PTT:44.9 sec      RADIOLOGY & ADDITIONAL TESTS:  CT angio of chest:  NO PE, BL sm pl effusions, BL sm airway disease,  stable dilatation of aescending aorta 4.3cm, Bovine arch, no pul consolidation, no pneumo    EKG:  /min, ST-T changes

## 2021-08-03 NOTE — PROGRESS NOTE ADULT - SUBJECTIVE AND OBJECTIVE BOX
ORAL WU 85y Female  MRN#: [id number]   CODE STATUS: Full    Hospital Day: 3d    Pt is currently admitted with the primary diagnosis of chest pain    SUBJECTIVE  Hospital Course  Pt seen and examined at bedside. No CP or SOB today. Pending PT eval and possible SNF dispo afterwards. Pending covid swab collection. For EP f/u OPD for possible ablation.    Overnight events   None    Subjective complaints   None    Present Today:   - Zamudio:  No [x], Yes [   ] : Indication:     - Type of IV Access:       .. CVC/Piccline:  No [  ], Yes [   ] : Indication:       .. Midline: No [  ], Yes [   ] : Indication:                                             ----------------------------------------------------------  OBJECTIVE  PAST MEDICAL & SURGICAL HISTORY  HTN (hypertension)    High cholesterol    Osteoporosis    Fracture of right shoulder    Anxiety    Depression    Atrial fibrillation    H/O heart valve replacement with mechanical valve    S/P hip hemiarthroplasty  on 7/16/2021 for right subcapital femoral neck fracture                                              -----------------------------------------------------------  ALLERGIES:  penicillin (Unknown)  pinapples (Unknown)                                            ------------------------------------------------------------    HOME MEDICATIONS  Home Medications:  acetaminophen 325 mg oral tablet: 2 tab(s) orally every 6 hours (23 Jul 2021 11:48)  alendronate 35 mg oral tablet: 1 tab(s) orally once a week (07 Jun 2021 00:16)  ALPRAZolam 0.5 mg oral tablet: 1 tab(s) orally once a day (at bedtime), As Needed (07 Jun 2021 00:16)  aspirin 81 mg oral tablet, chewable: 1 tab(s) orally once a day (07 Jun 2021 00:16)  atenolol 25 mg oral tablet: 1 tab(s) orally once a day (07 Jun 2021 00:16)  atorvastatin 20 mg oral tablet: 1 tab(s) orally once a day (07 Jun 2021 00:16)  escitalopram 10 mg oral tablet: 1 tab(s) orally once a day (07 Jun 2021 00:16)  ezetimibe 10 mg oral tablet: 1 tab(s) orally once a day (07 Jun 2021 00:16)  iron gluconate: 27 milligram(s) orally once a day (07 Jun 2021 00:16)  lidocaine 5% patch: 1  transdermally once a day (07 Jun 2021 00:16)  losartan 100 mg oral tablet: 1 tab(s) orally once a day (31 Jul 2021 18:45)  oxyCODONE 5 mg oral tablet: 1 tab(s) orally every 6 hours, As needed, Severe Pain (7 - 10) (23 Jul 2021 11:48)  warfarin 4 mg oral tablet: 0.5 tab(s) orally once a day (31 Jul 2021 19:24)                           MEDICATIONS:  STANDING MEDICATIONS  aspirin  chewable 81 milliGRAM(s) Oral daily  atorvastatin 20 milliGRAM(s) Oral at bedtime  diltiazem    milliGRAM(s) Oral daily  escitalopram 10 milliGRAM(s) Oral at bedtime  ferrous    sulfate 325 milliGRAM(s) Oral daily  warfarin 2 milliGRAM(s) Oral once    PRN MEDICATIONS  ALPRAZolam 0.5 milliGRAM(s) Oral at bedtime PRN  oxyCODONE    IR 5 milliGRAM(s) Oral every 6 hours PRN                                            ------------------------------------------------------------  VITAL SIGNS: Last 24 Hours  T(C): 36.8 (03 Aug 2021 06:17), Max: 36.8 (03 Aug 2021 06:17)  T(F): 98.2 (03 Aug 2021 06:17), Max: 98.2 (03 Aug 2021 06:17)  HR: 69 (03 Aug 2021 06:17) (65 - 75)  BP: 139/63 (03 Aug 2021 06:17) (139/63 - 165/73)  BP(mean): --  RR: 19 (03 Aug 2021 06:17) (18 - 20)  SpO2: --      08-02-21 @ 07:01  -  08-03-21 @ 07:00  --------------------------------------------------------  IN: 440 mL / OUT: 1150 mL / NET: -710 mL                                             --------------------------------------------------------------  LABS:                        9.6    7.29  )-----------( 474      ( 02 Aug 2021 05:23 )             30.4     08-02    134<L>  |  94<L>  |  11  ----------------------------<  101<H>  4.2   |  30  |  0.7    Ca    8.5      02 Aug 2021 05:23  Mg     1.7     08-02    TPro  5.7<L>  /  Alb  3.4<L>  /  TBili  1.0  /  DBili  x   /  AST  25  /  ALT  12  /  AlkPhos  85  08-02    PT/INR - ( 02 Aug 2021 05:23 )   PT: 27.70 sec;   INR: 2.43 ratio                       CARDIAC MARKERS ( 01 Aug 2021 11:48 )  x     / 0.04 ng/mL / x     / x     / x                                                  -------------------------------------------------------------  RADIOLOGY:    EXAM:  XR HIPS BI 2V            PROCEDURE DATE:  08/01/2021            INTERPRETATION:  Clinical History / Reason for exam: Hip pain. Status post recent RTHR 7/21.    Technique: 2 views of left hip and 2 views of the right hip.    Comparison: Hip x-ray 7/16/2021    Findings:    Right hip:  No acute fracture or dislocation.  Status post right hip hemiarthroplasty, stable since prior. No evidence of hardware complication.    Left hip:  No acute fracture or dislocation.  Moderate degenerative changes of the left hip.    Other: Stable uterine calcified fibroids. Moderate bilateral SI joint osteoarthritis, stable.    Impression:    No acute fracture or dislocation.    Status post right hip hemiarthroplasty without evidence of hardware complication.    Moderate degenerative changes of the left hip.    --- End of Report ---    < from: CT Angio Chest PE Protocol w/ IV Cont (07.31.21 @ 15:59) >  IMPRESSION:    1.  No pulmonary embolism.  2.  Bilateral small pleural effusions and probable left interfissural fluid.  3.  Right heart enlargement, stable on 11/20/2020.  4.  Bilateral small airways disease.  5.  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    < end of copied text >    TT< from: TTE Echo Complete w/ Contrast w/ Doppler (06.08.21 @ 09:03) >  Summary:   1. Left ventricular ejection fraction, by visual estimation, is 55 to 60%.   2. Spectral Doppler shows impaired relaxation pattern of left ventricular myocardial filling (Grade I diastolic dysfunction).   3. Mildly enlarged left atrium.   4. Mildly enlarged right atrium.   5. Mild mitral annular calcification.   6. Moderate mitral valve regurgitation.   7. Moderate tricuspid regurgitation.   8. Mechanical prosthesis in the aortic valve position.   9. Estimated pulmonary artery systolic pressure is 44.5 mmHg assuming a right atrial pressure of 3 mmHg, which is consistent with mild pulmonary hypertension.                                            --------------------------------------------------------------    PHYSICAL EXAM:  General: NAD, AOx3  HEENT: normocephalic, atraumatic  LUNGS: Normal breath sounds, no wheezes/crackles  HEART: RRR, no rubs or gallops. Systolic click  ABDOMEN: Soft, NT/ND. No rigidity/guarding  EXT: Peripheral pulses +2, no cyanosis. Rt leg edema +1 (surgical leg)  NEURO: grossly normal  SKIN: no rashes or brusies                                           --------------------------------------------------------------

## 2021-08-03 NOTE — PROGRESS NOTE ADULT - ASSESSMENT
ASSESSMENT & PLAN    85-year-old, female patient, Hypertension, DL, CAD s/p CABG in  with concomitant Metallic Aortic valve replacement and osteoporosis, and displaced right subcapital femoral neck fracture s/p Hemiarthroplasty on 2021 ( last week) was brought in from [facility] [place] by EMS for Chest discomfort. The patient was discharged from the hospital on  after a fall complicated by the fracture. Today she was attempting to work with PT at the NH, the patient could not participate due to pains in her right hip, so she laid back to rest. Afterwards she had an acute onset of chest discomfort, pressure like, retrosternal/ epigastric in nature. No alleviating factors, no aggravating factors ( she was sitting at that time). No cough, no pleuritic chest pain. No tenderness upon palpation. The RN at the [facility] (NH) took the patient's vital signs and she had Tachycardia (140's). The patient did not feel any palpitations. No syncope.     #Chest Discomfort  #Tachyarrhythmia  # HTN, DLD  #CAD, s/p CABG in   #Atrial Flutter vs Acute Onset Afib:   - Responded to Cardizem 10mg IV push once, but patient went into asymptomatic bradycardia afterwards  - Stopped BB by EP, Started on cardizam 60mg q6h --> Switched to long action cardizam 180mg instead.  - CHADSVASc2: 5 ( Female, Age > 75, HTN, Vascular disease ( CAD))   - Warfarin 2mg po once daily, please follow INR daily  - Repeat EKG post Cardizem: NSR  - Troponin: 0.02, elevated, likely secondary to tachyarrhythmia. 2nd set 0.04. 3rd 0.04. stable.  - CTA done to Rule out PE( Tachycardia, recently had surgery): negative, check full report above.   - The patient does have rt lower extremity edema, bilateral ( right > left). Follow up Venous Duplex - No DVT.  - Consult Cardiology  (Dr. Crow Nagy)   - TTE: Done in 2021:   a. Normal EF  b. Impaired LV diastolic relaxation with G1DD   c. Mild right and left atrial enlargements  d. Mild pulmonary hypertension    #Aortic Stenosis:   - S/p Metallic Valve replacement in    - Avoid fluid overload  - Continue with Warfarin 2mg po once daily, Daily INR    #Thoracic Aortic Aneurysm   - Seen on CTA scan  - Measurin.3cm  - Likely needs serial follow up.     Handoff: F/u daily INR. Switched to Cardizam ER 180mg, stopped metoprolol. F/u Echo. Keep Mg >2. D/C after overnight monitoring for OPD EPS f/u. Pending PT Eval.                                                                            ----------------------------------------------------  # DVT prophylaxis Warfarming 2mg PO daily    # GI prophylaxis pantoprazole PO    # Diet DASH/TLC    # Activity Score (AM-PAC)    # Code status Full    # Disposition SNF after PT eval

## 2021-08-03 NOTE — PROGRESS NOTE ADULT - SUBJECTIVE AND OBJECTIVE BOX
SUBJ:No chest pain or shortness of breath      MEDICATIONS  (STANDING):  aspirin  chewable 81 milliGRAM(s) Oral daily  atorvastatin 20 milliGRAM(s) Oral at bedtime  diltiazem    milliGRAM(s) Oral daily  escitalopram 10 milliGRAM(s) Oral at bedtime  ferrous    sulfate 325 milliGRAM(s) Oral daily    MEDICATIONS  (PRN):  ALPRAZolam 0.5 milliGRAM(s) Oral at bedtime PRN Anxiety/Agitation  oxyCODONE    IR 5 milliGRAM(s) Oral every 6 hours PRN Severe Pain (7 - 10)            Vital Signs Last 24 Hrs  T(C): 36.2 (03 Aug 2021 21:08), Max: 36.8 (03 Aug 2021 06:17)  T(F): 97.1 (03 Aug 2021 21:08), Max: 98.2 (03 Aug 2021 06:17)  HR: 73 (03 Aug 2021 21:08) (69 - 75)  BP: 135/62 (03 Aug 2021 21:08) (104/53 - 139/63)  BP(mean): --  RR: 20 (03 Aug 2021 21:08) (18 - 20)  SpO2: --      ECG:NML    TTE:    LABS:                        9.6    7.29  )-----------( 474      ( 02 Aug 2021 05:23 )             30.4     08-02    134<L>  |  94<L>  |  11  ----------------------------<  101<H>  4.2   |  30  |  0.7    Ca    8.5      02 Aug 2021 05:23  Mg     1.7     08-02    TPro  5.7<L>  /  Alb  3.4<L>  /  TBili  1.0  /  DBili  x   /  AST  25  /  ALT  12  /  AlkPhos  85  08-02        PT/INR - ( 03 Aug 2021 16:16 )   PT: 29.50 sec;   INR: 2.59 ratio         PTT - ( 03 Aug 2021 16:16 )  PTT:34.1 sec    I&O's Summary    02 Aug 2021 07:01  -  03 Aug 2021 07:00  --------------------------------------------------------  IN: 440 mL / OUT: 1150 mL / NET: -710 mL    03 Aug 2021 07:01  -  03 Aug 2021 22:07  --------------------------------------------------------  IN: 0 mL / OUT: 800 mL / NET: -800 mL      BNP

## 2021-08-04 ENCOUNTER — APPOINTMENT (OUTPATIENT)
Dept: MEDICATION MANAGEMENT | Facility: CLINIC | Age: 86
End: 2021-08-04

## 2021-08-04 LAB
ALBUMIN SERPL ELPH-MCNC: 3.2 G/DL — LOW (ref 3.5–5.2)
ALP SERPL-CCNC: 82 U/L — SIGNIFICANT CHANGE UP (ref 30–115)
ALT FLD-CCNC: 10 U/L — SIGNIFICANT CHANGE UP (ref 0–41)
ANION GAP SERPL CALC-SCNC: 13 MMOL/L — SIGNIFICANT CHANGE UP (ref 7–14)
AST SERPL-CCNC: 24 U/L — SIGNIFICANT CHANGE UP (ref 0–41)
BILIRUB SERPL-MCNC: 1 MG/DL — SIGNIFICANT CHANGE UP (ref 0.2–1.2)
BUN SERPL-MCNC: 13 MG/DL — SIGNIFICANT CHANGE UP (ref 10–20)
CALCIUM SERPL-MCNC: 7.8 MG/DL — LOW (ref 8.5–10.1)
CHLORIDE SERPL-SCNC: 91 MMOL/L — LOW (ref 98–110)
CO2 SERPL-SCNC: 28 MMOL/L — SIGNIFICANT CHANGE UP (ref 17–32)
CREAT SERPL-MCNC: 0.9 MG/DL — SIGNIFICANT CHANGE UP (ref 0.7–1.5)
GLUCOSE BLDC GLUCOMTR-MCNC: 136 MG/DL — HIGH (ref 70–99)
GLUCOSE SERPL-MCNC: 97 MG/DL — SIGNIFICANT CHANGE UP (ref 70–99)
HCT VFR BLD CALC: 31.2 % — LOW (ref 37–47)
HGB BLD-MCNC: 10 G/DL — LOW (ref 12–16)
INR BLD: 3.1 RATIO — HIGH (ref 0.65–1.3)
MAGNESIUM SERPL-MCNC: 2.4 MG/DL — SIGNIFICANT CHANGE UP (ref 1.8–2.4)
MCHC RBC-ENTMCNC: 29.1 PG — SIGNIFICANT CHANGE UP (ref 27–31)
MCHC RBC-ENTMCNC: 32.1 G/DL — SIGNIFICANT CHANGE UP (ref 32–37)
MCV RBC AUTO: 90.7 FL — SIGNIFICANT CHANGE UP (ref 81–99)
NRBC # BLD: 0 /100 WBCS — SIGNIFICANT CHANGE UP (ref 0–0)
PLATELET # BLD AUTO: 372 K/UL — SIGNIFICANT CHANGE UP (ref 130–400)
POTASSIUM SERPL-MCNC: 4.4 MMOL/L — SIGNIFICANT CHANGE UP (ref 3.5–5)
POTASSIUM SERPL-SCNC: 4.4 MMOL/L — SIGNIFICANT CHANGE UP (ref 3.5–5)
PROT SERPL-MCNC: 5.1 G/DL — LOW (ref 6–8)
PROTHROM AB SERPL-ACNC: 35.2 SEC — HIGH (ref 9.95–12.87)
RBC # BLD: 3.44 M/UL — LOW (ref 4.2–5.4)
RBC # FLD: 16.3 % — HIGH (ref 11.5–14.5)
SODIUM SERPL-SCNC: 132 MMOL/L — LOW (ref 135–146)
WBC # BLD: 6.43 K/UL — SIGNIFICANT CHANGE UP (ref 4.8–10.8)
WBC # FLD AUTO: 6.43 K/UL — SIGNIFICANT CHANGE UP (ref 4.8–10.8)

## 2021-08-04 RX ORDER — WARFARIN SODIUM 2.5 MG/1
2 TABLET ORAL ONCE
Refills: 0 | Status: COMPLETED | OUTPATIENT
Start: 2021-08-04 | End: 2021-08-04

## 2021-08-04 RX ADMIN — WARFARIN SODIUM 2 MILLIGRAM(S): 2.5 TABLET ORAL at 21:40

## 2021-08-04 RX ADMIN — Medication 180 MILLIGRAM(S): at 05:43

## 2021-08-04 RX ADMIN — Medication 325 MILLIGRAM(S): at 11:45

## 2021-08-04 RX ADMIN — ESCITALOPRAM OXALATE 10 MILLIGRAM(S): 10 TABLET, FILM COATED ORAL at 21:40

## 2021-08-04 RX ADMIN — Medication 81 MILLIGRAM(S): at 11:45

## 2021-08-04 RX ADMIN — ATORVASTATIN CALCIUM 20 MILLIGRAM(S): 80 TABLET, FILM COATED ORAL at 21:40

## 2021-08-04 NOTE — PROGRESS NOTE ADULT - ASSESSMENT
ASSESSMENT & PLAN    ASSESSMENT & PLAN    85-year-old, female patient, Hypertension, DL, CAD s/p CABG in  with concomitant Metallic Aortic valve replacement and osteoporosis, and displaced right subcapital femoral neck fracture s/p Hemiarthroplasty on 2021 ( last week) was brought in from [facility] by EMS for Chest discomfort. The patient was discharged from the hospital on  after a fall complicated by the fracture. Today she was attempting to work with PT at the NH, the patient could not participate due to pains in her right hip, so she laid back to rest. Afterwards she had an acute onset of chest discomfort, pressure like, retrosternal/ epigastric in nature. No alleviating factors, no aggravating factors ( she was sitting at that time). No cough, no pleuritic chest pain. No tenderness upon palpation. The RN at the [facility] (NH) took the patient's vital signs and she had Tachycardia (140's). The patient did not feel any palpitations. No syncope.     #Chest Discomfort  #Tachyarrhythmia  # HTN, DLD  #CAD, s/p CABG in   #Atrial Flutter vs Acute Onset Afib:   - Responded to Cardizem 10mg IV push once, but patient went into asymptomatic bradycardia afterwards  - Stopped BB by EP, Started on cardizam 60mg q6h --> Switched to long action cardizam 180mg instead.  - CHADSVASc2: 5 ( Female, Age > 75, HTN, Vascular disease ( CAD))   - Warfarin 2mg po once daily, please follow INR daily  - Repeat EKG post Cardizem: NSR  - Troponin: 0.02, elevated, likely secondary to tachyarrhythmia. 2nd set 0.04. 3rd 0.04. stable.  - CTA done to Rule out PE( Tachycardia, recently had surgery): negative, check full report above.   - The patient does have rt lower extremity edema, bilateral ( right > left). Follow up Venous Duplex - No DVT.  - Consult Cardiology  (Dr. Crow Nagy)   - TTE: Done in 2021:   a. Normal EF  b. Impaired LV diastolic relaxation with G1DD   c. Mild right and left atrial enlargements  d. Mild pulmonary hypertension    #Aortic Stenosis:   - S/p Metallic Valve replacement in    - Avoid fluid overload  - Continue with Warfarin 2mg po once daily, Daily INR    #Thoracic Aortic Aneurysm   - Seen on CTA scan  - Measurin.3cm  - Likely needs serial follow up.     **Patient was doing PT when she felt dizzy. A rapid response was called. Nurses reported that patient was doing PT then felt dizzy. She laid down in bed for a few minutes and felt better afterwards. Blood pressure in bed was 126/60, HR 80. Blood sugar 136. Patient was feeling fine in bed and orthostatic were ordered to be performed but patient keeps feeling dizzy when standing. Daughter was contacted over the phone as a  since patient is [place]-speaking. No events on telemetry preceding the incident. No further interventions needed.    Handoff: F/u daily INR. F/u Echo. Keep Mg >2. D/C after overnight monitoring for OPD EPS f/u. Pending PT Eval.                                                                            ----------------------------------------------------  # DVT prophylaxis Warfarming 2mg PO daily    # GI prophylaxis pantoprazole PO    # Diet DASH/TLC    # Activity Score (AM-PAC)    # Code status Full    # Disposition home with home care

## 2021-08-04 NOTE — PROGRESS NOTE ADULT - SUBJECTIVE AND OBJECTIVE BOX
ORAL WU 85y Fem[facility]ent from  [facility] after experiencing  R hip while trying to do PT, ff by CP, retrosternal and epigastric.  VS taken by SNF and pt's HR 140s.  The pt was transferred to the ER for further w/up and tx.  Pt's tachyarrhythmia tx with IVP of  cardizem, adenosine and pt placed on metoprolol. Pt's trop 0.02, 0.04, possible NSTEMI.  Pt had negative CT angio for PE.  The pt was evaluated by Cardio and is being ad to tele for cardiac monitoring.  The PMHx includes:  HTN, ASHD, CAD, sp CABG 2003, sp AVR metallic, cardiac arrhythmia, parox afib,  AC with warfarin, Anemia, GERD, diverticulosis,  OA, mobility dysfunction, falls,  DDD, DJD, osteoporosis, sp recent fx of R hip with RTHR 7/21,  sp shoulder fx, depression.    INTERVAL HPI/OVERNIGHT EVENTS:  pt was being evaluated by Rehab this AM when she started to ge v dizzy, RR called, SAVS, glu and pulse ox stable, will observe and do orthostatics, family would  like to take pt home, pt is sp recent R hip hemiarthroplasty 7/16,, will try to encourage pt and family to have pt return to [facility] x 1-2 wks to resume PT    MEDICATIONS  (STANDING):  aDENosine Injectable (ADENOCARD) 6 milliGRAM(s) IV Push once  amLODIPine   Tablet 5 milliGRAM(s) Oral daily  aspirin  chewable 81 milliGRAM(s) Oral daily  atorvastatin 20 milliGRAM(s) Oral at bedtime  escitalopram 10 milliGRAM(s) Oral at bedtime  ferrous    sulfate 325 milliGRAM(s) Oral daily  furosemide   Injectable 40 milliGRAM(s) IV Push daily  losartan 100 milliGRAM(s) Oral daily  metoprolol tartrate 50 milliGRAM(s) Oral two times a day  cardizem 30mg po q 6 hrs  warfarin 2 milliGRAM(s) Oral once    MEDICATIONS  (PRN):  ALPRAZolam 0.5 milliGRAM(s) Oral at bedtime PRN Anxiety/Agitation  oxyCODONE    IR 5 milliGRAM(s) Oral every 6 hours PRN Severe Pain (7 - 10)      Allergies    penicillin (Unknown)  pineapple (Unknown)        Vital Signs Last 24 Hrs    T(F): 95.7  HR: 73  BP:  103/97    RR: 19  SpO2: 98% 2-3LNC    PHYSICAL EXAM:      Constitutional: Alert, elderly WF, chronically ill looking but in NAD, + O2    Eyes: nonicteric    ENMT: dry oral mucosa, dental defects    Neck:n supple, + JVD, no bruits    Back:  Th kyphosis    Respiratory: shallow resp, scattered rhonchi    Cardiovascular: S1S2,  II/VI BELINDA    Gastrointestinal: soft and benign,  + BS    Genitourinary:  no anderson    Extremities: moves all ext, + arthritic changes, + healing incision R hip    Vascular: dec pulses    Neurological: nonfocal    Skin: no rash      Psychiatric: anxious but stable        LABS:    8/3                  9.6  7  )-----------( 474             30     135 |  91  |  13  ----------------------------<  101  4.4   |  28  |  0.9    Ca    8.2<L>        Mg     1.8     08-01    TPro  5.5<L>  /  Alb  3.3<L>  /  TBili  1.1  /  DBili  x   /  AST  28  /  ALT  13  /  AlkPhos  86  08-01    PT/INR -    PT: 28.90 sec;   INR: 2.54 ratio, 2.43, 3.1       PTT - ( 31 Jul 2021 13:50 )  PTT:44.9 sec      RADIOLOGY & ADDITIONAL TESTS:  CT angio of chest:  NO PE, BL sm pl effusions, BL sm airway disease,  stable dilatation of aescending aorta 4.3cm, Bovine arch, no pul consolidation, no pneumo    EKG:  /min, ST-T changes

## 2021-08-04 NOTE — PHYSICAL THERAPY INITIAL EVALUATION ADULT - GENERAL OBSERVATIONS, REHAB EVAL
Pt. encountered alert and NAD, supine in bed (+)primafit (+)tele. Pt. speaks fluent Wol[place],  # [id number] used to initiate conversation, pt. refused , asked therapist to call her daughter Marni to serve as . Pt. left as found, supine in bed (+) alarms (+)call bell in reach (+)primafit (+)tele, NAD

## 2021-08-04 NOTE — CHART NOTE - NSCHARTNOTEFT_GEN_A_CORE
Patient has appointment to follow up with Dr Melgar 8/25 at 12:30pm  [address]  [place] [address] [phone]    Will sign off, recall EP as needed [id number]

## 2021-08-04 NOTE — CHART NOTE - NSCHARTNOTEFT_GEN_A_CORE
9:38 AM     Rapid response was called. Nurses reported that patient was doing PT then felt dizzy. She laid down in bed for a few minutes and felt better afterwards. Blood pressure in bed was 126/60, HR 80. Blood sugar 136. Patient was feeling fine in bed and orthostatic were ordered to be performed in 30 minutes. Daughter was contacted over the phone as a  since patient is Bangladeshi-speaking. No events on telemetry preceding the incident. No further interventions needed.

## 2021-08-04 NOTE — PHYSICAL THERAPY INITIAL EVALUATION ADULT - ADDITIONAL COMMENTS
Pt. is a poor historian. As per patient's daughter, pt. was able to ambulate using a rolling walker with assistance. Pt. has been at [facility] for STR previously lived with her family in a private house with 1 small step to enter.

## 2021-08-04 NOTE — PROGRESS NOTE ADULT - SUBJECTIVE AND OBJECTIVE BOX
ORAL WU 85y Female  MRN#: [id number]   CODE STATUS: Full    Hospital Day: 4d    Pt is currently admitted with the primary diagnosis of chest pain    SUBJECTIVE  Hospital Course  Patient was doing PT when she felt dizzy. A rapid response was called. Nurses reported that patient was doing PT then felt dizzy. She laid down in bed for a few minutes and felt better afterwards. Blood pressure in bed was 126/60, HR 80. Blood sugar 136. Patient was feeling fine in bed and orthostatic were ordered to be performed but patient keeps feeling dizzy when standing. Daughter was contacted over the phone as a  since patient is [place]-speaking. No events on telemetry preceding the incident. No further interventions needed.    Overnight events   None    Subjective complaints   Dizziness upon standing    Present Today:   - Zamudio:  No [x], Yes [   ] : Indication:     - Type of IV Access:       .. CVC/Piccline:  No [  ], Yes [   ] : Indication:       .. Midline: No [  ], Yes [   ] : Indication:                                             ----------------------------------------------------------  OBJECTIVE  PAST MEDICAL & SURGICAL HISTORY  HTN (hypertension)    High cholesterol    Osteoporosis    Fracture of right shoulder    Anxiety    Depression    Atrial fibrillation    H/O heart valve replacement with mechanical valve    S/P hip hemiarthroplasty  on 7/16/2021 for right subcapital femoral neck fracture                                              -----------------------------------------------------------  ALLERGIES:  penicillin (Unknown)  pinapples (Unknown)                                            ------------------------------------------------------------    HOME MEDICATIONS  Home Medications:  acetaminophen 325 mg oral tablet: 2 tab(s) orally every 6 hours (23 Jul 2021 11:48)  alendronate 35 mg oral tablet: 1 tab(s) orally once a week (07 Jun 2021 00:16)  ALPRAZolam 0.5 mg oral tablet: 1 tab(s) orally once a day (at bedtime), As Needed (07 Jun 2021 00:16)  aspirin 81 mg oral tablet, chewable: 1 tab(s) orally once a day (07 Jun 2021 00:16)  atenolol 25 mg oral tablet: 1 tab(s) orally once a day (07 Jun 2021 00:16)  atorvastatin 20 mg oral tablet: 1 tab(s) orally once a day (07 Jun 2021 00:16)  escitalopram 10 mg oral tablet: 1 tab(s) orally once a day (07 Jun 2021 00:16)  ezetimibe 10 mg oral tablet: 1 tab(s) orally once a day (07 Jun 2021 00:16)  iron gluconate: 27 milligram(s) orally once a day (07 Jun 2021 00:16)  lidocaine 5% patch: 1  transdermally once a day (07 Jun 2021 00:16)  losartan 100 mg oral tablet: 1 tab(s) orally once a day (31 Jul 2021 18:45)  oxyCODONE 5 mg oral tablet: 1 tab(s) orally every 6 hours, As needed, Severe Pain (7 - 10) (23 Jul 2021 11:48)  warfarin 4 mg oral tablet: 0.5 tab(s) orally once a day (31 Jul 2021 19:24)                           MEDICATIONS:  STANDING MEDICATIONS  aspirin  chewable 81 milliGRAM(s) Oral daily  atorvastatin 20 milliGRAM(s) Oral at bedtime  diltiazem    milliGRAM(s) Oral daily  escitalopram 10 milliGRAM(s) Oral at bedtime  ferrous    sulfate 325 milliGRAM(s) Oral daily  warfarin 2 milliGRAM(s) Oral once    PRN MEDICATIONS  ALPRAZolam 0.5 milliGRAM(s) Oral at bedtime PRN  oxyCODONE    IR 5 milliGRAM(s) Oral every 6 hours PRN                                            ------------------------------------------------------------  VITAL SIGNS: Last 24 Hours  T(C): 36.6 (04 Aug 2021 12:56), Max: 36.6 (04 Aug 2021 12:56)  T(F): 97.8 (04 Aug 2021 12:56), Max: 97.8 (04 Aug 2021 12:56)  HR: 71 (04 Aug 2021 12:56) (71 - 73)  BP: 124/60 (04 Aug 2021 12:56) (103/97 - 135/62)  BP(mean): --  RR: 19 (04 Aug 2021 12:56) (19 - 20)  SpO2: 98% (04 Aug 2021 08:00) (98% - 98%)      08-03-21 @ 07:01  -  08-04-21 @ 07:00  --------------------------------------------------------  IN: 0 mL / OUT: 1200 mL / NET: -1200 mL    08-04-21 @ 07:01  -  08-04-21 @ 15:28  --------------------------------------------------------  IN: 0 mL / OUT: 400 mL / NET: -400 mL                                             --------------------------------------------------------------  LABS:                        10.0   6.43  )-----------( 372      ( 04 Aug 2021 05:41 )             31.2     08-04    132<L>  |  91<L>  |  13  ----------------------------<  97  4.4   |  28  |  0.9    Ca    7.8<L>      04 Aug 2021 05:41  Mg     2.4     08-04    TPro  5.1<L>  /  Alb  3.2<L>  /  TBili  1.0  /  DBili  x   /  AST  24  /  ALT  10  /  AlkPhos  82  08-04    PT/INR - ( 04 Aug 2021 05:41 )   PT: 35.20 sec;   INR: 3.10 ratio         PTT - ( 03 Aug 2021 16:16 )  PTT:34.1 sec                                                -------------------------------------------------------------  RADIOLOGY:  EXAM:  XR HIPS BI 2V            PROCEDURE DATE:  08/01/2021            INTERPRETATION:  Clinical History / Reason for exam: Hip pain. Status post recent RTHR 7/21.    Technique: 2 views of left hip and 2 views of the right hip.    Comparison: Hip x-ray 7/16/2021    Findings:    Right hip:  No acute fracture or dislocation.  Status post right hip hemiarthroplasty, stable since prior. No evidence of hardware complication.    Left hip:  No acute fracture or dislocation.  Moderate degenerative changes of the left hip.    Other: Stable uterine calcified fibroids. Moderate bilateral SI joint osteoarthritis, stable.    Impression:    No acute fracture or dislocation.    Status post right hip hemiarthroplasty without evidence of hardware complication.    Moderate degenerative changes of the left hip.    --- End of Report ---    < from: CT Angio Chest PE Protocol w/ IV Cont (07.31.21 @ 15:59) >  IMPRESSION:    1.  No pulmonary embolism.  2.  Bilateral small pleural effusions and probable left interfissural fluid.  3.  Right heart enlargement, stable on 11/20/2020.  4.  Bilateral small airways disease.  5.  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    < end of copied text >    TT< from: TTE Echo Complete w/ Contrast w/ Doppler (06.08.21 @ 09:03) >  Summary:   1. Left ventricular ejection fraction, by visual estimation, is 55 to 60%.   2. Spectral Doppler shows impaired relaxation pattern of left ventricular myocardial filling (Grade I diastolic dysfunction).   3. Mildly enlarged left atrium.   4. Mildly enlarged right atrium.   5. Mild mitral annular calcification.   6. Moderate mitral valve regurgitation.   7. Moderate tricuspid regurgitation.   8. Mechanical prosthesis in the aortic valve position.   9. Estimated pulmonary artery systolic pressure is 44.5 mmHg assuming a right atrial pressure of 3 mmHg, which is consistent with mild pulmonary hypertension.                                              --------------------------------------------------------------    PHYSICAL EXAM:  General: NAD, AOx3. Patient feels dizzy upon standing.  HEENT: normocephalic, atraumatic  LUNGS: Normal breath sounds, no wheezes/crackles  HEART: RRR, no rubs or gallops. Systolic click  ABDOMEN: Soft, NT/ND. No rigidity/guarding  EXT: Peripheral pulses +2, no cyanosis. Rt leg edema +1 (surgical leg)  NEURO: grossly normal  SKIN: no rashes or brusies                                         --------------------------------------------------------------

## 2021-08-04 NOTE — PROGRESS NOTE ADULT - ASSESSMENT
CP, tachyarrhythmias, R/O ACS  R hip pain, sp fall and recent R THR, ( D/C from hosp 7/23)  Hx of HTN, ASHD, CAD, sp CABG,2003, sp metallic AVR, Ac/warfarin, Afib  hx of DLD  Hx of OA, DDD, DJD, osteoporosis, sp R shoulder fx, sp recent R hip fx, sp THR 7/21, mobility dysfunction  Hx of Anemia  Hx of GERD, HH, diverticulosis  Hx of depression    pt on tele, rate control with metoprolol and cardizem  pt had RR for episode of dizziness during PT evaluation  VS were stable, glu stable, pulse ox stable  pt has been mostly bed bound and with recent R hip carley arthroplasty, family and pt wish to go home, most advantageousnext step would be to return to SNF for gait reconditioning due to age, recent R hip surgery and overall deconditione state     pt had CT angio of chest which is negative for PE  Vasc doppler of lower ext neg for DVT  pt on ad  req several pushes of IV cardizem and adeosine for tachyarrhythmia  Cardio consult : Dr Del Angel for Dr Lee/Yakov:  probable SVT, metoprolol tartrate 50mg BID, add amlodipine 5mg , lasix 40mg  x 2 days for pul congestion then re-evaluate  EPS consult  trend troponins:  <0.01, 0.02, 0.04, sugg of NSTEMI, med management  BNP  4004  keep Mg > 2  check TSH, monitor electrolytes  monitor INR and adjust wafarin, keep around 2.5-3  ECHO  cont home meds  Rehab consult  repeat Covid Swab  social svc to plan safe return to SNF

## 2021-08-04 NOTE — PHYSICAL THERAPY INITIAL EVALUATION ADULT - PERTINENT HX OF CURRENT PROBLEM, REHAB EVAL
85-year-old, female patient, Hypertension, DL, CAD s/p CABG in 2003 with concomitant Metallic Aortic valve replacement and osteoporosis, and displaced right subcapital femoral neck fracture s/p Hemiarthroplasty on 7/16/2021 ( last week) was brought in from [facility] by EMS for Chest discomfort. The patient was discharged from the hospital on 7/23 after a fall complicated by the fracture

## 2021-08-05 LAB
ALBUMIN SERPL ELPH-MCNC: 3 G/DL — LOW (ref 3.5–5.2)
ALP SERPL-CCNC: 78 U/L — SIGNIFICANT CHANGE UP (ref 30–115)
ALT FLD-CCNC: 10 U/L — SIGNIFICANT CHANGE UP (ref 0–41)
ANION GAP SERPL CALC-SCNC: 9 MMOL/L — SIGNIFICANT CHANGE UP (ref 7–14)
AST SERPL-CCNC: 23 U/L — SIGNIFICANT CHANGE UP (ref 0–41)
BILIRUB SERPL-MCNC: 0.9 MG/DL — SIGNIFICANT CHANGE UP (ref 0.2–1.2)
BUN SERPL-MCNC: 14 MG/DL — SIGNIFICANT CHANGE UP (ref 10–20)
CALCIUM SERPL-MCNC: 8 MG/DL — LOW (ref 8.5–10.1)
CHLORIDE SERPL-SCNC: 91 MMOL/L — LOW (ref 98–110)
CO2 SERPL-SCNC: 29 MMOL/L — SIGNIFICANT CHANGE UP (ref 17–32)
CREAT SERPL-MCNC: 0.9 MG/DL — SIGNIFICANT CHANGE UP (ref 0.7–1.5)
GLUCOSE SERPL-MCNC: 106 MG/DL — HIGH (ref 70–99)
HCT VFR BLD CALC: 29.8 % — LOW (ref 37–47)
HGB BLD-MCNC: 9.6 G/DL — LOW (ref 12–16)
INR BLD: 3.34 RATIO — HIGH (ref 0.65–1.3)
MAGNESIUM SERPL-MCNC: 2 MG/DL — SIGNIFICANT CHANGE UP (ref 1.8–2.4)
MCHC RBC-ENTMCNC: 29.6 PG — SIGNIFICANT CHANGE UP (ref 27–31)
MCHC RBC-ENTMCNC: 32.2 G/DL — SIGNIFICANT CHANGE UP (ref 32–37)
MCV RBC AUTO: 92 FL — SIGNIFICANT CHANGE UP (ref 81–99)
NRBC # BLD: 0 /100 WBCS — SIGNIFICANT CHANGE UP (ref 0–0)
PLATELET # BLD AUTO: 313 K/UL — SIGNIFICANT CHANGE UP (ref 130–400)
POTASSIUM SERPL-MCNC: 4.3 MMOL/L — SIGNIFICANT CHANGE UP (ref 3.5–5)
POTASSIUM SERPL-SCNC: 4.3 MMOL/L — SIGNIFICANT CHANGE UP (ref 3.5–5)
PROT SERPL-MCNC: 5.1 G/DL — LOW (ref 6–8)
PROTHROM AB SERPL-ACNC: 38 SEC — HIGH (ref 9.95–12.87)
RBC # BLD: 3.24 M/UL — LOW (ref 4.2–5.4)
RBC # FLD: 16.2 % — HIGH (ref 11.5–14.5)
SODIUM SERPL-SCNC: 129 MMOL/L — LOW (ref 135–146)
WBC # BLD: 5.59 K/UL — SIGNIFICANT CHANGE UP (ref 4.8–10.8)
WBC # FLD AUTO: 5.59 K/UL — SIGNIFICANT CHANGE UP (ref 4.8–10.8)

## 2021-08-05 RX ORDER — MIDODRINE HYDROCHLORIDE 2.5 MG/1
5 TABLET ORAL THREE TIMES A DAY
Refills: 0 | Status: DISCONTINUED | OUTPATIENT
Start: 2021-08-05 | End: 2021-08-06

## 2021-08-05 RX ORDER — SODIUM CHLORIDE 9 MG/ML
1000 INJECTION INTRAMUSCULAR; INTRAVENOUS; SUBCUTANEOUS ONCE
Refills: 0 | Status: COMPLETED | OUTPATIENT
Start: 2021-08-05 | End: 2021-08-05

## 2021-08-05 RX ORDER — WARFARIN SODIUM 2.5 MG/1
1.5 TABLET ORAL ONCE
Refills: 0 | Status: COMPLETED | OUTPATIENT
Start: 2021-08-05 | End: 2021-08-05

## 2021-08-05 RX ADMIN — SODIUM CHLORIDE 1000 MILLILITER(S): 9 INJECTION INTRAMUSCULAR; INTRAVENOUS; SUBCUTANEOUS at 11:30

## 2021-08-05 RX ADMIN — Medication 180 MILLIGRAM(S): at 05:27

## 2021-08-05 RX ADMIN — ATORVASTATIN CALCIUM 20 MILLIGRAM(S): 80 TABLET, FILM COATED ORAL at 21:31

## 2021-08-05 RX ADMIN — ESCITALOPRAM OXALATE 10 MILLIGRAM(S): 10 TABLET, FILM COATED ORAL at 21:32

## 2021-08-05 RX ADMIN — WARFARIN SODIUM 1.5 MILLIGRAM(S): 2.5 TABLET ORAL at 21:32

## 2021-08-05 RX ADMIN — Medication 325 MILLIGRAM(S): at 11:31

## 2021-08-05 RX ADMIN — MIDODRINE HYDROCHLORIDE 5 MILLIGRAM(S): 2.5 TABLET ORAL at 17:21

## 2021-08-05 RX ADMIN — Medication 81 MILLIGRAM(S): at 11:36

## 2021-08-05 NOTE — PROGRESS NOTE ADULT - ASSESSMENT
CP, tachyarrhythmias, R/O ACS  R hip pain, sp fall and recent R THR, ( D/C from hosp 7/23)  Hx of HTN, ASHD, CAD, sp CABG,2003, sp metallic AVR, Ac/warfarin, Afib  hx of DLD  Hx of OA, DDD, DJD, osteoporosis, sp R shoulder fx, sp recent R hip fx, sp THR 7/21, mobility dysfunction  Hx of Anemia  Hx of GERD, HH, diverticulosis  Hx of depression    pt on tele, rate control with metoprolol and cardizem  pt had RR for episode of dizziness during PT evaluation8/4, VS, pulse ox  and glu all were stable  VS  pt has been mostly bed bound and with recent R hip carley arthroplasty 7/16,   family and pt wish to go home, arrangements being made for needed home equipment    pt had CT angio of chest which is negative for PE  Vasc doppler of lower ext neg for DVT  pt on ad  req several pushes of IV cardizem and adeosine for tachyarrhythmia  Cardio consult : Dr Del Angel for Dr Lee/Yakov:  probable SVT, metoprolol tartrate 50mg BID, add amlodipine 5mg , lasix 40mg   EPS consult  trend troponins:  <0.01, 0.02, 0.04, sugg of NSTEMI, med management  BNP  4004  keep Mg > 2  check TSH, monitor electrolytes  monitor INR and adjust wafarin, keep around 2.5-3  ECHO  cont home meds  Rehab consult    social svc to plan safe SD/C home with home care, PT  and   home equipment     CP, tachyarrhythmias, R/O ACS  R hip pain, sp fall and recent R THR, ( D/C from hosp 7/23)  Orthostasis  Hx of HTN, ASHD, CAD, sp CABG,2003, sp metallic AVR, Ac/warfarin, Afib  hx of DLD  Hx of OA, DDD, DJD, osteoporosis, sp R shoulder fx, sp recent R hip fx, sp THR 7/21, mobility dysfunction  Hx of Anemia  Hx of GERD, HH, diverticulosis  Hx of depression    pt on tele, rate control with metoprolol and cardizem  pt had RR for episode of dizziness during PT evaluation8/4, VS, pulse ox  and glu all were stable  today trials of orthostatics are +, give some hydrateion and start midodrine 5mg po TID  pt has been mostly bed bound and with recent R hip carley arthroplasty 7/16,   family and pt wish to go home, arrangements being made for needed home equipment    pt had CT angio of chest which is negative for PE  Vasc doppler of lower ext neg for DVT  pt on ad  req several pushes of IV cardizem and adeosine for tachyarrhythmia  Cardio consult : Dr Del Angel for Dr Lee/Yakov:  probable SVT, metoprolol tartrate 50mg BID, add amlodipine 5mg , lasix 40mg   EPS consult  trend troponins:  <0.01, 0.02, 0.04, sugg of NSTEMI, med management  BNP  4004  keep Mg > 2  check TSH, monitor electrolytes  monitor INR and adjust wafarin, keep around 2.5-3  ECHO  cont home meds  Rehab consult    social svc to plan safe D/C home with home care, PT  and   home equipment, today + orthostatic hypotension,  give Iv fluids started midodrine, reassess in AM for possible D/C, pt will need transportation home/? ambulance or ambulette

## 2021-08-05 NOTE — PROGRESS NOTE ADULT - ASSESSMENT
ASSESSMENT & PLAN    85-year-old, female patient, Hypertension, DL, CAD s/p CABG in  with concomitant Metallic Aortic valve replacement and osteoporosis, and displaced right subcapital femoral neck fracture s/p Hemiarthroplasty on 2021 ( last week) was brought in from [facility] by EMS for Chest discomfort. The patient was discharged from the hospital on  after a fall complicated by the fracture. Today she was attempting to work with PT at the NH, the patient could not participate due to pains in her right hip, so she laid back to rest. Afterwards she had an acute onset of chest discomfort, pressure like, retrosternal/ epigastric in nature. No alleviating factors, no aggravating factors ( she was sitting at that time). No cough, no pleuritic chest pain. No tenderness upon palpation. The RN at the [facility] (NH) took the patient's vital signs and she had Tachycardia (140's). The patient did not feel any palpitations. No syncope.     #Chest Discomfort  #Tachyarrhythmia  # HTN, DLD  #CAD, s/p CABG in   #Atrial Flutter vs Acute Onset Afib:   - Responded to Cardizem 10mg IV push once, but patient went into asymptomatic bradycardia afterwards  - Stopped BB by EP, Started on cardizam 60mg q6h --> Switched to long acting cardizam 180mg instead.  - CHADSVASc2: 5 ( Female, Age > 75, HTN, Vascular disease ( CAD))   - Warfarin 2mg po once daily, please follow INR daily  - Repeat EKG post Cardizem: NSR  - Troponin: 0.02, elevated, likely secondary to tachyarrhythmia. 2nd set 0.04. 3rd 0.04. stable.  - CTA done to Rule out PE( Tachycardia, recently had surgery): negative, check full report above.   - The patient does have rt lower extremity edema, bilateral ( right > left). Follow up Venous Duplex - No DVT.  - Consult Cardiology  (Dr. Crow Nagy)   - TTE: Done in 2021:   a. Normal EF  b. Impaired LV diastolic relaxation with G1DD   c. Mild right and left atrial enlargements  d. Mild pulmonary hypertension    #Aortic Stenosis:   - S/p Metallic Valve replacement in    - Avoid fluid overload  - Continue with Warfarin po once daily, Daily INR. Warfarin dose decreased from 2-->1.5mg today because INR is >3.    #Thoracic Aortic Aneurysm   - Seen on CTA scan  - Measurin.3cm  - Likely needs serial follow up.     **Patient was doing PT when she felt dizzy. A rapid response was called. Nurses reported that patient was doing PT then felt dizzy. She laid down in bed for a few minutes and felt better afterwards. Blood pressure in bed was 126/60, HR 80. Blood sugar 136. Patient was feeling fine in bed and orthostatic were ordered to be performed but patient keeps feeling dizzy when standing. Daughter was contacted over the phone as a  since patient is [place]-speaking. No events on telemetry preceding the incident. No further interventions needed.    -Orthostatic vitals +ve, -->90. Given 1L NS and midodrine 5mg TID. Reassess orthostatics and monitor vitals.     Handoff: F/u daily INR. Keep Mg >2. OPD EPS f/u. Pending PT Eval. F/u orthostatic vitals and BP on midodrine.                                                                            ----------------------------------------------------  # DVT prophylaxis Warfarming 2mg PO daily --> down to 1.5mg today    # GI prophylaxis pantoprazole PO    # Diet DASH/TLC    # Activity Score (AM-PAC)    # Code status Full    # Disposition home with home care

## 2021-08-05 NOTE — PHARMACOTHERAPY INTERVENTION NOTE - COMMENTS
85yFemale      Indication: a fib (metallic avr)  INR Goal: 2.5-3.5  Home Dose: 2mg Wed, Sat  4mg all other days  Bridge Therapy: n/a      ALPRAZolam 0.5 milliGRAM(s) Oral at bedtime PRN  aspirin  chewable 81 milliGRAM(s) Oral daily  atorvastatin 20 milliGRAM(s) Oral at bedtime  diltiazem    milliGRAM(s) Oral daily  escitalopram 10 milliGRAM(s) Oral at bedtime  ferrous    sulfate 325 milliGRAM(s) Oral daily  midodrine. 5 milliGRAM(s) Oral three times a day  oxyCODONE    IR 5 milliGRAM(s) Oral every 6 hours PRN  warfarin 1.5 milliGRAM(s) Oral once        Drug Interactions:       H/H: 9.6/29.8  PLT: 313  GFR: 58    Date---------------INR-----------------Dose    INR: 3.34 ratio (08-05-21 @ 06:25)  INR: 3.10 ratio (08-04-21 @ 05:41)  INR: 2.59 ratio (08-03-21 @ 16:16)  INR: 2.43 ratio (08-02-21 @ 05:23)  INR: 2.54 ratio (08-01-21 @ 06:55)  INR: 2.98 ratio (07-31-21 @ 13:50)    Recommended to MD [id number] to continue 2mg dose as per home dose. Also patient's INR is stable and within goal on 2mg at 3.34. Suggested that with dose of 1.5mg INR will decrease. MD will discuss with attending and follow up. Stated that patient is a high risk of fall and most likely will be discharged on therapy other than warfarin.    1. Recommend Warfarin   2mg   PO x 1   2. Obtain INR tomorrow AM

## 2021-08-05 NOTE — PROGRESS NOTE ADULT - SUBJECTIVE AND OBJECTIVE BOX
ORAL WU 85y Femalesent from  [facility] after experiencing  R hip while trying to do PT, ff by CP, retrosternal and epigastric.  VS taken by SNF and pt's HR 140s.  The pt was transferred to the ER for further w/up and tx.  Pt's tachyarrhythmia tx with IVP of  cardizem, adenosine and pt placed on metoprolol. Pt's trop 0.02, 0.04, possible NSTEMI.  Pt had negative CT angio for PE.  The pt was evaluated by Cardio and is being ad to tele for cardiac monitoring.  The PMHx includes:  HTN, ASHD, CAD, sp CABG 2003, sp AVR metallic, cardiac arrhythmia, parox afib,  AC with warfarin, Anemia, GERD, diverticulosis,  OA, mobility dysfunction, falls,  DDD, DJD, osteoporosis, sp recent fx of R hip with RTHR 7/21,  sp shoulder fx, depression.    INTERVAL HPI/OVERNIGHT EVENTS: pt weak but med stable,  pt and family prefer home with care rather than SNF, social SVC arranging for home svc and equipment    MEDICATIONS  (STANDING):  aDENosine Injectable (ADENOCARD) 6 milliGRAM(s) IV Push once  amLODIPine   Tablet 5 milliGRAM(s) Oral daily  aspirin  chewable 81 milliGRAM(s) Oral daily  atorvastatin 20 milliGRAM(s) Oral at bedtime  escitalopram 10 milliGRAM(s) Oral at bedtime  ferrous    sulfate 325 milliGRAM(s) Oral daily  furosemide   Injectable 40 milliGRAM(s) IV Push daily  losartan 100 milliGRAM(s) Oral daily  metoprolol tartrate 50 milliGRAM(s) Oral two times a day  cardizem 30mg po q 6 hrs  warfarin 2 milliGRAM(s) Oral once    MEDICATIONS  (PRN):  ALPRAZolam 0.5 milliGRAM(s) Oral at bedtime PRN Anxiety/Agitation  oxyCODONE    IR 5 milliGRAM(s) Oral every 6 hours PRN Severe Pain (7 - 10)      Allergies    penicillin (Unknown)  pineapple (Unknown)        Vital Signs Last 24 Hrs    T(F): 97  HR: 76  BP:  148/65    RR: 18  SpO2: 98- 100% RA    PHYSICAL EXAM:      Constitutional: Alert, elderly WF, chronically ill looking but in NAD    Eyes: nonicteric    ENMT: dry oral mucosa, dental defects    Neck:n supple, + JVD, no bruits    Back:  Th kyphosis    Respiratory: shallow resp, scattered rhonchi    Cardiovascular: S1S2,  II/VI BELINDA    Gastrointestinal: soft and benign,  + BS    Genitourinary:  no anderson    Extremities: moves all ext, + arthritic changes, + healing incision R hip    Vascular: dec pulses    Neurological: nonfocal    Skin: no rash      Psychiatric: anxious but stable        LABS:                     9.6  5.5  )-----------( 313             29.8    129 |  91  |  14  ----------------------------<  106  4.3   |  29  |  0.9    Ca    8.2<L>          Mg     1.8     08-01    TPro  5.5<L>  /  Alb  3.3<L>  /  TBili  1.1  /  DBili  x   /  AST  28  /  ALT  13  /  AlkPhos  86  08-01    PT/INR -    PT: 28.90 sec;   INR: 2.54 ratio, 2.43, 3.1       PTT - ( 31 Jul 2021 13:50 )  PTT:44.9 sec      RADIOLOGY & ADDITIONAL TESTS:  CT angio of chest:  NO PE, BL sm pl effusions, BL sm airway disease,  stable dilatation of aescending aorta 4.3cm, Bovine arch, no pul consolidation, no pneumo    EKG:  /min, ST-T changes     ORAL WU 85y Femalesent from  [facility] after experiencing  R hip while trying to do PT, ff by CP, retrosternal and epigastric.  VS taken by SNF and pt's HR 140s.  The pt was transferred to the ER for further w/up and tx.  Pt's tachyarrhythmia tx with IVP of  cardizem, adenosine and pt placed on metoprolol. Pt's trop 0.02, 0.04, possible NSTEMI.  Pt had negative CT angio for PE.  The pt was evaluated by Cardio and is being ad to tele for cardiac monitoring.  The PMHx includes:  HTN, ASHD, CAD, sp CABG 2003, sp AVR metallic, cardiac arrhythmia, parox afib,  AC with warfarin, Anemia, GERD, diverticulosis,  OA, mobility dysfunction, falls,  DDD, DJD, osteoporosis, sp recent fx of R hip with RTHR 7/21,  sp shoulder fx, depression.    INTERVAL HPI/OVERNIGHT EVENTS: pt weak but med stable, + orthostasis,, hydrate and start sm dose of midodrine pt and family prefer home with care rather than SNF, social SVC arranging for home svc and equipment    MEDICATIONS  (STANDING):  aDENosine Injectable (ADENOCARD) 6 milliGRAM(s) IV Push once  amLODIPine   Tablet 5 milliGRAM(s) Oral daily  aspirin  chewable 81 milliGRAM(s) Oral daily  atorvastatin 20 milliGRAM(s) Oral at bedtime  escitalopram 10 milliGRAM(s) Oral at bedtime  ferrous    sulfate 325 milliGRAM(s) Oral daily  furosemide   Injectable 40 milliGRAM(s) IV Push daily  losartan 100 milliGRAM(s) Oral daily  metoprolol tartrate 50 milliGRAM(s) Oral two times a day  cardizem 30mg po q 6 hrs  warfarin 2 milliGRAM(s) Oral once  midodrine 5mg PO TID    MEDICATIONS  (PRN):  ALPRAZolam 0.5 milliGRAM(s) Oral at bedtime PRN Anxiety/Agitation  oxyCODONE    IR 5 milliGRAM(s) Oral every 6 hours PRN Severe Pain (7 - 10)      Allergies    penicillin (Unknown)  pineapple (Unknown)        Vital Signs Last 24 Hrs    T(F): 97  HR: 76  BP:  148/65, + orthostasis    RR: 18  SpO2: 98- 100% RA    PHYSICAL EXAM:      Constitutional: Alert, elderly WF, chronically ill looking but in NAD    Eyes: nonicteric    ENMT: dry oral mucosa, dental defects    Neck:n supple, + JVD, no bruits    Back:  Th kyphosis    Respiratory: shallow resp, scattered rhonchi    Cardiovascular: S1S2,  II/VI BELINDA    Gastrointestinal: soft and benign,  + BS    Genitourinary:  no anderson    Extremities: moves all ext, + arthritic changes, + healing incision R hip    Vascular: dec pulses    Neurological: nonfocal    Skin: no rash      Psychiatric: anxious but stable        LABS:                     9.6  5.5  )-----------( 313             29.8    129 |  91  |  14  ----------------------------<  106  4.3   |  29  |  0.9    Ca    8.2<L>          Mg     1.8     08-01    TPro  5.5<L>  /  Alb  3.3<L>  /  TBili  1.1  /  DBili  x   /  AST  28  /  ALT  13  /  AlkPhos  86  08-01    PT/INR -    PT: 28.90 sec;   INR: 2.54 ratio, 2.43, 3.1       PTT - ( 31 Jul 2021 13:50 )  PTT:44.9 sec      RADIOLOGY & ADDITIONAL TESTS:  CT angio of chest:  NO PE, BL sm pl effusions, BL sm airway disease,  stable dilatation of aescending aorta 4.3cm, Bovine arch, no pul consolidation, no pneumo    EKG:  /min, ST-T changes

## 2021-08-05 NOTE — PROGRESS NOTE ADULT - SUBJECTIVE AND OBJECTIVE BOX
ORAL WU 85y Female  MRN#: [id number]   CODE STATUS: Full    Hospital Day: 5d    Pt is currently admitted with the primary diagnosis of chest pain    SUBJECTIVE  Hospital Course  Pt feels better today. She still feels dizzy when getting up. She had positive orthostatic vitals: SBP went from 140 to 90. Gave her 1L NS and 5mg Midodrine TID. Monitor BP and repeat orthostatics and anticipate for DC if she improves.    Overnight events   None    Subjective complaints   None    Present Today:   - Zamudio:  No [x], Yes [   ] : Indication:     - Type of IV Access:       .. CVC/Piccline:  No [  ], Yes [   ] : Indication:       .. Midline: No [  ], Yes [   ] : Indication:                                             ----------------------------------------------------------  OBJECTIVE  PAST MEDICAL & SURGICAL HISTORY  HTN (hypertension)    High cholesterol    Osteoporosis    Fracture of right shoulder    Anxiety    Depression    Atrial fibrillation    H/O heart valve replacement with mechanical valve    S/P hip hemiarthroplasty  on 7/16/2021 for right subcapital femoral neck fracture                                              -----------------------------------------------------------  ALLERGIES:  penicillin (Unknown)  pinapples (Unknown)                                            ------------------------------------------------------------    HOME MEDICATIONS  Home Medications:  acetaminophen 325 mg oral tablet: 2 tab(s) orally every 6 hours (23 Jul 2021 11:48)  alendronate 35 mg oral tablet: 1 tab(s) orally once a week (07 Jun 2021 00:16)  ALPRAZolam 0.5 mg oral tablet: 1 tab(s) orally once a day (at bedtime), As Needed (07 Jun 2021 00:16)  aspirin 81 mg oral tablet, chewable: 1 tab(s) orally once a day (07 Jun 2021 00:16)  atenolol 25 mg oral tablet: 1 tab(s) orally once a day (07 Jun 2021 00:16)  atorvastatin 20 mg oral tablet: 1 tab(s) orally once a day (07 Jun 2021 00:16)  escitalopram 10 mg oral tablet: 1 tab(s) orally once a day (07 Jun 2021 00:16)  ezetimibe 10 mg oral tablet: 1 tab(s) orally once a day (07 Jun 2021 00:16)  iron gluconate: 27 milligram(s) orally once a day (07 Jun 2021 00:16)  lidocaine 5% patch: 1  transdermally once a day (07 Jun 2021 00:16)  losartan 100 mg oral tablet: 1 tab(s) orally once a day (31 Jul 2021 18:45)  oxyCODONE 5 mg oral tablet: 1 tab(s) orally every 6 hours, As needed, Severe Pain (7 - 10) (23 Jul 2021 11:48)  warfarin 4 mg oral tablet: 0.5 tab(s) orally once a day (31 Jul 2021 19:24)                           MEDICATIONS:  STANDING MEDICATIONS  aspirin  chewable 81 milliGRAM(s) Oral daily  atorvastatin 20 milliGRAM(s) Oral at bedtime  diltiazem    milliGRAM(s) Oral daily  escitalopram 10 milliGRAM(s) Oral at bedtime  ferrous    sulfate 325 milliGRAM(s) Oral daily  midodrine. 5 milliGRAM(s) Oral three times a day  warfarin 1.5 milliGRAM(s) Oral once    PRN MEDICATIONS  ALPRAZolam 0.5 milliGRAM(s) Oral at bedtime PRN  oxyCODONE    IR 5 milliGRAM(s) Oral every 6 hours PRN                                            ------------------------------------------------------------  VITAL SIGNS: Last 24 Hours  T(C): 36.4 (05 Aug 2021 12:41), Max: 36.4 (05 Aug 2021 12:41)  T(F): 97.5 (05 Aug 2021 12:41), Max: 97.5 (05 Aug 2021 12:41)  HR: 68 (05 Aug 2021 12:41) (68 - 76)  BP: 144/56 (05 Aug 2021 12:41) (137/62 - 148/65)  BP(mean): --  RR: 18 (05 Aug 2021 12:41) (18 - 18)  SpO2: 100% (05 Aug 2021 08:00) (100% - 100%)      08-04-21 @ 07:01  -  08-05-21 @ 07:00  --------------------------------------------------------  IN: 0 mL / OUT: 700 mL / NET: -700 mL    08-05-21 @ 07:01  -  08-05-21 @ 15:09  --------------------------------------------------------  IN: 1720 mL / OUT: 550 mL / NET: 1170 mL                                             --------------------------------------------------------------  LABS:                        9.6    5.59  )-----------( 313      ( 05 Aug 2021 06:25 )             29.8     08-05    129<L>  |  91<L>  |  14  ----------------------------<  106<H>  4.3   |  29  |  0.9    Ca    8.0<L>      05 Aug 2021 06:25  Mg     2.0     08-05    TPro  5.1<L>  /  Alb  3.0<L>  /  TBili  0.9  /  DBili  x   /  AST  23  /  ALT  10  /  AlkPhos  78  08-05    PT/INR - ( 05 Aug 2021 06:25 )   PT: 38.00 sec;   INR: 3.34 ratio         PTT - ( 03 Aug 2021 16:16 )  PTT:34.1 sec                                              -------------------------------------------------------------  RADIOLOGY:    EXAM:  XR HIPS BI 2V            PROCEDURE DATE:  08/01/2021            INTERPRETATION:  Clinical History / Reason for exam: Hip pain. Status post recent RTHR 7/21.    Technique: 2 views of left hip and 2 views of the right hip.    Comparison: Hip x-ray 7/16/2021    Findings:    Right hip:  No acute fracture or dislocation.  Status post right hip hemiarthroplasty, stable since prior. No evidence of hardware complication.    Left hip:  No acute fracture or dislocation.  Moderate degenerative changes of the left hip.    Other: Stable uterine calcified fibroids. Moderate bilateral SI joint osteoarthritis, stable.    Impression:    No acute fracture or dislocation.    Status post right hip hemiarthroplasty without evidence of hardware complication.    Moderate degenerative changes of the left hip.    --- End of Report ---    < from: CT Angio Chest PE Protocol w/ IV Cont (07.31.21 @ 15:59) >  IMPRESSION:    1.  No pulmonary embolism.  2.  Bilateral small pleural effusions and probable left interfissural fluid.  3.  Right heart enlargement, stable on 11/20/2020.  4.  Bilateral small airways disease.  5.  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    < end of copied text >    TT< from: TTE Echo Complete w/ Contrast w/ Doppler (06.08.21 @ 09:03) >  Summary:   1. Left ventricular ejection fraction, by visual estimation, is 55 to 60%.   2. Spectral Doppler shows impaired relaxation pattern of left ventricular myocardial filling (Grade I diastolic dysfunction).   3. Mildly enlarged left atrium.   4. Mildly enlarged right atrium.   5. Mild mitral annular calcification.   6. Moderate mitral valve regurgitation.   7. Moderate tricuspid regurgitation.   8. Mechanical prosthesis in the aortic valve position.   9. Estimated pulmonary artery systolic pressure is 44.5 mmHg assuming a right atrial pressure of 3 mmHg, which is consistent with mild pulmonary hypertension.                                            --------------------------------------------------------------    PHYSICAL EXAM:  General: NAD, AOx3. Patient feels dizzy upon standing. Orthostatic vitals -->90  HEENT: normocephalic, atraumatic  LUNGS: Normal breath sounds, no wheezes/crackles  HEART: RRR, no rubs or gallops. Systolic murmur.  ABDOMEN: Soft, NT/ND. No rigidity/guarding  EXT: Peripheral pulses +2, no cyanosis. Rt leg edema +1 (surgical leg)  NEURO: grossly normal  SKIN: Old bruising on Left arm and on Rt (surgical) leg.                                           --------------------------------------------------------------

## 2021-08-06 ENCOUNTER — TRANSCRIPTION ENCOUNTER (OUTPATIENT)
Age: 86
End: 2021-08-06

## 2021-08-06 VITALS — TEMPERATURE: 98 F

## 2021-08-06 LAB
ALBUMIN SERPL ELPH-MCNC: 3.2 G/DL — LOW (ref 3.5–5.2)
ALP SERPL-CCNC: 85 U/L — SIGNIFICANT CHANGE UP (ref 30–115)
ALT FLD-CCNC: 11 U/L — SIGNIFICANT CHANGE UP (ref 0–41)
ANION GAP SERPL CALC-SCNC: 10 MMOL/L — SIGNIFICANT CHANGE UP (ref 7–14)
AST SERPL-CCNC: 23 U/L — SIGNIFICANT CHANGE UP (ref 0–41)
BILIRUB SERPL-MCNC: 0.9 MG/DL — SIGNIFICANT CHANGE UP (ref 0.2–1.2)
BUN SERPL-MCNC: 13 MG/DL — SIGNIFICANT CHANGE UP (ref 10–20)
CALCIUM SERPL-MCNC: 8.3 MG/DL — LOW (ref 8.5–10.1)
CHLORIDE SERPL-SCNC: 91 MMOL/L — LOW (ref 98–110)
CO2 SERPL-SCNC: 28 MMOL/L — SIGNIFICANT CHANGE UP (ref 17–32)
CREAT SERPL-MCNC: 0.8 MG/DL — SIGNIFICANT CHANGE UP (ref 0.7–1.5)
GLUCOSE SERPL-MCNC: 114 MG/DL — HIGH (ref 70–99)
HCT VFR BLD CALC: 31.3 % — LOW (ref 37–47)
HGB BLD-MCNC: 10 G/DL — LOW (ref 12–16)
INR BLD: 3.5 RATIO — HIGH (ref 0.65–1.3)
MAGNESIUM SERPL-MCNC: 1.8 MG/DL — SIGNIFICANT CHANGE UP (ref 1.8–2.4)
MCHC RBC-ENTMCNC: 29.3 PG — SIGNIFICANT CHANGE UP (ref 27–31)
MCHC RBC-ENTMCNC: 31.9 G/DL — LOW (ref 32–37)
MCV RBC AUTO: 91.8 FL — SIGNIFICANT CHANGE UP (ref 81–99)
NRBC # BLD: 0 /100 WBCS — SIGNIFICANT CHANGE UP (ref 0–0)
PLATELET # BLD AUTO: 298 K/UL — SIGNIFICANT CHANGE UP (ref 130–400)
POTASSIUM SERPL-MCNC: 4.8 MMOL/L — SIGNIFICANT CHANGE UP (ref 3.5–5)
POTASSIUM SERPL-SCNC: 4.8 MMOL/L — SIGNIFICANT CHANGE UP (ref 3.5–5)
PROT SERPL-MCNC: 5.4 G/DL — LOW (ref 6–8)
PROTHROM AB SERPL-ACNC: 39.7 SEC — HIGH (ref 9.95–12.87)
RBC # BLD: 3.41 M/UL — LOW (ref 4.2–5.4)
RBC # FLD: 16.1 % — HIGH (ref 11.5–14.5)
SODIUM SERPL-SCNC: 129 MMOL/L — LOW (ref 135–146)
WBC # BLD: 7.65 K/UL — SIGNIFICANT CHANGE UP (ref 4.8–10.8)
WBC # FLD AUTO: 7.65 K/UL — SIGNIFICANT CHANGE UP (ref 4.8–10.8)

## 2021-08-06 RX ORDER — EZETIMIBE 10 MG/1
1 TABLET ORAL
Qty: 0 | Refills: 0 | DISCHARGE

## 2021-08-06 RX ORDER — MIDODRINE HYDROCHLORIDE 2.5 MG/1
10 TABLET ORAL THREE TIMES A DAY
Refills: 0 | Status: DISCONTINUED | OUTPATIENT
Start: 2021-08-06 | End: 2021-08-06

## 2021-08-06 RX ORDER — SODIUM CHLORIDE 9 MG/ML
1000 INJECTION INTRAMUSCULAR; INTRAVENOUS; SUBCUTANEOUS ONCE
Refills: 0 | Status: COMPLETED | OUTPATIENT
Start: 2021-08-06 | End: 2021-08-06

## 2021-08-06 RX ORDER — MIDODRINE HYDROCHLORIDE 2.5 MG/1
1 TABLET ORAL
Qty: 90 | Refills: 0
Start: 2021-08-06 | End: 2021-09-04

## 2021-08-06 RX ORDER — WARFARIN SODIUM 2.5 MG/1
1 TABLET ORAL
Qty: 30 | Refills: 0
Start: 2021-08-06 | End: 2021-09-04

## 2021-08-06 RX ORDER — DILTIAZEM HCL 120 MG
1 CAPSULE, EXT RELEASE 24 HR ORAL
Qty: 30 | Refills: 0
Start: 2021-08-06 | End: 2021-09-04

## 2021-08-06 RX ORDER — MAGNESIUM SULFATE 500 MG/ML
2 VIAL (ML) INJECTION ONCE
Refills: 0 | Status: COMPLETED | OUTPATIENT
Start: 2021-08-06 | End: 2021-08-06

## 2021-08-06 RX ORDER — WARFARIN SODIUM 2.5 MG/1
0.5 TABLET ORAL
Qty: 0 | Refills: 0 | DISCHARGE

## 2021-08-06 RX ADMIN — MIDODRINE HYDROCHLORIDE 10 MILLIGRAM(S): 2.5 TABLET ORAL at 17:54

## 2021-08-06 RX ADMIN — Medication 81 MILLIGRAM(S): at 12:35

## 2021-08-06 RX ADMIN — Medication 325 MILLIGRAM(S): at 12:36

## 2021-08-06 RX ADMIN — SODIUM CHLORIDE 1000 MILLILITER(S): 9 INJECTION INTRAMUSCULAR; INTRAVENOUS; SUBCUTANEOUS at 12:23

## 2021-08-06 RX ADMIN — MIDODRINE HYDROCHLORIDE 10 MILLIGRAM(S): 2.5 TABLET ORAL at 12:36

## 2021-08-06 RX ADMIN — Medication 180 MILLIGRAM(S): at 05:55

## 2021-08-06 RX ADMIN — MIDODRINE HYDROCHLORIDE 5 MILLIGRAM(S): 2.5 TABLET ORAL at 05:55

## 2021-08-06 NOTE — PROGRESS NOTE ADULT - SUBJECTIVE AND OBJECTIVE BOX
ORAL WU 85y Female  MRN#: [id number]   CODE STATUS: Full    Hospital Day: 6d    Pt is currently admitted with the primary diagnosis of Chest pain and SVT    SUBJECTIVE  Hospital Course  Pt seen and examined at bedside. No CP or SOB but still has +ve orthostatic vitals. --> 84 and HR 96-->124. Had similar results yesterday and was given 1L NS bolus and 5mg midodrine TID. Similar story today, given another 1L NS and increased midodrine to 10mg TID. Monitoring progress and another set of orthostatic vital signs.     Overnight events   None    Subjective complaints   None    Present Today:   - Zamudio:  No [x], Yes [   ] : Indication:     - Type of IV Access:       .. CVC/Piccline:  No [  ], Yes [   ] : Indication:       .. Midline: No [  ], Yes [   ] : Indication:                                             ----------------------------------------------------------  OBJECTIVE  PAST MEDICAL & SURGICAL HISTORY  HTN (hypertension)    High cholesterol    Osteoporosis    Fracture of right shoulder    Anxiety    Depression    Atrial fibrillation    H/O heart valve replacement with mechanical valve    S/P hip hemiarthroplasty  on 7/16/2021 for right subcapital femoral neck fracture                                              -----------------------------------------------------------  ALLERGIES:  penicillin (Unknown)  pinapples (Unknown)                                            ------------------------------------------------------------    HOME MEDICATIONS  Home Medications:  acetaminophen 325 mg oral tablet: 2 tab(s) orally every 6 hours (23 Jul 2021 11:48)  alendronate 35 mg oral tablet: 1 tab(s) orally once a week (07 Jun 2021 00:16)  ALPRAZolam 0.5 mg oral tablet: 1 tab(s) orally once a day (at bedtime), As Needed (07 Jun 2021 00:16)  aspirin 81 mg oral tablet, chewable: 1 tab(s) orally once a day (07 Jun 2021 00:16)  atenolol 25 mg oral tablet: 1 tab(s) orally once a day (07 Jun 2021 00:16)  atorvastatin 20 mg oral tablet: 1 tab(s) orally once a day (07 Jun 2021 00:16)  escitalopram 10 mg oral tablet: 1 tab(s) orally once a day (07 Jun 2021 00:16)  iron gluconate: 27 milligram(s) orally once a day (07 Jun 2021 00:16)  lidocaine 5% patch: 1  transdermally once a day (07 Jun 2021 00:16)  losartan 100 mg oral tablet: 1 tab(s) orally once a day (31 Jul 2021 18:45)  oxyCODONE 5 mg oral tablet: 1 tab(s) orally every 6 hours, As needed, Severe Pain (7 - 10) (23 Jul 2021 11:48)                           MEDICATIONS:  STANDING MEDICATIONS  aspirin  chewable 81 milliGRAM(s) Oral daily  atorvastatin 20 milliGRAM(s) Oral at bedtime  diltiazem    milliGRAM(s) Oral daily  escitalopram 10 milliGRAM(s) Oral at bedtime  ferrous    sulfate 325 milliGRAM(s) Oral daily  midodrine. 10 milliGRAM(s) Oral three times a day    PRN MEDICATIONS  ALPRAZolam 0.5 milliGRAM(s) Oral at bedtime PRN  oxyCODONE    IR 5 milliGRAM(s) Oral every 6 hours PRN                                            ------------------------------------------------------------  VITAL SIGNS: Last 24 Hours  T(C): 36.8 (06 Aug 2021 11:28), Max: 37.2 (06 Aug 2021 05:39)  T(F): 98.3 (06 Aug 2021 11:28), Max: 99 (06 Aug 2021 05:39)  HR: 69 (06 Aug 2021 05:39) (69 - 74)  BP: 150/60 (06 Aug 2021 05:39) (138/60 - 157/67)  BP(mean): --  RR: 18 (06 Aug 2021 05:39) (18 - 18)  SpO2: --      08-05-21 @ 07:01  -  08-06-21 @ 07:00  --------------------------------------------------------  IN: 1720 mL / OUT: 550 mL / NET: 1170 mL    08-06-21 @ 07:01  -  08-06-21 @ 13:37  --------------------------------------------------------  IN: 360 mL / OUT: 0 mL / NET: 360 mL                                             --------------------------------------------------------------  LABS:                        10.0   7.65  )-----------( 298      ( 06 Aug 2021 11:10 )             31.3     08-06    129<L>  |  91<L>  |  13  ----------------------------<  114<H>  4.8   |  28  |  0.8    Ca    8.3<L>      06 Aug 2021 11:10  Mg     1.8     08-06    TPro  5.4<L>  /  Alb  3.2<L>  /  TBili  0.9  /  DBili  x   /  AST  23  /  ALT  11  /  AlkPhos  85  08-06    PT/INR - ( 06 Aug 2021 06:07 )   PT: 39.70 sec;   INR: 3.50 ratio                                                -------------------------------------------------------------  RADIOLOGY:    EXAM:  XR HIPS BI 2V            PROCEDURE DATE:  08/01/2021            INTERPRETATION:  Clinical History / Reason for exam: Hip pain. Status post recent RTHR 7/21.    Technique: 2 views of left hip and 2 views of the right hip.    Comparison: Hip x-ray 7/16/2021    Findings:    Right hip:  No acute fracture or dislocation.  Status post right hip hemiarthroplasty, stable since prior. No evidence of hardware complication.    Left hip:  No acute fracture or dislocation.  Moderate degenerative changes of the left hip.    Other: Stable uterine calcified fibroids. Moderate bilateral SI joint osteoarthritis, stable.    Impression:    No acute fracture or dislocation.    Status post right hip hemiarthroplasty without evidence of hardware complication.    Moderate degenerative changes of the left hip.    --- End of Report ---    < from: CT Angio Chest PE Protocol w/ IV Cont (07.31.21 @ 15:59) >  IMPRESSION:    1.  No pulmonary embolism.  2.  Bilateral small pleural effusions and probable left interfissural fluid.  3.  Right heart enlargement, stable on 11/20/2020.  4.  Bilateral small airways disease.  5.  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    < end of copied text >    TT< from: TTE Echo Complete w/ Contrast w/ Doppler (06.08.21 @ 09:03) >  Summary:   1. Left ventricular ejection fraction, by visual estimation, is 55 to 60%.   2. Spectral Doppler shows impaired relaxation pattern of left ventricular myocardial filling (Grade I diastolic dysfunction).   3. Mildly enlarged left atrium.   4. Mildly enlarged right atrium.   5. Mild mitral annular calcification.   6. Moderate mitral valve regurgitation.   7. Moderate tricuspid regurgitation.   8. Mechanical prosthesis in the aortic valve position.   9. Estimated pulmonary artery systolic pressure is 44.5 mmHg assuming a right atrial pressure of 3 mmHg, which is consistent with mild pulmonary hypertension.                                          --------------------------------------------------------------    PHYSICAL EXAM:  General: NAD, AOx3. Patient feels dizzy upon standing. Orthostatic vitals -->84. Yesterday -90 on orthostatics.  HEENT: normocephalic, atraumatic  LUNGS: Normal breath sounds, no wheezes/crackles  HEART: RRR, no rubs or gallops. Systolic murmur.  ABDOMEN: Soft, NT/ND. No rigidity/guarding  EXT: Peripheral pulses +2, no cyanosis. Rt leg edema +1 (surgical leg)  NEURO: grossly normal. Rt leg impaired mobility because of pain.  SKIN: Old bruising on Left arm and on Rt (surgical) leg.                                           --------------------------------------------------------------

## 2021-08-06 NOTE — DISCHARGE NOTE PROVIDER - CARE PROVIDER_API CALL
Pacheco Forrest  INTERNAL MEDICINE  [address] [place] [address], [address]  [place] [address]  Phone: [phone]  Fax: [phone]  Follow Up Time: 2 weeks

## 2021-08-06 NOTE — DISCHARGE NOTE PROVIDER - NSDCCPCAREPLAN_GEN_ALL_CORE_FT
PRINCIPAL DISCHARGE DIAGNOSIS  Diagnosis: Chest pain  Assessment and Plan of Treatment: Chest pain can be caused by many different conditions which may or may not be dangerous. Causes include heartburn, lung infections, heart attack, blood clot in lungs, skin infections, strain or damage to muscle, cartilage, or bones, etc. In addition to a history and physical examination, multiple tests were used to evaluate your condition, including cardiac enzymes (negative), echo (normal) and ECG, which showed a rapid heart rhythm. Cardiac EP was consulted and recommended outpatient follow-up for definitive ablation on 8/25. Please take cardizem as prescribed until then and follow up with them for ablation.  SEEK IMMEDIATE MEDICAL CARE IF YOU HAVE ANY OF THE FOLLOWING SYMPTOMS: worsening chest pain, coughing up blood, unexplained back/neck/jaw pain, severe abdominal pain, dizziness or lightheadedness, fainting, shortness of breath, sweaty or clammy skin, vomiting, or racing heart beat. These symptoms may represent a serious problem that is an emergency. Do not wait to see if the symptoms will go away. Get medical help right away. Call 911 and do not drive yourself to the hospital.        SECONDARY DISCHARGE DIAGNOSES  Diagnosis: New onset atrial flutter  Assessment and Plan of Treatment: Atrial flutter is a type of irregular heartbeat (arrhythmia) where the heart quivers continuously in a chaotic pattern that makes the heart unable to pump blood normally. This can increase the risk for stroke, heart failure, and other heart-related conditions. Atrial flutter can be caused by a variety of conditions and may be temporary, intermittent, or permanent. Symptoms include feeling that your heart is beating rapidly or irregularly, chest discomfort, shortness of breath, or dizziness/lightheadedness that may be worse with exertion. You were found to have SVT while in the hospital and were followed by EP doctors who scheduled an appointment for you in the clinic to get definitive ablation on 8/25. Please take your medications as prescribed until then and follow up with your doctors.  SEEK IMMEDIATE MEDICAL CARE IF YOU HAVE ANY OF THE FOLLOWING SYMPTOMS: chest pain, shortness of breath, abdominal pain, sweating, vomiting, blood in vomit/bowel movements/urine, dizziness/lightheadedness, weakness or numbness to face/arm/leg, trouble speaking or understanding, facial droop.      Diagnosis: Orthostatic hypotension  Assessment and Plan of Treatment: You developed dizziness upon standing a few days before discharge. Your blood pressure would be normal while seated but would drop drastically when standing up. You were given 2 liters of normal saline and started on a new medication to keep your blood pressure high (midodrine 10mg TID). Please make sure to keep your surroundings safe and go to the ER if you fall.   FALL PREVENTION TIPS:  Stand or sit up slowly. Use assistive devices as directed. You may need to have grab bars put in your bathroom near the toilet or in the shower.   Wear shoes that fit well and have soles that . Wear shoes both inside and outside. Do not wear shoes with high heels.   Wear a personal alarm that can call 911 in an emergency.   Manage your medical conditions. Keep all appointments with your healthcare providers. Visit your eye doctor as directed.  HOME SAFETY TIPS:  Put nonslip strips on your bath or shower floor to prevent you from  Use a shower seat so you do not need to stand while you shower. Sit on the toilet or a chair in your bathroom to dry yourself and put on clothing. This will prevent you from losing your balance from drying or dressing yourself while you are standing.   Keep paths clear. Remove books, shoes, and other objects from walkways and stairs. Place cords for telephones and lamps out of the way so that you do not need to walk over them. Tape them down if you cannot move them. Remove small rugs or secure it with double-sided tape to prevent you from   Install bright lights in your home. Use night lights to help light paths to the bathroom or kitchen. Always turn on the light before you start walking.   Keep items you use often on shelves within reach. Do not use a step stool to help you reach an item.   Place reflective tape on the edges of your stairs to see better.

## 2021-08-06 NOTE — PROGRESS NOTE ADULT - NSICDXPILOT_GEN_ALL_CORE
[place]

## 2021-08-06 NOTE — PROGRESS NOTE ADULT - ASSESSMENT
ASSESSMENT & PLAN    85-year-old, female patient, Hypertension, DL, CAD s/p CABG in  with concomitant Metallic Aortic valve replacement and osteoporosis, and displaced right subcapital femoral neck fracture s/p Hemiarthroplasty on 2021 ( last week) was brought in from [facility] by EMS for Chest discomfort. The patient was discharged from the hospital on  after a fall complicated by the fracture. Today she was attempting to work with PT at the NH, the patient could not participate due to pains in her right hip, so she laid back to rest. Afterwards she had an acute onset of chest discomfort, pressure like, retrosternal/ epigastric in nature. No alleviating factors, no aggravating factors ( she was sitting at that time). No cough, no pleuritic chest pain. No tenderness upon palpation. The RN at the [facility] (NH) took the patient's vital signs and she had Tachycardia (140's). The patient did not feel any palpitations. No syncope.     #Chest Discomfort  #Tachyarrhythmia  # HTN, DLD  #CAD, s/p CABG in   #Atrial Flutter vs Acute Onset Afib:   - Responded to Cardizem 10mg IV push once, but patient went into asymptomatic bradycardia afterwards  - Stopped BB by EP, Started on cardizam 60mg q6h --> Switched to long acting cardizam 180mg instead.  - CHADSVASc2: 5 ( Female, Age > 75, HTN, Vascular disease ( CAD))   - Warfarin 2mg po once daily, please follow INR daily  - Repeat EKG post Cardizem: NSR  - Troponin: 0.02, elevated, likely secondary to tachyarrhythmia. 2nd set 0.04. 3rd 0.04. stable.  - CTA done to Rule out PE( Tachycardia, recently had surgery): negative, check full report above.   - The patient does have rt lower extremity edema, bilateral ( right > left). Follow up Venous Duplex - No DVT.  - Consult Cardiology  (Dr. Crow Nagy)   - TTE: Done in 2021:   a. Normal EF  b. Impaired LV diastolic relaxation with G1DD   c. Mild right and left atrial enlargements  d. Mild pulmonary hypertension    #Aortic Stenosis:   - S/p Metallic Valve replacement in    - Avoid fluid overload  - Continue with Warfarin po once daily, Daily INR. Warfarin dose decreased from 2-->1.5mg because INR is >3.5    #Thoracic Aortic Aneurysm   - Seen on CTA scan  - Measurin.3cm  - Likely needs serial follow up.     **Patient was doing PT when she felt dizzy. A rapid response was called. Nurses reported that patient was doing PT then felt dizzy. She laid down in bed for a few minutes and felt better afterwards. Blood pressure in bed was 126/60, HR 80. Blood sugar 136. Patient was feeling fine in bed and orthostatic were ordered to be performed but patient keeps feeling dizzy when standing. Daughter was contacted over the phone as a  since patient is [place]-speaking. No events on telemetry preceding the incident. No further interventions needed.    - Yesterday: orthostatic vitals +ve, -->90. Given 1L NS and midodrine 5mg TID. Reassess orthostatics and monitor vitals.   - Today: Orthostatic vitals +ve -->84. Given another 1 L NS and increased midodrine to 10mg TID. Reassess again.    Handoff: F/u daily INR. Keep Mg >2. OPD EPS f/u. Pending PT Eval. F/u orthostatic vitals and BP on midodrine.                                                                            ----------------------------------------------------  # DVT prophylaxis Warfarin Down to 1.5mg daily (INR >3.5)    # GI prophylaxis pantoprazole PO    # Diet DASH/TLC    # Activity Score (AM-PAC)    # Code status Full    # Disposition home with home care

## 2021-08-06 NOTE — DISCHARGE NOTE PROVIDER - NSDCMRMEDTOKEN_GEN_ALL_CORE_FT
acetaminophen 325 mg oral tablet: 2 tab(s) orally every 6 hours  alendronate 35 mg oral tablet: 1 tab(s) orally once a week  ALPRAZolam 0.5 mg oral tablet: 1 tab(s) orally once a day (at bedtime), As Needed  aspirin 81 mg oral tablet, chewable: 1 tab(s) orally once a day  atenolol 25 mg oral tablet: 1 tab(s) orally once a day  atorvastatin 20 mg oral tablet: 1 tab(s) orally once a day  dilTIAZem 180 mg/24 hours oral capsule, extended release: 1 cap(s) orally once a day  escitalopram 10 mg oral tablet: 1 tab(s) orally once a day  iron gluconate: 27 milligram(s) orally once a day  lidocaine 5% patch: 1  transdermally once a day  losartan 100 mg oral tablet: 1 tab(s) orally once a day  midodrine 10 mg oral tablet: 1 tab(s) orally 3 times a day  oxyCODONE 5 mg oral tablet: 1 tab(s) orally every 6 hours, As needed, Severe Pain (7 - 10)  warfarin 2 mg oral tablet: 1 tab(s) orally once a day

## 2021-08-06 NOTE — CDI QUERY NOTE - NSCDIOTHERTXTBX_GEN_ALL_CORE_HH
______________________________________________________________________________________________________________________________________    85 F, with Diagnosis:    #Chest Discomfort    #Tachyarrhythmia    Clinical Indicator:  Labs:  7/31/2021:   BNP = 4004  Echo: 6/8/2021: 1. Left ventricular ejection fraction, by visual estimation, is 55 to 60%.                             2. Spectral Doppler shows impaired relaxation pattern of left ventricular myocardial filling (Grade I diastolic dysfunction).    Cardiologist Consult:  7/31/2021:  Volume overload, CHF ( HFpEF) b/l leg edema, crackle in the lung exam and chest x-ray suggestive of a mild                                                                                      congestion along with a small pleural effusion  Progress Note:  8/3/2021:    Cardio consult :  probable SVT, metoprolol tartrate 50mg BID, add amlodipine 5mg ,                                                                       lasix 40mg  x 2 days for pul congestion then re-evaluate  Meds:  Furosemide 40 mg IV ( 8/1/2021 – 8/3/2021)    Based on your professional judgment and clinical indicators, can the diagnosis of HFpEF be further specified as :    [ ]   Acute HFpEF, present on admission, resolved  [ ]  Other (please specify)  [ ]  Clinically unable to determine    Thank you.

## 2021-08-06 NOTE — DISCHARGE NOTE PROVIDER - HOSPITAL COURSE
85-year-old, female patient, Hypertension, DL, CAD s/p CABG in 2003 with concomitant Metallic Aortic valve replacement and osteoporosis, and displaced right subcapital femoral neck fracture s/p Hemiarthroplasty on 7/16/2021 was brought in from [facility] by EMS for Chest discomfort. The patient was discharged from the hospital on 7/23 after a fall complicated by the fracture. Today she was attempting to work with PT at the NH, the patient could not participate due to pains in her right hip, so she laid back to rest. Afterwards she had an acute onset of chest discomfort, pressure like, retrosternal/ epigastric in nature. No alleviating factors, no aggravating factors ( she was sitting at that time). No cough, no pleuritic chest pain. No tenderness upon palpation. The RN at the [facility] (NH) took the patient's vital signs and she had Tachycardia (140's). The patient did not feel any palpitations. No syncope.     The patient was admitted and a cardiac workup was done that revealed a new-onset tachyarrhythmia. Troponins were negative and an Echo showed a normal EF. Thought to be A flutter, she was started on beta blockers then switched to cardizem. Developed multiple episodes of SVT a few days before discharge which were reverted with adenosine. EP was consulted and opted for outpatient management (ablation). Medically optimized until then. Patient experienced several episodes of orthostatic hypotension for the past 2 days, and received 2L of NS and was started on midodrine 10mg TID. Patient and family want to go home and she was medically cleared for discharge with instruction on how to take care of herself luis post-surgery and with a new orthostatic hypotension.     Patient discharged on cardizem CD 180mg daily to follow up with Dr. Melgar in the [facility] on 8/25.

## 2021-08-06 NOTE — PROGRESS NOTE ADULT - REASON FOR ADMISSION
Chest Discomfort/ Tachyarrhythmia
Chest Discomfort/ SVT
Chest Discomfort/ Tachyarrhythmia
Chest Discomfort/ Tachyarrhythmia

## 2021-08-06 NOTE — DISCHARGE NOTE PROVIDER - NSDCFUADDINST_GEN_ALL_CORE_FT
Please take your medications as prescribed and follow up with your primary care physician. Remember to go to this appointment for definitive treatment of your arrhythmia:     Follow up with Dr Melgar 8/25 at 12:30pm  [address] [address] [phone]

## 2021-08-06 NOTE — DISCHARGE NOTE PROVIDER - NSDCFUSCHEDAPPT_GEN_ALL_CORE_FT
[facility] ; 08/18/2021 ; [facility] [address] [facility] [place] ; 08/25/2021 ; [facility] Cardio [address] [facility] [place] ; 10/15/2021 ; [facility] CARDIOLOGY [address]

## 2021-08-06 NOTE — PROGRESS NOTE ADULT - PROVIDER SPECIALTY LIST ADULT
Internal Medicine
Cardiology
Cardiology

## 2021-08-16 LAB
INR PPP: 6.1 RATIO
QUALITY CONTROL: YES

## 2021-08-19 ENCOUNTER — OUTPATIENT (OUTPATIENT)
Dept: OUTPATIENT SERVICES | Facility: HOSPITAL | Age: 86
LOS: 1 days | Discharge: HOME | End: 2021-08-19

## 2021-08-19 ENCOUNTER — APPOINTMENT (OUTPATIENT)
Dept: MEDICATION MANAGEMENT | Facility: CLINIC | Age: 86
End: 2021-08-19

## 2021-08-19 DIAGNOSIS — Z79.01 LONG TERM (CURRENT) USE OF ANTICOAGULANTS: ICD-10-CM

## 2021-08-19 DIAGNOSIS — Z96.649 PRESENCE OF UNSPECIFIED ARTIFICIAL HIP JOINT: Chronic | ICD-10-CM

## 2021-08-19 DIAGNOSIS — Z95.2 PRESENCE OF PROSTHETIC HEART VALVE: Chronic | ICD-10-CM

## 2021-08-19 DIAGNOSIS — I27.82 CHRONIC PULMONARY EMBOLISM: ICD-10-CM

## 2021-08-19 LAB
INR PPP: 2.2 RATIO
QUALITY CONTROL: YES

## 2021-08-23 ENCOUNTER — APPOINTMENT (OUTPATIENT)
Dept: MEDICATION MANAGEMENT | Facility: CLINIC | Age: 86
End: 2021-08-23

## 2021-08-23 ENCOUNTER — OUTPATIENT (OUTPATIENT)
Dept: OUTPATIENT SERVICES | Facility: HOSPITAL | Age: 86
LOS: 1 days | Discharge: HOME | End: 2021-08-23

## 2021-08-23 DIAGNOSIS — Z95.2 PRESENCE OF PROSTHETIC HEART VALVE: Chronic | ICD-10-CM

## 2021-08-23 DIAGNOSIS — I27.81 COR PULMONALE (CHRONIC): ICD-10-CM

## 2021-08-23 DIAGNOSIS — Z79.01 LONG TERM (CURRENT) USE OF ANTICOAGULANTS: ICD-10-CM

## 2021-08-23 DIAGNOSIS — Z96.649 PRESENCE OF UNSPECIFIED ARTIFICIAL HIP JOINT: Chronic | ICD-10-CM

## 2021-08-23 LAB
INR PPP: 3.5 RATIO
QUALITY CONTROL: YES

## 2021-08-25 ENCOUNTER — APPOINTMENT (OUTPATIENT)
Dept: MEDICATION MANAGEMENT | Facility: CLINIC | Age: 86
End: 2021-08-25

## 2021-08-25 ENCOUNTER — OUTPATIENT (OUTPATIENT)
Dept: OUTPATIENT SERVICES | Facility: HOSPITAL | Age: 86
LOS: 1 days | Discharge: HOME | End: 2021-08-25

## 2021-08-25 ENCOUNTER — APPOINTMENT (OUTPATIENT)
Dept: CARDIOLOGY | Facility: CLINIC | Age: 86
End: 2021-08-25

## 2021-08-25 DIAGNOSIS — Z96.649 PRESENCE OF UNSPECIFIED ARTIFICIAL HIP JOINT: Chronic | ICD-10-CM

## 2021-08-25 DIAGNOSIS — Z95.2 PRESENCE OF PROSTHETIC HEART VALVE: Chronic | ICD-10-CM

## 2021-08-25 DIAGNOSIS — Z79.01 LONG TERM (CURRENT) USE OF ANTICOAGULANTS: ICD-10-CM

## 2021-08-25 DIAGNOSIS — I27.82 CHRONIC PULMONARY EMBOLISM: ICD-10-CM

## 2021-08-25 LAB
INR PPP: 2.6 RATIO
QUALITY CONTROL: YES

## 2021-09-01 ENCOUNTER — OUTPATIENT (OUTPATIENT)
Dept: OUTPATIENT SERVICES | Facility: HOSPITAL | Age: 86
LOS: 1 days | Discharge: HOME | End: 2021-09-01

## 2021-09-01 ENCOUNTER — APPOINTMENT (OUTPATIENT)
Dept: MEDICATION MANAGEMENT | Facility: CLINIC | Age: 86
End: 2021-09-01

## 2021-09-01 DIAGNOSIS — Z96.649 PRESENCE OF UNSPECIFIED ARTIFICIAL HIP JOINT: Chronic | ICD-10-CM

## 2021-09-01 DIAGNOSIS — Z95.2 PRESENCE OF PROSTHETIC HEART VALVE: Chronic | ICD-10-CM

## 2021-09-01 DIAGNOSIS — I27.82 CHRONIC PULMONARY EMBOLISM: ICD-10-CM

## 2021-09-01 DIAGNOSIS — Z79.01 LONG TERM (CURRENT) USE OF ANTICOAGULANTS: ICD-10-CM

## 2021-09-01 LAB
INR PPP: 5.8 RATIO
QUALITY CONTROL: YES

## 2021-09-03 ENCOUNTER — APPOINTMENT (OUTPATIENT)
Dept: MEDICATION MANAGEMENT | Facility: CLINIC | Age: 86
End: 2021-09-03

## 2021-09-03 ENCOUNTER — OUTPATIENT (OUTPATIENT)
Dept: OUTPATIENT SERVICES | Facility: HOSPITAL | Age: 86
LOS: 1 days | Discharge: HOME | End: 2021-09-03

## 2021-09-03 DIAGNOSIS — Z96.649 PRESENCE OF UNSPECIFIED ARTIFICIAL HIP JOINT: Chronic | ICD-10-CM

## 2021-09-03 DIAGNOSIS — Z79.01 LONG TERM (CURRENT) USE OF ANTICOAGULANTS: ICD-10-CM

## 2021-09-03 DIAGNOSIS — I27.82 CHRONIC PULMONARY EMBOLISM: ICD-10-CM

## 2021-09-03 DIAGNOSIS — Z95.2 PRESENCE OF PROSTHETIC HEART VALVE: Chronic | ICD-10-CM

## 2021-09-03 LAB
INR PPP: 2.1 RATIO
QUALITY CONTROL: YES

## 2021-09-08 ENCOUNTER — APPOINTMENT (OUTPATIENT)
Dept: MEDICATION MANAGEMENT | Facility: CLINIC | Age: 86
End: 2021-09-08

## 2021-09-08 ENCOUNTER — OUTPATIENT (OUTPATIENT)
Dept: OUTPATIENT SERVICES | Facility: HOSPITAL | Age: 86
LOS: 1 days | Discharge: HOME | End: 2021-09-08

## 2021-09-08 DIAGNOSIS — Z96.649 PRESENCE OF UNSPECIFIED ARTIFICIAL HIP JOINT: Chronic | ICD-10-CM

## 2021-09-08 DIAGNOSIS — I27.82 CHRONIC PULMONARY EMBOLISM: ICD-10-CM

## 2021-09-08 DIAGNOSIS — Z95.2 PRESENCE OF PROSTHETIC HEART VALVE: Chronic | ICD-10-CM

## 2021-09-08 DIAGNOSIS — Z79.01 LONG TERM (CURRENT) USE OF ANTICOAGULANTS: ICD-10-CM

## 2021-09-08 LAB
INR PPP: 2.9 RATIO
QUALITY CONTROL: YES

## 2021-09-15 ENCOUNTER — APPOINTMENT (OUTPATIENT)
Dept: MEDICATION MANAGEMENT | Facility: CLINIC | Age: 86
End: 2021-09-15

## 2021-09-15 ENCOUNTER — OUTPATIENT (OUTPATIENT)
Dept: OUTPATIENT SERVICES | Facility: HOSPITAL | Age: 86
LOS: 1 days | Discharge: HOME | End: 2021-09-15

## 2021-09-15 DIAGNOSIS — I27.82 CHRONIC PULMONARY EMBOLISM: ICD-10-CM

## 2021-09-15 DIAGNOSIS — Z96.649 PRESENCE OF UNSPECIFIED ARTIFICIAL HIP JOINT: Chronic | ICD-10-CM

## 2021-09-15 DIAGNOSIS — Z95.2 PRESENCE OF PROSTHETIC HEART VALVE: Chronic | ICD-10-CM

## 2021-09-15 DIAGNOSIS — Z79.01 LONG TERM (CURRENT) USE OF ANTICOAGULANTS: ICD-10-CM

## 2021-09-15 LAB
INR PPP: 4.3 RATIO
QUALITY CONTROL: YES

## 2021-09-22 ENCOUNTER — OUTPATIENT (OUTPATIENT)
Dept: OUTPATIENT SERVICES | Facility: HOSPITAL | Age: 86
LOS: 1 days | Discharge: HOME | End: 2021-09-22
Payer: MEDICARE

## 2021-09-22 ENCOUNTER — OUTPATIENT (OUTPATIENT)
Dept: OUTPATIENT SERVICES | Facility: HOSPITAL | Age: 86
LOS: 1 days | Discharge: HOME | End: 2021-09-22

## 2021-09-22 ENCOUNTER — APPOINTMENT (OUTPATIENT)
Dept: MEDICATION MANAGEMENT | Facility: CLINIC | Age: 86
End: 2021-09-22

## 2021-09-22 ENCOUNTER — APPOINTMENT (OUTPATIENT)
Dept: OPHTHALMOLOGY | Facility: CLINIC | Age: 86
End: 2021-09-22

## 2021-09-22 DIAGNOSIS — I27.82 CHRONIC PULMONARY EMBOLISM: ICD-10-CM

## 2021-09-22 DIAGNOSIS — Z79.01 LONG TERM (CURRENT) USE OF ANTICOAGULANTS: ICD-10-CM

## 2021-09-22 DIAGNOSIS — Z96.649 PRESENCE OF UNSPECIFIED ARTIFICIAL HIP JOINT: Chronic | ICD-10-CM

## 2021-09-22 DIAGNOSIS — Z95.2 PRESENCE OF PROSTHETIC HEART VALVE: Chronic | ICD-10-CM

## 2021-09-22 LAB
INR PPP: 2.3 RATIO
QUALITY CONTROL: YES

## 2021-09-22 PROCEDURE — 92014 COMPRE OPH EXAM EST PT 1/>: CPT

## 2021-09-23 ENCOUNTER — APPOINTMENT (OUTPATIENT)
Dept: CARDIOLOGY | Facility: CLINIC | Age: 86
End: 2021-09-23

## 2021-09-24 ENCOUNTER — APPOINTMENT (OUTPATIENT)
Dept: CARDIOLOGY | Facility: CLINIC | Age: 86
End: 2021-09-24
Payer: MEDICARE

## 2021-09-24 VITALS
DIASTOLIC BLOOD PRESSURE: 70 MMHG | SYSTOLIC BLOOD PRESSURE: 132 MMHG | BODY MASS INDEX: 25.71 KG/M2 | WEIGHT: 160 LBS | HEIGHT: 66 IN

## 2021-09-24 DIAGNOSIS — I27.82 CHRONIC PULMONARY EMBOLISM: ICD-10-CM

## 2021-09-24 DIAGNOSIS — H35.3190 NONEXUDATIVE AGE-RELATED MACULAR DEGENERATION, UNSPECIFIED EYE, STAGE UNSPECIFIED: ICD-10-CM

## 2021-09-24 PROCEDURE — 99214 OFFICE O/P EST MOD 30 MIN: CPT

## 2021-09-24 RX ORDER — VALSARTAN 160 MG/1
160 TABLET, COATED ORAL
Qty: 90 | Refills: 3 | Status: DISCONTINUED | COMMUNITY
Start: 2021-06-22 | End: 2021-09-24

## 2021-09-24 RX ORDER — VALSARTAN 160 MG
160 CAPSULE ORAL
Refills: 0 | Status: DISCONTINUED | COMMUNITY
End: 2021-09-24

## 2021-09-24 NOTE — HISTORY OF PRESENT ILLNESS
[FreeTextEntry1] : Patient had 10 teeth pulled. Then on lovenox she bled. She got 2 units blood. No more bleeding. No SOB . INR good. No bleeding She had THR 7/16/21. Then developed afib. She is getting ha. Getting PT at home

## 2021-09-24 NOTE — REVIEW OF SYSTEMS
[Fever] : no fever [Chills] : no chills [Blurry Vision] : no blurred vision [Earache] : no earache [Hearing Loss] : no hearing loss [Sinus Pressure] : no sinus pressure [SOB] : no shortness of breath [Chest Discomfort] : no chest discomfort [Palpitations] : palpitations [Cough] : no cough [Wheezing] : no wheezing [Abdominal Pain] : no abdominal pain [Vomiting] : no vomiting [Constipation] : no constipation [Dysuria] : no dysuria [Joint Pain] : joint pain [Shoulder Pain] : shoulder pain [Hand Pain] : hand pain [Knee Pain] : knee pain [Lower Back Pain] : lower back pain [Rash] : no rash [Skin Lesions] : no skin lesions [Dizziness] : no dizziness [Confusion] : no confusion was observed [Swollen Glands] : no swollen glands

## 2021-09-24 NOTE — REASON FOR VISIT
[Arrhythmia/ECG Abnorrmalities] : arrhythmia/ECG abnormalities [FreeTextEntry1] : Patient had 10 teeth pulled. Then on lovenox she bled. She got 2 units blood. No more bleeding. No SOB . INR good. No bleeding She had THR 7/16/21. Then developed afib

## 2021-09-24 NOTE — PHYSICAL EXAM
[5th Left ICS - MCL] : palpated at the 5th LICS in the midclavicular line [No Precordial Heave] : no precordial heave was noted [Normal Rate] : normal [Rhythm Regular] : regular [Normal S1] : normal S1 [Normal S2] : normal S2 [S3] : no S3 [S4] : no S4 [Click] : a ~M click was heard [I] : a grade 1 [No Pitting Edema] : no pitting edema present [2+] : left 2+ [Right Carotid Bruit] : no bruit heard over the right carotid [Left Carotid Bruit] : no bruit heard over the left carotid [No Abnormalities] : the abdominal aorta was not enlarged and no bruit was heard [Normal] : alert and oriented, normal memory

## 2021-09-29 ENCOUNTER — OUTPATIENT (OUTPATIENT)
Dept: OUTPATIENT SERVICES | Facility: HOSPITAL | Age: 86
LOS: 1 days | Discharge: HOME | End: 2021-09-29

## 2021-09-29 ENCOUNTER — APPOINTMENT (OUTPATIENT)
Dept: MEDICATION MANAGEMENT | Facility: CLINIC | Age: 86
End: 2021-09-29

## 2021-09-29 DIAGNOSIS — Z96.649 PRESENCE OF UNSPECIFIED ARTIFICIAL HIP JOINT: Chronic | ICD-10-CM

## 2021-09-29 DIAGNOSIS — Z79.01 LONG TERM (CURRENT) USE OF ANTICOAGULANTS: ICD-10-CM

## 2021-09-29 DIAGNOSIS — Z95.2 PRESENCE OF PROSTHETIC HEART VALVE: Chronic | ICD-10-CM

## 2021-09-29 DIAGNOSIS — I27.82 CHRONIC PULMONARY EMBOLISM: ICD-10-CM

## 2021-09-29 LAB
INR PPP: 2.4 RATIO
QUALITY CONTROL: YES

## 2021-10-04 NOTE — DISCHARGE NOTE NURSING/CASE MANAGEMENT/SOCIAL WORK - PATIENT PORTAL LINK FT
You can access the FollowMyHealth Patient Portal offered by [facility] by registering at the following website: [email or web address]. By joining [facility]’s FollowMyHealth portal, you will also be able to view your health information using other applications (apps) compatible with our system.
pt will speak  to surgeon and anesthesiologist pre op

## 2021-10-06 ENCOUNTER — OUTPATIENT (OUTPATIENT)
Dept: OUTPATIENT SERVICES | Facility: HOSPITAL | Age: 86
LOS: 1 days | Discharge: HOME | End: 2021-10-06

## 2021-10-06 ENCOUNTER — APPOINTMENT (OUTPATIENT)
Dept: MEDICATION MANAGEMENT | Facility: CLINIC | Age: 86
End: 2021-10-06

## 2021-10-06 DIAGNOSIS — Z95.2 PRESENCE OF PROSTHETIC HEART VALVE: Chronic | ICD-10-CM

## 2021-10-06 DIAGNOSIS — I27.82 CHRONIC PULMONARY EMBOLISM: ICD-10-CM

## 2021-10-06 DIAGNOSIS — Z96.649 PRESENCE OF UNSPECIFIED ARTIFICIAL HIP JOINT: Chronic | ICD-10-CM

## 2021-10-06 DIAGNOSIS — Z79.01 LONG TERM (CURRENT) USE OF ANTICOAGULANTS: ICD-10-CM

## 2021-10-06 LAB
INR PPP: 2.9 RATIO
QUALITY CONTROL: YES

## 2021-10-13 ENCOUNTER — OUTPATIENT (OUTPATIENT)
Dept: OUTPATIENT SERVICES | Facility: HOSPITAL | Age: 86
LOS: 1 days | Discharge: HOME | End: 2021-10-13

## 2021-10-13 ENCOUNTER — APPOINTMENT (OUTPATIENT)
Dept: MEDICATION MANAGEMENT | Facility: CLINIC | Age: 86
End: 2021-10-13

## 2021-10-13 DIAGNOSIS — Z96.649 PRESENCE OF UNSPECIFIED ARTIFICIAL HIP JOINT: Chronic | ICD-10-CM

## 2021-10-13 DIAGNOSIS — Z79.01 LONG TERM (CURRENT) USE OF ANTICOAGULANTS: ICD-10-CM

## 2021-10-13 DIAGNOSIS — I27.82 CHRONIC PULMONARY EMBOLISM: ICD-10-CM

## 2021-10-13 DIAGNOSIS — Z95.2 PRESENCE OF PROSTHETIC HEART VALVE: Chronic | ICD-10-CM

## 2021-10-13 LAB
INR PPP: 2.3 RATIO
QUALITY CONTROL: YES

## 2021-10-18 ENCOUNTER — APPOINTMENT (OUTPATIENT)
Dept: CARDIOLOGY | Facility: CLINIC | Age: 86
End: 2021-10-18

## 2021-10-20 ENCOUNTER — APPOINTMENT (OUTPATIENT)
Dept: MEDICATION MANAGEMENT | Facility: CLINIC | Age: 86
End: 2021-10-20

## 2021-10-20 ENCOUNTER — OUTPATIENT (OUTPATIENT)
Dept: OUTPATIENT SERVICES | Facility: HOSPITAL | Age: 86
LOS: 1 days | Discharge: HOME | End: 2021-10-20

## 2021-10-20 DIAGNOSIS — Z96.649 PRESENCE OF UNSPECIFIED ARTIFICIAL HIP JOINT: Chronic | ICD-10-CM

## 2021-10-20 DIAGNOSIS — I27.82 CHRONIC PULMONARY EMBOLISM: ICD-10-CM

## 2021-10-20 DIAGNOSIS — Z79.01 LONG TERM (CURRENT) USE OF ANTICOAGULANTS: ICD-10-CM

## 2021-10-20 DIAGNOSIS — Z95.2 PRESENCE OF PROSTHETIC HEART VALVE: Chronic | ICD-10-CM

## 2021-10-20 LAB
INR PPP: 2 RATIO
QUALITY CONTROL: YES

## 2021-10-27 ENCOUNTER — APPOINTMENT (OUTPATIENT)
Dept: MEDICATION MANAGEMENT | Facility: CLINIC | Age: 86
End: 2021-10-27

## 2021-10-27 ENCOUNTER — OUTPATIENT (OUTPATIENT)
Dept: OUTPATIENT SERVICES | Facility: HOSPITAL | Age: 86
LOS: 1 days | Discharge: HOME | End: 2021-10-27

## 2021-10-27 DIAGNOSIS — Z96.649 PRESENCE OF UNSPECIFIED ARTIFICIAL HIP JOINT: Chronic | ICD-10-CM

## 2021-10-27 DIAGNOSIS — I27.82 CHRONIC PULMONARY EMBOLISM: ICD-10-CM

## 2021-10-27 DIAGNOSIS — Z79.01 LONG TERM (CURRENT) USE OF ANTICOAGULANTS: ICD-10-CM

## 2021-10-27 DIAGNOSIS — Z95.2 PRESENCE OF PROSTHETIC HEART VALVE: Chronic | ICD-10-CM

## 2021-10-27 LAB
INR PPP: 1.9 RATIO
QUALITY CONTROL: YES

## 2021-11-01 ENCOUNTER — APPOINTMENT (OUTPATIENT)
Dept: CARDIOLOGY | Facility: CLINIC | Age: 86
End: 2021-11-01
Payer: MEDICARE

## 2021-11-01 VITALS
BODY MASS INDEX: 22.98 KG/M2 | HEIGHT: 66 IN | SYSTOLIC BLOOD PRESSURE: 112 MMHG | DIASTOLIC BLOOD PRESSURE: 70 MMHG | WEIGHT: 143 LBS

## 2021-11-01 DIAGNOSIS — Z23 ENCOUNTER FOR IMMUNIZATION: ICD-10-CM

## 2021-11-01 PROCEDURE — 93248 EXT ECG>7D<15D REV&INTERPJ: CPT

## 2021-11-01 PROCEDURE — 90662 IIV NO PRSV INCREASED AG IM: CPT

## 2021-11-01 PROCEDURE — G0008: CPT

## 2021-11-01 PROCEDURE — 99214 OFFICE O/P EST MOD 30 MIN: CPT

## 2021-11-01 RX ORDER — AMIODARONE HYDROCHLORIDE 200 MG/1
200 TABLET ORAL DAILY
Qty: 30 | Refills: 0 | Status: DISCONTINUED | COMMUNITY
Start: 2021-10-07 | End: 2021-11-01

## 2021-11-01 RX ORDER — MIDODRINE HYDROCHLORIDE 10 MG/1
10 TABLET ORAL 3 TIMES DAILY
Qty: 90 | Refills: 5 | Status: DISCONTINUED | COMMUNITY
Start: 2021-08-27 | End: 2021-11-01

## 2021-11-01 NOTE — HISTORY OF PRESENT ILLNESS
[FreeTextEntry1] : Patient had 10 teeth pulled. Then on lovenox she bled. She got 2 units blood. No more bleeding. No SOB . INR good. No bleeding She had THR 7/16/21. Then developed afib. No more thearpy at home. walking in house . uses walker. Off amiodarone no ha

## 2021-11-01 NOTE — DISCUSSION/SUMMARY
[FreeTextEntry1] : The pt had CABG mechanical AVR 2003 on 5/14 palpitations found in V tach. IV amiodarone need temp pacemaker. Cath RCA PO. V tach secondary ischemia. Event monitor no VT. Now off amiodarone. Palpitations resolved. .Now off xanax. Echo 1017 mild pulm htn mild MR. off plavix. Now on asa 81mg not drive arthritis. She needs access a ride told resume exercise.She gained  3 lbs. She does not want stress test.Fx right shoulder 11/16. Refused shoulder surgery. go rehab.Dizzy get up resolved. She 6/21 10 teeth pulled. On Lovenox bled mouth. Got 2 units blood.  She had THR 7/16/21. Then developed afib.Will  taper stop midodrine .  She lost 17 pounds. She has no teeth. MCOT 2 week. No afib.. SVT vs vtach. Not drink boost it has vitamin k. To see [facility] to get teeth

## 2021-11-03 ENCOUNTER — OUTPATIENT (OUTPATIENT)
Dept: OUTPATIENT SERVICES | Facility: HOSPITAL | Age: 86
LOS: 1 days | Discharge: HOME | End: 2021-11-03

## 2021-11-03 ENCOUNTER — APPOINTMENT (OUTPATIENT)
Dept: MEDICATION MANAGEMENT | Facility: CLINIC | Age: 86
End: 2021-11-03

## 2021-11-03 DIAGNOSIS — Z95.2 PRESENCE OF PROSTHETIC HEART VALVE: Chronic | ICD-10-CM

## 2021-11-03 DIAGNOSIS — I27.82 CHRONIC PULMONARY EMBOLISM: ICD-10-CM

## 2021-11-03 DIAGNOSIS — Z79.01 LONG TERM (CURRENT) USE OF ANTICOAGULANTS: ICD-10-CM

## 2021-11-03 DIAGNOSIS — Z96.649 PRESENCE OF UNSPECIFIED ARTIFICIAL HIP JOINT: Chronic | ICD-10-CM

## 2021-11-03 LAB
INR PPP: 2.5 RATIO
QUALITY CONTROL: YES

## 2021-11-10 ENCOUNTER — APPOINTMENT (OUTPATIENT)
Dept: MEDICATION MANAGEMENT | Facility: CLINIC | Age: 86
End: 2021-11-10

## 2021-11-10 ENCOUNTER — OUTPATIENT (OUTPATIENT)
Dept: OUTPATIENT SERVICES | Facility: HOSPITAL | Age: 86
LOS: 1 days | Discharge: HOME | End: 2021-11-10

## 2021-11-10 DIAGNOSIS — Z79.01 LONG TERM (CURRENT) USE OF ANTICOAGULANTS: ICD-10-CM

## 2021-11-10 DIAGNOSIS — Z95.2 PRESENCE OF PROSTHETIC HEART VALVE: Chronic | ICD-10-CM

## 2021-11-10 DIAGNOSIS — I27.82 CHRONIC PULMONARY EMBOLISM: ICD-10-CM

## 2021-11-10 DIAGNOSIS — Z96.649 PRESENCE OF UNSPECIFIED ARTIFICIAL HIP JOINT: Chronic | ICD-10-CM

## 2021-11-10 LAB
INR PPP: 3 RATIO
QUALITY CONTROL: YES

## 2021-11-17 ENCOUNTER — OUTPATIENT (OUTPATIENT)
Dept: OUTPATIENT SERVICES | Facility: HOSPITAL | Age: 86
LOS: 1 days | Discharge: HOME | End: 2021-11-17

## 2021-11-17 ENCOUNTER — APPOINTMENT (OUTPATIENT)
Dept: MEDICATION MANAGEMENT | Facility: CLINIC | Age: 86
End: 2021-11-17

## 2021-11-17 DIAGNOSIS — Z95.2 PRESENCE OF PROSTHETIC HEART VALVE: Chronic | ICD-10-CM

## 2021-11-17 DIAGNOSIS — I27.82 CHRONIC PULMONARY EMBOLISM: ICD-10-CM

## 2021-11-17 DIAGNOSIS — Z79.01 LONG TERM (CURRENT) USE OF ANTICOAGULANTS: ICD-10-CM

## 2021-11-17 DIAGNOSIS — Z96.649 PRESENCE OF UNSPECIFIED ARTIFICIAL HIP JOINT: Chronic | ICD-10-CM

## 2021-11-17 LAB
INR PPP: 2.1 RATIO
QUALITY CONTROL: YES

## 2021-11-24 ENCOUNTER — APPOINTMENT (OUTPATIENT)
Dept: MEDICATION MANAGEMENT | Facility: CLINIC | Age: 86
End: 2021-11-24

## 2021-11-24 ENCOUNTER — OUTPATIENT (OUTPATIENT)
Dept: OUTPATIENT SERVICES | Facility: HOSPITAL | Age: 86
LOS: 1 days | Discharge: HOME | End: 2021-11-24

## 2021-11-24 DIAGNOSIS — I27.82 CHRONIC PULMONARY EMBOLISM: ICD-10-CM

## 2021-11-24 DIAGNOSIS — Z79.01 LONG TERM (CURRENT) USE OF ANTICOAGULANTS: ICD-10-CM

## 2021-11-24 DIAGNOSIS — Z96.649 PRESENCE OF UNSPECIFIED ARTIFICIAL HIP JOINT: Chronic | ICD-10-CM

## 2021-11-24 DIAGNOSIS — Z95.2 PRESENCE OF PROSTHETIC HEART VALVE: Chronic | ICD-10-CM

## 2021-11-24 LAB
INR PPP: 2.1 RATIO
QUALITY CONTROL: YES

## 2021-12-01 ENCOUNTER — OUTPATIENT (OUTPATIENT)
Dept: OUTPATIENT SERVICES | Facility: HOSPITAL | Age: 86
LOS: 1 days | Discharge: HOME | End: 2021-12-01

## 2021-12-01 ENCOUNTER — APPOINTMENT (OUTPATIENT)
Dept: MEDICATION MANAGEMENT | Facility: CLINIC | Age: 86
End: 2021-12-01

## 2021-12-01 DIAGNOSIS — I27.82 CHRONIC PULMONARY EMBOLISM: ICD-10-CM

## 2021-12-01 DIAGNOSIS — Z96.649 PRESENCE OF UNSPECIFIED ARTIFICIAL HIP JOINT: Chronic | ICD-10-CM

## 2021-12-01 DIAGNOSIS — Z79.01 LONG TERM (CURRENT) USE OF ANTICOAGULANTS: ICD-10-CM

## 2021-12-01 DIAGNOSIS — Z95.2 PRESENCE OF PROSTHETIC HEART VALVE: Chronic | ICD-10-CM

## 2021-12-01 LAB
INR PPP: 2.1 RATIO
QUALITY CONTROL: YES

## 2021-12-08 ENCOUNTER — APPOINTMENT (OUTPATIENT)
Dept: MEDICATION MANAGEMENT | Facility: CLINIC | Age: 86
End: 2021-12-08

## 2021-12-08 ENCOUNTER — OUTPATIENT (OUTPATIENT)
Dept: OUTPATIENT SERVICES | Facility: HOSPITAL | Age: 86
LOS: 1 days | Discharge: HOME | End: 2021-12-08

## 2021-12-08 DIAGNOSIS — I27.82 CHRONIC PULMONARY EMBOLISM: ICD-10-CM

## 2021-12-08 DIAGNOSIS — Z79.01 LONG TERM (CURRENT) USE OF ANTICOAGULANTS: ICD-10-CM

## 2021-12-08 DIAGNOSIS — Z95.2 PRESENCE OF PROSTHETIC HEART VALVE: Chronic | ICD-10-CM

## 2021-12-08 DIAGNOSIS — Z96.649 PRESENCE OF UNSPECIFIED ARTIFICIAL HIP JOINT: Chronic | ICD-10-CM

## 2021-12-08 LAB
INR PPP: 2.8 RATIO
QUALITY CONTROL: YES

## 2021-12-15 ENCOUNTER — APPOINTMENT (OUTPATIENT)
Dept: MEDICATION MANAGEMENT | Facility: CLINIC | Age: 86
End: 2021-12-15

## 2021-12-15 ENCOUNTER — OUTPATIENT (OUTPATIENT)
Dept: OUTPATIENT SERVICES | Facility: HOSPITAL | Age: 86
LOS: 1 days | Discharge: HOME | End: 2021-12-15

## 2021-12-15 DIAGNOSIS — Z96.649 PRESENCE OF UNSPECIFIED ARTIFICIAL HIP JOINT: Chronic | ICD-10-CM

## 2021-12-15 DIAGNOSIS — I27.82 CHRONIC PULMONARY EMBOLISM: ICD-10-CM

## 2021-12-15 DIAGNOSIS — Z95.2 PRESENCE OF PROSTHETIC HEART VALVE: Chronic | ICD-10-CM

## 2021-12-15 DIAGNOSIS — Z79.01 LONG TERM (CURRENT) USE OF ANTICOAGULANTS: ICD-10-CM

## 2021-12-15 LAB
INR PPP: 3.6 RATIO
QUALITY CONTROL: YES

## 2021-12-22 ENCOUNTER — APPOINTMENT (OUTPATIENT)
Dept: MEDICATION MANAGEMENT | Facility: CLINIC | Age: 86
End: 2021-12-22

## 2021-12-22 ENCOUNTER — OUTPATIENT (OUTPATIENT)
Dept: OUTPATIENT SERVICES | Facility: HOSPITAL | Age: 86
LOS: 1 days | Discharge: HOME | End: 2021-12-22

## 2021-12-22 DIAGNOSIS — Z79.01 LONG TERM (CURRENT) USE OF ANTICOAGULANTS: ICD-10-CM

## 2021-12-22 DIAGNOSIS — I27.82 CHRONIC PULMONARY EMBOLISM: ICD-10-CM

## 2021-12-22 DIAGNOSIS — Z96.649 PRESENCE OF UNSPECIFIED ARTIFICIAL HIP JOINT: Chronic | ICD-10-CM

## 2021-12-22 DIAGNOSIS — Z95.2 PRESENCE OF PROSTHETIC HEART VALVE: Chronic | ICD-10-CM

## 2021-12-22 LAB
INR PPP: 2 RATIO
QUALITY CONTROL: YES

## 2021-12-29 ENCOUNTER — OUTPATIENT (OUTPATIENT)
Dept: OUTPATIENT SERVICES | Facility: HOSPITAL | Age: 86
LOS: 1 days | Discharge: HOME | End: 2021-12-29

## 2021-12-29 ENCOUNTER — APPOINTMENT (OUTPATIENT)
Dept: MEDICATION MANAGEMENT | Facility: CLINIC | Age: 86
End: 2021-12-29

## 2021-12-29 DIAGNOSIS — I27.82 CHRONIC PULMONARY EMBOLISM: ICD-10-CM

## 2021-12-29 DIAGNOSIS — Z96.649 PRESENCE OF UNSPECIFIED ARTIFICIAL HIP JOINT: Chronic | ICD-10-CM

## 2021-12-29 DIAGNOSIS — Z95.2 PRESENCE OF PROSTHETIC HEART VALVE: Chronic | ICD-10-CM

## 2021-12-29 DIAGNOSIS — Z79.01 LONG TERM (CURRENT) USE OF ANTICOAGULANTS: ICD-10-CM

## 2021-12-29 LAB
INR PPP: 2.5 RATIO
QUALITY CONTROL: YES

## 2022-01-05 ENCOUNTER — OUTPATIENT (OUTPATIENT)
Dept: OUTPATIENT SERVICES | Facility: HOSPITAL | Age: 87
LOS: 1 days | Discharge: HOME | End: 2022-01-05

## 2022-01-05 ENCOUNTER — APPOINTMENT (OUTPATIENT)
Dept: MEDICATION MANAGEMENT | Facility: CLINIC | Age: 87
End: 2022-01-05

## 2022-01-05 DIAGNOSIS — Z96.649 PRESENCE OF UNSPECIFIED ARTIFICIAL HIP JOINT: Chronic | ICD-10-CM

## 2022-01-05 DIAGNOSIS — Z79.01 LONG TERM (CURRENT) USE OF ANTICOAGULANTS: ICD-10-CM

## 2022-01-05 DIAGNOSIS — I27.82 CHRONIC PULMONARY EMBOLISM: ICD-10-CM

## 2022-01-05 DIAGNOSIS — Z95.2 PRESENCE OF PROSTHETIC HEART VALVE: Chronic | ICD-10-CM

## 2022-01-05 LAB
INR PPP: 3 RATIO
QUALITY CONTROL: YES

## 2022-01-12 ENCOUNTER — APPOINTMENT (OUTPATIENT)
Dept: MEDICATION MANAGEMENT | Facility: CLINIC | Age: 87
End: 2022-01-12

## 2022-01-12 ENCOUNTER — OUTPATIENT (OUTPATIENT)
Dept: OUTPATIENT SERVICES | Facility: HOSPITAL | Age: 87
LOS: 1 days | Discharge: HOME | End: 2022-01-12

## 2022-01-12 DIAGNOSIS — Z79.01 LONG TERM (CURRENT) USE OF ANTICOAGULANTS: ICD-10-CM

## 2022-01-12 DIAGNOSIS — I27.82 CHRONIC PULMONARY EMBOLISM: ICD-10-CM

## 2022-01-12 DIAGNOSIS — Z96.649 PRESENCE OF UNSPECIFIED ARTIFICIAL HIP JOINT: Chronic | ICD-10-CM

## 2022-01-12 DIAGNOSIS — Z95.2 PRESENCE OF PROSTHETIC HEART VALVE: Chronic | ICD-10-CM

## 2022-01-12 LAB
INR PPP: 1.9 RATIO
QUALITY CONTROL: YES

## 2022-01-19 ENCOUNTER — APPOINTMENT (OUTPATIENT)
Dept: MEDICATION MANAGEMENT | Facility: CLINIC | Age: 87
End: 2022-01-19

## 2022-01-19 ENCOUNTER — OUTPATIENT (OUTPATIENT)
Dept: OUTPATIENT SERVICES | Facility: HOSPITAL | Age: 87
LOS: 1 days | Discharge: HOME | End: 2022-01-19

## 2022-01-19 DIAGNOSIS — I27.82 CHRONIC PULMONARY EMBOLISM: ICD-10-CM

## 2022-01-19 DIAGNOSIS — Z96.649 PRESENCE OF UNSPECIFIED ARTIFICIAL HIP JOINT: Chronic | ICD-10-CM

## 2022-01-19 DIAGNOSIS — Z95.2 PRESENCE OF PROSTHETIC HEART VALVE: Chronic | ICD-10-CM

## 2022-01-19 DIAGNOSIS — Z79.01 LONG TERM (CURRENT) USE OF ANTICOAGULANTS: ICD-10-CM

## 2022-01-19 LAB
INR PPP: 3.7 RATIO
QUALITY CONTROL: YES

## 2022-01-26 ENCOUNTER — OUTPATIENT (OUTPATIENT)
Dept: OUTPATIENT SERVICES | Facility: HOSPITAL | Age: 87
LOS: 1 days | Discharge: HOME | End: 2022-01-26

## 2022-01-26 ENCOUNTER — APPOINTMENT (OUTPATIENT)
Dept: MEDICATION MANAGEMENT | Facility: CLINIC | Age: 87
End: 2022-01-26

## 2022-01-26 DIAGNOSIS — Z96.649 PRESENCE OF UNSPECIFIED ARTIFICIAL HIP JOINT: Chronic | ICD-10-CM

## 2022-01-26 DIAGNOSIS — Z79.01 LONG TERM (CURRENT) USE OF ANTICOAGULANTS: ICD-10-CM

## 2022-01-26 DIAGNOSIS — Z95.2 PRESENCE OF PROSTHETIC HEART VALVE: Chronic | ICD-10-CM

## 2022-01-26 DIAGNOSIS — I27.82 CHRONIC PULMONARY EMBOLISM: ICD-10-CM

## 2022-01-26 LAB
INR PPP: 2.6 RATIO
QUALITY CONTROL: YES

## 2022-02-02 ENCOUNTER — OUTPATIENT (OUTPATIENT)
Dept: OUTPATIENT SERVICES | Facility: HOSPITAL | Age: 87
LOS: 1 days | Discharge: HOME | End: 2022-02-02

## 2022-02-02 ENCOUNTER — APPOINTMENT (OUTPATIENT)
Dept: MEDICATION MANAGEMENT | Facility: CLINIC | Age: 87
End: 2022-02-02

## 2022-02-02 DIAGNOSIS — Z79.01 LONG TERM (CURRENT) USE OF ANTICOAGULANTS: ICD-10-CM

## 2022-02-02 DIAGNOSIS — Z95.2 PRESENCE OF PROSTHETIC HEART VALVE: Chronic | ICD-10-CM

## 2022-02-02 DIAGNOSIS — I27.82 CHRONIC PULMONARY EMBOLISM: ICD-10-CM

## 2022-02-02 DIAGNOSIS — Z96.649 PRESENCE OF UNSPECIFIED ARTIFICIAL HIP JOINT: Chronic | ICD-10-CM

## 2022-02-02 LAB
INR PPP: 2.7 RATIO
QUALITY CONTROL: YES

## 2022-02-07 ENCOUNTER — APPOINTMENT (OUTPATIENT)
Dept: CARDIOLOGY | Facility: CLINIC | Age: 87
End: 2022-02-07
Payer: MEDICARE

## 2022-02-07 VITALS
DIASTOLIC BLOOD PRESSURE: 70 MMHG | WEIGHT: 138 LBS | BODY MASS INDEX: 22.18 KG/M2 | SYSTOLIC BLOOD PRESSURE: 120 MMHG | HEIGHT: 66 IN

## 2022-02-07 DIAGNOSIS — I27.20 PULMONARY HYPERTENSION, UNSPECIFIED: ICD-10-CM

## 2022-02-07 DIAGNOSIS — Z79.01 LONG TERM (CURRENT) USE OF ANTICOAGULANTS: ICD-10-CM

## 2022-02-07 PROCEDURE — 99214 OFFICE O/P EST MOD 30 MIN: CPT

## 2022-02-07 RX ORDER — EZETIMIBE 10 MG/1
10 TABLET ORAL DAILY
Qty: 90 | Refills: 3 | Status: DISCONTINUED | COMMUNITY
Start: 1900-01-01 | End: 2022-02-07

## 2022-02-07 NOTE — HISTORY OF PRESENT ILLNESS
[FreeTextEntry1] : Patient had 10 teeth pulled. Then on lovenox she bled. She got 2 units blood. No more bleeding. No SOB . INR good. No bleeding She had THR 7/16/21. Then developed afib. Walking in house . uses walker. Off amiodarone \par No bottom teeth. To go [place]. To get teeth done. Cant get done in [place]. No palpitations

## 2022-02-07 NOTE — DISCUSSION/SUMMARY
[FreeTextEntry1] : The pt had CABG mechanical AVR 2003 on 5/14 palpitations found in V tach. IV amiodarone need temp pacemaker. Cath RCA PO. V tach secondary ischemia. Event monitor no VT. Now off amiodarone. Palpitations resolved. .Now off xanax. Echo 1017 mild pulm htn mild MR. off plavix. Now on asa 81mg not drive arthritis. She needs access a ride told resume exercise.She gained  3 lbs. She does not want stress test.Fx right shoulder 11/16. Refused shoulder surgery. go rehab.Dizzy get up resolved. She 6/21 10 teeth pulled. On Lovenox bled mouth. Got 2 units blood.  She had THR 7/16/21. Then developed afib.Will  taper stop midodrine .  She lost 5 pounds. She has no teeth. MCOT 2 week. No afib.. SVT vs vtach. Not drink boost it has vitamin k. No bottom teeth. To go [facility]. To get teeth done. Cant get done in [place]. Try eat more

## 2022-02-09 ENCOUNTER — OUTPATIENT (OUTPATIENT)
Dept: OUTPATIENT SERVICES | Facility: HOSPITAL | Age: 87
LOS: 1 days | Discharge: HOME | End: 2022-02-09

## 2022-02-09 ENCOUNTER — APPOINTMENT (OUTPATIENT)
Dept: MEDICATION MANAGEMENT | Facility: CLINIC | Age: 87
End: 2022-02-09

## 2022-02-09 DIAGNOSIS — Z79.01 LONG TERM (CURRENT) USE OF ANTICOAGULANTS: ICD-10-CM

## 2022-02-09 DIAGNOSIS — I27.82 CHRONIC PULMONARY EMBOLISM: ICD-10-CM

## 2022-02-09 DIAGNOSIS — Z96.649 PRESENCE OF UNSPECIFIED ARTIFICIAL HIP JOINT: Chronic | ICD-10-CM

## 2022-02-09 DIAGNOSIS — Z95.2 PRESENCE OF PROSTHETIC HEART VALVE: Chronic | ICD-10-CM

## 2022-02-09 LAB
INR PPP: 3.2 RATIO
QUALITY CONTROL: YES

## 2022-02-16 ENCOUNTER — APPOINTMENT (OUTPATIENT)
Dept: MEDICATION MANAGEMENT | Facility: CLINIC | Age: 87
End: 2022-02-16

## 2022-02-16 ENCOUNTER — OUTPATIENT (OUTPATIENT)
Dept: OUTPATIENT SERVICES | Facility: HOSPITAL | Age: 87
LOS: 1 days | Discharge: HOME | End: 2022-02-16

## 2022-02-16 DIAGNOSIS — Z96.649 PRESENCE OF UNSPECIFIED ARTIFICIAL HIP JOINT: Chronic | ICD-10-CM

## 2022-02-16 DIAGNOSIS — Z79.01 LONG TERM (CURRENT) USE OF ANTICOAGULANTS: ICD-10-CM

## 2022-02-16 DIAGNOSIS — I27.82 CHRONIC PULMONARY EMBOLISM: ICD-10-CM

## 2022-02-16 DIAGNOSIS — Z95.2 PRESENCE OF PROSTHETIC HEART VALVE: Chronic | ICD-10-CM

## 2022-02-16 LAB
INR PPP: 2.5 RATIO
QUALITY CONTROL: YES

## 2022-02-23 ENCOUNTER — APPOINTMENT (OUTPATIENT)
Dept: MEDICATION MANAGEMENT | Facility: CLINIC | Age: 87
End: 2022-02-23

## 2022-02-23 ENCOUNTER — OUTPATIENT (OUTPATIENT)
Dept: OUTPATIENT SERVICES | Facility: HOSPITAL | Age: 87
LOS: 1 days | Discharge: HOME | End: 2022-02-23

## 2022-02-23 DIAGNOSIS — Z96.649 PRESENCE OF UNSPECIFIED ARTIFICIAL HIP JOINT: Chronic | ICD-10-CM

## 2022-02-23 DIAGNOSIS — I27.82 CHRONIC PULMONARY EMBOLISM: ICD-10-CM

## 2022-02-23 DIAGNOSIS — Z95.2 PRESENCE OF PROSTHETIC HEART VALVE: Chronic | ICD-10-CM

## 2022-02-23 DIAGNOSIS — Z79.01 LONG TERM (CURRENT) USE OF ANTICOAGULANTS: ICD-10-CM

## 2022-02-23 LAB
INR PPP: 3 RATIO
QUALITY CONTROL: YES

## 2022-03-02 ENCOUNTER — APPOINTMENT (OUTPATIENT)
Dept: MEDICATION MANAGEMENT | Facility: CLINIC | Age: 87
End: 2022-03-02

## 2022-03-02 ENCOUNTER — OUTPATIENT (OUTPATIENT)
Dept: OUTPATIENT SERVICES | Facility: HOSPITAL | Age: 87
LOS: 1 days | Discharge: HOME | End: 2022-03-02

## 2022-03-02 DIAGNOSIS — Z79.01 LONG TERM (CURRENT) USE OF ANTICOAGULANTS: ICD-10-CM

## 2022-03-02 DIAGNOSIS — I27.82 CHRONIC PULMONARY EMBOLISM: ICD-10-CM

## 2022-03-02 DIAGNOSIS — Z96.649 PRESENCE OF UNSPECIFIED ARTIFICIAL HIP JOINT: Chronic | ICD-10-CM

## 2022-03-02 DIAGNOSIS — Z95.2 PRESENCE OF PROSTHETIC HEART VALVE: Chronic | ICD-10-CM

## 2022-03-02 LAB
INR PPP: 3.4 RATIO
QUALITY CONTROL: YES

## 2022-03-09 ENCOUNTER — OUTPATIENT (OUTPATIENT)
Dept: OUTPATIENT SERVICES | Facility: HOSPITAL | Age: 87
LOS: 1 days | Discharge: HOME | End: 2022-03-09

## 2022-03-09 ENCOUNTER — APPOINTMENT (OUTPATIENT)
Dept: MEDICATION MANAGEMENT | Facility: CLINIC | Age: 87
End: 2022-03-09

## 2022-03-09 DIAGNOSIS — Z95.2 PRESENCE OF PROSTHETIC HEART VALVE: Chronic | ICD-10-CM

## 2022-03-09 DIAGNOSIS — I27.82 CHRONIC PULMONARY EMBOLISM: ICD-10-CM

## 2022-03-09 DIAGNOSIS — Z79.01 LONG TERM (CURRENT) USE OF ANTICOAGULANTS: ICD-10-CM

## 2022-03-09 DIAGNOSIS — Z96.649 PRESENCE OF UNSPECIFIED ARTIFICIAL HIP JOINT: Chronic | ICD-10-CM

## 2022-03-09 LAB
INR PPP: 4.3 RATIO
QUALITY CONTROL: YES

## 2022-03-15 NOTE — PRE-OP CHECKLIST - NS PREOP CHK CHLOROHEX WASH
Other (Free Text): \\nHypertrophic Actinic Keratosis | Location: mid-frontal scalp | Biopsy Date: 2021/09/21 | Treatment: LN2 | Treatment Date: 2021/10/29 | Status: COMPLETED\\nBasal Cell Carcinoma | Location: left superior medial malar cheek | Biopsy Date: 2015/10/26 | Status: COMPLETED Detail Level: Zone Render Risk Assessment In Note?: no Note Text (......Xxx Chief Complaint.): This diagnosis correlates with the Other (Free Text): s/p cyst excision N/A

## 2022-03-16 ENCOUNTER — OUTPATIENT (OUTPATIENT)
Dept: OUTPATIENT SERVICES | Facility: HOSPITAL | Age: 87
LOS: 1 days | Discharge: HOME | End: 2022-03-16

## 2022-03-16 ENCOUNTER — APPOINTMENT (OUTPATIENT)
Dept: MEDICATION MANAGEMENT | Facility: CLINIC | Age: 87
End: 2022-03-16

## 2022-03-16 DIAGNOSIS — Z95.2 PRESENCE OF PROSTHETIC HEART VALVE: Chronic | ICD-10-CM

## 2022-03-16 DIAGNOSIS — Z79.01 LONG TERM (CURRENT) USE OF ANTICOAGULANTS: ICD-10-CM

## 2022-03-16 DIAGNOSIS — I27.82 CHRONIC PULMONARY EMBOLISM: ICD-10-CM

## 2022-03-16 DIAGNOSIS — Z96.649 PRESENCE OF UNSPECIFIED ARTIFICIAL HIP JOINT: Chronic | ICD-10-CM

## 2022-03-16 LAB
INR PPP: 2 RATIO
QUALITY CONTROL: YES

## 2022-03-23 ENCOUNTER — OUTPATIENT (OUTPATIENT)
Dept: OUTPATIENT SERVICES | Facility: HOSPITAL | Age: 87
LOS: 1 days | Discharge: HOME | End: 2022-03-23

## 2022-03-23 ENCOUNTER — APPOINTMENT (OUTPATIENT)
Dept: MEDICATION MANAGEMENT | Facility: CLINIC | Age: 87
End: 2022-03-23

## 2022-03-23 DIAGNOSIS — Z79.01 LONG TERM (CURRENT) USE OF ANTICOAGULANTS: ICD-10-CM

## 2022-03-23 DIAGNOSIS — I27.82 CHRONIC PULMONARY EMBOLISM: ICD-10-CM

## 2022-03-23 DIAGNOSIS — Z95.2 PRESENCE OF PROSTHETIC HEART VALVE: Chronic | ICD-10-CM

## 2022-03-23 DIAGNOSIS — Z96.649 PRESENCE OF UNSPECIFIED ARTIFICIAL HIP JOINT: Chronic | ICD-10-CM

## 2022-03-23 LAB
INR PPP: 3.1 RATIO
QUALITY CONTROL: YES

## 2022-03-30 ENCOUNTER — APPOINTMENT (OUTPATIENT)
Dept: MEDICATION MANAGEMENT | Facility: CLINIC | Age: 87
End: 2022-03-30

## 2022-03-30 ENCOUNTER — OUTPATIENT (OUTPATIENT)
Dept: OUTPATIENT SERVICES | Facility: HOSPITAL | Age: 87
LOS: 1 days | Discharge: HOME | End: 2022-03-30

## 2022-03-30 DIAGNOSIS — I27.82 CHRONIC PULMONARY EMBOLISM: ICD-10-CM

## 2022-03-30 DIAGNOSIS — Z95.2 PRESENCE OF PROSTHETIC HEART VALVE: Chronic | ICD-10-CM

## 2022-03-30 DIAGNOSIS — Z96.649 PRESENCE OF UNSPECIFIED ARTIFICIAL HIP JOINT: Chronic | ICD-10-CM

## 2022-03-30 DIAGNOSIS — Z79.01 LONG TERM (CURRENT) USE OF ANTICOAGULANTS: ICD-10-CM

## 2022-03-30 LAB
INR PPP: 3.2 RATIO
QUALITY CONTROL: YES

## 2022-04-13 ENCOUNTER — APPOINTMENT (OUTPATIENT)
Dept: MEDICATION MANAGEMENT | Facility: CLINIC | Age: 87
End: 2022-04-13

## 2022-04-13 ENCOUNTER — OUTPATIENT (OUTPATIENT)
Dept: OUTPATIENT SERVICES | Facility: HOSPITAL | Age: 87
LOS: 1 days | Discharge: HOME | End: 2022-04-13

## 2022-04-13 DIAGNOSIS — Z95.2 PRESENCE OF PROSTHETIC HEART VALVE: Chronic | ICD-10-CM

## 2022-04-13 DIAGNOSIS — Z79.01 LONG TERM (CURRENT) USE OF ANTICOAGULANTS: ICD-10-CM

## 2022-04-13 DIAGNOSIS — I27.82 CHRONIC PULMONARY EMBOLISM: ICD-10-CM

## 2022-04-13 DIAGNOSIS — Z96.649 PRESENCE OF UNSPECIFIED ARTIFICIAL HIP JOINT: Chronic | ICD-10-CM

## 2022-04-20 ENCOUNTER — APPOINTMENT (OUTPATIENT)
Dept: MEDICATION MANAGEMENT | Facility: CLINIC | Age: 87
End: 2022-04-20

## 2022-04-20 ENCOUNTER — OUTPATIENT (OUTPATIENT)
Dept: OUTPATIENT SERVICES | Facility: HOSPITAL | Age: 87
LOS: 1 days | Discharge: HOME | End: 2022-04-20

## 2022-04-20 DIAGNOSIS — Z95.2 PRESENCE OF PROSTHETIC HEART VALVE: Chronic | ICD-10-CM

## 2022-04-20 DIAGNOSIS — I27.82 CHRONIC PULMONARY EMBOLISM: ICD-10-CM

## 2022-04-20 DIAGNOSIS — Z96.649 PRESENCE OF UNSPECIFIED ARTIFICIAL HIP JOINT: Chronic | ICD-10-CM

## 2022-04-20 DIAGNOSIS — Z79.01 LONG TERM (CURRENT) USE OF ANTICOAGULANTS: ICD-10-CM

## 2022-04-27 ENCOUNTER — APPOINTMENT (OUTPATIENT)
Dept: MEDICATION MANAGEMENT | Facility: CLINIC | Age: 87
End: 2022-04-27

## 2022-04-27 ENCOUNTER — OUTPATIENT (OUTPATIENT)
Dept: OUTPATIENT SERVICES | Facility: HOSPITAL | Age: 87
LOS: 1 days | Discharge: HOME | End: 2022-04-27

## 2022-04-27 ENCOUNTER — APPOINTMENT (OUTPATIENT)
Dept: OPHTHALMOLOGY | Facility: CLINIC | Age: 87
End: 2022-04-27

## 2022-04-27 DIAGNOSIS — Z95.2 PRESENCE OF PROSTHETIC HEART VALVE: Chronic | ICD-10-CM

## 2022-04-27 DIAGNOSIS — Z79.01 LONG TERM (CURRENT) USE OF ANTICOAGULANTS: ICD-10-CM

## 2022-04-27 DIAGNOSIS — I27.82 CHRONIC PULMONARY EMBOLISM: ICD-10-CM

## 2022-04-27 DIAGNOSIS — Z96.649 PRESENCE OF UNSPECIFIED ARTIFICIAL HIP JOINT: Chronic | ICD-10-CM

## 2022-04-27 LAB
INR PPP: 2.7 RATIO
QUALITY CONTROL: YES

## 2022-05-04 ENCOUNTER — APPOINTMENT (OUTPATIENT)
Dept: MEDICATION MANAGEMENT | Facility: CLINIC | Age: 87
End: 2022-05-04

## 2022-05-04 ENCOUNTER — OUTPATIENT (OUTPATIENT)
Dept: OUTPATIENT SERVICES | Facility: HOSPITAL | Age: 87
LOS: 1 days | Discharge: HOME | End: 2022-05-04

## 2022-05-04 DIAGNOSIS — I27.82 CHRONIC PULMONARY EMBOLISM: ICD-10-CM

## 2022-05-04 DIAGNOSIS — Z95.2 PRESENCE OF PROSTHETIC HEART VALVE: Chronic | ICD-10-CM

## 2022-05-04 DIAGNOSIS — Z96.649 PRESENCE OF UNSPECIFIED ARTIFICIAL HIP JOINT: Chronic | ICD-10-CM

## 2022-05-04 DIAGNOSIS — Z79.01 LONG TERM (CURRENT) USE OF ANTICOAGULANTS: ICD-10-CM

## 2022-05-04 LAB
INR PPP: 2.4 RATIO
QUALITY CONTROL: YES

## 2022-05-11 ENCOUNTER — OUTPATIENT (OUTPATIENT)
Dept: OUTPATIENT SERVICES | Facility: HOSPITAL | Age: 87
LOS: 1 days | Discharge: HOME | End: 2022-05-11

## 2022-05-11 ENCOUNTER — APPOINTMENT (OUTPATIENT)
Dept: MEDICATION MANAGEMENT | Facility: CLINIC | Age: 87
End: 2022-05-11

## 2022-05-11 DIAGNOSIS — Z95.2 PRESENCE OF PROSTHETIC HEART VALVE: Chronic | ICD-10-CM

## 2022-05-11 DIAGNOSIS — Z96.649 PRESENCE OF UNSPECIFIED ARTIFICIAL HIP JOINT: Chronic | ICD-10-CM

## 2022-05-11 DIAGNOSIS — Z79.01 LONG TERM (CURRENT) USE OF ANTICOAGULANTS: ICD-10-CM

## 2022-05-11 DIAGNOSIS — I27.82 CHRONIC PULMONARY EMBOLISM: ICD-10-CM

## 2022-05-18 ENCOUNTER — APPOINTMENT (OUTPATIENT)
Dept: MEDICATION MANAGEMENT | Facility: CLINIC | Age: 87
End: 2022-05-18

## 2022-05-18 ENCOUNTER — OUTPATIENT (OUTPATIENT)
Dept: OUTPATIENT SERVICES | Facility: HOSPITAL | Age: 87
LOS: 1 days | Discharge: HOME | End: 2022-05-18

## 2022-05-18 DIAGNOSIS — I27.82 CHRONIC PULMONARY EMBOLISM: ICD-10-CM

## 2022-05-18 DIAGNOSIS — Z79.01 LONG TERM (CURRENT) USE OF ANTICOAGULANTS: ICD-10-CM

## 2022-05-18 DIAGNOSIS — Z96.649 PRESENCE OF UNSPECIFIED ARTIFICIAL HIP JOINT: Chronic | ICD-10-CM

## 2022-05-18 DIAGNOSIS — Z95.2 PRESENCE OF PROSTHETIC HEART VALVE: Chronic | ICD-10-CM

## 2022-05-18 LAB
INR PPP: 2.9 RATIO
QUALITY CONTROL: YES

## 2022-05-25 ENCOUNTER — APPOINTMENT (OUTPATIENT)
Dept: MEDICATION MANAGEMENT | Facility: CLINIC | Age: 87
End: 2022-05-25

## 2022-05-25 ENCOUNTER — OUTPATIENT (OUTPATIENT)
Dept: OUTPATIENT SERVICES | Facility: HOSPITAL | Age: 87
LOS: 1 days | Discharge: HOME | End: 2022-05-25

## 2022-05-25 DIAGNOSIS — Z96.649 PRESENCE OF UNSPECIFIED ARTIFICIAL HIP JOINT: Chronic | ICD-10-CM

## 2022-05-25 DIAGNOSIS — I27.82 CHRONIC PULMONARY EMBOLISM: ICD-10-CM

## 2022-05-25 DIAGNOSIS — Z79.01 LONG TERM (CURRENT) USE OF ANTICOAGULANTS: ICD-10-CM

## 2022-05-25 DIAGNOSIS — Z95.2 PRESENCE OF PROSTHETIC HEART VALVE: Chronic | ICD-10-CM

## 2022-05-25 LAB
INR PPP: 2.3 RATIO
QUALITY CONTROL: YES

## 2022-05-31 ENCOUNTER — APPOINTMENT (OUTPATIENT)
Dept: CARDIOLOGY | Facility: CLINIC | Age: 87
End: 2022-05-31
Payer: MEDICARE

## 2022-05-31 DIAGNOSIS — Z95.2 PRESENCE OF PROSTHETIC HEART VALVE: ICD-10-CM

## 2022-05-31 DIAGNOSIS — I34.0 NONRHEUMATIC MITRAL (VALVE) INSUFFICIENCY: ICD-10-CM

## 2022-05-31 PROCEDURE — 99213 OFFICE O/P EST LOW 20 MIN: CPT

## 2022-05-31 RX ORDER — ESCITALOPRAM OXALATE 10 MG/1
10 TABLET ORAL DAILY
Qty: 90 | Refills: 3 | Status: ACTIVE | COMMUNITY
Start: 1900-01-01 | End: 1900-01-01

## 2022-05-31 NOTE — REASON FOR VISIT
[Arrhythmia/ECG Abnorrmalities] : arrhythmia/ECG abnormalities [Coronary Artery Disease] : coronary artery disease [Family Member] : family member [FreeTextEntry1] : Here  for f/u overall feels well

## 2022-05-31 NOTE — DISCUSSION/SUMMARY
[FreeTextEntry1] : The pt had CABG mechanical AVR 2003 on 5/14 palpitations found in V tach. IV amiodarone need temp pacemaker. Cath RCA PO. V tach secondary ischemia. Event monitor no VT. Now off amiodarone. Palpitations resolved.  Echo 1017 mild pulm htn mild MR. off plavix. Now on asa 81mg not drive arthritis. She needs access a ride told resume exercise. She does not want stress test.Fx right shoulder 11/16. Refused shoulder surgery. go rehab.Dizzy get up resolved.  6/21 10 teeth pulled. On Lovenox bled mouth. received 2 units blood.  She had THR 7/16/21. Then developed afib. BP dropped now  off diovan only on atenolol.Will  taper  midodrine .  She lost 5 pounds.  MCOT 2 week. No afib.. SVT vs v tach. Not drink boost it has vitamin k. Pt had teeth done in [facility].

## 2022-05-31 NOTE — PHYSICAL EXAM
[5th Left ICS - MCL] : palpated at the 5th LICS in the midclavicular line [No Precordial Heave] : no precordial heave was noted [Normal Rate] : normal [Rhythm Regular] : regular [Normal S1] : normal S1 [Normal S2] : normal S2 [Click] : a ~M click was heard [I] : a grade 1 [No Pitting Edema] : no pitting edema present [2+] : left 2+ [No Abnormalities] : the abdominal aorta was not enlarged and no bruit was heard [Normal] : alert and oriented, normal memory [S3] : no S3 [S4] : no S4 [Right Carotid Bruit] : no bruit heard over the right carotid [Left Carotid Bruit] : no bruit heard over the left carotid [de-identified] : trace edema bilat + venous stasis

## 2022-05-31 NOTE — REVIEW OF SYSTEMS
[Joint Pain] : joint pain [Shoulder Pain] : shoulder pain [Hand Pain] : hand pain [Knee Pain] : knee pain [Lower Back Pain] : lower back pain [Weight Loss (___ Lbs)] : [unfilled] ~Ulb weight loss [Fever] : no fever [Chills] : no chills [Blurry Vision] : no blurred vision [Earache] : no earache [Hearing Loss] : no hearing loss [Sinus Pressure] : no sinus pressure [SOB] : no shortness of breath [Chest Discomfort] : no chest discomfort [Palpitations] : no palpitations [Cough] : no cough [Wheezing] : no wheezing [Abdominal Pain] : no abdominal pain [Vomiting] : no vomiting [Constipation] : no constipation [Dysuria] : no dysuria [Rash] : no rash [Skin Lesions] : no skin lesions [Dizziness] : no dizziness [Confusion] : no confusion was observed [Swollen Glands] : no swollen glands

## 2022-05-31 NOTE — HISTORY OF PRESENT ILLNESS
[FreeTextEntry1] :  6/21 10 teeth pulled. On Lovenox bled mouth. required  2 units blood.  She had THR 7/16/21. Then developed afib. She got her teeth done in [place]\par The pt has h/o CABG, mechanical AVR 2003 on 5/14 palpitations found in V tach. IV amiodarone need temp pacemaker. Cath RCA PO. V tach secondary ischemia. Event monitor no VT. Now off amiodarone. 10/18/21 Holter No AF had SVT/VT 19 beats\par She had the dental work done in [place] and feeling better. Denies chest pain SOB palpitations dizziness or syncope. She is eating less lost 5 lbs

## 2022-06-01 ENCOUNTER — OUTPATIENT (OUTPATIENT)
Dept: OUTPATIENT SERVICES | Facility: HOSPITAL | Age: 87
LOS: 1 days | Discharge: HOME | End: 2022-06-01

## 2022-06-01 ENCOUNTER — APPOINTMENT (OUTPATIENT)
Dept: MEDICATION MANAGEMENT | Facility: CLINIC | Age: 87
End: 2022-06-01

## 2022-06-01 DIAGNOSIS — Z79.01 LONG TERM (CURRENT) USE OF ANTICOAGULANTS: ICD-10-CM

## 2022-06-01 DIAGNOSIS — Z95.2 PRESENCE OF PROSTHETIC HEART VALVE: Chronic | ICD-10-CM

## 2022-06-01 DIAGNOSIS — I27.82 CHRONIC PULMONARY EMBOLISM: ICD-10-CM

## 2022-06-01 DIAGNOSIS — Z96.649 PRESENCE OF UNSPECIFIED ARTIFICIAL HIP JOINT: Chronic | ICD-10-CM

## 2022-06-01 LAB
INR PPP: 2.2 RATIO
QUALITY CONTROL: YES

## 2022-06-01 RX ORDER — ALPRAZOLAM 0.5 MG/1
0.5 TABLET ORAL DAILY
Qty: 30 | Refills: 0 | Status: ACTIVE | COMMUNITY
Start: 1900-01-01 | End: 1900-01-01

## 2022-06-02 LAB
ALBUMIN SERPL ELPH-MCNC: 4.2 G/DL
ALP BLD-CCNC: 50 U/L
ALT SERPL-CCNC: 9 U/L
ANION GAP SERPL CALC-SCNC: 11 MMOL/L
AST SERPL-CCNC: 20 U/L
BASOPHILS # BLD AUTO: 0.02 K/UL
BASOPHILS NFR BLD AUTO: 0.4 %
BILIRUB SERPL-MCNC: 0.4 MG/DL
BUN SERPL-MCNC: 18 MG/DL
CALCIUM SERPL-MCNC: 9.2 MG/DL
CHLORIDE SERPL-SCNC: 96 MMOL/L
CHOLEST SERPL-MCNC: 226 MG/DL
CO2 SERPL-SCNC: 28 MMOL/L
CREAT SERPL-MCNC: 0.8 MG/DL
EGFR: 72 ML/MIN/1.73M2
EOSINOPHIL # BLD AUTO: 0.05 K/UL
EOSINOPHIL NFR BLD AUTO: 1 %
GLUCOSE SERPL-MCNC: 97 MG/DL
HCT VFR BLD CALC: 35.8 %
HDLC SERPL-MCNC: 52 MG/DL
HGB BLD-MCNC: 11.3 G/DL
IMM GRANULOCYTES NFR BLD AUTO: 0.2 %
LDLC SERPL CALC-MCNC: 147 MG/DL
LYMPHOCYTES # BLD AUTO: 1.36 K/UL
LYMPHOCYTES NFR BLD AUTO: 27.6 %
MAN DIFF?: NORMAL
MCHC RBC-ENTMCNC: 29.5 PG
MCHC RBC-ENTMCNC: 31.6 G/DL
MCV RBC AUTO: 93.5 FL
MONOCYTES # BLD AUTO: 0.55 K/UL
MONOCYTES NFR BLD AUTO: 11.2 %
NEUTROPHILS # BLD AUTO: 2.93 K/UL
NEUTROPHILS NFR BLD AUTO: 59.6 %
NONHDLC SERPL-MCNC: 174 MG/DL
PLATELET # BLD AUTO: 218 K/UL
POTASSIUM SERPL-SCNC: 4.3 MMOL/L
PROT SERPL-MCNC: 6.6 G/DL
RBC # BLD: 3.83 M/UL
RBC # FLD: 14.5 %
SODIUM SERPL-SCNC: 135 MMOL/L
TRIGL SERPL-MCNC: 134 MG/DL
TSH SERPL-ACNC: 2.43 UIU/ML
WBC # FLD AUTO: 4.92 K/UL

## 2022-06-03 RX ORDER — ATORVASTATIN CALCIUM 20 MG/1
20 TABLET, FILM COATED ORAL
Qty: 90 | Refills: 3 | Status: DISCONTINUED | COMMUNITY
Start: 1900-01-01 | End: 2022-06-03

## 2022-06-03 RX ORDER — ATORVASTATIN CALCIUM 40 MG/1
40 TABLET, FILM COATED ORAL
Qty: 90 | Refills: 3 | Status: ACTIVE | COMMUNITY
Start: 2022-06-03 | End: 1900-01-01

## 2022-06-07 ENCOUNTER — APPOINTMENT (OUTPATIENT)
Dept: CARDIOLOGY | Facility: CLINIC | Age: 87
End: 2022-06-07
Payer: MEDICARE

## 2022-06-07 PROCEDURE — 93306 TTE W/DOPPLER COMPLETE: CPT

## 2022-06-08 ENCOUNTER — OUTPATIENT (OUTPATIENT)
Dept: OUTPATIENT SERVICES | Facility: HOSPITAL | Age: 87
LOS: 1 days | Discharge: HOME | End: 2022-06-08

## 2022-06-08 ENCOUNTER — APPOINTMENT (OUTPATIENT)
Dept: MEDICATION MANAGEMENT | Facility: CLINIC | Age: 87
End: 2022-06-08

## 2022-06-08 DIAGNOSIS — I27.82 CHRONIC PULMONARY EMBOLISM: ICD-10-CM

## 2022-06-08 DIAGNOSIS — Z95.2 PRESENCE OF PROSTHETIC HEART VALVE: Chronic | ICD-10-CM

## 2022-06-08 DIAGNOSIS — Z96.649 PRESENCE OF UNSPECIFIED ARTIFICIAL HIP JOINT: Chronic | ICD-10-CM

## 2022-06-08 DIAGNOSIS — Z79.01 LONG TERM (CURRENT) USE OF ANTICOAGULANTS: ICD-10-CM

## 2022-06-15 ENCOUNTER — APPOINTMENT (OUTPATIENT)
Dept: MEDICATION MANAGEMENT | Facility: CLINIC | Age: 87
End: 2022-06-15

## 2022-06-15 ENCOUNTER — OUTPATIENT (OUTPATIENT)
Dept: OUTPATIENT SERVICES | Facility: HOSPITAL | Age: 87
LOS: 1 days | Discharge: HOME | End: 2022-06-15

## 2022-06-15 DIAGNOSIS — Z95.2 PRESENCE OF PROSTHETIC HEART VALVE: Chronic | ICD-10-CM

## 2022-06-15 DIAGNOSIS — I27.82 CHRONIC PULMONARY EMBOLISM: ICD-10-CM

## 2022-06-15 DIAGNOSIS — Z79.01 LONG TERM (CURRENT) USE OF ANTICOAGULANTS: ICD-10-CM

## 2022-06-15 DIAGNOSIS — Z96.649 PRESENCE OF UNSPECIFIED ARTIFICIAL HIP JOINT: Chronic | ICD-10-CM

## 2022-06-15 LAB
INR PPP: 2.8 RATIO
QUALITY CONTROL: YES

## 2022-06-22 ENCOUNTER — APPOINTMENT (OUTPATIENT)
Dept: MEDICATION MANAGEMENT | Facility: CLINIC | Age: 87
End: 2022-06-22

## 2022-06-22 ENCOUNTER — OUTPATIENT (OUTPATIENT)
Dept: OUTPATIENT SERVICES | Facility: HOSPITAL | Age: 87
LOS: 1 days | Discharge: HOME | End: 2022-06-22

## 2022-06-22 DIAGNOSIS — Z95.2 PRESENCE OF PROSTHETIC HEART VALVE: Chronic | ICD-10-CM

## 2022-06-22 DIAGNOSIS — Z96.649 PRESENCE OF UNSPECIFIED ARTIFICIAL HIP JOINT: Chronic | ICD-10-CM

## 2022-06-22 DIAGNOSIS — I27.82 CHRONIC PULMONARY EMBOLISM: ICD-10-CM

## 2022-06-22 DIAGNOSIS — Z79.01 LONG TERM (CURRENT) USE OF ANTICOAGULANTS: ICD-10-CM

## 2022-06-22 LAB
INR PPP: 3.3 RATIO
QUALITY CONTROL: YES

## 2022-06-29 ENCOUNTER — APPOINTMENT (OUTPATIENT)
Dept: MEDICATION MANAGEMENT | Facility: CLINIC | Age: 87
End: 2022-06-29

## 2022-06-29 ENCOUNTER — OUTPATIENT (OUTPATIENT)
Dept: OUTPATIENT SERVICES | Facility: HOSPITAL | Age: 87
LOS: 1 days | Discharge: HOME | End: 2022-06-29

## 2022-06-29 DIAGNOSIS — I27.82 CHRONIC PULMONARY EMBOLISM: ICD-10-CM

## 2022-06-29 DIAGNOSIS — Z96.649 PRESENCE OF UNSPECIFIED ARTIFICIAL HIP JOINT: Chronic | ICD-10-CM

## 2022-06-29 DIAGNOSIS — Z79.01 LONG TERM (CURRENT) USE OF ANTICOAGULANTS: ICD-10-CM

## 2022-06-29 DIAGNOSIS — Z95.2 PRESENCE OF PROSTHETIC HEART VALVE: Chronic | ICD-10-CM

## 2022-06-29 LAB
INR PPP: 3 RATIO
QUALITY CONTROL: YES

## 2022-06-30 NOTE — ED PROVIDER NOTE - RELATIONSHIP TO PATIENT
Cm received a [employer] message from Nandini Urena indicating pt would not receive transportation to [address] [facility] today but rather tomorrow by CTS at 10:30am  Cm requested that this be made for today due to pt's need for BHU placement  Cm informed no transport available  Cm contacted [place] and spoke with Malvina Severe Cm informed of delay and Malvina Severe indicated this was acceptable  Cm contacted unit charge nurse and informed of this change  [facility] [phone]

## 2022-07-06 ENCOUNTER — APPOINTMENT (OUTPATIENT)
Dept: MEDICATION MANAGEMENT | Facility: CLINIC | Age: 87
End: 2022-07-06

## 2022-07-06 ENCOUNTER — OUTPATIENT (OUTPATIENT)
Dept: OUTPATIENT SERVICES | Facility: HOSPITAL | Age: 87
LOS: 1 days | Discharge: HOME | End: 2022-07-06

## 2022-07-06 DIAGNOSIS — Z95.2 PRESENCE OF PROSTHETIC HEART VALVE: Chronic | ICD-10-CM

## 2022-07-06 DIAGNOSIS — Z96.649 PRESENCE OF UNSPECIFIED ARTIFICIAL HIP JOINT: Chronic | ICD-10-CM

## 2022-07-06 DIAGNOSIS — Z79.01 LONG TERM (CURRENT) USE OF ANTICOAGULANTS: ICD-10-CM

## 2022-07-06 DIAGNOSIS — I27.82 CHRONIC PULMONARY EMBOLISM: ICD-10-CM

## 2022-07-06 LAB
INR PPP: 3.2 RATIO
QUALITY CONTROL: YES

## 2022-07-13 ENCOUNTER — OUTPATIENT (OUTPATIENT)
Dept: OUTPATIENT SERVICES | Facility: HOSPITAL | Age: 87
LOS: 1 days | Discharge: HOME | End: 2022-07-13

## 2022-07-13 ENCOUNTER — APPOINTMENT (OUTPATIENT)
Dept: MEDICATION MANAGEMENT | Facility: CLINIC | Age: 87
End: 2022-07-13

## 2022-07-13 DIAGNOSIS — Z96.649 PRESENCE OF UNSPECIFIED ARTIFICIAL HIP JOINT: Chronic | ICD-10-CM

## 2022-07-13 DIAGNOSIS — Z79.01 LONG TERM (CURRENT) USE OF ANTICOAGULANTS: ICD-10-CM

## 2022-07-13 DIAGNOSIS — Z95.2 PRESENCE OF PROSTHETIC HEART VALVE: Chronic | ICD-10-CM

## 2022-07-13 DIAGNOSIS — I27.82 CHRONIC PULMONARY EMBOLISM: ICD-10-CM

## 2022-07-13 LAB
INR PPP: 3.1 RATIO
QUALITY CONTROL: YES

## 2022-07-20 ENCOUNTER — APPOINTMENT (OUTPATIENT)
Dept: MEDICATION MANAGEMENT | Facility: CLINIC | Age: 87
End: 2022-07-20

## 2022-07-20 ENCOUNTER — OUTPATIENT (OUTPATIENT)
Dept: OUTPATIENT SERVICES | Facility: HOSPITAL | Age: 87
LOS: 1 days | Discharge: HOME | End: 2022-07-20

## 2022-07-20 DIAGNOSIS — I27.82 CHRONIC PULMONARY EMBOLISM: ICD-10-CM

## 2022-07-20 DIAGNOSIS — Z96.649 PRESENCE OF UNSPECIFIED ARTIFICIAL HIP JOINT: Chronic | ICD-10-CM

## 2022-07-20 DIAGNOSIS — Z79.01 LONG TERM (CURRENT) USE OF ANTICOAGULANTS: ICD-10-CM

## 2022-07-20 DIAGNOSIS — Z95.2 PRESENCE OF PROSTHETIC HEART VALVE: Chronic | ICD-10-CM

## 2022-07-27 ENCOUNTER — APPOINTMENT (OUTPATIENT)
Dept: MEDICATION MANAGEMENT | Facility: CLINIC | Age: 87
End: 2022-07-27

## 2022-07-27 ENCOUNTER — OUTPATIENT (OUTPATIENT)
Dept: OUTPATIENT SERVICES | Facility: HOSPITAL | Age: 87
LOS: 1 days | Discharge: HOME | End: 2022-07-27

## 2022-07-27 DIAGNOSIS — Z95.2 PRESENCE OF PROSTHETIC HEART VALVE: Chronic | ICD-10-CM

## 2022-07-27 DIAGNOSIS — Z96.649 PRESENCE OF UNSPECIFIED ARTIFICIAL HIP JOINT: Chronic | ICD-10-CM

## 2022-07-27 DIAGNOSIS — Z79.01 LONG TERM (CURRENT) USE OF ANTICOAGULANTS: ICD-10-CM

## 2022-07-27 DIAGNOSIS — I27.82 CHRONIC PULMONARY EMBOLISM: ICD-10-CM

## 2022-08-03 ENCOUNTER — OUTPATIENT (OUTPATIENT)
Dept: OUTPATIENT SERVICES | Facility: HOSPITAL | Age: 87
LOS: 1 days | Discharge: HOME | End: 2022-08-03

## 2022-08-03 ENCOUNTER — APPOINTMENT (OUTPATIENT)
Dept: MEDICATION MANAGEMENT | Facility: CLINIC | Age: 87
End: 2022-08-03

## 2022-08-03 DIAGNOSIS — Z95.2 PRESENCE OF PROSTHETIC HEART VALVE: Chronic | ICD-10-CM

## 2022-08-03 DIAGNOSIS — I27.82 CHRONIC PULMONARY EMBOLISM: ICD-10-CM

## 2022-08-03 DIAGNOSIS — Z96.649 PRESENCE OF UNSPECIFIED ARTIFICIAL HIP JOINT: Chronic | ICD-10-CM

## 2022-08-03 DIAGNOSIS — Z79.01 LONG TERM (CURRENT) USE OF ANTICOAGULANTS: ICD-10-CM

## 2022-08-03 LAB
INR PPP: 3.6 RATIO
QUALITY CONTROL: YES

## 2022-08-10 ENCOUNTER — OUTPATIENT (OUTPATIENT)
Dept: OUTPATIENT SERVICES | Facility: HOSPITAL | Age: 87
LOS: 1 days | Discharge: HOME | End: 2022-08-10

## 2022-08-10 ENCOUNTER — APPOINTMENT (OUTPATIENT)
Dept: MEDICATION MANAGEMENT | Facility: CLINIC | Age: 87
End: 2022-08-10

## 2022-08-10 DIAGNOSIS — Z79.01 LONG TERM (CURRENT) USE OF ANTICOAGULANTS: ICD-10-CM

## 2022-08-10 DIAGNOSIS — I27.82 CHRONIC PULMONARY EMBOLISM: ICD-10-CM

## 2022-08-10 DIAGNOSIS — Z96.649 PRESENCE OF UNSPECIFIED ARTIFICIAL HIP JOINT: Chronic | ICD-10-CM

## 2022-08-10 DIAGNOSIS — Z95.2 PRESENCE OF PROSTHETIC HEART VALVE: Chronic | ICD-10-CM

## 2022-08-10 LAB
INR PPP: 2.3 RATIO
QUALITY CONTROL: YES

## 2022-08-16 ENCOUNTER — APPOINTMENT (OUTPATIENT)
Dept: ORTHOPEDIC SURGERY | Facility: CLINIC | Age: 87
End: 2022-08-16

## 2022-08-16 DIAGNOSIS — M25.562 PAIN IN RIGHT KNEE: ICD-10-CM

## 2022-08-16 DIAGNOSIS — M25.561 PAIN IN RIGHT KNEE: ICD-10-CM

## 2022-08-16 DIAGNOSIS — G89.29 PAIN IN RIGHT KNEE: ICD-10-CM

## 2022-08-16 PROCEDURE — 99213 OFFICE O/P EST LOW 20 MIN: CPT | Mod: 25

## 2022-08-16 PROCEDURE — 73560 X-RAY EXAM OF KNEE 1 OR 2: CPT | Mod: 50

## 2022-08-16 PROCEDURE — 20610 DRAIN/INJ JOINT/BURSA W/O US: CPT | Mod: 50

## 2022-08-16 PROCEDURE — 73502 X-RAY EXAM HIP UNI 2-3 VIEWS: CPT | Mod: RT

## 2022-08-16 NOTE — ASSESSMENT
[FreeTextEntry1] :   86-year-old woman returns for follow-up 1 year status post right hip hemiarthroplasty which seems to be functioning well without issue.  Patient has known history of mild knee arthritis and is having an exacerbation of this knee pain.  The past she has had injections which provide her with some relief.  I think it not unreasonable that we try to give her new hyaluronic acid injections into each knee which may mitigate some of her symptoms.  She can continue with Tylenol.  In addition to the injections she could consider some physical therapy for her knees.  Physical therapy would focus on gait training balance knee strengthening and generalized conditioning.  Even if she does not do physical therapy she is encouraged to continue to exercise and walk.  The more she walks the better she will feel.  Will have her check in with us in 6 months time for her knees if we are able to provide her with injections today otherwise will bring her back sooner as soon as injections are available.\par \par   Procedure:  At the patient's request and with his consent utilizing standard technique he was provided an injection of  Synvisc-One into each knee through a superior lateral portal.  Patient tolerated injection without issue.  Post-injection precautions discussed.

## 2022-08-16 NOTE — IMAGING
[de-identified] :   Elderly woman sits reasonably comfortably in the exam room.  She was able get up onto the exam table with some assistance and get off the exam table similarly.\par \par Physical examination:\par Right hip:  Surgical incision healed with no induration erythema in her or fluctuance.  No tenderness palpation about her trochanteric bursa inguinal crease or thigh.  No pain with rotation of her hip joint at full extension 90° of flexion.  She has symmetric bilateral mild Trendelenburg lurch.\par \par Bilateral knees:  Moderate synovial thickening.  No significant effusion.  Mildly abnormal patellofemoral grind test.  Mild to moderate joint line tenderness medially and mild laterally.  No geniculate lymph nodes or masses.  No instability varus valgus stress testing.  Negative Lachman test.  Knee motion:  0-125 degrees.\par \par Radiographs:\par Right hip (AP, lateral):  Well-seated right hip hemiarthroplasty stem with no evidence of loosening.  No change in alignment from prior radiographs.  Joint space acceptably maintained between the bipolar head and her native acetabulum.\par \par Bilateral weight-bearing knee (AP, lateral, oblique):  Mild medial joint space narrowing (5 mm).  Otherwise unremarkable examination with no subchondral sclerotic changes, no subchondral cystic changes, no periarticular erosive changes, no periarticular osteophytes.

## 2022-08-16 NOTE — HISTORY OF PRESENT ILLNESS
[de-identified] :  86-year-old woman returns for interval follow-up 1 year status post right hip hemiarthroplasty for fracture (date of surgery July 17, 2021).  Patient has some mild thigh pain on the right side but really her major complaint is bilateral knee pain.  This is poorly localized but primarily anterior medially.  She returns accompanied by her daughter.  She walks with assistance from her family but no supportive aids.  He does not wear any braces.  Occasional use of Tylenol for pain relief.  She wonders what is causing the knee pain and whether anything can be done to help her knee pain.  She is not currently enrolled in physical therapy.

## 2022-08-17 ENCOUNTER — OUTPATIENT (OUTPATIENT)
Dept: OUTPATIENT SERVICES | Facility: HOSPITAL | Age: 87
LOS: 1 days | Discharge: HOME | End: 2022-08-17

## 2022-08-17 ENCOUNTER — APPOINTMENT (OUTPATIENT)
Dept: MEDICATION MANAGEMENT | Facility: CLINIC | Age: 87
End: 2022-08-17

## 2022-08-17 DIAGNOSIS — Z79.01 LONG TERM (CURRENT) USE OF ANTICOAGULANTS: ICD-10-CM

## 2022-08-17 DIAGNOSIS — Z96.649 PRESENCE OF UNSPECIFIED ARTIFICIAL HIP JOINT: Chronic | ICD-10-CM

## 2022-08-17 DIAGNOSIS — I27.82 CHRONIC PULMONARY EMBOLISM: ICD-10-CM

## 2022-08-17 DIAGNOSIS — Z95.2 PRESENCE OF PROSTHETIC HEART VALVE: Chronic | ICD-10-CM

## 2022-08-17 LAB
INR PPP: 3.1 RATIO
QUALITY CONTROL: YES

## 2022-08-24 ENCOUNTER — APPOINTMENT (OUTPATIENT)
Dept: MEDICATION MANAGEMENT | Facility: CLINIC | Age: 87
End: 2022-08-24

## 2022-08-24 ENCOUNTER — OUTPATIENT (OUTPATIENT)
Dept: OUTPATIENT SERVICES | Facility: HOSPITAL | Age: 87
LOS: 1 days | Discharge: HOME | End: 2022-08-24

## 2022-08-24 DIAGNOSIS — Z96.649 PRESENCE OF UNSPECIFIED ARTIFICIAL HIP JOINT: Chronic | ICD-10-CM

## 2022-08-24 DIAGNOSIS — Z79.01 LONG TERM (CURRENT) USE OF ANTICOAGULANTS: ICD-10-CM

## 2022-08-24 DIAGNOSIS — Z95.2 PRESENCE OF PROSTHETIC HEART VALVE: Chronic | ICD-10-CM

## 2022-08-24 DIAGNOSIS — I27.82 CHRONIC PULMONARY EMBOLISM: ICD-10-CM

## 2022-08-24 LAB
INR PPP: 3.3 RATIO
QUALITY CONTROL: YES

## 2022-08-30 ENCOUNTER — INPATIENT (INPATIENT)
Facility: HOSPITAL | Age: 87
LOS: 1 days | Discharge: NURSING, REHAB OR LONG-TERM CARE | End: 2022-09-01
Attending: INTERNAL MEDICINE | Admitting: INTERNAL MEDICINE

## 2022-08-30 VITALS
OXYGEN SATURATION: 100 % | RESPIRATION RATE: 20 BRPM | SYSTOLIC BLOOD PRESSURE: 220 MMHG | DIASTOLIC BLOOD PRESSURE: 95 MMHG | HEART RATE: 57 BPM | TEMPERATURE: 97 F

## 2022-08-30 DIAGNOSIS — Z95.2 PRESENCE OF PROSTHETIC HEART VALVE: Chronic | ICD-10-CM

## 2022-08-30 DIAGNOSIS — Z96.649 PRESENCE OF UNSPECIFIED ARTIFICIAL HIP JOINT: Chronic | ICD-10-CM

## 2022-08-30 LAB
ALBUMIN SERPL ELPH-MCNC: 4 G/DL — SIGNIFICANT CHANGE UP (ref 3.5–5.2)
ALP SERPL-CCNC: 50 U/L — SIGNIFICANT CHANGE UP (ref 30–115)
ALT FLD-CCNC: 12 U/L — SIGNIFICANT CHANGE UP (ref 0–41)
ANION GAP SERPL CALC-SCNC: 6 MMOL/L — LOW (ref 7–14)
APPEARANCE UR: ABNORMAL
APTT BLD: 65.5 SEC — HIGH (ref 27–39.2)
AST SERPL-CCNC: 26 U/L — SIGNIFICANT CHANGE UP (ref 0–41)
BACTERIA # UR AUTO: ABNORMAL
BASOPHILS # BLD AUTO: 0.03 K/UL — SIGNIFICANT CHANGE UP (ref 0–0.2)
BASOPHILS NFR BLD AUTO: 0.5 % — SIGNIFICANT CHANGE UP (ref 0–1)
BILIRUB SERPL-MCNC: 0.5 MG/DL — SIGNIFICANT CHANGE UP (ref 0.2–1.2)
BILIRUB UR-MCNC: NEGATIVE — SIGNIFICANT CHANGE UP
BUN SERPL-MCNC: 15 MG/DL — SIGNIFICANT CHANGE UP (ref 10–20)
CALCIUM SERPL-MCNC: 8.8 MG/DL — SIGNIFICANT CHANGE UP (ref 8.5–10.1)
CHLORIDE SERPL-SCNC: 94 MMOL/L — LOW (ref 98–110)
CO2 SERPL-SCNC: 30 MMOL/L — SIGNIFICANT CHANGE UP (ref 17–32)
COLOR SPEC: YELLOW — SIGNIFICANT CHANGE UP
CREAT SERPL-MCNC: 0.9 MG/DL — SIGNIFICANT CHANGE UP (ref 0.7–1.5)
DIFF PNL FLD: ABNORMAL
EGFR: 62 ML/MIN/1.73M2 — SIGNIFICANT CHANGE UP
EOSINOPHIL # BLD AUTO: 0.07 K/UL — SIGNIFICANT CHANGE UP (ref 0–0.7)
EOSINOPHIL NFR BLD AUTO: 1.3 % — SIGNIFICANT CHANGE UP (ref 0–8)
EPI CELLS # UR: ABNORMAL /HPF
GLUCOSE SERPL-MCNC: 160 MG/DL — HIGH (ref 70–99)
GLUCOSE UR QL: NEGATIVE MG/DL — SIGNIFICANT CHANGE UP
HCT VFR BLD CALC: 34.3 % — LOW (ref 37–47)
HGB BLD-MCNC: 11.4 G/DL — LOW (ref 12–16)
IMM GRANULOCYTES NFR BLD AUTO: 0.2 % — SIGNIFICANT CHANGE UP (ref 0.1–0.3)
INR BLD: 4.25 RATIO — HIGH (ref 0.65–1.3)
KETONES UR-MCNC: NEGATIVE — SIGNIFICANT CHANGE UP
LEUKOCYTE ESTERASE UR-ACNC: ABNORMAL
LYMPHOCYTES # BLD AUTO: 1.04 K/UL — LOW (ref 1.2–3.4)
LYMPHOCYTES # BLD AUTO: 19 % — LOW (ref 20.5–51.1)
MAGNESIUM SERPL-MCNC: 1.8 MG/DL — SIGNIFICANT CHANGE UP (ref 1.8–2.4)
MCHC RBC-ENTMCNC: 30.5 PG — SIGNIFICANT CHANGE UP (ref 27–31)
MCHC RBC-ENTMCNC: 33.2 G/DL — SIGNIFICANT CHANGE UP (ref 32–37)
MCV RBC AUTO: 91.7 FL — SIGNIFICANT CHANGE UP (ref 81–99)
MONOCYTES # BLD AUTO: 0.39 K/UL — SIGNIFICANT CHANGE UP (ref 0.1–0.6)
MONOCYTES NFR BLD AUTO: 7.1 % — SIGNIFICANT CHANGE UP (ref 1.7–9.3)
NEUTROPHILS # BLD AUTO: 3.94 K/UL — SIGNIFICANT CHANGE UP (ref 1.4–6.5)
NEUTROPHILS NFR BLD AUTO: 71.9 % — SIGNIFICANT CHANGE UP (ref 42.2–75.2)
NITRITE UR-MCNC: POSITIVE
NRBC # BLD: 0 /100 WBCS — SIGNIFICANT CHANGE UP (ref 0–0)
NT-PROBNP SERPL-SCNC: 2270 PG/ML — HIGH (ref 0–300)
PH UR: 7 — SIGNIFICANT CHANGE UP (ref 5–8)
PLATELET # BLD AUTO: 186 K/UL — SIGNIFICANT CHANGE UP (ref 130–400)
POTASSIUM SERPL-MCNC: 4.7 MMOL/L — SIGNIFICANT CHANGE UP (ref 3.5–5)
POTASSIUM SERPL-SCNC: 4.7 MMOL/L — SIGNIFICANT CHANGE UP (ref 3.5–5)
PROT SERPL-MCNC: 6.1 G/DL — SIGNIFICANT CHANGE UP (ref 6–8)
PROT UR-MCNC: NEGATIVE MG/DL — SIGNIFICANT CHANGE UP
PROTHROM AB SERPL-ACNC: >40 SEC — HIGH (ref 9.95–12.87)
RBC # BLD: 3.74 M/UL — LOW (ref 4.2–5.4)
RBC # FLD: 13.4 % — SIGNIFICANT CHANGE UP (ref 11.5–14.5)
RBC CASTS # UR COMP ASSIST: ABNORMAL /HPF
SARS-COV-2 RNA SPEC QL NAA+PROBE: SIGNIFICANT CHANGE UP
SODIUM SERPL-SCNC: 130 MMOL/L — LOW (ref 135–146)
SP GR SPEC: 1.01 — SIGNIFICANT CHANGE UP (ref 1.01–1.03)
TROPONIN T SERPL-MCNC: <0.01 NG/ML — SIGNIFICANT CHANGE UP
UROBILINOGEN FLD QL: 0.2 MG/DL — SIGNIFICANT CHANGE UP
WBC # BLD: 5.48 K/UL — SIGNIFICANT CHANGE UP (ref 4.8–10.8)
WBC # FLD AUTO: 5.48 K/UL — SIGNIFICANT CHANGE UP (ref 4.8–10.8)
WBC UR QL: >50 /HPF

## 2022-08-30 PROCEDURE — 99223 1ST HOSP IP/OBS HIGH 75: CPT | Mod: AI

## 2022-08-30 PROCEDURE — 71045 X-RAY EXAM CHEST 1 VIEW: CPT | Mod: 26

## 2022-08-30 PROCEDURE — 93010 ELECTROCARDIOGRAM REPORT: CPT

## 2022-08-30 PROCEDURE — 36000 PLACE NEEDLE IN VEIN: CPT | Mod: GC

## 2022-08-30 PROCEDURE — 70498 CT ANGIOGRAPHY NECK: CPT | Mod: 26,MA

## 2022-08-30 PROCEDURE — 70496 CT ANGIOGRAPHY HEAD: CPT | Mod: 26,MA

## 2022-08-30 PROCEDURE — 76937 US GUIDE VASCULAR ACCESS: CPT | Mod: 26,GC

## 2022-08-30 PROCEDURE — 99285 EMERGENCY DEPT VISIT HI MDM: CPT | Mod: 25,GC

## 2022-08-30 RX ORDER — ONDANSETRON 8 MG/1
4 TABLET, FILM COATED ORAL EVERY 8 HOURS
Refills: 0 | Status: DISCONTINUED | OUTPATIENT
Start: 2022-08-30 | End: 2022-09-01

## 2022-08-30 RX ORDER — FERROUS GLUCONATE 100 %
27 POWDER (GRAM) MISCELLANEOUS
Qty: 0 | Refills: 0 | DISCHARGE

## 2022-08-30 RX ORDER — CHOLECALCIFEROL (VITAMIN D3) 125 MCG
2000 CAPSULE ORAL DAILY
Refills: 0 | Status: DISCONTINUED | OUTPATIENT
Start: 2022-08-30 | End: 2022-09-01

## 2022-08-30 RX ORDER — ACETAMINOPHEN 500 MG
650 TABLET ORAL EVERY 6 HOURS
Refills: 0 | Status: DISCONTINUED | OUTPATIENT
Start: 2022-08-30 | End: 2022-09-01

## 2022-08-30 RX ORDER — ALENDRONATE SODIUM 70 MG/1
1 TABLET ORAL
Qty: 0 | Refills: 0 | DISCHARGE

## 2022-08-30 RX ORDER — CALCIUM CARBONATE 500(1250)
0 TABLET ORAL
Qty: 0 | Refills: 0 | DISCHARGE

## 2022-08-30 RX ORDER — AMLODIPINE BESYLATE 2.5 MG/1
5 TABLET ORAL ONCE
Refills: 0 | Status: COMPLETED | OUTPATIENT
Start: 2022-08-30 | End: 2022-08-30

## 2022-08-30 RX ORDER — MECLIZINE HCL 12.5 MG
25 TABLET ORAL EVERY 8 HOURS
Refills: 0 | Status: DISCONTINUED | OUTPATIENT
Start: 2022-08-30 | End: 2022-09-01

## 2022-08-30 RX ORDER — ASPIRIN/CALCIUM CARB/MAGNESIUM 324 MG
1 TABLET ORAL
Qty: 0 | Refills: 0 | DISCHARGE

## 2022-08-30 RX ORDER — ESCITALOPRAM OXALATE 10 MG/1
20 TABLET, FILM COATED ORAL DAILY
Refills: 0 | Status: DISCONTINUED | OUTPATIENT
Start: 2022-08-30 | End: 2022-09-01

## 2022-08-30 RX ORDER — LOSARTAN POTASSIUM 100 MG/1
1 TABLET, FILM COATED ORAL
Qty: 0 | Refills: 0 | DISCHARGE

## 2022-08-30 RX ORDER — ATORVASTATIN CALCIUM 80 MG/1
40 TABLET, FILM COATED ORAL AT BEDTIME
Refills: 0 | Status: DISCONTINUED | OUTPATIENT
Start: 2022-08-30 | End: 2022-09-01

## 2022-08-30 RX ORDER — ESCITALOPRAM OXALATE 10 MG/1
1 TABLET, FILM COATED ORAL
Qty: 0 | Refills: 0 | DISCHARGE

## 2022-08-30 RX ORDER — LIDOCAINE 4 G/100G
1 CREAM TOPICAL
Qty: 0 | Refills: 0 | DISCHARGE

## 2022-08-30 RX ORDER — HYDRALAZINE HCL 50 MG
10 TABLET ORAL ONCE
Refills: 0 | Status: COMPLETED | OUTPATIENT
Start: 2022-08-30 | End: 2022-08-30

## 2022-08-30 RX ORDER — ASPIRIN/CALCIUM CARB/MAGNESIUM 324 MG
81 TABLET ORAL DAILY
Refills: 0 | Status: DISCONTINUED | OUTPATIENT
Start: 2022-08-30 | End: 2022-09-01

## 2022-08-30 RX ORDER — WARFARIN SODIUM 2.5 MG/1
1 TABLET ORAL
Qty: 0 | Refills: 0 | DISCHARGE

## 2022-08-30 RX ORDER — ATENOLOL 25 MG/1
25 TABLET ORAL DAILY
Refills: 0 | Status: DISCONTINUED | OUTPATIENT
Start: 2022-08-30 | End: 2022-09-01

## 2022-08-30 RX ORDER — CEFTRIAXONE 500 MG/1
1000 INJECTION, POWDER, FOR SOLUTION INTRAMUSCULAR; INTRAVENOUS ONCE
Refills: 0 | Status: COMPLETED | OUTPATIENT
Start: 2022-08-30 | End: 2022-08-30

## 2022-08-30 RX ORDER — ALPRAZOLAM 0.25 MG
0.5 TABLET ORAL AT BEDTIME
Refills: 0 | Status: DISCONTINUED | OUTPATIENT
Start: 2022-08-30 | End: 2022-09-01

## 2022-08-30 RX ORDER — CEFTRIAXONE 500 MG/1
1000 INJECTION, POWDER, FOR SOLUTION INTRAMUSCULAR; INTRAVENOUS EVERY 24 HOURS
Refills: 0 | Status: DISCONTINUED | OUTPATIENT
Start: 2022-08-30 | End: 2022-08-31

## 2022-08-30 RX ORDER — ATORVASTATIN CALCIUM 80 MG/1
1 TABLET, FILM COATED ORAL
Qty: 0 | Refills: 0 | DISCHARGE

## 2022-08-30 RX ORDER — LANOLIN ALCOHOL/MO/W.PET/CERES
3 CREAM (GRAM) TOPICAL AT BEDTIME
Refills: 0 | Status: DISCONTINUED | OUTPATIENT
Start: 2022-08-30 | End: 2022-09-01

## 2022-08-30 RX ORDER — VALSARTAN 80 MG/1
1 TABLET ORAL
Qty: 0 | Refills: 0 | DISCHARGE

## 2022-08-30 RX ADMIN — CEFTRIAXONE 100 MILLIGRAM(S): 500 INJECTION, POWDER, FOR SOLUTION INTRAMUSCULAR; INTRAVENOUS at 23:22

## 2022-08-30 RX ADMIN — AMLODIPINE BESYLATE 5 MILLIGRAM(S): 2.5 TABLET ORAL at 23:20

## 2022-08-30 RX ADMIN — ATORVASTATIN CALCIUM 40 MILLIGRAM(S): 80 TABLET, FILM COATED ORAL at 23:22

## 2022-08-30 RX ADMIN — Medication 10 MILLIGRAM(S): at 23:19

## 2022-08-30 RX ADMIN — CEFTRIAXONE 100 MILLIGRAM(S): 500 INJECTION, POWDER, FOR SOLUTION INTRAMUSCULAR; INTRAVENOUS at 18:21

## 2022-08-30 RX ADMIN — Medication 10 MILLIGRAM(S): at 16:21

## 2022-08-30 NOTE — ED PROVIDER NOTE - OBJECTIVE STATEMENT
86F with pmhx  Hypertension, HDL, CAD s/p CABG in 2003 with concomitant Metallic Aortic valve replacement  on Coumadin, osteoporosis, displaced right subcapital femoral neck fracture s/p Hemiarthroplasty on 7/16/2021 presents with dizziness.  Pt reports dizziness started this am, denies room spinning. Pt states has had dizziness in the past, has heart valve. Pt denies chest pain or palpitation. Pt denies falling. Pt states takes her medications regularly.  PT denies dizziness, blurry vison, focal weakness in upper or lower extremity. Pt denies dizziness at this time . Pt denies fever, chills, shortness of breath, burning with urination, diarrhea.

## 2022-08-30 NOTE — ED PROVIDER NOTE - ATTENDING CONTRIBUTION TO CARE
Patient is an 86-year-old female past medical history of hyperlipidemia hypertension history of metallic aortic valve replacement patient is on Coumadin presents for evaluation of dizziness patient states that she is beginning of dizziness for the past 3 months some days worse than others worsening over the past 24 hours states that upon using the toilet patient had an episode of presyncope with dizziness prompting emergency department evaluation she denies any headache visual changes chest pain shortness of breath abdominal pain back pain she denies any diaphoresis history corroborated by daughter here in the emergency department    On physical exam the patient is normocephalic atraumatic pupils equal round react light accommodation extraocular muscle intact oropharynx clear chest clear to auscultation bilaterally abdomen soft nontender nondistended bowel sounds positive abdomen soft nontender nondistended bowel sounds positive extremities patient has no focal deficits she is ANO x3 able to move all 4 extremities strength is normal sensation is intact radial pulses 2+ pedal pulse 2+ no edema noted    Assessment plan patient presents for evaluation of dizziness however symptoms have been present for 3 months on an intermittent basis worsening over the past 24 hours today patient sustained a presyncopal episode after using the toilet routine EKG labs as well as troponin which was negative we obtained CT head which was negative as well as CTA given this patient's past medical history her symptoms and today's episode I will admit for further evaluation her symptoms are more consistent with presyncope/syncope versus CVA however given dizziness CT CTA obtained patient's not a tPA candidate based on vital onset of symptoms

## 2022-08-30 NOTE — ED ADULT TRIAGE NOTE - CHIEF COMPLAINT QUOTE
BIBA via [facility] from home- pt here with daughter, daughter states that for the last "3 years" patient has been experiencing dizziness that has worsened over the last "couple of months" today while in the bathroom patient became unresponsive and daughter called EMS. MD Norris at bedside

## 2022-08-30 NOTE — H&P ADULT - HISTORY OF PRESENT ILLNESS
Serbian speaking  # [id number]  A 87 y/o female with pmhx  Hypertension, HDL, CAD s/p CABG in 2003 with concomitant Metallic Aortic valve replacement  on Coumadin, osteoporosis, displaced right subcapital femoral neck fracture s/p Hemiarthroplasty on 7/16/2021 presents with dizziness.  Pt reports dizziness started this am, denies room spinning. Pt states has had dizziness in the past, has heart valve. Pt denies chest pain or palpitation. Pt denies falling. Pt states takes her medications regularly.  PT denies dizziness, blurry vison, focal weakness in upper or lower extremity. Pt denies dizziness at this time . Pt denies fever, chills, shortness of breath, burning with urination, diarrhea. Tried to reach daughter tel [phone] no answer.  English speaking  # [id number]  A 85 y/o female with pmhx  Hypertension, HDL, CAD s/p CABG in 2003 with concomitant Metallic Aortic valve replacement  on Coumadin, osteoporosis, displaced right subcapital femoral neck fracture s/p Hemiarthroplasty on 7/16/2021 presents with dizziness.  Pt reports dizziness started this am, denies room spinning. Pt states has had dizziness in the past, has heart valve. Pt denies chest pain or palpitation. Pt denies falling. Pt states takes her medications regularly.  PT denies dizziness, blurry vison, focal weakness in upper or lower extremity. Pt denies dizziness at this time . Pt denies fever, chills, shortness of breath, burning with urination, diarrhea. Tried to reach daughter tel [phone] no answer. Meds verified with pharmacy.

## 2022-08-30 NOTE — H&P ADULT - ASSESSMENT
A 87 y/o female with pmhx  Hypertension, HDL, CAD s/p CABG in 2003 with concomitant Metallic Aortic valve replacement  on Coumadin, osteoporosis, displaced right subcapital femoral neck fracture s/p Hemiarthroplasty on 7/16/2021 presents with dizziness.        #dizziness likely vertigo   CT head  Right internal carotid artery stenosis (less than 50%).  Left common carotid artery distal ( less than 50%)  -meclizine prn   -orthostatic vitals  -MRI brain non contrast ( pt has metallic aortic valve)  -neurology consult  -continue cardiac monitor   -fall precaution     #UTI  -IV abx   -f/up cultures  -monitor for fever     #Hypertension   -continue home meds   -monitor blood pressure     #HLD  -continue statin     # CAD s/p CABG in 2003   #metallic Aortic valve replacement  on Coumadin  -no coags done in ed, will order for tonight night PA to follow up and order coumadin   -INR goal 2.5-3.5    #Aneurysmal dilatation of the ascending thoracic aorta measuring    approximately 4.5 cm in maximum diameter.  -chronic daughter aware as per ED       A 87 y/o female with pmhx  Hypertension, HDL, CAD s/p CABG in 2003 with concomitant Metallic Aortic valve replacement  on Coumadin, osteoporosis, displaced right subcapital femoral neck fracture s/p Hemiarthroplasty on 7/16/2021 presents with dizziness.        #dizziness likely vertigo   CT head  Right internal carotid artery stenosis (less than 50%).  Left common carotid artery distal ( less than 50%)  -meclizine prn   -orthostatic vitals  -MRI brain non contrast ( pt has metallic aortic valve)  -neurology consult  -continue cardiac monitor   -fall precaution     #UTI  -IV abx   -f/up cultures  -monitor for fever     #Hypertension   -continue home meds   -monitor blood pressure     #HLD  -continue statin     # CAD s/p CABG in 2003   #metallic Aortic valve replacement  on Coumadin  - pt takes 4mg on M,T,Th, and Saturdays & 2mg on T, W, F, and Sundays  -no coags done in ed, will order for tonight night PA to follow up and order coumadin   -INR goal 2.5-3.5  -c/w home dose baby aspirin     #Aneurysmal dilatation of the ascending thoracic aorta measuring    approximately 4.5 cm in maximum diameter.  -chronic daughter aware as per ED       A 85 y/o female with pmhx  Hypertension, HDL, CAD s/p CABG in 2003 with concomitant Metallic Aortic valve replacement  on Coumadin, osteoporosis, displaced right subcapital femoral neck fracture s/p Hemiarthroplasty on 7/16/2021 presents with dizziness.        #vertigo   CTA head and neck- Right internal carotid artery stenosis (less than 50%), Left common carotid artery distal ( less than 50%)  -meclizine prn   -orthostatic vitals  -MRI brain non contrast ( pt has metallic aortic valve, need to confirm MRI compatibility)  -neurology consult  -fall precaution     #UTI  -IV abx   -f/up cultures  -monitor for fever     #Hypertension   -continue home meds   -monitor blood pressure     #HLD  -continue statin     # CAD s/p CABG in 2003   #metallic Aortic valve replacement  on Coumadin  - pt takes 4mg on M,T,Th, and Saturdays & 2mg on T, W, F, and Sundays  -no coags done in ed, will order for tonight night PA to follow up and order coumadin   -INR goal 2.5-3.5  -c/w home dose baby aspirin     #Aneurysmal dilatation of the ascending thoracic aorta measuring    approximately 4.5 cm in maximum diameter.  -chronic daughter aware as per ED

## 2022-08-30 NOTE — PATIENT PROFILE ADULT - FALL HARM RISK - HARM RISK INTERVENTIONS
Assistance with ambulation/Assistance OOB with selected safe patient handling equipment/Communicate Risk of Fall with Harm to all staff/Discuss with provider need for PT consult/Monitor gait and stability/Provide patient with walking aids - walker, cane, crutches/Reinforce activity limits and safety measures with patient and family/Sit up slowly, dangle for a short time, stand at bedside before walking/Tailored Fall Risk Interventions/Visual Cue: Yellow wristband and red socks/Bed in lowest position, wheels locked, appropriate side rails in place/Call bell, personal items and telephone in reach/Instruct patient to call for assistance before getting out of bed or chair/Non-slip footwear when patient is out of bed/[place] to call system/Physically safe environment - no spills, clutter or unnecessary equipment/Purposeful Proactive Rounding/Room/bathroom lighting operational, light cord in reach

## 2022-08-30 NOTE — ED PROVIDER NOTE - CLINICAL SUMMARY MEDICAL DECISION MAKING FREE TEXT BOX
patient presents for evaluation of dizziness however symptoms have been present for 3 months on an intermittent basis worsening over the past 24 hours today patient sustained a presyncopal episode after using the toilet routine EKG labs as well as troponin which was negative we obtained CT head which was negative as well as CTA given this patient's past medical history her symptoms and today's episode I will admit for further evaluation her symptoms are more consistent with presyncope/syncope versus CVA however given dizziness CT CTA obtained patient's not a tPA candidate based on vital onset of symptoms

## 2022-08-30 NOTE — ED PROVIDER NOTE - PHYSICAL EXAMINATION
CONSTITUTIONAL: well-developed, well-nourished, in no acute distress  SKIN: warm, dry  HEAD: Normocephalic  EYES: no conjunctival erythema  ENT: no nasal discharge, airway clear  NECK: full ROM  CARD: regular rate and rhythm  RESP: normal respiratory effort, no wheezes, rales or rhonchi  ABD: soft, non-distended, non-tender  EXT: moving all extremities spontaneously  NEURO: alert and oriented x3, strength and sensation 5/5 b/l UE and LE, CN2-12 intact, finger to nose normal b/l  PSYCH: cooperative, appropriate

## 2022-08-30 NOTE — ED PROVIDER NOTE - NSICDXPASTSURGICALHX_GEN_ALL_CORE_FT
PAST SURGICAL HISTORY:  H/O heart valve replacement with mechanical valve     S/P hip hemiarthroplasty on 7/16/2021 for right subcapital femoral neck fracture

## 2022-08-30 NOTE — ED PROVIDER NOTE - PROGRESS NOTE DETAILS
RK: Results explained and discussed with patient and her daughter at bedside, the patient already knows about aortic aneurysm, her cardiologist dr. aleman also aware and has been monitoring it.

## 2022-08-30 NOTE — H&P ADULT - NSHPLABSRESULTS_GEN_ALL_CORE
11.4   5.48  )-----------( 186      ( 30 Aug 2022 13:00 )             34.3       08-    130<L>  |  94<L>  |  15  ----------------------------<  160<H>  4.7   |  30  |  0.9    Ca    8.8      30 Aug 2022 13:00  Mg     1.8         TPro  6.1  /  Alb  4.0  /  TBili  0.5  /  DBili  x   /  AST  26  /  ALT  12  /  AlkPhos  50            Magnesium, Serum: 1.8 mg/dL (22 @ 13:00)          Urinalysis Basic - ( 30 Aug 2022 14:35 )    Color: Yellow / Appearance: Slightly Cloudy / S.015 / pH: x  Gluc: x / Ketone: Negative  / Bili: Negative / Urobili: 0.2 mg/dL   Blood: x / Protein: Negative mg/dL / Nitrite: Positive   Leuk Esterase: Large / RBC: 3-5 /HPF / WBC >50 /HPF   Sq Epi: x / Non Sq Epi: Moderate /HPF / Bacteria: Many    Lactate Trend      CARDIAC MARKERS ( 30 Aug 2022 13:00 )  x     / <0.01 ng/mL / x     / x     / x          < from: CT Head No Cont (22 @ 15:06) >      IMPRESSION:    In comparison with the prior CT scan of the brain dated 2021:    No acute intracranial hemorrhage or acute large territorial infarct.    Stable examination.    --- End of Report ---      EVERETT HIGGINS MD; Attending Radiologist  This document has been electronically signed. Aug 30 2022  3:56PM    < end of copied text >        < from: CT Angio Neck w/ IV Cont (22 @ 15:58) >    IMPRESSION:    1.  Aneurysmal dilatation of the visualized ascending aorta, incompletely   imaged.    2.  Right internal carotid artery; origin and proximal short segment mild   stenosis (less than 50%).    3.  Left common carotid artery distal, internal carotid artery origin and   proximal, and external carotid artery origin; short segment mild stenosis   (less than 50%), vascular calcifications.    --- End of Report ---    EVERETT HIGGINS MD; Attending Radiologist  This document has been electronically signed. Aug 30 2022  4:52PM    < end of copied text >

## 2022-08-30 NOTE — H&P ADULT - NSHPPHYSICALEXAM_GEN_ALL_CORE
VITALS:   ICU Vital Signs Last 24 Hrs  T(C): 35.9 (30 Aug 2022 12:19), Max: 35.9 (30 Aug 2022 12:19)  T(F): 96.7 (30 Aug 2022 12:19), Max: 96.7 (30 Aug 2022 12:19)  HR: 61 (30 Aug 2022 18:18) (55 - 61)  BP: 178/79 (30 Aug 2022 18:18) (178/79 - 220/95)  RR: 18 (30 Aug 2022 18:18) (16 - 20)  SpO2: 99% (30 Aug 2022 18:18) (99% - 100%)    O2 Parameters below as of 30 Aug 2022 18:18  Patient On (Oxygen Delivery Method): room air    GENERAL: NAD, lying in bed comfortably  HEAD:  Atraumatic, Normocephalic, face symmetrical   EYES: EOMI, PERRLA, conjunctiva and sclera clear  ENT: Moist mucous membranes  NECK: Supple, No JVD  CHEST/LUNG: breath sounds present bilaterally,  No rales, rhonchi, wheezing, or rubs. Unlabored respirations  HEART: Regular rate and rhythm; No murmurs, rubs, or gallops (mechanical valve sound present)   ABDOMEN: Bowel sounds present; Soft, Nontender, Nondistended. No hepatomegally  EXTREMITIES:  no edema or tenderness   NERVOUS SYSTEM:  Alert & Oriented X3, speech clear. No deficits   MSK: FROM all 4 extremities, full and equal strength  SKIN: No rashes or lesions

## 2022-08-31 LAB
ALBUMIN SERPL ELPH-MCNC: 3.9 G/DL — SIGNIFICANT CHANGE UP (ref 3.5–5.2)
ALP SERPL-CCNC: 54 U/L — SIGNIFICANT CHANGE UP (ref 30–115)
ALT FLD-CCNC: 10 U/L — SIGNIFICANT CHANGE UP (ref 0–41)
ANION GAP SERPL CALC-SCNC: 12 MMOL/L — SIGNIFICANT CHANGE UP (ref 7–14)
AST SERPL-CCNC: 27 U/L — SIGNIFICANT CHANGE UP (ref 0–41)
BASOPHILS # BLD AUTO: 0.02 K/UL — SIGNIFICANT CHANGE UP (ref 0–0.2)
BASOPHILS NFR BLD AUTO: 0.3 % — SIGNIFICANT CHANGE UP (ref 0–1)
BILIRUB SERPL-MCNC: 0.4 MG/DL — SIGNIFICANT CHANGE UP (ref 0.2–1.2)
BUN SERPL-MCNC: 14 MG/DL — SIGNIFICANT CHANGE UP (ref 10–20)
CALCIUM SERPL-MCNC: 9 MG/DL — SIGNIFICANT CHANGE UP (ref 8.5–10.1)
CHLORIDE SERPL-SCNC: 95 MMOL/L — LOW (ref 98–110)
CHOLEST SERPL-MCNC: 167 MG/DL — SIGNIFICANT CHANGE UP
CO2 SERPL-SCNC: 26 MMOL/L — SIGNIFICANT CHANGE UP (ref 17–32)
CREAT SERPL-MCNC: 0.9 MG/DL — SIGNIFICANT CHANGE UP (ref 0.7–1.5)
EGFR: 62 ML/MIN/1.73M2 — SIGNIFICANT CHANGE UP
EOSINOPHIL # BLD AUTO: 0.02 K/UL — SIGNIFICANT CHANGE UP (ref 0–0.7)
EOSINOPHIL NFR BLD AUTO: 0.3 % — SIGNIFICANT CHANGE UP (ref 0–8)
GLUCOSE SERPL-MCNC: 97 MG/DL — SIGNIFICANT CHANGE UP (ref 70–99)
HCT VFR BLD CALC: 34.8 % — LOW (ref 37–47)
HDLC SERPL-MCNC: 47 MG/DL — LOW
HGB BLD-MCNC: 11.7 G/DL — LOW (ref 12–16)
IMM GRANULOCYTES NFR BLD AUTO: 0.3 % — SIGNIFICANT CHANGE UP (ref 0.1–0.3)
INR BLD: 3.72 RATIO — HIGH (ref 0.65–1.3)
LIPID PNL WITH DIRECT LDL SERPL: 105 MG/DL — HIGH
LYMPHOCYTES # BLD AUTO: 1.2 K/UL — SIGNIFICANT CHANGE UP (ref 1.2–3.4)
LYMPHOCYTES # BLD AUTO: 15.6 % — LOW (ref 20.5–51.1)
MCHC RBC-ENTMCNC: 30.1 PG — SIGNIFICANT CHANGE UP (ref 27–31)
MCHC RBC-ENTMCNC: 33.6 G/DL — SIGNIFICANT CHANGE UP (ref 32–37)
MCV RBC AUTO: 89.5 FL — SIGNIFICANT CHANGE UP (ref 81–99)
MONOCYTES # BLD AUTO: 0.64 K/UL — HIGH (ref 0.1–0.6)
MONOCYTES NFR BLD AUTO: 8.3 % — SIGNIFICANT CHANGE UP (ref 1.7–9.3)
NEUTROPHILS # BLD AUTO: 5.77 K/UL — SIGNIFICANT CHANGE UP (ref 1.4–6.5)
NEUTROPHILS NFR BLD AUTO: 75.2 % — SIGNIFICANT CHANGE UP (ref 42.2–75.2)
NON HDL CHOLESTEROL: 120 MG/DL — SIGNIFICANT CHANGE UP
NRBC # BLD: 0 /100 WBCS — SIGNIFICANT CHANGE UP (ref 0–0)
PLATELET # BLD AUTO: 196 K/UL — SIGNIFICANT CHANGE UP (ref 130–400)
POTASSIUM SERPL-MCNC: 4.6 MMOL/L — SIGNIFICANT CHANGE UP (ref 3.5–5)
POTASSIUM SERPL-SCNC: 4.6 MMOL/L — SIGNIFICANT CHANGE UP (ref 3.5–5)
PROT SERPL-MCNC: 6.2 G/DL — SIGNIFICANT CHANGE UP (ref 6–8)
PROTHROM AB SERPL-ACNC: >40 SEC — HIGH (ref 9.95–12.87)
RBC # BLD: 3.89 M/UL — LOW (ref 4.2–5.4)
RBC # FLD: 13.6 % — SIGNIFICANT CHANGE UP (ref 11.5–14.5)
SODIUM SERPL-SCNC: 133 MMOL/L — LOW (ref 135–146)
TRIGL SERPL-MCNC: 76 MG/DL — SIGNIFICANT CHANGE UP
WBC # BLD: 7.67 K/UL — SIGNIFICANT CHANGE UP (ref 4.8–10.8)
WBC # FLD AUTO: 7.67 K/UL — SIGNIFICANT CHANGE UP (ref 4.8–10.8)

## 2022-08-31 PROCEDURE — 99233 SBSQ HOSP IP/OBS HIGH 50: CPT

## 2022-08-31 PROCEDURE — 99222 1ST HOSP IP/OBS MODERATE 55: CPT

## 2022-08-31 RX ORDER — WARFARIN SODIUM 2.5 MG/1
2 TABLET ORAL ONCE
Refills: 0 | Status: COMPLETED | OUTPATIENT
Start: 2022-08-31 | End: 2022-08-31

## 2022-08-31 RX ADMIN — WARFARIN SODIUM 2 MILLIGRAM(S): 2.5 TABLET ORAL at 21:31

## 2022-08-31 RX ADMIN — Medication 650 MILLIGRAM(S): at 06:03

## 2022-08-31 RX ADMIN — Medication 81 MILLIGRAM(S): at 12:57

## 2022-08-31 RX ADMIN — ATENOLOL 25 MILLIGRAM(S): 25 TABLET ORAL at 05:48

## 2022-08-31 RX ADMIN — Medication 650 MILLIGRAM(S): at 05:46

## 2022-08-31 RX ADMIN — Medication 2000 UNIT(S): at 12:56

## 2022-08-31 RX ADMIN — ATORVASTATIN CALCIUM 40 MILLIGRAM(S): 80 TABLET, FILM COATED ORAL at 21:31

## 2022-08-31 RX ADMIN — ESCITALOPRAM OXALATE 20 MILLIGRAM(S): 10 TABLET, FILM COATED ORAL at 12:58

## 2022-08-31 RX ADMIN — Medication 650 MILLIGRAM(S): at 18:33

## 2022-08-31 NOTE — PHYSICAL THERAPY INITIAL EVALUATION ADULT - PERTINENT HX OF CURRENT PROBLEM, REHAB EVAL
85 y/o female admitted with diagnoses of Postural dizziness with presyncope, Acute UTI with c/o dizziness at home, no acute pathology on Head CT, awaiting MRI

## 2022-08-31 NOTE — PHYSICAL THERAPY INITIAL EVALUATION ADULT - ACTIVE RANGE OF MOTION EXAMINATION, REHAB EVAL
Both upper extremities/Both lower extremities joints within functional limits and painfree AAROM with LOM (R) shoulder from old injury

## 2022-08-31 NOTE — PHYSICAL THERAPY INITIAL EVALUATION ADULT - GENERAL OBSERVATIONS, REHAB EVAL
10:10-10:35 Chart reviewed. Pt encountered semireclined in bed, may be seen by Physical Therapist as confirmed with Nurse. Patient denied pain at rest and ready to get up now; +tele/heplock (L) hand/Purewick

## 2022-08-31 NOTE — PHYSICAL THERAPY INITIAL EVALUATION ADULT - GAIT DEVIATIONS NOTED, PT EVAL
stooped posture, dec heel strike/pushoff/decreased nehemiah/decreased step length/decreased weight-shifting ability

## 2022-08-31 NOTE — PROGRESS NOTE ADULT - SUBJECTIVE AND OBJECTIVE BOX
ORAL WU  [address] ([facility] 3S[facility]3 [facility])      Patient is a 86y old  Female who presents with a chief complaint of dizziness, uti (31 Aug 2022 10:16)        Interval events:  Kinyarwanda #: [id number]  Patient seen and examined at bedside. She says she was having BM at home, felt "dizzy", which lasted about 10-20 mins before EMS came. Says she feels much better now, hasn't felt dizzy since. Denies urinary symptoms, fevers, weakness, sensory loss.     -PMHx: HTN (hypertension)    High cholesterol    Osteoporosis    Fracture of right shoulder    Anxiety    Depression    Atrial fibrillation      -PSHx:H/O heart valve replacement with mechanical valve    S/P hip hemiarthroplasty            REVIEW OF SYSTEMS:  CONSTITUTIONAL: No fever, weight loss, or fatigue  RESPIRATORY: No cough, wheezing, chills or hemoptysis; No shortness of breath  CARDIOVASCULAR: No chest pain, palpitations, dizziness, or leg swelling  GASTROINTESTINAL: No abdominal or epigastric pain. No nausea, vomiting, or hematemesis; No diarrhea or constipation. No melena or hematochezia.  NEUROLOGICAL: No headaches  LYMPH NODES: No enlarged glands  MUSCULOSKELETAL: No joint pain or swelling; No muscle, back, or extremity pain      T(C): , Max: 37.3 (08-30-22 @ 18:18)  HR: 66 (08-31-22 @ 10:50)  BP: 141/66 (08-31-22 @ 10:50)  RR: 16 (08-31-22 @ 08:11)  SpO2: 99% (08-30-22 @ 23:00)  CAPILLARY BLOOD GLUCOSE      POCT Blood Glucose.: 147 mg/dL (30 Aug 2022 12:08)      PHYSICAL EXAM:  GENERAL: NAD, lying in bed comfortably  HEAD:  Atraumatic, Normocephalic  EYES: EOMI, PERRLA, conjunctiva and sclera clear  ENT: Moist mucous membranes  NECK: Supple, No JVD  CHEST/LUNG: Clear to auscultation bilaterally; No rales, rhonchi, wheezing, or rubs. Unlabored respirations  HEART: Regular rate and rhythm; No murmurs, rubs, or gallops  ABDOMEN: Bowel sounds present; Soft, Nontender, Nondistended. No hepatomegaly  EXTREMITIES:  2+ Peripheral Pulses, brisk capillary refill. No clubbing, cyanosis, or edema  NERVOUS SYSTEM:  Alert & Oriented X3, speech clear. No deficits   MSK: FROM all 4 extremities, full and equal strength  SKIN: No rashes or lesions      LABS:          11.7  7.67  )-------(196          34.8  N=75.2  L=15.6  MCV=89.5          11.4  5.48  )-------(186          34.3  N=71.9  L=19.0  MCV=91.7    133<L>|95<L>|14  ------------------<97  4.6|26|0.9  eGFR:--  Ca:9.0  130<L>|94<L>|15  ------------------<160<H>  4.7|30|0.9  eGFR:--  Ca:8.8      PT/INR - ( 31 Aug 2022 07:38 )   PT: >40.00 sec;   INR: 3.72 ratio         PTT - ( 30 Aug 2022 20:20 )  PTT:65.5 sec      Microbiology:      RADIOLOGY & ADDITIONAL TESTS:  < from: CT Angio Neck w/ IV Cont (08.30.22 @ 15:58) >    IMPRESSION:    1.  Aneurysmal dilatation of the visualized ascending aorta, incompletely   imaged.    2.  Right internal carotid artery; origin and proximal short segment mild   stenosis (less than 50%).    3.  Left common carotid artery distal, internal carotid artery origin and   proximal, and external carotid artery origin; short segment mild stenosis   (less than 50%), vascular calcifications.    < end of copied text >    < from: CT Head No Cont (08.30.22 @ 15:06) >  IMPRESSION:    In comparison with the prior CT scan of the brain dated July 13, 2021:    No acute intracranial hemorrhage or acute large territorial infarct.    Stable examination.    < end of copied text >    < from: 12 Lead ECG (08.30.22 @ 12:14) >  Diagnosis Line Sinus bradycardia  Minimal voltage criteria for LVH, may be normal variant  Borderline ECG    < end of copied text >          Medications:  acetaminophen     Tablet .. 650 milliGRAM(s) Oral every 6 hours PRN  ALPRAZolam 0.5 milliGRAM(s) Oral at bedtime PRN  aluminum hydroxide/magnesium hydroxide/simethicone Suspension 30 milliLiter(s) Oral every 4 hours PRN  aspirin enteric coated 81 milliGRAM(s) Oral daily  ATENolol  Tablet 25 milliGRAM(s) Oral daily  atorvastatin 40 milliGRAM(s) Oral at bedtime  cefTRIAXone   IVPB 1000 milliGRAM(s) IV Intermittent every 24 hours  cholecalciferol 2000 Unit(s) Oral daily  escitalopram 20 milliGRAM(s) Oral daily  meclizine 25 milliGRAM(s) Oral every 8 hours PRN  melatonin 3 milliGRAM(s) Oral at bedtime PRN  ondansetron Injectable 4 milliGRAM(s) IV Push every 8 hours PRN

## 2022-08-31 NOTE — PROGRESS NOTE ADULT - ASSESSMENT
87 y/o female with pmhx  Hypertension, HDL, CAD s/p CABG in 2003 with concomitant Metallic Aortic valve replacement  on Coumadin, osteoporosis, displaced right subcapital femoral neck fracture s/p Hemiarthroplasty on 7/16/2021 presents with dizziness.      #Dizziness   CTA head and neck- Right internal carotid artery stenosis (less than 50%), Left common carotid artery distal ( less than 50%)  -meclizine prn   -orthostatic vitals- please repeat, equivocal results (did not get while standing)   -MRI brain non contrast ( pt has metallic aortic valve, need to confirm MRI compatibility)- pending   -neurology consult appreciated  -EEG  -fall precaution   -no events on tele, cardio f/u appreciated, should f/u outpatient for MCOT     #Asymptomatic bacteruria   -d/c abx, patient not having any symptoms     #Hypertension   -continue home meds   -monitor blood pressure - stable today     #HLD  -continue statin     # CAD s/p CABG in 2003   #metallic Aortic valve replacement  on Coumadin  - pt takes 4mg on M,T,Th, and Saturdays & 2mg on W, F, and Sundays  -INR 3.72 today; coumadin held yesterday, will give home dose of 2mg coumadin today (INR will drop again tomorrow); f/u INR in AM  -INR goal 2.5-3.5   -c/w home dose baby aspirin     #Aneurysmal dilatation of the ascending thoracic aorta measuring    approximately 4.5 cm in maximum diameter.  -chronic daughter aware as per ED    #DVT PPx- Coumadin 2mg today   #GI PPx- None  #Diet- DASH/TLC   #CHG  #Activity- Ambulate with assistance; fall risk   #Dispo- pending MRI  #Code- daughter will sign MOLST when she comes back this afternoon    #Progress Note Handoff  Pending (specify): MRI, orthostatics, PT/rehab, daily INR   Family discussion: Plan of care discussed with patient, aware and agreeable   Disposition: acute

## 2022-08-31 NOTE — PHYSICAL THERAPY INITIAL EVALUATION ADULT - ADDITIONAL COMMENTS
Per daughter at bedside whom patient preferred to translate [place]ian as needed, they live in private home with one small step to enter home where patient is assisted by family then has stairlift inside home; was using a rolling walker

## 2022-09-01 ENCOUNTER — TRANSCRIPTION ENCOUNTER (OUTPATIENT)
Age: 87
End: 2022-09-01

## 2022-09-01 VITALS
HEART RATE: 69 BPM | TEMPERATURE: 97 F | RESPIRATION RATE: 16 BRPM | DIASTOLIC BLOOD PRESSURE: 67 MMHG | SYSTOLIC BLOOD PRESSURE: 145 MMHG

## 2022-09-01 LAB
-  AMIKACIN: SIGNIFICANT CHANGE UP
-  AMOXICILLIN/CLAVULANIC ACID: SIGNIFICANT CHANGE UP
-  AMPICILLIN/SULBACTAM: SIGNIFICANT CHANGE UP
-  AMPICILLIN: SIGNIFICANT CHANGE UP
-  AZTREONAM: SIGNIFICANT CHANGE UP
-  CEFAZOLIN: SIGNIFICANT CHANGE UP
-  CEFEPIME: SIGNIFICANT CHANGE UP
-  CEFOXITIN: SIGNIFICANT CHANGE UP
-  CEFTRIAXONE: SIGNIFICANT CHANGE UP
-  CIPROFLOXACIN: SIGNIFICANT CHANGE UP
-  ERTAPENEM: SIGNIFICANT CHANGE UP
-  GENTAMICIN: SIGNIFICANT CHANGE UP
-  IMIPENEM: SIGNIFICANT CHANGE UP
-  LEVOFLOXACIN: SIGNIFICANT CHANGE UP
-  MEROPENEM: SIGNIFICANT CHANGE UP
-  NITROFURANTOIN: SIGNIFICANT CHANGE UP
-  PIPERACILLIN/TAZOBACTAM: SIGNIFICANT CHANGE UP
-  TIGECYCLINE: SIGNIFICANT CHANGE UP
-  TOBRAMYCIN: SIGNIFICANT CHANGE UP
-  TRIMETHOPRIM/SULFAMETHOXAZOLE: SIGNIFICANT CHANGE UP
ALBUMIN SERPL ELPH-MCNC: 4.1 G/DL — SIGNIFICANT CHANGE UP (ref 3.5–5.2)
ALP SERPL-CCNC: 53 U/L — SIGNIFICANT CHANGE UP (ref 30–115)
ALT FLD-CCNC: 10 U/L — SIGNIFICANT CHANGE UP (ref 0–41)
ANION GAP SERPL CALC-SCNC: 12 MMOL/L — SIGNIFICANT CHANGE UP (ref 7–14)
AST SERPL-CCNC: 23 U/L — SIGNIFICANT CHANGE UP (ref 0–41)
BASOPHILS # BLD AUTO: 0.02 K/UL — SIGNIFICANT CHANGE UP (ref 0–0.2)
BASOPHILS NFR BLD AUTO: 0.4 % — SIGNIFICANT CHANGE UP (ref 0–1)
BILIRUB SERPL-MCNC: 0.5 MG/DL — SIGNIFICANT CHANGE UP (ref 0.2–1.2)
BUN SERPL-MCNC: 13 MG/DL — SIGNIFICANT CHANGE UP (ref 10–20)
CALCIUM SERPL-MCNC: 8.6 MG/DL — SIGNIFICANT CHANGE UP (ref 8.5–10.1)
CHLORIDE SERPL-SCNC: 96 MMOL/L — LOW (ref 98–110)
CO2 SERPL-SCNC: 27 MMOL/L — SIGNIFICANT CHANGE UP (ref 17–32)
CREAT SERPL-MCNC: 0.7 MG/DL — SIGNIFICANT CHANGE UP (ref 0.7–1.5)
CULTURE RESULTS: SIGNIFICANT CHANGE UP
EGFR: 84 ML/MIN/1.73M2 — SIGNIFICANT CHANGE UP
EOSINOPHIL # BLD AUTO: 0.05 K/UL — SIGNIFICANT CHANGE UP (ref 0–0.7)
EOSINOPHIL NFR BLD AUTO: 0.9 % — SIGNIFICANT CHANGE UP (ref 0–8)
GLUCOSE SERPL-MCNC: 110 MG/DL — HIGH (ref 70–99)
HCT VFR BLD CALC: 33.5 % — LOW (ref 37–47)
HGB BLD-MCNC: 11.1 G/DL — LOW (ref 12–16)
IMM GRANULOCYTES NFR BLD AUTO: 0.2 % — SIGNIFICANT CHANGE UP (ref 0.1–0.3)
INR BLD: 2.55 RATIO — HIGH (ref 0.65–1.3)
LYMPHOCYTES # BLD AUTO: 1.37 K/UL — SIGNIFICANT CHANGE UP (ref 1.2–3.4)
LYMPHOCYTES # BLD AUTO: 25.5 % — SIGNIFICANT CHANGE UP (ref 20.5–51.1)
MCHC RBC-ENTMCNC: 29.8 PG — SIGNIFICANT CHANGE UP (ref 27–31)
MCHC RBC-ENTMCNC: 33.1 G/DL — SIGNIFICANT CHANGE UP (ref 32–37)
MCV RBC AUTO: 89.8 FL — SIGNIFICANT CHANGE UP (ref 81–99)
METHOD TYPE: SIGNIFICANT CHANGE UP
MONOCYTES # BLD AUTO: 0.5 K/UL — SIGNIFICANT CHANGE UP (ref 0.1–0.6)
MONOCYTES NFR BLD AUTO: 9.3 % — SIGNIFICANT CHANGE UP (ref 1.7–9.3)
NEUTROPHILS # BLD AUTO: 3.42 K/UL — SIGNIFICANT CHANGE UP (ref 1.4–6.5)
NEUTROPHILS NFR BLD AUTO: 63.7 % — SIGNIFICANT CHANGE UP (ref 42.2–75.2)
NRBC # BLD: 0 /100 WBCS — SIGNIFICANT CHANGE UP (ref 0–0)
ORGANISM # SPEC MICROSCOPIC CNT: SIGNIFICANT CHANGE UP
ORGANISM # SPEC MICROSCOPIC CNT: SIGNIFICANT CHANGE UP
PLATELET # BLD AUTO: 197 K/UL — SIGNIFICANT CHANGE UP (ref 130–400)
POTASSIUM SERPL-MCNC: 4.2 MMOL/L — SIGNIFICANT CHANGE UP (ref 3.5–5)
POTASSIUM SERPL-SCNC: 4.2 MMOL/L — SIGNIFICANT CHANGE UP (ref 3.5–5)
PROT SERPL-MCNC: 6.3 G/DL — SIGNIFICANT CHANGE UP (ref 6–8)
PROTHROM AB SERPL-ACNC: 29.1 SEC — HIGH (ref 9.95–12.87)
RBC # BLD: 3.73 M/UL — LOW (ref 4.2–5.4)
RBC # FLD: 13.9 % — SIGNIFICANT CHANGE UP (ref 11.5–14.5)
SARS-COV-2 RNA SPEC QL NAA+PROBE: SIGNIFICANT CHANGE UP
SODIUM SERPL-SCNC: 135 MMOL/L — SIGNIFICANT CHANGE UP (ref 135–146)
SPECIMEN SOURCE: SIGNIFICANT CHANGE UP
WBC # BLD: 5.37 K/UL — SIGNIFICANT CHANGE UP (ref 4.8–10.8)
WBC # FLD AUTO: 5.37 K/UL — SIGNIFICANT CHANGE UP (ref 4.8–10.8)

## 2022-09-01 PROCEDURE — 93971 EXTREMITY STUDY: CPT | Mod: 26,RT

## 2022-09-01 PROCEDURE — 99238 HOSP IP/OBS DSCHRG MGMT 30/<: CPT

## 2022-09-01 RX ORDER — MECLIZINE HCL 12.5 MG
1 TABLET ORAL
Qty: 0 | Refills: 0 | DISCHARGE
Start: 2022-09-01

## 2022-09-01 RX ADMIN — Medication 0.1 MILLIGRAM(S): at 01:10

## 2022-09-01 RX ADMIN — ATENOLOL 25 MILLIGRAM(S): 25 TABLET ORAL at 06:44

## 2022-09-01 NOTE — DISCHARGE NOTE NURSING/CASE MANAGEMENT/SOCIAL WORK - PATIENT PORTAL LINK FT
You can access the FollowMyHealth Patient Portal offered by [facility] by registering at the following website: [email or web address]. By joining [facility]’s FollowMyHealth portal, you will also be able to view your health information using other applications (apps) compatible with our system.

## 2022-09-01 NOTE — CONSULT NOTE ADULT - SUBJECTIVE AND OBJECTIVE BOX
HPI: 87 y/o female Pashto speaking  with pmhx  Hypertension, HDL, CAD s/p CABG in  with concomitant Metallic Aortic valve replacement  on Coumadin, osteoporosis, displaced right subcapital femoral neck fracture s/p Hemiarthroplasty on 2021 presents with dizziness.  Pt reports dizziness started this am, denies room spinning. Pt states has had dizziness in the past, has heart valve. Pt denies chest pain or palpitation. Pt denies falling. Pt states takes her medications regularly.  PT denies dizziness, blurry vison, focal weakness in upper or lower extremity. Pt denies dizziness at this time . Pt denies fever, chills, shortness of breath, burning with urination, diarrhea.ptn seen at  bed side nad fu  simple command      PTN  REFERRED TO ACUTE  REHAB  FOR  EVAL AND  TX   PAST MEDICAL & SURGICAL HISTORY:  HTN (hypertension)      High cholesterol      Osteoporosis      Fracture of right shoulder      Anxiety      Depression      Atrial fibrillation      H/O heart valve replacement with mechanical valve      S/P hip hemiarthroplasty  on 2021 for right subcapital femoral neck fracture          Hospital Course:    TODAY'S SUBJECTIVE & REVIEW OF SYMPTOMS:     Constitutional WNL   Cardio WNL   Resp WNL   GI WNL  Heme WNL  Endo WNL  Skin WNL  MSK WNL  Neuro WNL  Cognitive WNL  Psych WNL      MEDICATIONS  (STANDING):  aspirin enteric coated 81 milliGRAM(s) Oral daily  ATENolol  Tablet 25 milliGRAM(s) Oral daily  atorvastatin 40 milliGRAM(s) Oral at bedtime  cholecalciferol 2000 Unit(s) Oral daily  escitalopram 20 milliGRAM(s) Oral daily    MEDICATIONS  (PRN):  acetaminophen     Tablet .. 650 milliGRAM(s) Oral every 6 hours PRN Temp greater or equal to 38C (100.4F), Mild Pain (1 - 3)  ALPRAZolam 0.5 milliGRAM(s) Oral at bedtime PRN anxiety  aluminum hydroxide/magnesium hydroxide/simethicone Suspension 30 milliLiter(s) Oral every 4 hours PRN Dyspepsia  meclizine 25 milliGRAM(s) Oral every 8 hours PRN Dizziness  melatonin 3 milliGRAM(s) Oral at bedtime PRN Insomnia  ondansetron Injectable 4 milliGRAM(s) IV Push every 8 hours PRN Nausea and/or Vomiting      FAMILY HISTORY:  FH: lung cancer (Sibling)        Allergies    penicillin (Unknown)  pinapples (Unknown)    Intolerances        SOCIAL HISTORY:    [  ] Etoh  [  ] Smoking  [  ] Substance abuse     Home Environment:  [  ] Home Alone  [ x ] Lives with Family dtr    [  ] Home Health Aid    Dwelling:  [  ] Apartment  [ x ] Private House  [  ] Adult Home  [  ] Skilled Nursing Facility      [  ] Short Term  [  ] Long Term  [x  ] Stairs       Elevator [  ]    FUNCTIONAL STATUS PTA: (Check all that apply)  Ambulation: [ x  ]Independent    [  ] Dependent     [  ] Non-Ambulatory  Assistive Device: [  ] SA Cane  [  ]  Q Cane  [x  ] Walker  [  ]  Wheelchair  ADL : [ x] Independent  [  ]  Dependent       Vital Signs Last 24 Hrs  T(C): 36.1 (01 Sep 2022 05:00), Max: 36.7 (31 Aug 2022 20:54)  T(F): 97 (01 Sep 2022 05:00), Max: 98 (31 Aug 2022 20:54)  HR: 68 (01 Sep 2022 08:16) (64 - 81)  BP: 157/65 (01 Sep 2022 05:00) (126/56 - 199/83)  BP(mean): --  RR: 16 (01 Sep 2022 08:16) (16 - 18)  SpO2: 97% (01 Sep 2022 08:16) (97% - 97%)    Parameters below as of 01 Sep 2022 08:16  Patient On (Oxygen Delivery Method): room air          PHYSICAL EXAM: Alert & Oriented X 2  GENERAL: NAD, well-groomed, well-developed  HEAD:  Atraumatic, Normocephalic  EYES: EOMI, PERRLA, conjunctiva and sclera clear  NECK: Supple, No JVD, Normal thyroid  CHEST/LUNG: Clear to percussion bilaterally; No rales, rhonchi, wheezing, or rubs  HEART: Regular rate and rhythm; No murmurs, rubs, or gallops  ABDOMEN: Soft, Nontender, Nondistended; Bowel sounds present  EXTREMITIES:  2+ Peripheral Pulses, No clubbing, cyanosis, or edema    NERVOUS SYSTEM:  Cranial Nerves 2-12 intact [x  ] Abnormal  [  ]  ROM: WFL all extremities [  p  Abnormal [  ]  Motor Strength: WFL all extremities  [  ]  Abnormal [x  ]  Sensation: intact to light touch [  ] Abnormal [ x ]  Reflexes: Symmetric [  ]  Abnormal [x  ]    FUNCTIONAL STATUS:  Bed Mobility: Independent [  ]  Supervision [  ]  Needs Assistance [ x ]  N/A [  ]  Transfers: Independent [  ]  Supervision [  ]  Needs Assistance [ x ]  N/A [  ]   Ambulation: Independent [  ]  Supervision [  ]  Needs Assistance [x ]  N/A [  ]  ADL: Independent [  ] Requires Assistance [  ] N/A [ x ]  SEE PT/OT IE NOTES    LABS:                        11.1   5.37  )-----------( 197      ( 01 Sep 2022 06:24 )             33.5         135  |  96<L>  |  13  ----------------------------<  110<H>  4.2   |  27  |  0.7    Ca    8.6      01 Sep 2022 06:24    TPro  6.3  /  Alb  4.1  /  TBili  0.5  /  DBili  x   /  AST  23  /  ALT  10  /  AlkPhos  53      PT/INR - ( 01 Sep 2022 06:24 )   PT: 29.10 sec;   INR: 2.55 ratio         PTT - ( 30 Aug 2022 20:20 )  PTT:65.5 sec  Urinalysis Basic - ( 30 Aug 2022 14:35 )    Color: Yellow / Appearance: Slightly Cloudy / S.015 / pH: x  Gluc: x / Ketone: Negative  / Bili: Negative / Urobili: 0.2 mg/dL   Blood: x / Protein: Negative mg/dL / Nitrite: Positive   Leuk Esterase: Large / RBC: 3-5 /HPF / WBC >50 /HPF   Sq Epi: x / Non Sq Epi: Moderate /HPF / Bacteria: Many        RADIOLOGY & ADDITIONAL STUDIES:    Assesment:
ORAL WU     86y     Female    MRN-[id number]                                                           CC:Patient is a 86y old  Female who presents with a chief complaint of dizziness, uti (30 Aug 2022 18:35)      HPI:  Sammarinese speaking  # [id number]  A 85 y/o female with pmhx  Hypertension, HDL, CAD s/p CABG in 2003 with concomitant Metallic Aortic valve replacement  on Coumadin, osteoporosis, displaced right subcapital femoral neck fracture s/p Hemiarthroplasty on 7/16/2021 presents with dizziness.  Pt reports dizziness started this am, denies room spinning. Pt states has had dizziness in the past, has heart valve. Pt denies chest pain or palpitation. Pt denies falling. Pt states takes her medications regularly.  PT denies dizziness, blurry vison, focal weakness in upper or lower extremity. Pt denies dizziness at this time . Pt denies fever, chills, shortness of breath, burning with urination, diarrhea. Tried to reach daughter tel [phone] no answer. Meds verified with pharmacy.  (30 Aug 2022 18:35)      Patient seen with daughter at bedside to provide history.  Patient unable to provide acurrate history.  Daughter says her mother needs help with almost all of her ADLS and it is difficult for her an dher sister to continue taking care of her.  Her mother had an episode when she slumped to the side and was starring and not responsive.  It last for <1 minute which is why she came to the ED.  Daughter says this episode has happened many times in the last few months and sometimes they will goto the hospital and sometimes she will arouse quickly and they wont.    ROS:  Constitutional, Neurological, Psychiatric, Eyes, ENT, Cardiovascular, Respiratory, Gastrointestinal, Genitourinary, Musculoskeletal, Integumentary, Endocrine and Heme/Lymph are otherwise negative. Except for    Social History: No smoking, No drinking, No drug use    FAMILY HISTORY:  FH: lung cancer (Sibling)        HEALTH ISSUES - PROBLEM Dx:          Vital Signs Last 24 Hrs  T(C): 36.3 (31 Aug 2022 05:00), Max: 37.3 (30 Aug 2022 18:18)  T(F): 97.3 (31 Aug 2022 05:00), Max: 99.2 (30 Aug 2022 18:18)  HR: 64 (31 Aug 2022 08:11) (55 - 74)  BP: 155/68 (31 Aug 2022 05:00) (155/68 - 220/95)  BP(mean): --  RR: 16 (31 Aug 2022 08:11) (16 - 20)  SpO2: 99% (30 Aug 2022 23:00) (99% - 100%)    Parameters below as of 30 Aug 2022 23:00  Patient On (Oxygen Delivery Method): room air        Physical Exam:  Constitutional: alert and in no acute distress.  Eyes: the sclera and conjunctiva were normal, pupils were equal in size, round, reactive to light, with normal accommodation and extraocular movements were intact.   Back: no costovertebral angle tenderness and no spinal tenderness.      Neuro Exam:  a+Ox2  Able to name and repeat short sentences  No facial, EOMI, VFF to confrontation  No drift   Power >4/5 throughout and symmetric  FTN NL  Gait deferred    NIHSS: 0  mrankin 3-4    Allergies    penicillin (Unknown)  pinapples (Unknown)    Intolerances       Home Medications:  ALPRAZolam 0.5 mg oral tablet: 1/2 tab(s) orally once a day (at bedtime), As Needed (30 Aug 2022 19:22)  aspirin 81 mg oral delayed release tablet: 1 tab(s) orally once a day (30 Aug 2022 19:22)  atenolol 25 mg oral tablet: 1 tab(s) orally once a day (30 Aug 2022 19:22)  atorvastatin 40 mg oral tablet: 1 tab(s) orally once a day (30 Aug 2022 19:22)  escitalopram 20 mg oral tablet: 1 tab(s) orally once a day (30 Aug 2022 19:22)  Vitamin D3 50 mcg (2000 intl units) oral tablet: 1 tab(s) orally once a day (30 Aug 2022 19:22)  warfarin 4 mg oral tablet: 1 tab(s) orally once a day  4mg M, T , TH, sat  2 mg  w, F , s (30 Aug 2022 19:22)      MEDICATIONS  (STANDING):  aspirin enteric coated 81 milliGRAM(s) Oral daily  ATENolol  Tablet 25 milliGRAM(s) Oral daily  atorvastatin 40 milliGRAM(s) Oral at bedtime  cefTRIAXone   IVPB 1000 milliGRAM(s) IV Intermittent every 24 hours  cholecalciferol 2000 Unit(s) Oral daily  escitalopram 20 milliGRAM(s) Oral daily    MEDICATIONS  (PRN):  acetaminophen     Tablet .. 650 milliGRAM(s) Oral every 6 hours PRN Temp greater or equal to 38C (100.4F), Mild Pain (1 - 3)  ALPRAZolam 0.5 milliGRAM(s) Oral at bedtime PRN anxiety  aluminum hydroxide/magnesium hydroxide/simethicone Suspension 30 milliLiter(s) Oral every 4 hours PRN Dyspepsia  meclizine 25 milliGRAM(s) Oral every 8 hours PRN Dizziness  melatonin 3 milliGRAM(s) Oral at bedtime PRN Insomnia  ondansetron Injectable 4 milliGRAM(s) IV Push every 8 hours PRN Nausea and/or Vomiting      LABS:                        11.7   7.67  )-----------( 196      ( 31 Aug 2022 07:38 )             34.8     08-31    133<L>  |  95<L>  |  14  ----------------------------<  97  4.6   |  26  |  0.9    Ca    9.0      31 Aug 2022 07:38  Mg     1.8     08-30    TPro  6.2  /  Alb  3.9  /  TBili  0.4  /  DBili  x   /  AST  27  /  ALT  10  /  AlkPhos  54  08-31    PT/INR - ( 31 Aug 2022 07:38 )   PT: >40.00 sec;   INR: 3.72 ratio         PTT - ( 30 Aug 2022 20:20 )  PTT:65.5 sec        Neuro Imaging:  [facility]:   < from: CT Angio Neck w/ IV Cont (08.30.22 @ 15:58) >    ACC: [id number] EXAM:  CT ANGIO NECK (W)AW IC                        ACC: [id number] EXAM:  CT ANGIO BRAIN (W)AW IC                          PROCEDURE DATE:  08/30/2022          INTERPRETATION:  Clinical History / Reason for exam: Dizziness.    CTA OF THE HEAD AND NECK    TECHNIQUE:    Following the intravenous administration of 95 cc (5 cc discarded) of   non-ionic contrast, CTA of the head and neck was performed. Sagittal and   coronal reformatted images and 3-D volume rendering images were submitted   for interpretation.    FINDINGS:    Vascular calcifications are noted.    CTA NECK:    Aneurysmal dilatation of the ascending thoracic aorta measuring    approximately 4.5 cm in maximum diameter. Incompletely imaged on this   examination.    There is a bovine arch, normal anatomic variation.    The origins of the great vessels are patent.    Right:    The common carotid artery, external carotid artery, and vertebral artery   are patent.    There is a short segment of mild stenosis (less than 50%) involving the   origin and proximal internal carotid artery.    Left:    There is a short segment of mild stenosis (less than 50%) involving the   distal common carotid artery, origin and proximal internal carotid   artery, and origin of the external carotid artery, vascular   calcifications.    The vertebral artery is patent.    CTA HEAD:    Intracranial internal carotid arteries, anterior cerebral arteries,   middle cerebral arteries, basilar artery, posterior cerebral arteries and   intracranial vertebral arteries are patent.    OTHER:    Sternal wires and mediastinal clips.    Degenerative changes of the cervical spine and temporomandibular joints.    Chronic fracture deformity of the right humeral head and neck.    IMPRESSION:    1.  Aneurysmal dilatation of the visualized ascending aorta, incompletely   imaged.    2.  Right internal carotid artery; origin and proximal short segment mild   stenosis (less than 50%).    3.  Left common carotid artery distal, internal carotid artery origin and   proximal, and external carotid artery origin; short segment mild stenosis   (less than 50%), vascular calcifications.    --- End of Report ---    < end of copied text >          Assessment / Plan: This is a 86y year old Female presenting with recurrent episodes of unresponsiveness.  She has a history of orthostatic hypotension and according to family severe deconditioning.  Would workup for possible seizures.  Ma also have mild dementia  1. Routine EEG  2. Social work case work for placement as per family

## 2022-09-01 NOTE — DISCHARGE NOTE PROVIDER - HOSPITAL COURSE
87 y/o female with pmhx  Hypertension, HDL, CAD s/p CABG in 2003 with concomitant Metallic Aortic valve replacement  on Coumadin, osteoporosis, displaced right subcapital femoral neck fracture s/p Hemiarthroplasty on 7/16/2021 presents with dizziness.      #Dizziness   CTA head and neck- Right internal carotid artery stenosis (less than 50%), Left common carotid artery distal ( less than 50%)  -meclizine prn   -neurology consult appreciated- EEG normal, no further work up needed  -fall precaution   -no events on tele, cardio f/u appreciated, should f/u outpatient for MCOT     #Asymptomatic bacteruria   -d/c abx, patient not having any symptoms     #Hypertension   -continue home meds   -monitor blood pressure - stable today     #HLD  -continue statin     # CAD s/p CABG in 2003   #metallic Aortic valve replacement  on Coumadin  - pt takes 4mg on M,T,Th, and Saturdays & 2mg on W, F, and Sundays  -INR goal 2.5-3.5   -c/w home dose baby aspirin     #Aneurysmal dilatation of the ascending thoracic aorta measuring    approximately 4.5 cm in maximum diameter.  -chronic daughter aware as per ED

## 2022-09-01 NOTE — DISCHARGE NOTE PROVIDER - CARE PROVIDER_API CALL
Bart Hughes (DO)  Medicine  [address] [place] [address], [address] [place] [address]  Phone: [phone]  Fax: [phone]  Follow Up Time:

## 2022-09-01 NOTE — DISCHARGE NOTE PROVIDER - NSDCCPCAREPLAN_GEN_ALL_CORE_FT
PRINCIPAL DISCHARGE DIAGNOSIS  Diagnosis: Postural dizziness with presyncope  Assessment and Plan of Treatment: Neurology work up unremarkable.  Work hard and rehab and follow with your primary doctor upon dischage.

## 2022-09-01 NOTE — DISCHARGE NOTE PROVIDER - NSDCFUSCHEDAPPT_GEN_ALL_CORE_FT
Brittney Wright  [facility]  CARDIOLOGY [address]  Scheduled Appointment: 09/07/2022    [facility]  MEDMGMT OP [address]  Scheduled Appointment: 09/07/2022

## 2022-09-01 NOTE — CONSULT NOTE ADULT - ASSESSMENT
IMPRESSION: Rehab of 85 y/o  f  rehab  for debility  gd     PRECAUTIONS: [  ] Cardiac  [  ] Respiratory  [  ] Seizures [  ] Contact Isolation  [  ] Droplet Isolation  [ FALL ] Other    Weight Bearing Status:     RECOMMENDATION:    Out of Bed to Chair     DVT/Decubiti Prophylaxis    REHAB PLAN:     [  xx ] Bedside P/T 3-5 times a week   [   ]   Bedside O/T  2-3 times a week             [   ] No Rehab Therapy Indicated                   [   ]  Speech Therapy   Conditioning/ROM                                    ADL  Bed Mobility                                               Conditioning/ROM  Transfers                                                     Bed Mobility  Sitting /Standing Balance                         Transfers                                        Gait Training                                               Sitting/Standing Balance  Stair Training [   ]Applicable                    Home equipment Eval                                                                        Splinting  [   ] Only      GOALS:   ADL   [  x ]   Independent                    Transfers  [x ] Independent                          Ambulation  [x ] Independent     [  x  ] With device                            [ x  ]  CG                                                         [  x ]  CG                                                                  [  x ] CG                            [    ] Min A                                                   [   ] Min A                                                              [   ] Min  A          DISCHARGE PLAN:   [   ]  Good candidate for Intensive Rehabilitation/Hospital based-4A [facility]                                             Will tolerate 3hrs Intensive Rehab Daily                                       [ xx   ]  Short Term Rehab in Skilled Nursing Facility and [facility]                                        [    ]  Home with Outpatient or  services                                         [    ]  Possible Candidate for Intensive Hospital based Rehab

## 2022-09-01 NOTE — DISCHARGE NOTE PROVIDER - NSDCMRMEDTOKEN_GEN_ALL_CORE_FT
ALPRAZolam 0.5 mg oral tablet: 1/2 tab(s) orally once a day (at bedtime), As Needed  aspirin 81 mg oral delayed release tablet: 1 tab(s) orally once a day  atenolol 25 mg oral tablet: 1 tab(s) orally once a day  atorvastatin 40 mg oral tablet: 1 tab(s) orally once a day  escitalopram 20 mg oral tablet: 1 tab(s) orally once a day  meclizine 25 mg oral tablet: 1 tab(s) orally every 8 hours, As needed, Dizziness  Vitamin D3 50 mcg (2000 intl units) oral tablet: 1 tab(s) orally once a day  warfarin 4 mg oral tablet: 1 tab(s) orally once a day  4mg M, T , TH, sat  2 mg  w, F , s

## 2022-09-04 NOTE — DISCHARGE NOTE PROVIDER - NSDCCPGOAL_GEN_ALL_CORE_FT
PAST SURGICAL HISTORY:  No significant past surgical history
To get better and follow your care plan as instructed.

## 2022-09-06 DIAGNOSIS — I71.2 THORACIC AORTIC ANEURYSM, WITHOUT RUPTURE: ICD-10-CM

## 2022-09-06 DIAGNOSIS — I25.10 ATHEROSCLEROTIC HEART DISEASE OF NATIVE CORONARY ARTERY WITHOUT ANGINA PECTORIS: ICD-10-CM

## 2022-09-06 DIAGNOSIS — Z95.1 PRESENCE OF AORTOCORONARY BYPASS GRAFT: ICD-10-CM

## 2022-09-06 DIAGNOSIS — Z79.82 LONG TERM (CURRENT) USE OF ASPIRIN: ICD-10-CM

## 2022-09-06 DIAGNOSIS — Z91.018 ALLERGY TO OTHER FOODS: ICD-10-CM

## 2022-09-06 DIAGNOSIS — Z88.0 ALLERGY STATUS TO PENICILLIN: ICD-10-CM

## 2022-09-06 DIAGNOSIS — Z79.01 LONG TERM (CURRENT) USE OF ANTICOAGULANTS: ICD-10-CM

## 2022-09-06 DIAGNOSIS — Z95.2 PRESENCE OF PROSTHETIC HEART VALVE: ICD-10-CM

## 2022-09-06 DIAGNOSIS — F41.9 ANXIETY DISORDER, UNSPECIFIED: ICD-10-CM

## 2022-09-06 DIAGNOSIS — M81.0 AGE-RELATED OSTEOPOROSIS WITHOUT CURRENT PATHOLOGICAL FRACTURE: ICD-10-CM

## 2022-09-06 DIAGNOSIS — Z20.822 CONTACT WITH AND (SUSPECTED) EXPOSURE TO COVID-19: ICD-10-CM

## 2022-09-06 DIAGNOSIS — R42 DIZZINESS AND GIDDINESS: ICD-10-CM

## 2022-09-06 DIAGNOSIS — E78.00 PURE HYPERCHOLESTEROLEMIA, UNSPECIFIED: ICD-10-CM

## 2022-09-06 DIAGNOSIS — Z96.641 PRESENCE OF RIGHT ARTIFICIAL HIP JOINT: ICD-10-CM

## 2022-09-06 DIAGNOSIS — R82.71 BACTERIURIA: ICD-10-CM

## 2022-09-06 DIAGNOSIS — F32.A DEPRESSION, UNSPECIFIED: ICD-10-CM

## 2022-09-06 DIAGNOSIS — I10 ESSENTIAL (PRIMARY) HYPERTENSION: ICD-10-CM

## 2022-09-07 ENCOUNTER — APPOINTMENT (OUTPATIENT)
Dept: CARDIOLOGY | Facility: CLINIC | Age: 87
End: 2022-09-07

## 2022-10-19 ENCOUNTER — APPOINTMENT (OUTPATIENT)
Dept: MEDICATION MANAGEMENT | Facility: CLINIC | Age: 87
End: 2022-10-19

## 2022-10-26 ENCOUNTER — APPOINTMENT (OUTPATIENT)
Dept: MEDICATION MANAGEMENT | Facility: CLINIC | Age: 87
End: 2022-10-26

## 2022-11-04 ENCOUNTER — APPOINTMENT (OUTPATIENT)
Dept: MEDICATION MANAGEMENT | Facility: CLINIC | Age: 87
End: 2022-11-04

## 2022-11-09 ENCOUNTER — APPOINTMENT (OUTPATIENT)
Dept: MEDICATION MANAGEMENT | Facility: CLINIC | Age: 87
End: 2022-11-09

## 2022-11-16 ENCOUNTER — APPOINTMENT (OUTPATIENT)
Dept: MEDICATION MANAGEMENT | Facility: CLINIC | Age: 87
End: 2022-11-16

## 2022-11-23 ENCOUNTER — APPOINTMENT (OUTPATIENT)
Dept: MEDICATION MANAGEMENT | Facility: CLINIC | Age: 87
End: 2022-11-23

## 2022-11-30 ENCOUNTER — APPOINTMENT (OUTPATIENT)
Dept: MEDICATION MANAGEMENT | Facility: CLINIC | Age: 87
End: 2022-11-30

## 2022-12-07 ENCOUNTER — APPOINTMENT (OUTPATIENT)
Dept: MEDICATION MANAGEMENT | Facility: CLINIC | Age: 87
End: 2022-12-07

## 2022-12-08 NOTE — CHART NOTE - NSCHARTNOTESELECT_GEN_ALL_CORE
Dr. Jordy Luna office called to say that the Patient did not show up for the Neuropsych testing so they have nothing to fax over.   (we had called for a copy of the report)
Rapid Response
Transfer Note
Electrophysiology PA/Event Note
Event Note
Event Note

## 2022-12-14 ENCOUNTER — APPOINTMENT (OUTPATIENT)
Dept: MEDICATION MANAGEMENT | Facility: CLINIC | Age: 87
End: 2022-12-14

## 2022-12-14 NOTE — ED PROVIDER NOTE - MDM ORDERS SUBMITTED SELECTION
This note was not shared with the patient due to this is a psychotherapy note   Assessment/Plan:      Diagnoses and all orders for this visit:    Adjustment disorder, unspecified type          Subjective:     Patient ID: Emilia Marrero is a [de-identified] y o  male who presented for his follow-up outpatient counseling session with undersigned therapist  During today's session, Guillermo Villarreal reports he continues to hear the whistling sound in his throat but the surgeon mentioned it's nothing to worry about  However, Guillermo Villarreal doesn't feel so sure  The undersigned therapist assisted Guillermo Villarreal process his options and encouraged he discuss his feelings with his PCP  Guillermo Villarreal reports Stefani Harrell was able to get out of the house this week and got his nails done  Guillermo Villarreal continues to take care of Stefani Harrell  Guillermo Villarreal reports Stefani Harrell has been doing well these last few weeks and more coherent than other times  The undersigned therapist assisted Guillermo Villarreal process his feelings about caring for Stefani Harrell and Guillermo Villarreal continued to update undersigned therapist about his week  Guillermo Villarreal is happy with his new car and things are going relatively well  Guillermo Villarreal denies suicidal intent, gesture or ideation at this time  Assessment:    Review of Systems   Psychiatric/Behavioral: Negative  Physical Exam  Psychiatric:         Attention and Perception: Attention and perception normal          Mood and Affect: Mood and affect normal          Speech: Speech normal          Behavior: Behavior normal  Behavior is cooperative  Thought Content:  Thought content normal          Cognition and Memory: Cognition and memory normal          Judgment: Judgment normal  Labs/EKG/Imaging Studies/Medications

## 2022-12-21 ENCOUNTER — APPOINTMENT (OUTPATIENT)
Dept: MEDICATION MANAGEMENT | Facility: CLINIC | Age: 87
End: 2022-12-21

## 2022-12-28 ENCOUNTER — APPOINTMENT (OUTPATIENT)
Dept: MEDICATION MANAGEMENT | Facility: CLINIC | Age: 87
End: 2022-12-28

## 2023-01-03 ENCOUNTER — APPOINTMENT (OUTPATIENT)
Dept: ORTHOPEDIC SURGERY | Facility: ASSISTED LIVING FACILITY | Age: 88
End: 2023-01-03
Payer: MEDICARE

## 2023-01-03 ENCOUNTER — INPATIENT (INPATIENT)
Facility: HOSPITAL | Age: 88
LOS: 5 days | Discharge: NURSING, REHAB OR LONG-TERM CARE | End: 2023-01-09
Attending: INTERNAL MEDICINE | Admitting: INTERNAL MEDICINE
Payer: MEDICARE

## 2023-01-03 ENCOUNTER — NON-APPOINTMENT (OUTPATIENT)
Age: 88
End: 2023-01-03

## 2023-01-03 VITALS
OXYGEN SATURATION: 99 % | TEMPERATURE: 96 F | SYSTOLIC BLOOD PRESSURE: 186 MMHG | RESPIRATION RATE: 18 BRPM | HEART RATE: 64 BPM | WEIGHT: 149.91 LBS | DIASTOLIC BLOOD PRESSURE: 77 MMHG

## 2023-01-03 DIAGNOSIS — F32.9 MAJOR DEPRESSIVE DISORDER, SINGLE EPISODE, UNSPECIFIED: ICD-10-CM

## 2023-01-03 DIAGNOSIS — Z96.649 PRESENCE OF UNSPECIFIED ARTIFICIAL HIP JOINT: Chronic | ICD-10-CM

## 2023-01-03 DIAGNOSIS — I10 ESSENTIAL (PRIMARY) HYPERTENSION: ICD-10-CM

## 2023-01-03 DIAGNOSIS — F32.A DEPRESSION, UNSPECIFIED: ICD-10-CM

## 2023-01-03 DIAGNOSIS — Z88.0 ALLERGY STATUS TO PENICILLIN: ICD-10-CM

## 2023-01-03 DIAGNOSIS — Z79.82 LONG TERM (CURRENT) USE OF ASPIRIN: ICD-10-CM

## 2023-01-03 DIAGNOSIS — S42.252A DISPLACED FRACTURE OF GREATER TUBEROSITY OF LEFT HUMERUS, INITIAL ENCOUNTER FOR CLOSED FRACTURE: ICD-10-CM

## 2023-01-03 DIAGNOSIS — S72.002A FRACTURE OF UNSPECIFIED PART OF NECK OF LEFT FEMUR, INITIAL ENCOUNTER FOR CLOSED FRACTURE: ICD-10-CM

## 2023-01-03 DIAGNOSIS — I25.10 ATHEROSCLEROTIC HEART DISEASE OF NATIVE CORONARY ARTERY WITHOUT ANGINA PECTORIS: ICD-10-CM

## 2023-01-03 DIAGNOSIS — Z95.2 PRESENCE OF PROSTHETIC HEART VALVE: Chronic | ICD-10-CM

## 2023-01-03 DIAGNOSIS — S72.111A DISPLACED FRACTURE OF GREATER TROCHANTER OF RIGHT FEMUR, INITIAL ENCOUNTER FOR CLOSED FRACTURE: ICD-10-CM

## 2023-01-03 DIAGNOSIS — Z79.01 LONG TERM (CURRENT) USE OF ANTICOAGULANTS: ICD-10-CM

## 2023-01-03 DIAGNOSIS — Z95.1 PRESENCE OF AORTOCORONARY BYPASS GRAFT: ICD-10-CM
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
## 2023-01-03 DIAGNOSIS — I48.20 CHRONIC ATRIAL FIBRILLATION, UNSPECIFIED: ICD-10-CM

## 2023-01-03 DIAGNOSIS — E87.0 HYPEROSMOLALITY AND HYPERNATREMIA: ICD-10-CM

## 2023-01-03 DIAGNOSIS — M80.052A AGE-RELATED OSTEOPOROSIS WITH CURRENT PATHOLOGICAL FRACTURE, LEFT FEMUR, INITIAL ENCOUNTER FOR FRACTURE: ICD-10-CM

## 2023-01-03 DIAGNOSIS — S72.101A UNSPECIFIED TROCHANTERIC FRACTURE OF RIGHT FEMUR, INITIAL ENCOUNTER FOR CLOSED FRACTURE: ICD-10-CM

## 2023-01-03 DIAGNOSIS — N17.9 ACUTE KIDNEY FAILURE, UNSPECIFIED: ICD-10-CM
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
## 2023-01-03 DIAGNOSIS — E78.00 PURE HYPERCHOLESTEROLEMIA, UNSPECIFIED: ICD-10-CM

## 2023-01-03 DIAGNOSIS — E78.5 HYPERLIPIDEMIA, UNSPECIFIED: ICD-10-CM

## 2023-01-03 DIAGNOSIS — R29.6 REPEATED FALLS: ICD-10-CM

## 2023-01-03 DIAGNOSIS — I47.1 SUPRAVENTRICULAR TACHYCARDIA: ICD-10-CM

## 2023-01-03 DIAGNOSIS — F41.9 ANXIETY DISORDER, UNSPECIFIED: ICD-10-CM

## 2023-01-03 DIAGNOSIS — Z95.2 PRESENCE OF PROSTHETIC HEART VALVE: ICD-10-CM

## 2023-01-03 LAB
ALBUMIN SERPL ELPH-MCNC: 3.2 G/DL — LOW (ref 3.5–5.2)
ALP SERPL-CCNC: 63 U/L — SIGNIFICANT CHANGE UP (ref 30–115)
ALT FLD-CCNC: 7 U/L — SIGNIFICANT CHANGE UP (ref 0–41)
ANION GAP SERPL CALC-SCNC: 9 MMOL/L — SIGNIFICANT CHANGE UP (ref 7–14)
APTT BLD: 47.5 SEC — HIGH (ref 27–39.2)
AST SERPL-CCNC: 17 U/L — SIGNIFICANT CHANGE UP (ref 0–41)
BASOPHILS # BLD AUTO: 0.04 K/UL — SIGNIFICANT CHANGE UP (ref 0–0.2)
BASOPHILS NFR BLD AUTO: 0.7 % — SIGNIFICANT CHANGE UP (ref 0–1)
BILIRUB SERPL-MCNC: 0.6 MG/DL — SIGNIFICANT CHANGE UP (ref 0.2–1.2)
BUN SERPL-MCNC: 21 MG/DL — HIGH (ref 10–20)
CALCIUM SERPL-MCNC: 8.1 MG/DL — LOW (ref 8.4–10.5)
CHLORIDE SERPL-SCNC: 97 MMOL/L — LOW (ref 98–110)
CO2 SERPL-SCNC: 27 MMOL/L — SIGNIFICANT CHANGE UP (ref 17–32)
CREAT SERPL-MCNC: 0.7 MG/DL — SIGNIFICANT CHANGE UP (ref 0.7–1.5)
EGFR: 84 ML/MIN/1.73M2 — SIGNIFICANT CHANGE UP
EOSINOPHIL # BLD AUTO: 0.93 K/UL — HIGH (ref 0–0.7)
EOSINOPHIL NFR BLD AUTO: 16 % — HIGH (ref 0–8)
GLUCOSE SERPL-MCNC: 102 MG/DL — HIGH (ref 70–99)
HCT VFR BLD CALC: 29.9 % — LOW (ref 37–47)
HGB BLD-MCNC: 9.8 G/DL — LOW (ref 12–16)
IMM GRANULOCYTES NFR BLD AUTO: 0.2 % — SIGNIFICANT CHANGE UP (ref 0.1–0.3)
INR BLD: 2.92 RATIO — HIGH (ref 0.65–1.3)
LYMPHOCYTES # BLD AUTO: 1.18 K/UL — LOW (ref 1.2–3.4)
LYMPHOCYTES # BLD AUTO: 20.3 % — LOW (ref 20.5–51.1)
MCHC RBC-ENTMCNC: 28.9 PG — SIGNIFICANT CHANGE UP (ref 27–31)
MCHC RBC-ENTMCNC: 32.8 G/DL — SIGNIFICANT CHANGE UP (ref 32–37)
MCV RBC AUTO: 88.2 FL — SIGNIFICANT CHANGE UP (ref 81–99)
MONOCYTES # BLD AUTO: 0.49 K/UL — SIGNIFICANT CHANGE UP (ref 0.1–0.6)
MONOCYTES NFR BLD AUTO: 8.4 % — SIGNIFICANT CHANGE UP (ref 1.7–9.3)
NEUTROPHILS # BLD AUTO: 3.16 K/UL — SIGNIFICANT CHANGE UP (ref 1.4–6.5)
NEUTROPHILS NFR BLD AUTO: 54.4 % — SIGNIFICANT CHANGE UP (ref 42.2–75.2)
NRBC # BLD: 0 /100 WBCS — SIGNIFICANT CHANGE UP (ref 0–0)
PLATELET # BLD AUTO: 224 K/UL — SIGNIFICANT CHANGE UP (ref 130–400)
POTASSIUM SERPL-MCNC: 4.7 MMOL/L — SIGNIFICANT CHANGE UP (ref 3.5–5)
POTASSIUM SERPL-SCNC: 4.7 MMOL/L — SIGNIFICANT CHANGE UP (ref 3.5–5)
PROT SERPL-MCNC: 5.8 G/DL — LOW (ref 6–8)
PROTHROM AB SERPL-ACNC: 34.4 SEC — HIGH (ref 9.95–12.87)
RBC # BLD: 3.39 M/UL — LOW (ref 4.2–5.4)
RBC # FLD: 14.1 % — SIGNIFICANT CHANGE UP (ref 11.5–14.5)
SARS-COV-2 RNA SPEC QL NAA+PROBE: SIGNIFICANT CHANGE UP
SODIUM SERPL-SCNC: 133 MMOL/L — LOW (ref 135–146)
WBC # BLD: 5.81 K/UL — SIGNIFICANT CHANGE UP (ref 4.8–10.8)
WBC # FLD AUTO: 5.81 K/UL — SIGNIFICANT CHANGE UP (ref 4.8–10.8)

## 2023-01-03 PROCEDURE — 99221 1ST HOSP IP/OBS SF/LOW 40: CPT

## 2023-01-03 PROCEDURE — 99285 EMERGENCY DEPT VISIT HI MDM: CPT | Mod: FS

## 2023-01-03 PROCEDURE — 73030 X-RAY EXAM OF SHOULDER: CPT | Mod: 26,LT

## 2023-01-03 PROCEDURE — 73060 X-RAY EXAM OF HUMERUS: CPT | Mod: 26,LT

## 2023-01-03 PROCEDURE — 71045 X-RAY EXAM CHEST 1 VIEW: CPT | Mod: 26

## 2023-01-03 PROCEDURE — 73502 X-RAY EXAM HIP UNI 2-3 VIEWS: CPT | Mod: 26,LT

## 2023-01-03 PROCEDURE — 99309 SBSQ NF CARE MODERATE MDM 30: CPT

## 2023-01-03 RX ORDER — ATORVASTATIN CALCIUM 80 MG/1
40 TABLET, FILM COATED ORAL AT BEDTIME
Refills: 0 | Status: DISCONTINUED | OUTPATIENT
Start: 2023-01-03 | End: 2023-01-03

## 2023-01-03 RX ORDER — ESCITALOPRAM OXALATE 10 MG/1
1 TABLET, FILM COATED ORAL
Qty: 0 | Refills: 0 | DISCHARGE

## 2023-01-03 RX ORDER — TRAMADOL HYDROCHLORIDE 50 MG/1
25 TABLET ORAL EVERY 6 HOURS
Refills: 0 | Status: DISCONTINUED | OUTPATIENT
Start: 2023-01-03 | End: 2023-01-04

## 2023-01-03 RX ORDER — ESCITALOPRAM OXALATE 10 MG/1
10 TABLET, FILM COATED ORAL DAILY
Refills: 0 | Status: DISCONTINUED | OUTPATIENT
Start: 2023-01-03 | End: 2023-01-03

## 2023-01-03 RX ORDER — CHLORHEXIDINE GLUCONATE 213 G/1000ML
1 SOLUTION TOPICAL
Refills: 0 | Status: DISCONTINUED | OUTPATIENT
Start: 2023-01-03 | End: 2023-01-04

## 2023-01-03 RX ORDER — ASPIRIN/CALCIUM CARB/MAGNESIUM 324 MG
81 TABLET ORAL DAILY
Refills: 0 | Status: DISCONTINUED | OUTPATIENT
Start: 2023-01-03 | End: 2023-01-04

## 2023-01-03 RX ORDER — ATORVASTATIN CALCIUM 80 MG/1
40 TABLET, FILM COATED ORAL AT BEDTIME
Refills: 0 | Status: DISCONTINUED | OUTPATIENT
Start: 2023-01-03 | End: 2023-01-04

## 2023-01-03 RX ORDER — ATORVASTATIN CALCIUM 80 MG/1
1 TABLET, FILM COATED ORAL
Qty: 0 | Refills: 0 | DISCHARGE

## 2023-01-03 RX ORDER — ASCORBIC ACID 60 MG
500 TABLET,CHEWABLE ORAL DAILY
Refills: 0 | Status: DISCONTINUED | OUTPATIENT
Start: 2023-01-03 | End: 2023-01-04

## 2023-01-03 RX ORDER — ATENOLOL 25 MG/1
50 TABLET ORAL DAILY
Refills: 0 | Status: DISCONTINUED | OUTPATIENT
Start: 2023-01-03 | End: 2023-01-04

## 2023-01-03 RX ORDER — CHOLECALCIFEROL (VITAMIN D3) 125 MCG
2000 CAPSULE ORAL ONCE
Refills: 0 | Status: DISCONTINUED | OUTPATIENT
Start: 2023-01-03 | End: 2023-01-04

## 2023-01-03 RX ORDER — ALPRAZOLAM 0.25 MG
1 TABLET ORAL
Qty: 0 | Refills: 0 | DISCHARGE

## 2023-01-03 RX ORDER — ESCITALOPRAM OXALATE 10 MG/1
10 TABLET, FILM COATED ORAL DAILY
Refills: 0 | Status: DISCONTINUED | OUTPATIENT
Start: 2023-01-03 | End: 2023-01-04

## 2023-01-03 RX ORDER — ASPIRIN/CALCIUM CARB/MAGNESIUM 324 MG
1 TABLET ORAL
Qty: 0 | Refills: 0 | DISCHARGE

## 2023-01-03 RX ORDER — WARFARIN SODIUM 2.5 MG/1
1 TABLET ORAL
Qty: 0 | Refills: 0 | DISCHARGE

## 2023-01-03 RX ORDER — CHOLECALCIFEROL (VITAMIN D3) 125 MCG
1 CAPSULE ORAL
Qty: 0 | Refills: 0 | DISCHARGE

## 2023-01-03 RX ORDER — MORPHINE SULFATE 50 MG/1
2 CAPSULE, EXTENDED RELEASE ORAL EVERY 6 HOURS
Refills: 0 | Status: DISCONTINUED | OUTPATIENT
Start: 2023-01-03 | End: 2023-01-04

## 2023-01-03 RX ORDER — ATENOLOL 25 MG/1
1 TABLET ORAL
Qty: 0 | Refills: 0 | DISCHARGE

## 2023-01-03 RX ORDER — ATENOLOL 25 MG/1
2 TABLET ORAL
Qty: 0 | Refills: 0 | DISCHARGE

## 2023-01-03 RX ORDER — ASCORBIC ACID 60 MG
500 TABLET,CHEWABLE ORAL DAILY
Refills: 0 | Status: DISCONTINUED | OUTPATIENT
Start: 2023-01-03 | End: 2023-01-03

## 2023-01-03 RX ORDER — ASCORBIC ACID 60 MG
1 TABLET,CHEWABLE ORAL
Qty: 0 | Refills: 0 | DISCHARGE

## 2023-01-03 RX ORDER — ALPRAZOLAM 0.25 MG
0.5 TABLET ORAL AT BEDTIME
Refills: 0 | Status: DISCONTINUED | OUTPATIENT
Start: 2023-01-03 | End: 2023-01-04

## 2023-01-03 RX ADMIN — TRAMADOL HYDROCHLORIDE 25 MILLIGRAM(S): 50 TABLET ORAL at 20:15

## 2023-01-03 RX ADMIN — TRAMADOL HYDROCHLORIDE 25 MILLIGRAM(S): 50 TABLET ORAL at 20:45

## 2023-01-03 RX ADMIN — ATORVASTATIN CALCIUM 40 MILLIGRAM(S): 80 TABLET, FILM COATED ORAL at 23:24

## 2023-01-03 NOTE — CHART NOTE - NSCHARTNOTEFT_GEN_A_CORE
pt is booked for surgery tomorrow pending labs and clearance   discussed with hospitalist dr rachell santana at midnight   no coumadin tonight   inr in am   awaiting transfer to [facility]

## 2023-01-03 NOTE — PATIENT PROFILE ADULT - FALL HARM RISK - HARM RISK INTERVENTIONS
Assistance with ambulation/Assistance OOB with selected safe patient handling equipment/Communicate Risk of Fall with Harm to all staff/Discuss with provider need for PT consult/Monitor gait and stability/Provide patient with walking aids - walker, cane, crutches/Reinforce activity limits and safety measures with patient and family/Tailored Fall Risk Interventions/Use of alarms - bed, chair and/or voice tab/Visual Cue: Yellow wristband and red socks/Bed in lowest position, wheels locked, appropriate side rails in place/Call bell, personal items and telephone in reach/Instruct patient to call for assistance before getting out of bed or chair/Non-slip footwear when patient is out of bed/[place] to call system/Physically safe environment - no spills, clutter or unnecessary equipment/Purposeful Proactive Rounding/Room/bathroom lighting operational, light cord in reach

## 2023-01-03 NOTE — H&P ADULT - ASSESSMENT
88 y/o female with left greater trochanter Fx                                             86 y/o female with left greater trochanter Fx    discussed with Dr Salazar

## 2023-01-03 NOTE — ED ADULT NURSE REASSESSMENT NOTE - NS ED NURSE REASSESS COMMENT FT1
Called report to [facility] and the  said that there is no bed as 19b in [address] [place] [address]. Charge nurse was notified.

## 2023-01-03 NOTE — CONSULT NOTE ADULT - SUBJECTIVE AND OBJECTIVE BOX
HPI:   ER Notataion:     88 y/o female with pmhx  Hypertension, HDL, CAD s/p CABG, Aortic valve replacement  on Coumadin, Osteoporosis, old right hip fracture. Presents to the ED from NH  s/p fall one week ago c/o pain to left hip and shoulder. Patient s/p fall off a chair. Patient with fracture of left shoulder and left hip. She ambulates with walker and assistance at baseline. Patient without any abdominal pain , chest pain or head injury, no fevers, no tingling distally.   - C/O pain to left hip    [employer] PA:  patient is able to move/raise her left leg on request (03 Jan 2023 13:53)        HPI-Cardiology   Pt with the above hx, evaluated at bedside. Currently admitted for s/p fall. Cardiology consulted for cardiovascular risk stratification. Pt denies any CP, SOB, palpitations or dizziness/lightheadedness. Radiology tests and hospital records, were reviewed, as well as previous notes on this patient.      PAST MEDICAL & SURGICAL HISTORY  HTN (hypertension)    High cholesterol    Osteoporosis    Fracture of right shoulder    Anxiety    Depression    Cervical spondyloarthritis    Chronic atrial fibrillation    Exposure to COVID-19 virus    H/O heart valve replacement with mechanical valve    S/P hip hemiarthroplasty  on 7/16/2021 for right subcapital femoral neck fracture        ALLERGIES:  penicillin (Unknown)  pinapples (Unknown)      MEDICATIONS:  MEDICATIONS  (STANDING):  ascorbic acid 500 milliGRAM(s) Oral daily  aspirin enteric coated 81 milliGRAM(s) Oral daily  atenolol  Tablet 50 milliGRAM(s) Oral daily  atorvastatin 40 milliGRAM(s) Oral at bedtime  chlorhexidine 2% Cloths 1 Application(s) Topical <User Schedule>  cholecalciferol 2000 Unit(s) Oral Once  escitalopram 10 milliGRAM(s) Oral daily    MEDICATIONS  (PRN):  ALPRAZolam 0.5 milliGRAM(s) Oral at bedtime PRN anxiety  morphine  - Injectable 2 milliGRAM(s) IV Push every 6 hours PRN Severe Pain (7 - 10)  traMADol 25 milliGRAM(s) Oral every 6 hours PRN Moderate Pain (4 - 6)      HOME MEDICATIONS:  Home Medications:  ALPRAZolam 0.5 mg oral tablet: 1/2 tab(s) orally once a day (at bedtime), As Needed (03 Jan 2023 14:04)  aspirin 81 mg oral delayed release tablet: 1 tab(s) orally once a day (03 Jan 2023 14:04)  atenolol 25 mg oral tablet: 2 tab(s) orally once a day (03 Jan 2023 14:04)  atorvastatin 40 mg oral tablet: 1 tab(s) orally once a day (at bedtime) (03 Jan 2023 14:04)  escitalopram 10 mg oral tablet: 1 tab(s) orally once a day (03 Jan 2023 14:04)  Vitamin C 500 mg oral tablet: 1 tab(s) orally once a day (03 Jan 2023 14:04)  Vitamin D3 50 mcg (2000 intl units) oral tablet: 1 tab(s) orally once a day (03 Jan 2023 14:04)  warfarin 2.5 mg oral tablet: 1 tab(s) orally once a day (at bedtime) (03 Jan 2023 14:04)      VITALS:   T(F): 96 (01-03 @ 11:14), Max: 96 (01-03 @ 11:14)  HR: 64 (01-03 @ 11:14) (64 - 64)  BP: 186/77 (01-03 @ 11:14) (186/77 - 186/77)  BP(mean): --  RR: 18 (01-03 @ 11:14) (18 - 18)  SpO2: 99% (01-03 @ 11:14) (99% - 99%)    I&O's Summary      REVIEW OF SYSTEMS:  See HPI      PHYSICAL EXAM:  NEURO: patient is awake , alert and oriented  GEN: Not in acute distress  NECK: no thyroid enlargement, no JVD  LUNGS: Clear to auscultation bilaterally   CARDIOVASCULAR: S1/S2 present, RRR , no murmurs or rubs, no carotid bruits,  + PP bilaterally  ABD: Soft, non-tender, non-distended, +BS  EXT: No SANDRO  SKIN: Intact        LABS:                        9.8    5.81  )-----------( 224      ( 03 Jan 2023 12:00 )             29.9     01-03    133<L>  |  97<L>  |  21<H>  ----------------------------<  102<H>  4.7   |  27  |  0.7    Ca    8.1<L>      03 Jan 2023 12:00    TPro  5.8<L>  /  Alb  3.2<L>  /  TBili  0.6  /  DBili  x   /  AST  17  /  ALT  7   /  AlkPhos  63  01-03    PT/INR - ( 03 Jan 2023 12:00 )   PT: 34.40 sec;   INR: 2.92 ratio         PTT - ( 03 Jan 2023 12:00 )  PTT:47.5 sec        RADIOLOGY:  -CXR:  < from: Xray Chest 1 View-PORTABLE IMMEDIATE (Xray Chest 1 View-PORTABLE IMMEDIATE .) (01.03.23 @ 12:16) >  Impression:    No radiographic evidence of acute cardiopulmonary disease.        --- End of Report ---    < end of copied text >            < from: TTE Echo Complete w/ Contrast w/ Doppler (06.08.21 @ 09:03) >    Summary:   1. Left ventricular ejection fraction, by visual estimation, is 55 to 60%.   2. Spectral Doppler shows impaired relaxation pattern of left ventricular myocardial filling (Grade I diastolic dysfunction).   3. Mildly enlarged left atrium.   4. Mildly enlarged right atrium.   5. Mild mitral annular calcification.   6. Moderate mitral valve regurgitation.   7. Moderate tricuspid regurgitation.   8. Mechanical prosthesis in the aortic valve position.   9. Estimated pulmonary artery systolic pressure is 44.5 mmHg assuming a right atrial pressure of 3 mmHg, which is consistent with mild pulmonary hypertension.      < end of copied text >

## 2023-01-03 NOTE — ED PROVIDER NOTE - WR ORDER ID 5
[id number] Paramedian Forehead Flap Text: A decision was made to reconstruct the defect utilizing an interpolation axial flap and a staged reconstruction.  A telfa template was made of the defect.  This telfa template was then used to outline the paramedian forehead pedicle flap.  The donor area for the pedicle flap was then injected with anesthesia.  The flap was excised through the skin and subcutaneous tissue down to the layer of the underlying musculature.  The pedicle flap was carefully excised within this deep plane to maintain its blood supply.  The edges of the donor site were undermined.   The donor site was closed in a primary fashion.  The pedicle was then rotated into position and sutured.  Once the tube was sutured into place, adequate blood supply was confirmed with blanching and refill.  The pedicle was then wrapped with xeroform gauze and dressed appropriately with a telfa and gauze bandage to ensure continued blood supply and protect the attached pedicle.

## 2023-01-03 NOTE — ED PROVIDER NOTE - OBJECTIVE STATEMENT
88 y/o female with pmhx  Hypertension, HDL, CAD s/p CABG, Aortic valve replacement  on Coumadin, osteoporosis, right hip fracture presents to the ED s/p fall one week ago c/o pain to left hip and shoulder. patient s/p fall off a chair. patient with fracture of left shoulder and left hip. patient bedbound. patient 88 y/o female with pmhx  Hypertension, HDL, CAD s/p CABG, Aortic valve replacement  on Coumadin, osteoporosis, right hip fracture presents to the ED s/p fall one week ago c/o pain to left hip and shoulder. patient s/p fall off a chair. patient with fracture of left shoulder and left hip. patient ambulates with walker and assistance. patient without any abdominal pain , chest pain or head injury. no fevers. no tingling distally. patient with continued pain

## 2023-01-03 NOTE — CONSULT NOTE ADULT - ASSESSMENT
Orthopaedic Surgery Consult Note    Time consult called: 11:30 AM from [facility] patient seen: 8:45 PM  Phone number: [phone] DAUGHTER    86yo Female s/p  R hemiarthroplasty on 7/16/2021 for right femoral neck fx, PMH HTN, HLD, CAD s/p CABG, aortic valve replacement on Coumadin presents with left hip fracture s/p mechanical fall. Per daughter, patient fell one week ago. Endorsing left hip and shoulder pain. No head trauma, LOC. Patient ambulates with walker at baseline per daughter.      PMH/PSH  FALL SUBCAPITAL FRACTURE OF LEFT HIP    History of Cath Stent Placement    Chronic atrial fibrillation, unspecified    Chronic pulmonary embolism    Contact with and (suspected) exposure to covid-19    Depression, unspecified    Encounter for immunization    Encounter for therapeutic drug level monitoring    Essential (primary) hypertension    History of Thalassemia trait    Localized edema    Long term (current) use of anticoagulants    Nonrheumatic mitral (valve) insufficiency    Other specified anxiety disorders    Pain in right knee    Personal history of other diseases of the circulatory system    Personal history of other endocrine, nutritional and metabolic disease    Presence of aortocoronary bypass graft    Presence of coronary angioplasty implant and graft    Pulmonary hypertension, unspecified    Pure hypercholesterolemia, unspecified    Rheumatic tricuspid insufficiency    Spondylosis without myelopathy or radiculopathy, cervical region    Unspecified atrial fibrillation    Unspecified fall, initial encounter    Unspecified intracapsular fracture of left femur, initial encounter for closed fracture    Unspecified trochanteric fracture of right femur, initial encounter for closed fracture    Ventricular tachycardia, unspecified    ^FALL SUBCAPITAL FRACTURE OF LEFT HIP    FH: lung cancer (Sibling)    Handoff    MEWS Score    Prior    HTN (hypertension)    High cholesterol    Osteoporosis    Fracture of right shoulder    Anxiety    Depression    Atrial fibrillation    Cervical spondyloarthritis    Chronic atrial fibrillation    Exposure to COVID-19 virus    Fall    Fracture of greater trochanter of right femur    Trochanteric fracture of right femur    HTN (hypertension)    High cholesterol    Depression    Chronic atrial fibrillation    H/O heart valve replacement with mechanical valve    S/P hip hemiarthroplasty    HIP PAIN/FALL    90+    Subcapital fracture of left hip    [email or web address]        Medications  ALPRAZolam 0.5 milliGRAM(s) Oral at bedtime PRN  ascorbic acid 500 milliGRAM(s) Oral daily  aspirin enteric coated 81 milliGRAM(s) Oral daily  atenolol  Tablet 50 milliGRAM(s) Oral daily  atorvastatin 40 milliGRAM(s) Oral at bedtime  chlorhexidine 2% Cloths 1 Application(s) Topical <User Schedule>  cholecalciferol 2000 Unit(s) Oral Once  escitalopram 10 milliGRAM(s) Oral daily  morphine  - Injectable 2 milliGRAM(s) IV Push every 6 hours PRN  traMADol 25 milliGRAM(s) Oral every 6 hours PRN      Home Medications:  ALPRAZolam 0.5 mg oral tablet: 1/2 tab(s) orally once a day (at bedtime), As Needed (03 Jan 2023 14:04)  aspirin 81 mg oral delayed release tablet: 1 tab(s) orally once a day (03 Jan 2023 14:04)  atenolol 25 mg oral tablet: 2 tab(s) orally once a day (03 Jan 2023 14:04)  atorvastatin 40 mg oral tablet: 1 tab(s) orally once a day (at bedtime) (03 Jan 2023 14:04)  escitalopram 10 mg oral tablet: 1 tab(s) orally once a day (03 Jan 2023 14:04)  Vitamin C 500 mg oral tablet: 1 tab(s) orally once a day (03 Jan 2023 14:04)  Vitamin D3 50 mcg (2000 intl units) oral tablet: 1 tab(s) orally once a day (03 Jan 2023 14:04)  warfarin 2.5 mg oral tablet: 1 tab(s) orally once a day (at bedtime) (03 Jan 2023 14:04)        Allergies  penicillin (Unknown)  pinapples (Unknown)        T(C): 35.8 (01-03-23 @ 19:45), Max: 35.8 (01-03-23 @ 19:45)  HR: 62 (01-03-23 @ 19:45) (62 - 64)  BP: 182/78 (01-03-23 @ 19:45) (182/78 - 186/77)  RR: 16 (01-03-23 @ 19:45) (16 - 18)  SpO2: 97% (01-03-23 @ 19:45) (97% - 99%)    P/E:  AOx3, NAD  Non-labored breathing    LUE  Skin intact  Mild swelling  No gross deformity  TTP over greater tuberosity  ROM shoulder limited by pain  Compartments soft and compressible, no pain w/ passive stretch of digits  SILT Ax/LABCN/R/M/U  AIN/PIN/U intact  Radial pulse 2+, cap refill <2    LLE  Skin intact  Left extremity slightly shortened  TTP over left hip  Compartments soft and compressible, no pain w/ passive stretch of digits  SILT SP/DP/T/Sural/Saph  Firing TA/EHL/FHL/GS  DP pulse 2+, cap refill <2        Labs                        9.8    5.81  )-----------( 224      ( 03 Jan 2023 12:00 )             29.9     01-03    133<L>  |  97<L>  |  21<H>  ----------------------------<  102<H>  4.7   |  27  |  0.7    Ca    8.1<L>      03 Jan 2023 12:00    TPro  5.8<L>  /  Alb  3.2<L>  /  TBili  0.6  /  DBili  x   /  AST  17  /  ALT  7   /  AlkPhos  63  01-03    LIVER FUNCTIONS - ( 03 Jan 2023 12:00 )  Alb: 3.2 g/dL / Pro: 5.8 g/dL / ALK PHOS: 63 U/L / ALT: 7 U/L / AST: 17 U/L / GGT: x           PT/INR - ( 03 Jan 2023 12:00 )   PT: 34.40 sec;   INR: 2.92 ratio         PTT - ( 03 Jan 2023 12:00 )  PTT:47.5 sec    Imaging:    XR hip  left femoral neck fracture    XR shoulder  left minimally displaced greater tuberosity fx      A/P:  86yo Female w/ left valgus impacted femoral neck fx, and left minimally displaced greater tuberosity fx    Added on for left Hip CRPP vs. hemiarthroplasty with Dr. Moreno  Please obtain preop labs: bmp, cbc, coags, type and screen x2, ekg, cxr, utox  NPO at midnight with IVF  DVT ppx:  SCD + per primary team  Medical clearance  Rapid COVID     Orthopaedic Surgery Consult Note    Time consult called: 11:30 AM from [facility] patient seen: 8:45 PM  Phone number: [phone] DAUGHTER    88yo Female s/p  R hemiarthroplasty on 7/16/2021 for right femoral neck fx, PMH HTN, HLD, CAD s/p CABG, aortic valve replacement on Coumadin presents with left hip fracture s/p mechanical fall. Per daughter, patient fell one week ago. Endorsing left hip and shoulder pain. No head trauma, LOC. Patient ambulates with walker at baseline per daughter.      PMH/PSH  FALL SUBCAPITAL FRACTURE OF LEFT HIP    History of Cath Stent Placement    Chronic atrial fibrillation, unspecified    Chronic pulmonary embolism    Contact with and (suspected) exposure to covid-19    Depression, unspecified    Encounter for immunization    Encounter for therapeutic drug level monitoring    Essential (primary) hypertension    History of Thalassemia trait    Localized edema    Long term (current) use of anticoagulants    Nonrheumatic mitral (valve) insufficiency    Other specified anxiety disorders    Pain in right knee    Personal history of other diseases of the circulatory system    Personal history of other endocrine, nutritional and metabolic disease    Presence of aortocoronary bypass graft    Presence of coronary angioplasty implant and graft    Pulmonary hypertension, unspecified    Pure hypercholesterolemia, unspecified    Rheumatic tricuspid insufficiency    Spondylosis without myelopathy or radiculopathy, cervical region    Unspecified atrial fibrillation    Unspecified fall, initial encounter    Unspecified intracapsular fracture of left femur, initial encounter for closed fracture    Unspecified trochanteric fracture of right femur, initial encounter for closed fracture    Ventricular tachycardia, unspecified    ^FALL SUBCAPITAL FRACTURE OF LEFT HIP    FH: lung cancer (Sibling)    Handoff    MEWS Score    Prior    HTN (hypertension)    High cholesterol    Osteoporosis    Fracture of right shoulder    Anxiety    Depression    Atrial fibrillation    Cervical spondyloarthritis    Chronic atrial fibrillation    Exposure to COVID-19 virus    Fall    Fracture of greater trochanter of right femur    Trochanteric fracture of right femur    HTN (hypertension)    High cholesterol    Depression    Chronic atrial fibrillation    H/O heart valve replacement with mechanical valve    S/P hip hemiarthroplasty    HIP PAIN/FALL    90+    Subcapital fracture of left hip    [email or web address]        Medications  ALPRAZolam 0.5 milliGRAM(s) Oral at bedtime PRN  ascorbic acid 500 milliGRAM(s) Oral daily  aspirin enteric coated 81 milliGRAM(s) Oral daily  atenolol  Tablet 50 milliGRAM(s) Oral daily  atorvastatin 40 milliGRAM(s) Oral at bedtime  chlorhexidine 2% Cloths 1 Application(s) Topical <User Schedule>  cholecalciferol 2000 Unit(s) Oral Once  escitalopram 10 milliGRAM(s) Oral daily  morphine  - Injectable 2 milliGRAM(s) IV Push every 6 hours PRN  traMADol 25 milliGRAM(s) Oral every 6 hours PRN      Home Medications:  ALPRAZolam 0.5 mg oral tablet: 1/2 tab(s) orally once a day (at bedtime), As Needed (03 Jan 2023 14:04)  aspirin 81 mg oral delayed release tablet: 1 tab(s) orally once a day (03 Jan 2023 14:04)  atenolol 25 mg oral tablet: 2 tab(s) orally once a day (03 Jan 2023 14:04)  atorvastatin 40 mg oral tablet: 1 tab(s) orally once a day (at bedtime) (03 Jan 2023 14:04)  escitalopram 10 mg oral tablet: 1 tab(s) orally once a day (03 Jan 2023 14:04)  Vitamin C 500 mg oral tablet: 1 tab(s) orally once a day (03 Jan 2023 14:04)  Vitamin D3 50 mcg (2000 intl units) oral tablet: 1 tab(s) orally once a day (03 Jan 2023 14:04)  warfarin 2.5 mg oral tablet: 1 tab(s) orally once a day (at bedtime) (03 Jan 2023 14:04)        Allergies  penicillin (Unknown)  pinapples (Unknown)        T(C): 35.8 (01-03-23 @ 19:45), Max: 35.8 (01-03-23 @ 19:45)  HR: 62 (01-03-23 @ 19:45) (62 - 64)  BP: 182/78 (01-03-23 @ 19:45) (182/78 - 186/77)  RR: 16 (01-03-23 @ 19:45) (16 - 18)  SpO2: 97% (01-03-23 @ 19:45) (97% - 99%)    P/E:  AOx1, NAD  Non-labored breathing    LUE  Skin intact  Mild swelling  No gross deformity  TTP over greater tuberosity  ROM shoulder limited by pain  Compartments soft and compressible, no pain w/ passive stretch of digits  SILT Ax/LABCN/R/M/U  AIN/PIN/U intact  Radial pulse 2+, cap refill <2    LLE  Skin intact  Left extremity slightly shortened  TTP over left hip  Compartments soft and compressible, no pain w/ passive stretch of digits  SILT SP/DP/T/Sural/Saph  Firing TA/EHL/FHL/GS  DP pulse 2+, cap refill <2        Labs                        9.8    5.81  )-----------( 224      ( 03 Jan 2023 12:00 )             29.9     01-03    133<L>  |  97<L>  |  21<H>  ----------------------------<  102<H>  4.7   |  27  |  0.7    Ca    8.1<L>      03 Jan 2023 12:00    TPro  5.8<L>  /  Alb  3.2<L>  /  TBili  0.6  /  DBili  x   /  AST  17  /  ALT  7   /  AlkPhos  63  01-03    LIVER FUNCTIONS - ( 03 Jan 2023 12:00 )  Alb: 3.2 g/dL / Pro: 5.8 g/dL / ALK PHOS: 63 U/L / ALT: 7 U/L / AST: 17 U/L / GGT: x           PT/INR - ( 03 Jan 2023 12:00 )   PT: 34.40 sec;   INR: 2.92 ratio         PTT - ( 03 Jan 2023 12:00 )  PTT:47.5 sec    Imaging:    XR hip  left femoral neck fracture    XR shoulder  left minimally displaced greater tuberosity fx      A/P:  88yo Female w/ left valgus impacted femoral neck fx, and left minimally displaced greater tuberosity fx    Added on for left Hip CRPP vs. hemiarthroplasty with Dr. Moreno  Please obtain preop labs: bmp, cbc, coags, type and screen x2, ekg, cxr, utox  NPO at midnight with IVF  DVT ppx:  SCD + per primary team  Medical clearance  Rapid COVID

## 2023-01-03 NOTE — CONSULT NOTE ADULT - ASSESSMENT
88 y/o female with pmhx  Hypertension, HDL, CAD s/p CABG, Aortic valve replacement  on Coumadin, Osteoporosis, old right hip fracture. Presents to the ED from NH  s/p fall one week ago c/o pain to left hip and shoulder.         #This consult serves only as a perioperative cardiac risk stratification and evaluation to predict 30-day cardiac complications risk and mortality.  The decision to proceed with the surgery/procedure is made by the performing physician and the patient.       - Currently no active cardiac conditions. No signs of ischemia, or ADHF  - Limited functional capacity <4 METS   - Pt is at intermediate-risk for perioperative major adverse cardiac events  - RCRI: 1 (6%) risk of MACE sandra-op  - Can Hold Coumadin pre-procedure and resume ASAP post-procedure  - No further cardiac workup is indicated at this time. Further cardiac workup will depend on clinical course  - All other workup per primary team      Discussed with Dr Nagy

## 2023-01-03 NOTE — H&P ADULT - NSHPSOCIALHISTORY_GEN_ALL_CORE
Tobacco use: No  EtOH use: No  Illicit drug use: No  Ambulate swith walker and assistance at baseline

## 2023-01-03 NOTE — ED PROVIDER NOTE - PROGRESS NOTE DETAILS
spoke with ortho resident Dr. Kan Marcos. patient to be transferred to [facility] for admission spoke with daughter Rahul [phone]

## 2023-01-03 NOTE — H&P ADULT - PROBLEM SELECTOR PLAN 1
Transfer North for orthopedic treatment  pain control  already on Warfarin for Atrial Fib: this will cover DVT prophylaxis Transfer [place] for orthopedic treatment  already on Warfarin for Atrial Fib: this will cover DVT prophylaxis  Tramadol 50mg PO Q6h/PRN moderate pain  MS 2mg IV Q6H/PRN severe pain  CHG towels  bedrest  Cardiology Consult for OR clearence Transfer [facility] for orthopedic treatment  already on Warfarin for Atrial Fib: this will cover DVT prophylaxis  Tramadol 50mg PO Q6h/PRN moderate pain  MS 2mg IV Q6H/PRN severe pain  CHG towels  bedrest  Cardiology Consult for OR clearance Transfer [facility] for orthopedic treatment  already on Warfarin for Atrial Fib: this will cover DVT prophylaxis: hold Rx for tonight and repeat INR in AM: possible OR tomorrow  continue ASA  Tramadol 50mg PO Q6h/PRN moderate pain  MS 2mg IV Q6H/PRN severe pain  CHG towels  bedrest  Cardiology Consult for OR clearance

## 2023-01-03 NOTE — ED PROVIDER NOTE - PHYSICAL EXAMINATION
Vital Signs: I have reviewed the initial vital signs.  Constitutional: elderly and frail  Eyes: PERRLA, EOMI, , clear conjunctiva  ENT: MMM,   Cardiovascular: regular rate, regular rhythm, no murmur appreciated  Respiratory: unlabored respiratory effort, clear to auscultation bilaterally  Gastrointestinal: soft, non-tender, non-distended  abdomen, no pulsatile mass  Musculoskeletal: supple neck, no lower extremity edema, +tenderness left lateral hip and pelvis , no deformity, left shoulder- +ttp prox humerus  Integumentary: warm, dry, no rash  Neurologic: awake, alert, cranial nerves II-XII grossly intact, extremities’ motor and sensory functions grossly intact, no focal deficits

## 2023-01-03 NOTE — PRE PROCEDURE NOTE - PRE PROCEDURE EVALUATION
Orthopaedic Surgery Preop Note      Planned procedure: Left Hip CRPP vs. hemiarthroplasty      Consent: R/B/A discussed w/ pt/pt's family who expressed understanding and wished to proceed w/ surgery. Consent documented and signed appropriately.      Consults/clearances:   Cardiology  Anesthesia    Vitals:  T(C): 35.8 (01-03-23 @ 19:45), Max: 35.8 (01-03-23 @ 19:45)  HR: 62 (01-03-23 @ 19:45) (62 - 64)  BP: 182/78 (01-03-23 @ 19:45) (182/78 - 186/77)  RR: 16 (01-03-23 @ 19:45) (16 - 18)  SpO2: 97% (01-03-23 @ 19:45) (97% - 99%)    NPO @ MN, IVF  CXR: see PACS  EKG: see chart  Type and screen x2: done   2 units prbc on hold  Blood bank called:    Labs                        9.8    5.81  )-----------( 224      ( 03 Jan 2023 12:00 )             29.9     01-03    133<L>  |  97<L>  |  21<H>  ----------------------------<  102<H>  4.7   |  27  |  0.7    Ca    8.1<L>      03 Jan 2023 12:00    TPro  5.8<L>  /  Alb  3.2<L>  /  TBili  0.6  /  DBili  x   /  AST  17  /  ALT  7   /  AlkPhos  63  01-03    LIVER FUNCTIONS - ( 03 Jan 2023 12:00 )  Alb: 3.2 g/dL / Pro: 5.8 g/dL / ALK PHOS: 63 U/L / ALT: 7 U/L / AST: 17 U/L / GGT: x           PT/INR - ( 03 Jan 2023 12:00 )   PT: 34.40 sec;   INR: 2.92 ratio         PTT - ( 03 Jan 2023 12:00 )  PTT:47.5 sec    Anticoagulation status (include name of anticoagulant)  Please hold Coumadin on the morning of surgery    A/P    [ ] NPO and IVF @ midnight  [ ] pain control/analgesia prn per primary team  [ ] Incentive Spirometry  [ ] Notify Ortho with any questions - spectra [id number]    [ ] DISCUSSED WITH PRIMARY TEAM MEMBER (name of team member):   [ ] Date and TIME DISCUSSED WITH PRIMARY TEAM MEMBER:  Orthopaedic Surgery Preop Note      Planned procedure: Left Hip CRPP vs. hemiarthroplasty      Consent: R/B/A discussed w/ pt/pt's family who expressed understanding and wished to proceed w/ surgery. Consent documented and signed appropriately.      Consults/clearances:   Cardiology  Anesthesia    Vitals:  T(C): 35.8 (01-03-23 @ 19:45), Max: 35.8 (01-03-23 @ 19:45)  HR: 62 (01-03-23 @ 19:45) (62 - 64)  BP: 182/78 (01-03-23 @ 19:45) (182/78 - 186/77)  RR: 16 (01-03-23 @ 19:45) (16 - 18)  SpO2: 97% (01-03-23 @ 19:45) (97% - 99%)    NPO @ MN, IVF  CXR: see PACS  EKG: see chart  Type and screen x2: done   2 units prbc on hold  Blood bank called:    Labs                        9.8    5.81  )-----------( 224      ( 03 Jan 2023 12:00 )             29.9     01-03    133<L>  |  97<L>  |  21<H>  ----------------------------<  102<H>  4.7   |  27  |  0.7    Ca    8.1<L>      03 Jan 2023 12:00    TPro  5.8<L>  /  Alb  3.2<L>  /  TBili  0.6  /  DBili  x   /  AST  17  /  ALT  7   /  AlkPhos  63  01-03    LIVER FUNCTIONS - ( 03 Jan 2023 12:00 )  Alb: 3.2 g/dL / Pro: 5.8 g/dL / ALK PHOS: 63 U/L / ALT: 7 U/L / AST: 17 U/L / GGT: x           PT/INR - ( 03 Jan 2023 12:00 )   PT: 34.40 sec;   INR: 2.92 ratio         PTT - ( 03 Jan 2023 12:00 )  PTT:47.5 sec    Anticoagulation status (include name of anticoagulant)  Please hold Coumadin the night before surgery    A/P    [ ] NPO and IVF @ midnight  [ ] pain control/analgesia prn per primary team  [ ] Incentive Spirometry  [ ] Notify Ortho with any questions - spectra [id number]    [ ] DISCUSSED WITH PRIMARY TEAM MEMBER (name of team member):   [ ] Date and TIME DISCUSSED WITH PRIMARY TEAM MEMBER:

## 2023-01-03 NOTE — H&P ADULT - HISTORY OF PRESENT ILLNESS
ER Notataion:     88 y/o female with pmhx  Hypertension, HDL, CAD s/p CABG, Aortic valve replacement  on Coumadin, Osteoporosis, old right hip fracture. Presents to the ED from NH  s/p fall one week ago c/o pain to left hip and shoulder. Patient s/p fall off a chair. Patient with fracture of left shoulder and left hip. She ambulates with walker and assistance at baseline. Patient without any abdominal pain , chest pain or head injury, no fevers, no tingling distally.   - C/O pain to left hip    Sabine PA:  patient is able to move/raise her left leg on request

## 2023-01-03 NOTE — H&P ADULT - PROBLEM SELECTOR PLAN 2
continue Warfarin  on Atenolol for HR control  INR 2.92 presently Hold  Warfarin and recheck INR in AM  continue ASA  on Atenolol for HR control  INR 2.92 presently

## 2023-01-03 NOTE — H&P ADULT - NSHPLABSRESULTS_GEN_ALL_CORE
9.8    5.81  )-----------( 224      ( 03 Jan 2023 12:00 )             29.9       01-03    133<L>  |  97<L>  |  21<H>  ----------------------------<  102<H>  4.7   |  27  |  0.7    Ca    8.1<L>      03 Jan 2023 12:00    TPro  5.8<L>  /  Alb  3.2<L>  /  TBili  0.6  /  DBili  x   /  AST  17  /  ALT  7   /  AlkPhos  63  01-03          PT/INR - ( 03 Jan 2023 12:00 )   PT: 34.40 sec;   INR: 2.92 ratio         PTT - ( 03 Jan 2023 12:00 )  PTT:47.5 sec     Xray Chest 1 View-PORTABLE IMMEDIATE (Xray Chest 1 View-PORTABLE IMMEDIATE .) (01.03.23 @ 12:16) >      Impression:    No radiographic evidence of acute cardiopulmonary disease.       Xray Humerus, Left (01.03.23 @ 12:15) >      There is an acute minimally displaced fracture of the greater trochanter.   There is no dislocation.

## 2023-01-03 NOTE — ED PROVIDER NOTE - CLINICAL SUMMARY MEDICAL DECISION MAKING FREE TEXT BOX
Visit Information Date & Time Provider Department Dept. Phone Encounter #  
 12/6/2017  9:00 AM [address] [phone] [id number] Upcoming Health Maintenance Date Due Hepatitis C Screening 1956 DTaP/Tdap/Td series (1 - Tdap) 8/16/1977 FOBT Q 1 YEAR AGE 50-75 8/16/2006 ZOSTER VACCINE AGE 60> 6/16/2016 Influenza Age 5 to Adult 8/1/2017 Allergies as of 12/6/2017  Review Complete On: 12/6/2017 By: Jarod Pedraza MD  
 No Known Allergies Current Immunizations  Never Reviewed No immunizations on file. Not reviewed this visit You Were Diagnosed With   
  
 Codes Comments Essential hypertension    -  Primary ICD-10-CM: I10 
ICD-9-CM: 401.9 Lipid screening     ICD-10-CM: V46.660 ICD-9-CM: V77.91 Neck muscle spasm     ICD-10-CM: C79.471 ICD-9-CM: 728.85 Back spasm     ICD-10-CM: F98.930 ICD-9-CM: 724.8 Right wrist pain     ICD-10-CM: M25.531 ICD-9-CM: 719.43 Vitals BP Pulse Temp Resp Height(growth percentile) Weight(growth percentile) (!) 166/97 (BP 1 Location: Right arm, BP Patient Position: Sitting) 74 98.4 °F (36.9 °C) (Oral) 16 5' 9\" (1.753 m) 165 lb (74.8 kg) SpO2 BMI Smoking Status 98% 24.37 kg/m2 Former Smoker Vitals History BMI and BSA Data Body Mass Index Body Surface Area  
 24.37 kg/m 2 1.91 m 2 Preferred Pharmacy Pharmacy Name Phone [facility] [address] [place] [address] [phone] Your Updated Medication List  
  
   
This list is accurate as of: 12/6/17  9:33 AM.  Always use your most recent med list.  
  
  
  
  
 acetaminophen 325 mg tablet Commonly known as:  TYLENOL Take  by mouth every four (4) hours as needed for Pain. cyclobenzaprine 10 mg tablet Commonly known as:  FLEXERIL Take 1 Tab by mouth three (3) times daily as needed for Muscle Spasm(s). hydroCHLOROthiazide 25 mg tablet Commonly known as:  HYDRODIURIL Take 1 Tab by mouth daily. methocarbamol 750 mg tablet Commonly known as:  [id number] Take 1 Tab by mouth three (3) times daily as needed for Pain. naproxen 500 mg tablet Commonly known as:  NAPROSYN Take 1 Tab by mouth two (2) times daily as needed for Pain. Prescriptions Sent to Pharmacy Refills  
 naproxen (NAPROSYN) 500 mg tablet 3 Sig: Take 1 Tab by mouth two (2) times daily as needed for Pain. Class: Normal  
 Pharmacy: [address], [place] [address]. Ph #: [phone] Route: Oral  
 cyclobenzaprine (FLEXERIL) 10 mg tablet 3 Sig: Take 1 Tab by mouth three (3) times daily as needed for Muscle Spasm(s). Class: Normal  
 Pharmacy: [address] [place] [address], [facility] [place] [address]. Ph #: [phone] Route: Oral  
 hydroCHLOROthiazide (HYDRODIURIL) 25 mg tablet 3 Sig: Take 1 Tab by mouth daily. Class: Normal  
 Pharmacy: [address] [place] [address], [place] [address]. Ph #: [phone] Route: Oral  
  
To-Do List   
 12/06/2017 Lab:  LDL, DIRECT   
  
 12/06/2017 Lab:  METABOLIC PANEL, BASIC Introducing [facility]! Citlali Sousa introduces CellARide patient portal. Now you can access parts of your medical record, email your doctor's office, and request medication refills online. 1. In your internet browser, go to [email or web address] 2. Click on the First Time User? Click Here link in the Sign In box. You will see the New Member Sign Up page. 3. Enter your Cell[employer]ide Access Code exactly as it appears below. You will not need to use this code after youve completed the sign-up process. If you do not sign up before the expiration date, you must request a new code. · Cell[employer] Access Code: [email or web address] Expires: 3/6/2018  9:24 AM 
 
 4. Enter the last four digits of your Social Security Number (xxxx) and Date of Birth (mm/dd/yyyy) as indicated and click Submit. You will be taken to the next sign-up page. 5. Create a Local.com ID. This will be your Local.com login ID and cannot be changed, so think of one that is secure and easy to remember. 6. Create a Local.com password. You can change your password at any time. 7. Enter your Password Reset Question and Answer. This can be used at a later time if you forget your password. 8. Enter your e-mail address. You will receive e-mail notification when new information is available in [address] 9. Click Sign Up. You can now view and download portions of your medical record. 10. Click the Download Summary menu link to download a portable copy of your medical information. If you have questions, please visit the Frequently Asked Questions section of the Local.com website. Remember, Local.com is NOT to be used for urgent needs. For medical emergencies, dial 911. Now available from your iPhone and Android! Please provide this summary of care documentation to your next provider. Your primary care clinician is listed as Brianna Mckeon. If you have any questions after today's visit, please call [phone]. 86 yo woman w/ HTN, HLD, CAD, AVR on coumadin here w/ L hip fracture  pt had fall 1 week ago and was c/o L shoulder pain and XR showed fx (in sling)  Last 2 days, c/o hip pain and XR at [facility] showed hip fracture  pt ambulatory w/ walker and assistance at baseline but non-ambulatory since fall 1 week prior and no subsequent falls    wd/wn  NAD  eomi, perrl  nc/at  non-tender midline neck and back  L shoulder in sling - NVID  L hip w/ ttp and decr ROM. no gross deformity, nvid  alert    86 yo woman w/ hip fx likely from 1 week prior  imaging, ortho consult

## 2023-01-03 NOTE — H&P ADULT - NSICDXPASTMEDICALHX_GEN_ALL_CORE_FT
PAST MEDICAL HISTORY:  Anxiety     Cervical spondyloarthritis     Chronic atrial fibrillation     Depression     Exposure to COVID-19 virus     Fracture of right shoulder     High cholesterol     HTN (hypertension)     Osteoporosis

## 2023-01-03 NOTE — CONSULT NOTE ADULT - ATTENDING COMMENTS
Orthopedic Attending  Patient well known to my service.  Recent PMHx, PE , laboratory w/u and radiographs reviewed.  Impression Valgus impacted femoral neck fracture.  Recommend CRPP.  DIscussed treatment recommendations with patient and her family, who wish to proceed with surgery in as timely fashion as possible.  Will await medical optimization.  NWB until surgery

## 2023-01-03 NOTE — H&P ADULT - NSHPPHYSICALEXAM_GEN_ALL_CORE
Vital Signs Last 24 Hrs    T(F): 96 (01-03-23 @ 11:14), Max: 96 (01-03-23 @ 11:14)  HR: 64 (01-03-23 @ 11:14) (64 - 64)  BP: 186/77 (01-03-23 @ 11:14)  RR: 18 (01-03-23 @ 11:14) (18 - 18)  SpO2: 99% (01-03-23 @ 11:14) (99% - 99%)    PHYSICAL EXAM:      Constitutional:  awake , alert, no distress while  lying in bed    Eyes: PERRLA    Respiratory: +air entry, no rales, no rhonchi, no wheezes    Cardiovascular: +S1 and S2, regular rate and rhythm,     Gastrointestinal: +BS, soft, non-tender, not distended    Extremities:  ** can straight leg raise both legs but right greater then left, ** can apply some pushing pressure with left foot ,  ** No external rotation, no edema, no calf tenderness    Vascular: +dorsal pedis and radial pulses, no extremity cyanosis    Neurological: sensation intact, ROM equal B/L, CN II-XII intact    Skin: no rashes, normal turgor

## 2023-01-04 ENCOUNTER — APPOINTMENT (OUTPATIENT)
Dept: MEDICATION MANAGEMENT | Facility: CLINIC | Age: 88
End: 2023-01-04

## 2023-01-04 ENCOUNTER — TRANSCRIPTION ENCOUNTER (OUTPATIENT)
Age: 88
End: 2023-01-04

## 2023-01-04 LAB
ALBUMIN SERPL ELPH-MCNC: 3.2 G/DL — LOW (ref 3.5–5.2)
ALP SERPL-CCNC: 63 U/L — SIGNIFICANT CHANGE UP (ref 30–115)
ALT FLD-CCNC: 8 U/L — SIGNIFICANT CHANGE UP (ref 0–41)
ANION GAP SERPL CALC-SCNC: 8 MMOL/L — SIGNIFICANT CHANGE UP (ref 7–14)
APTT BLD: 42.2 SEC — HIGH (ref 27–39.2)
AST SERPL-CCNC: 14 U/L — SIGNIFICANT CHANGE UP (ref 0–41)
BASOPHILS # BLD AUTO: 0.04 K/UL — SIGNIFICANT CHANGE UP (ref 0–0.2)
BASOPHILS NFR BLD AUTO: 0.6 % — SIGNIFICANT CHANGE UP (ref 0–1)
BILIRUB SERPL-MCNC: 0.8 MG/DL — SIGNIFICANT CHANGE UP (ref 0.2–1.2)
BLD GP AB SCN SERPL QL: SIGNIFICANT CHANGE UP
BUN SERPL-MCNC: 19 MG/DL — SIGNIFICANT CHANGE UP (ref 10–20)
CALCIUM SERPL-MCNC: 8.4 MG/DL — SIGNIFICANT CHANGE UP (ref 8.4–10.5)
CHLORIDE SERPL-SCNC: 97 MMOL/L — LOW (ref 98–110)
CO2 SERPL-SCNC: 30 MMOL/L — SIGNIFICANT CHANGE UP (ref 17–32)
CREAT SERPL-MCNC: 0.6 MG/DL — LOW (ref 0.7–1.5)
EGFR: 87 ML/MIN/1.73M2 — SIGNIFICANT CHANGE UP
EOSINOPHIL # BLD AUTO: 0.75 K/UL — HIGH (ref 0–0.7)
EOSINOPHIL NFR BLD AUTO: 11.5 % — HIGH (ref 0–8)
GLUCOSE SERPL-MCNC: 85 MG/DL — SIGNIFICANT CHANGE UP (ref 70–99)
HCT VFR BLD CALC: 30.3 % — LOW (ref 37–47)
HGB BLD-MCNC: 9.7 G/DL — LOW (ref 12–16)
IMM GRANULOCYTES NFR BLD AUTO: 0.3 % — SIGNIFICANT CHANGE UP (ref 0.1–0.3)
INR BLD: 2.61 RATIO — HIGH (ref 0.65–1.3)
LYMPHOCYTES # BLD AUTO: 1.52 K/UL — SIGNIFICANT CHANGE UP (ref 1.2–3.4)
LYMPHOCYTES # BLD AUTO: 23.3 % — SIGNIFICANT CHANGE UP (ref 20.5–51.1)
MAGNESIUM SERPL-MCNC: 1.7 MG/DL — LOW (ref 1.8–2.4)
MCHC RBC-ENTMCNC: 28.5 PG — SIGNIFICANT CHANGE UP (ref 27–31)
MCHC RBC-ENTMCNC: 32 G/DL — SIGNIFICANT CHANGE UP (ref 32–37)
MCV RBC AUTO: 89.1 FL — SIGNIFICANT CHANGE UP (ref 81–99)
MONOCYTES # BLD AUTO: 0.5 K/UL — SIGNIFICANT CHANGE UP (ref 0.1–0.6)
MONOCYTES NFR BLD AUTO: 7.7 % — SIGNIFICANT CHANGE UP (ref 1.7–9.3)
NEUTROPHILS # BLD AUTO: 3.7 K/UL — SIGNIFICANT CHANGE UP (ref 1.4–6.5)
NEUTROPHILS NFR BLD AUTO: 56.6 % — SIGNIFICANT CHANGE UP (ref 42.2–75.2)
NRBC # BLD: 0 /100 WBCS — SIGNIFICANT CHANGE UP (ref 0–0)
PLATELET # BLD AUTO: 231 K/UL — SIGNIFICANT CHANGE UP (ref 130–400)
POTASSIUM SERPL-MCNC: 4.4 MMOL/L — SIGNIFICANT CHANGE UP (ref 3.5–5)
POTASSIUM SERPL-SCNC: 4.4 MMOL/L — SIGNIFICANT CHANGE UP (ref 3.5–5)
PROT SERPL-MCNC: 5.3 G/DL — LOW (ref 6–8)
PROTHROM AB SERPL-ACNC: 30.7 SEC — HIGH (ref 9.95–12.87)
RBC # BLD: 3.4 M/UL — LOW (ref 4.2–5.4)
RBC # FLD: 13.8 % — SIGNIFICANT CHANGE UP (ref 11.5–14.5)
SODIUM SERPL-SCNC: 135 MMOL/L — SIGNIFICANT CHANGE UP (ref 135–146)
WBC # BLD: 6.53 K/UL — SIGNIFICANT CHANGE UP (ref 4.8–10.8)
WBC # FLD AUTO: 6.53 K/UL — SIGNIFICANT CHANGE UP (ref 4.8–10.8)

## 2023-01-04 PROCEDURE — 93010 ELECTROCARDIOGRAM REPORT: CPT

## 2023-01-04 PROCEDURE — 99232 SBSQ HOSP IP/OBS MODERATE 35: CPT

## 2023-01-04 RX ORDER — MAGNESIUM SULFATE 500 MG/ML
2 VIAL (ML) INJECTION ONCE
Refills: 0 | Status: COMPLETED | OUTPATIENT
Start: 2023-01-04 | End: 2023-01-04

## 2023-01-04 RX ORDER — MORPHINE SULFATE 50 MG/1
1 CAPSULE, EXTENDED RELEASE ORAL
Refills: 0 | Status: DISCONTINUED | OUTPATIENT
Start: 2023-01-04 | End: 2023-01-04

## 2023-01-04 RX ORDER — HYDRALAZINE HCL 50 MG
25 TABLET ORAL ONCE
Refills: 0 | Status: COMPLETED | OUTPATIENT
Start: 2023-01-04 | End: 2023-01-04

## 2023-01-04 RX ORDER — ONDANSETRON 8 MG/1
4 TABLET, FILM COATED ORAL ONCE
Refills: 0 | Status: DISCONTINUED | OUTPATIENT
Start: 2023-01-04 | End: 2023-01-04

## 2023-01-04 RX ORDER — SODIUM CHLORIDE 9 MG/ML
1000 INJECTION, SOLUTION INTRAVENOUS
Refills: 0 | Status: DISCONTINUED | OUTPATIENT
Start: 2023-01-04 | End: 2023-01-04

## 2023-01-04 RX ORDER — PHYTONADIONE (VIT K1) 5 MG
5 TABLET ORAL ONCE
Refills: 0 | Status: COMPLETED | OUTPATIENT
Start: 2023-01-04 | End: 2023-01-04

## 2023-01-04 RX ORDER — MORPHINE SULFATE 50 MG/1
2 CAPSULE, EXTENDED RELEASE ORAL
Refills: 0 | Status: DISCONTINUED | OUTPATIENT
Start: 2023-01-04 | End: 2023-01-04

## 2023-01-04 RX ADMIN — Medication 25 MILLIGRAM(S): at 08:30

## 2023-01-04 RX ADMIN — MORPHINE SULFATE 2 MILLIGRAM(S): 50 CAPSULE, EXTENDED RELEASE ORAL at 20:49

## 2023-01-04 RX ADMIN — CHLORHEXIDINE GLUCONATE 1 APPLICATION(S): 213 SOLUTION TOPICAL at 04:30

## 2023-01-04 RX ADMIN — ATENOLOL 50 MILLIGRAM(S): 25 TABLET ORAL at 04:50

## 2023-01-04 RX ADMIN — Medication 25 GRAM(S): at 11:00

## 2023-01-04 RX ADMIN — TRAMADOL HYDROCHLORIDE 25 MILLIGRAM(S): 50 TABLET ORAL at 03:53

## 2023-01-04 RX ADMIN — Medication 5 MILLIGRAM(S): at 10:17

## 2023-01-04 NOTE — PROGRESS NOTE ADULT - ASSESSMENT
Problem/Plan - 1:  ·  Problem: Trochanteric fracture of right femur.   · transferred from [facility]   was on Warfarin for Atrial Fib: currently on hold   on aspirin   Tramadol 50mg PO Q6h/PRN moderate pain  MS 2mg IV Q6H/PRN severe pain  CHG towels  bedrest  pre op clearance by cardiology   follow ortho      Problem/Plan - 2:  ·  Problem: Chronic atrial fibrillation.   warfarin on hold  on Atenolol for HR control  restarted after procedure once cleared by ortho      Problem/Plan - 3:  ·  Problem: HTN (hypertension).   ·  Plan: continue Atenolol.     Problem/Plan - 4:  ·  Problem: Depression.   ·  Plan: continue Escitalopram.     Problem/Plan - 5:  ·  Problem: High cholesterol.   ·  Plan: continue with home medications.      follow up: planned or procedure by ortho  today. restarted coumadin after procedure once cleared by ortho.

## 2023-01-04 NOTE — PRE-ANESTHESIA EVALUATION ADULT - NSANTHOSAYNRD_GEN_A_CORE
denies/No. LINDA screening performed.  STOP BANG Legend: 0-2 = LOW Risk; 3-4 = INTERMEDIATE Risk; 5-8 = HIGH Risk

## 2023-01-04 NOTE — CHART NOTE - NSCHARTNOTEFT_GEN_A_CORE
PACU ANESTHESIA ADMISSION NOTE      Procedure: Percutaneous pinning of femoral neck fracture  Valgus impacted, CPT code 77116      Post op diagnosis:  Fracture of femoral neck, left, closed        ____  Intubated  TV:______       Rate: ______      FiO2: ______    __x__  Patent Airway    ____  Full return of protective reflexes    ____  Full recovery from anesthesia / back to baseline status    Vitals:  T(C): 36.7   HR: 74   BP: 149/83   RR: 16   SpO2: 94%   Mental Status:  ___x_ Awake   _____ Alert   _____ Drowsy   _____ Sedated    Nausea/Vomiting:  ____ Yes, See Post - Op Orders      __x__ No    Pain Scale (0-10):  _____    Treatment: ____ None    _x___ See Post - Op/PCA Orders    Post - Operative Fluids:   ____ Oral   __x__ See Post - Op Orders    Plan: Discharge:   ____Home       _x____Floor     _____Critical Care    _____  Other:_________________    Comments:  report given to RN DC to pacu

## 2023-01-04 NOTE — BRIEF OPERATIVE NOTE - NSICDXBRIEFPOSTOP_GEN_ALL_CORE_FT
POST-OP DIAGNOSIS:  Fracture of femoral neck, left, closed 04-Jan-2023 20:05:46 valgus impacted Mike Moreno

## 2023-01-04 NOTE — PROVIDER CONTACT NOTE (OTHER) - BACKGROUND
s/p fall at NH - Left Hip fracture - [place] for hip repair
s/p fall Left Hip fracture

## 2023-01-04 NOTE — PRE-ANESTHESIA EVALUATION ADULT - NSANTHADDINFOFT_GEN_ALL_CORE
risks, benefits, alternatives, discussed with the patient and her daughter (as she has periods of confusion) and they agree to proceed as planned

## 2023-01-04 NOTE — BRIEF OPERATIVE NOTE - OPERATION/FINDINGS
above fracture; post op WBAT, Abx and DVT ppx per protocol  Dict:  Implants: Synthes 3 x 6.5mm cannulated screws above fracture; post op WBAT, Abx and DVT ppx per protocol  Dict:[id number]  Implants: Synthes 3 x 6.5mm cannulated screws

## 2023-01-04 NOTE — BRIEF OPERATIVE NOTE - NSICDXBRIEFPREOP_GEN_ALL_CORE_FT
PRE-OP DIAGNOSIS:  Fracture of femoral neck, left, closed 04-Jan-2023 20:05:33 valgus impacted Mike Moreno

## 2023-01-04 NOTE — BRIEF OPERATIVE NOTE - NSICDXBRIEFPROCEDURE_GEN_ALL_CORE_FT
PROCEDURES:  Percutaneous pinning of femoral neck fracture 04-Jan-2023 20:04:57 Valgus impacted, CPT code 82625 Mike Moreno

## 2023-01-05 LAB
ALBUMIN SERPL ELPH-MCNC: 3.3 G/DL — LOW (ref 3.5–5.2)
ALP SERPL-CCNC: 190 U/L — HIGH (ref 30–115)
ALT FLD-CCNC: 52 U/L — HIGH (ref 0–41)
ANION GAP SERPL CALC-SCNC: 9 MMOL/L — SIGNIFICANT CHANGE UP (ref 7–14)
APTT BLD: 37.9 SEC — SIGNIFICANT CHANGE UP (ref 27–39.2)
APTT BLD: 40.6 SEC — HIGH (ref 27–39.2)
AST SERPL-CCNC: 71 U/L — HIGH (ref 0–41)
BASOPHILS # BLD AUTO: 0.01 K/UL — SIGNIFICANT CHANGE UP (ref 0–0.2)
BASOPHILS NFR BLD AUTO: 0.1 % — SIGNIFICANT CHANGE UP (ref 0–1)
BILIRUB SERPL-MCNC: 0.7 MG/DL — SIGNIFICANT CHANGE UP (ref 0.2–1.2)
BUN SERPL-MCNC: 27 MG/DL — HIGH (ref 10–20)
CALCIUM SERPL-MCNC: 8.6 MG/DL — SIGNIFICANT CHANGE UP (ref 8.4–10.5)
CHLORIDE SERPL-SCNC: 96 MMOL/L — LOW (ref 98–110)
CO2 SERPL-SCNC: 26 MMOL/L — SIGNIFICANT CHANGE UP (ref 17–32)
CREAT SERPL-MCNC: 0.9 MG/DL — SIGNIFICANT CHANGE UP (ref 0.7–1.5)
EGFR: 62 ML/MIN/1.73M2 — SIGNIFICANT CHANGE UP
EOSINOPHIL # BLD AUTO: 0 K/UL — SIGNIFICANT CHANGE UP (ref 0–0.7)
EOSINOPHIL NFR BLD AUTO: 0 % — SIGNIFICANT CHANGE UP (ref 0–8)
GLUCOSE SERPL-MCNC: 154 MG/DL — HIGH (ref 70–99)
HCT VFR BLD CALC: 28.4 % — LOW (ref 37–47)
HGB BLD-MCNC: 9.6 G/DL — LOW (ref 12–16)
IMM GRANULOCYTES NFR BLD AUTO: 0.6 % — HIGH (ref 0.1–0.3)
INR BLD: 1.18 RATIO — SIGNIFICANT CHANGE UP (ref 0.65–1.3)
LYMPHOCYTES # BLD AUTO: 1.27 K/UL — SIGNIFICANT CHANGE UP (ref 1.2–3.4)
LYMPHOCYTES # BLD AUTO: 14.9 % — LOW (ref 20.5–51.1)
MAGNESIUM SERPL-MCNC: 1.9 MG/DL — SIGNIFICANT CHANGE UP (ref 1.8–2.4)
MCHC RBC-ENTMCNC: 29.5 PG — SIGNIFICANT CHANGE UP (ref 27–31)
MCHC RBC-ENTMCNC: 33.8 G/DL — SIGNIFICANT CHANGE UP (ref 32–37)
MCV RBC AUTO: 87.4 FL — SIGNIFICANT CHANGE UP (ref 81–99)
MONOCYTES # BLD AUTO: 0.69 K/UL — HIGH (ref 0.1–0.6)
MONOCYTES NFR BLD AUTO: 8.1 % — SIGNIFICANT CHANGE UP (ref 1.7–9.3)
NEUTROPHILS # BLD AUTO: 6.53 K/UL — HIGH (ref 1.4–6.5)
NEUTROPHILS NFR BLD AUTO: 76.3 % — HIGH (ref 42.2–75.2)
NRBC # BLD: 0 /100 WBCS — SIGNIFICANT CHANGE UP (ref 0–0)
PLATELET # BLD AUTO: 299 K/UL — SIGNIFICANT CHANGE UP (ref 130–400)
POTASSIUM SERPL-MCNC: 4.7 MMOL/L — SIGNIFICANT CHANGE UP (ref 3.5–5)
POTASSIUM SERPL-SCNC: 4.7 MMOL/L — SIGNIFICANT CHANGE UP (ref 3.5–5)
PROT SERPL-MCNC: 5.6 G/DL — LOW (ref 6–8)
PROTHROM AB SERPL-ACNC: 13.5 SEC — HIGH (ref 9.95–12.87)
RBC # BLD: 3.25 M/UL — LOW (ref 4.2–5.4)
RBC # FLD: 14.2 % — SIGNIFICANT CHANGE UP (ref 11.5–14.5)
SODIUM SERPL-SCNC: 131 MMOL/L — LOW (ref 135–146)
WBC # BLD: 8.55 K/UL — SIGNIFICANT CHANGE UP (ref 4.8–10.8)
WBC # FLD AUTO: 8.55 K/UL — SIGNIFICANT CHANGE UP (ref 4.8–10.8)

## 2023-01-05 PROCEDURE — 99232 SBSQ HOSP IP/OBS MODERATE 35: CPT

## 2023-01-05 RX ORDER — WARFARIN SODIUM 2.5 MG/1
2 TABLET ORAL ONCE
Refills: 0 | Status: DISCONTINUED | OUTPATIENT
Start: 2023-01-05 | End: 2023-01-05

## 2023-01-05 RX ORDER — PANTOPRAZOLE SODIUM 20 MG/1
40 TABLET, DELAYED RELEASE ORAL
Refills: 0 | Status: DISCONTINUED | OUTPATIENT
Start: 2023-01-05 | End: 2023-01-09

## 2023-01-05 RX ORDER — SENNA PLUS 8.6 MG/1
2 TABLET ORAL AT BEDTIME
Refills: 0 | Status: DISCONTINUED | OUTPATIENT
Start: 2023-01-05 | End: 2023-01-09

## 2023-01-05 RX ORDER — CEFAZOLIN SODIUM 1 G
2000 VIAL (EA) INJECTION EVERY 8 HOURS
Refills: 0 | Status: COMPLETED | OUTPATIENT
Start: 2023-01-05 | End: 2023-01-05

## 2023-01-05 RX ORDER — OXYCODONE HYDROCHLORIDE 5 MG/1
10 TABLET ORAL
Refills: 0 | Status: DISCONTINUED | OUTPATIENT
Start: 2023-01-05 | End: 2023-01-09

## 2023-01-05 RX ORDER — GABAPENTIN 400 MG/1
100 CAPSULE ORAL THREE TIMES A DAY
Refills: 0 | Status: DISCONTINUED | OUTPATIENT
Start: 2023-01-05 | End: 2023-01-09

## 2023-01-05 RX ORDER — WARFARIN SODIUM 2.5 MG/1
1 TABLET ORAL
Qty: 0 | Refills: 0 | DISCHARGE

## 2023-01-05 RX ORDER — WARFARIN SODIUM 2.5 MG/1
4 TABLET ORAL ONCE
Refills: 0 | Status: COMPLETED | OUTPATIENT
Start: 2023-01-05 | End: 2023-01-05

## 2023-01-05 RX ORDER — WARFARIN SODIUM 2.5 MG/1
2.5 TABLET ORAL ONCE
Refills: 0 | Status: DISCONTINUED | OUTPATIENT
Start: 2023-01-05 | End: 2023-01-05

## 2023-01-05 RX ORDER — MAGNESIUM HYDROXIDE 400 MG/1
30 TABLET, CHEWABLE ORAL DAILY
Refills: 0 | Status: DISCONTINUED | OUTPATIENT
Start: 2023-01-05 | End: 2023-01-09

## 2023-01-05 RX ORDER — POLYETHYLENE GLYCOL 3350 17 G/17G
17 POWDER, FOR SOLUTION ORAL AT BEDTIME
Refills: 0 | Status: DISCONTINUED | OUTPATIENT
Start: 2023-01-05 | End: 2023-01-09

## 2023-01-05 RX ORDER — ONDANSETRON 8 MG/1
4 TABLET, FILM COATED ORAL EVERY 6 HOURS
Refills: 0 | Status: DISCONTINUED | OUTPATIENT
Start: 2023-01-05 | End: 2023-01-09

## 2023-01-05 RX ORDER — ACETAMINOPHEN 500 MG
650 TABLET ORAL EVERY 6 HOURS
Refills: 0 | Status: COMPLETED | OUTPATIENT
Start: 2023-01-05 | End: 2023-01-08

## 2023-01-05 RX ORDER — OXYCODONE HYDROCHLORIDE 5 MG/1
5 TABLET ORAL
Refills: 0 | Status: DISCONTINUED | OUTPATIENT
Start: 2023-01-05 | End: 2023-01-09

## 2023-01-05 RX ORDER — ENOXAPARIN SODIUM 100 MG/ML
70 INJECTION SUBCUTANEOUS EVERY 12 HOURS
Refills: 0 | Status: DISCONTINUED | OUTPATIENT
Start: 2023-01-05 | End: 2023-01-06

## 2023-01-05 RX ORDER — CELECOXIB 200 MG/1
200 CAPSULE ORAL EVERY 12 HOURS
Refills: 0 | Status: DISCONTINUED | OUTPATIENT
Start: 2023-01-06 | End: 2023-01-06

## 2023-01-05 RX ORDER — FERROUS SULFATE 325(65) MG
325 TABLET ORAL DAILY
Refills: 0 | Status: DISCONTINUED | OUTPATIENT
Start: 2023-01-05 | End: 2023-01-09

## 2023-01-05 RX ORDER — KETOROLAC TROMETHAMINE 30 MG/ML
15 SYRINGE (ML) INJECTION EVERY 6 HOURS
Refills: 0 | Status: DISCONTINUED | OUTPATIENT
Start: 2023-01-05 | End: 2023-01-05

## 2023-01-05 RX ORDER — ENOXAPARIN SODIUM 100 MG/ML
30 INJECTION SUBCUTANEOUS EVERY 12 HOURS
Refills: 0 | Status: DISCONTINUED | OUTPATIENT
Start: 2023-01-05 | End: 2023-01-05

## 2023-01-05 RX ADMIN — Medication 100 MILLIGRAM(S): at 05:58

## 2023-01-05 RX ADMIN — ENOXAPARIN SODIUM 70 MILLIGRAM(S): 100 INJECTION SUBCUTANEOUS at 17:53

## 2023-01-05 RX ADMIN — Medication 650 MILLIGRAM(S): at 23:41

## 2023-01-05 RX ADMIN — WARFARIN SODIUM 4 MILLIGRAM(S): 2.5 TABLET ORAL at 21:50

## 2023-01-05 RX ADMIN — Medication 650 MILLIGRAM(S): at 17:53

## 2023-01-05 RX ADMIN — GABAPENTIN 100 MILLIGRAM(S): 400 CAPSULE ORAL at 13:04

## 2023-01-05 RX ADMIN — Medication 15 MILLIGRAM(S): at 19:15

## 2023-01-05 RX ADMIN — POLYETHYLENE GLYCOL 3350 17 GRAM(S): 17 POWDER, FOR SOLUTION ORAL at 22:06

## 2023-01-05 RX ADMIN — Medication 1 TABLET(S): at 13:04

## 2023-01-05 RX ADMIN — Medication 650 MILLIGRAM(S): at 13:01

## 2023-01-05 RX ADMIN — Medication 1 TABLET(S): at 05:57

## 2023-01-05 RX ADMIN — Medication 650 MILLIGRAM(S): at 05:58

## 2023-01-05 RX ADMIN — Medication 15 MILLIGRAM(S): at 23:41

## 2023-01-05 RX ADMIN — Medication 325 MILLIGRAM(S): at 13:01

## 2023-01-05 RX ADMIN — Medication 15 MILLIGRAM(S): at 13:00

## 2023-01-05 RX ADMIN — Medication 100 MILLIGRAM(S): at 13:46

## 2023-01-05 RX ADMIN — ENOXAPARIN SODIUM 30 MILLIGRAM(S): 100 INJECTION SUBCUTANEOUS at 07:00

## 2023-01-05 RX ADMIN — Medication 15 MILLIGRAM(S): at 05:56

## 2023-01-05 RX ADMIN — PANTOPRAZOLE SODIUM 40 MILLIGRAM(S): 20 TABLET, DELAYED RELEASE ORAL at 13:04

## 2023-01-05 RX ADMIN — GABAPENTIN 100 MILLIGRAM(S): 400 CAPSULE ORAL at 22:06

## 2023-01-05 RX ADMIN — Medication 15 MILLIGRAM(S): at 17:58

## 2023-01-05 RX ADMIN — Medication 1 TABLET(S): at 13:01

## 2023-01-05 RX ADMIN — Medication 1 TABLET(S): at 22:03

## 2023-01-05 RX ADMIN — Medication 650 MILLIGRAM(S): at 18:23

## 2023-01-05 RX ADMIN — Medication 15 MILLIGRAM(S): at 17:54

## 2023-01-05 RX ADMIN — GABAPENTIN 100 MILLIGRAM(S): 400 CAPSULE ORAL at 05:58

## 2023-01-05 RX ADMIN — SENNA PLUS 2 TABLET(S): 8.6 TABLET ORAL at 22:06

## 2023-01-05 RX ADMIN — Medication 650 MILLIGRAM(S): at 17:57

## 2023-01-05 NOTE — PROGRESS NOTE ADULT - ASSESSMENT
86 yo F, with PMHx of HTN, HLD, CAD s/p CABG, AV replacement on coumadin, osteoporosis, old right hip fracture presented to the ED from NH s/p fall     #Trochanteric fracture of left femur  - s/p Transfer [place] for orthopedic treatment  - Cardiology Consult for OR clearance- pt at intermediate risk  - s/p Percutaneous pinning of femoral neck fracture 1/5  - continue ASA  - toradol and oxycodone PRN for pain control  - NBW LLE   - PT eval  - ortho following  - coumadin was held prior to procedure, Vitamin K x1 given  - coumadin restarted 1/5    #Chronic atrial fibrillation.   - on home coumadin 2.5mg daily  - continue ASA  - on Atenolol for HR control  - trend daily INRs, dose coumadin accordingly    #HTN  - continue Atenolol    #Depression  - continue Escitalopram    #HLD  - continue with home medications    #Misc:  - DVT ppx: lovenox  - gi ppx: start protonix  - diet: soft and bite sized  - Activity: NBW LLE, PT eval  - Dispo: to [facility]  - Pending: INRs daily, coumadin, PT eval         86 yo F, with PMHx of HTN, HLD, CAD s/p CABG, AV replacement on coumadin, osteoporosis, old right hip fracture presented to the ED from NH s/p fall     #L femoral neck fracture  #L minimally displaced greater tuberosity fx of the shoulder  - s/p [place] treatment  - Cardiology Consult for OR clearance- pt at intermediate risk  - s/p Percutaneous pinning of femoral neck fracture 1/5  - continue ASA  - toradol and oxycodone PRN for pain control  - activity status: NWB LUE, WBAT LLE  - PT and OT eval: rehab  - ortho following  - coumadin was held prior to procedure, Vitamin K x1 given  - coumadin restarted 1/5    #Chronic atrial fibrillation  #CAD s/p CABG  #AVR on coumadin  - on home coumadin 2.5mg daily  - continue ASA  - on Atenolol for HR control  - off home atorvastatin for now for mild transaminitis  - trend daily INRs, dose coumadin accordingly  - INR today subtherapeutic, start therapeutic lovenox for bridging to coumadin    #HTN  - continue Atenolol    #Depression  - continue Escitalopram    #HLD  - continue with home medications    #Misc:  - DVT ppx: lovenox briding to coumadin  - gi ppx: start protonix  - diet: soft and bite sized  - Activity: NBW LUE and WBAT LLE  - Dispo: to STR  - Pending: INRs daily, coumadin, PT/OT, pain control         86 yo F, with PMHx of HTN, HLD, CAD s/p CABG, AV replacement on coumadin, osteoporosis, old right hip fracture presented to the ED from NH s/p fall     #L femoral neck fracture  #L minimally displaced greater tuberosity fx of the shoulder  - s[place]  - Cardiology Consult for OR clearance- pt at intermediate risk  - s/p Percutaneous pinning of femoral neck fracture 1/5  - continue ASA  - toradol and oxycodone PRN for pain control  - activity status: NWB LUE, WBAT LLE  - PT and OT eval: rehab  - ortho following  - coumadin was held prior to procedure, Vitamin K x1 given  - coumadin restarted 1/5    #Chronic atrial fibrillation  #CAD s/p CABG  #AVR on coumadin  - on home coumadin 2mg Sunday/Tuesday/Friday and 4mg Monday/Wednedsay/Thursday/Saturday  - continue ASA  - on Atenolol for HR control  - off home atorvastatin for now for mild transaminitis  - trend daily INRs, dose coumadin accordingly  - INR today subtherapeutic, start therapeutic lovenox for bridging to coumadin    #HTN  - continue Atenolol    #Depression  - continue Escitalopram    #HLD  - continue with home medications    #Misc:  - DVT ppx: lovenox briding to coumadin  - gi ppx: start protonix  - diet: soft and bite sized  - Activity: NBW LUE and WBAT LLE  - Dispo: to STR  - Pending: INRs daily, coumadin, PT/OT, pain control         88 yo F, with PMHx of HTN, HLD, CAD s/p CABG, AV replacement on coumadin, osteoporosis, old right hip fracture presented to the ED from NH s/p fall     #L femoral neck fracture s/p fall 2/2 osteoporosis   #L minimally displaced greater tuberosity fx of the shoulder  - s[place]p [place]  - Cardiology Consult for OR clearance- pt at intermediate risk  - s/p Percutaneous pinning of femoral neck fracture 1/5  - continue ASA  - toradol and oxycodone PRN for pain control  - activity status: NWB LUE, WBAT LLE  - PT and OT eval: rehab  - ortho following  - coumadin was held prior to procedure, Vitamin K x1 given  - coumadin restarted 1/5    #Chronic atrial fibrillation  #CAD s/p CABG  #AVR on coumadin  - on home coumadin 2mg Sunday/Tuesday/Friday and 4mg Monday/Wednedsay/Thursday/Saturday  - continue ASA  - on Atenolol for HR control  - off home atorvastatin for now for mild transaminitis  - trend daily INRs, dose coumadin accordingly  - INR today subtherapeutic, start therapeutic lovenox for bridging to coumadin    #HTN  - continue Atenolol    #Depression  - continue Escitalopram    #HLD  - continue with home medications    #Misc:  - DVT ppx: lovenox briding to coumadin  - gi ppx: start protonix  - diet: soft and bite sized  - Activity: NBW LUE and WBAT LLE  - Dispo: to STR  - Pending: INRs daily, coumadin, PT/OT, pain control

## 2023-01-05 NOTE — OCCUPATIONAL THERAPY INITIAL EVALUATION ADULT - SITTING BALANCE: DYNAMIC
Message   Recorded as Task   Date: 02/16/2017 10:49 AM, Created By: Alpa Contreras   Task Name: Verify Doc   Assigned To: Terri Anguiano   Regarding Patient: Bud Jimenez, Status: In Progress   CommentGeena Lewis - 16 Feb 2017 10:49 AM     Verify task generated in Scan batch basket  Terri Anguiano - 16 Feb 2017 1:37 PM     TASK EDITED  Called patient left message on her voicemail to call back  Terri Anguiano - 16 Feb 2017 1:37 PM     TASK IN PROGRESS   Terri Anguiano - 30 Mar 2017 9:36 AM     TASK EDITED  Called patient advised her that she has osteopenia and she needs to start taking extra calcium at least 1200 mg a day with vitamin D  Advised her we would recheck it in 2 years to make sure it wasn't getting any worse          Signatures   Electronically signed by : Saritha Owens CNM; Mar 30 2017  9:36AM EST                       (Author) fair minus

## 2023-01-05 NOTE — OCCUPATIONAL THERAPY INITIAL EVALUATION ADULT - PERTINENT HX OF CURRENT PROBLEM, REHAB EVAL
88 y/o female with pmhx  Hypertension, HDL, CAD s/p CABG, Aortic valve replacement  on Coumadin, Osteoporosis, old right hip fracture. Presents to the ED from NH  s/p fall one week ago c/o pain to left hip and shoulder. Patient s/p fall off a chair. Patient with fracture of left shoulder and left hip. She ambulates with walker and assistance at baseline. Patient without any abdominal pain , chest pain or head injury, no fevers, no tingling distally.   - C/O pain to left hip

## 2023-01-05 NOTE — PROGRESS NOTE ADULT - ASSESSMENT
ORTHOPAEDIC SURGERY   POST-OP CHECK NOTE    Patient Name: ORAL WU  Operation: Left femoral neck fx s/p CRPP  Surgeon:  Dr. ibarra  ---------------------------------------  SUBJECTIVE    87y Female seen and examined approximately  4 hours after above procedure. Patient is resting comfortably in no acute distress. Denies f/c/cp/sob/n/v/d.    ---------------------------------------  P/E:  T(F): 96.4 (01-05-23 @ 04:53), Max: 98.6 (01-04-23 @ 20:20)  HR: 81 (01-05-23 @ 04:53) (60 - 81)  BP: 113/59 (01-05-23 @ 04:53) (113/59 - 199/83)  RR: 18 (01-05-23 @ 04:53) (16 - 22)    General: awake, alert, resting comfortably in NAD  Cardio: RRR per peripheral pulses  Respiratory: breathing quietly without use of accessory muscles      LLE: hip  Dressing/splint in place, c/d/i  SILT sp/dp/t/sural/saph  Firing ta/ehl/fhl/gs  Foot WWP, 2+ DP pulse  ---------------------------------------  LABS                        9.7    6.53  )-----------( 231      ( 04 Jan 2023 07:55 )             30.3       01-04    135  |  97<L>  |  19  ----------------------------<  85  4.4   |  30  |  0.6<L>    Ca    8.4      04 Jan 2023 07:55  Mg     1.7     01-04    TPro  5.3<L>  /  Alb  3.2<L>  /  TBili  0.8  /  DBili  x   /  AST  14  /  ALT  8   /  AlkPhos  63  01-04    LIVER FUNCTIONS - ( 04 Jan 2023 07:55 )  Alb: 3.2 g/dL / Pro: 5.3 g/dL / ALK PHOS: 63 U/L / ALT: 8 U/L / AST: 14 U/L / GGT: x           PT/INR - ( 04 Jan 2023 07:55 )   PT: 30.70 sec;   INR: 2.61 ratio         PTT - ( 04 Jan 2023 07:55 )  PTT:42.2 sec      COVID-19 PCR: NotDetec (03 Jan 2023 11:30)  COVID-19 PCR: NotDetec (01 Sep 2022 11:00)  COVID-19 PCR: NotDetec (30 Aug 2022 12:34)      MEDS  MEDICATIONS  (STANDING):  acetaminophen     Tablet .. 650 milliGRAM(s) Oral every 6 hours  calcium carbonate 1250 mG  + Vitamin D (OsCal 500 + D) 1 Tablet(s) Oral three times a day  ceFAZolin   IVPB 2000 milliGRAM(s) IV Intermittent every 8 hours  enoxaparin Injectable 30 milliGRAM(s) SubCutaneous every 12 hours  ferrous    sulfate 325 milliGRAM(s) Oral daily  gabapentin 100 milliGRAM(s) Oral three times a day  ketorolac   Injectable 15 milliGRAM(s) IV Push every 6 hours  multivitamin 1 Tablet(s) Oral daily  polyethylene glycol 3350 17 Gram(s) Oral at bedtime  senna 2 Tablet(s) Oral at bedtime  warfarin 2 milliGRAM(s) Oral once    MEDICATIONS  (PRN):  magnesium hydroxide Suspension 30 milliLiter(s) Oral daily PRN Constipation  ondansetron Injectable 4 milliGRAM(s) IV Push every 6 hours PRN Nausea and/or Vomiting  oxyCODONE    IR 5 milliGRAM(s) Oral every 3 hours PRN Moderate Pain (4 - 6)  oxyCODONE    IR 10 milliGRAM(s) Oral every 3 hours PRN Severe Pain (7 - 10)    ---------------------------------------    ASSESSMENT  87y Female W/  #LEFT FEMORAL NECK FX S/P CRPP  on 1/4/2022, seen approximately 4 hours, after surgery, in stable condition.    PLAN:   - follow-up post-op labs  - Activity:  WBAT LLE  - Antibiotics: Ancef 2g q8h for 24h    DVT PPX:   Patient on Coumadin for AFib with CHADS2 score 2-3 : intermediate risk of VTE  Please bridge to home dose of coumadin w/ goal INR as noted by Cardio or PCP  - After the procedure the resumption of unfractionated heparin and LMW heparin is similar. Once it has been decided that adequate homeostasis has been obtained then these can be restarted. Postoperatively warfarin is generally resumed on the same postoperative day as the heparin or LMW heparin, which is then discontinued when the INR reaches the therapeutic range (Douketis & Lip, 2020).   - Warfarin 2mg ordered  - Lovenox ordered to bridge   - please DC Lvx once INR is within therapeutic range     - Pain: continue care per current regimen- PT    -Disposition:   Return to floor when cleared by anesthesia  Home vs. subacute rehab vs. acute rehab  pending PT evaluation      Please page ORTHO w/ any questions or concerns         ORTHOPAEDIC SURGERY   POST-OP CHECK NOTE    Patient Name: ORAL WU  Operation: Left femoral neck fx s/p CRPP  Surgeon:  Dr. ibarra  ---------------------------------------  SUBJECTIVE    87y Female seen and examined approximately  4 hours after above procedure. Patient is resting comfortably in no acute distress. Denies f/c/cp/sob/n/v/d.    ---------------------------------------  P/E:  T(F): 96.4 (01-05-23 @ 04:53), Max: 98.6 (01-04-23 @ 20:20)  HR: 81 (01-05-23 @ 04:53) (60 - 81)  BP: 113/59 (01-05-23 @ 04:53) (113/59 - 199/83)  RR: 18 (01-05-23 @ 04:53) (16 - 22)    General: awake, alert, resting comfortably in NAD  Cardio: RRR per peripheral pulses  Respiratory: breathing quietly without use of accessory muscles      LLE: hip  Dressing/splint in place, c/d/i  SILT sp/dp/t/sural/saph  Firing ta/ehl/fhl/gs  Foot WWP, 2+ DP pulse  ---------------------------------------  LABS                        9.7    6.53  )-----------( 231      ( 04 Jan 2023 07:55 )             30.3       01-04    135  |  97<L>  |  19  ----------------------------<  85  4.4   |  30  |  0.6<L>    Ca    8.4      04 Jan 2023 07:55  Mg     1.7     01-04    TPro  5.3<L>  /  Alb  3.2<L>  /  TBili  0.8  /  DBili  x   /  AST  14  /  ALT  8   /  AlkPhos  63  01-04    LIVER FUNCTIONS - ( 04 Jan 2023 07:55 )  Alb: 3.2 g/dL / Pro: 5.3 g/dL / ALK PHOS: 63 U/L / ALT: 8 U/L / AST: 14 U/L / GGT: x           PT/INR - ( 04 Jan 2023 07:55 )   PT: 30.70 sec;   INR: 2.61 ratio         PTT - ( 04 Jan 2023 07:55 )  PTT:42.2 sec      COVID-19 PCR: NotDetec (03 Jan 2023 11:30)  COVID-19 PCR: NotDetec (01 Sep 2022 11:00)  COVID-19 PCR: NotDetec (30 Aug 2022 12:34)      MEDS  MEDICATIONS  (STANDING):  acetaminophen     Tablet .. 650 milliGRAM(s) Oral every 6 hours  calcium carbonate 1250 mG  + Vitamin D (OsCal 500 + D) 1 Tablet(s) Oral three times a day  ceFAZolin   IVPB 2000 milliGRAM(s) IV Intermittent every 8 hours  enoxaparin Injectable 30 milliGRAM(s) SubCutaneous every 12 hours  ferrous    sulfate 325 milliGRAM(s) Oral daily  gabapentin 100 milliGRAM(s) Oral three times a day  ketorolac   Injectable 15 milliGRAM(s) IV Push every 6 hours  multivitamin 1 Tablet(s) Oral daily  polyethylene glycol 3350 17 Gram(s) Oral at bedtime  senna 2 Tablet(s) Oral at bedtime  warfarin 2 milliGRAM(s) Oral once    MEDICATIONS  (PRN):  magnesium hydroxide Suspension 30 milliLiter(s) Oral daily PRN Constipation  ondansetron Injectable 4 milliGRAM(s) IV Push every 6 hours PRN Nausea and/or Vomiting  oxyCODONE    IR 5 milliGRAM(s) Oral every 3 hours PRN Moderate Pain (4 - 6)  oxyCODONE    IR 10 milliGRAM(s) Oral every 3 hours PRN Severe Pain (7 - 10)    ---------------------------------------    ASSESSMENT  87y Female W/  #LEFT FEMORAL NECK FX S/P CRPP  on 1/4/2022, seen approximately 4 hours, after surgery, in stable condition.    #LEFT HUMERUS GT FX  - NONOP     PLAN:   - follow-up post-op labs  - Activity:  WBAT LLE, NWB LUE  - Antibiotics: Ancef 2g q8h for 24h    DVT PPX:   Patient on Coumadin for AFib with CHADS2 score 2-3 : intermediate risk of VTE  Please bridge to home dose of coumadin w/ goal INR as noted by Cardio or PCP  - After the procedure the resumption of unfractionated heparin and LMW heparin is similar. Once it has been decided that adequate homeostasis has been obtained then these can be restarted. Postoperatively warfarin is generally resumed on the same postoperative day as the heparin or LMW heparin, which is then discontinued when the INR reaches the therapeutic range (Douketis & Lip, 2020).   - Warfarin 2mg ordered  - Lovenox ordered to bridge   - please DC Lvx once INR is within therapeutic range     - Pain: continue care per current regimen- PT    -Disposition:   Return to floor when cleared by anesthesia  Home vs. subacute rehab vs. acute rehab  pending PT evaluation      Please page ORTHO w/ any questions or concerns

## 2023-01-05 NOTE — OCCUPATIONAL THERAPY INITIAL EVALUATION ADULT - PATIENT PROFILE REVIEW, REHAB EVAL
Chief Complaint   Patient presents with     Physical     pap     Had annual already with primary, needs pap  
Time Seen 8:21-8:55 pt chart thoroughly reviewed prior to OT evaluation/yes

## 2023-01-05 NOTE — OCCUPATIONAL THERAPY INITIAL EVALUATION ADULT - GENERAL OBSERVATIONS, REHAB EVAL
Pt encountered semi robles in bed, + IV locked. Pt agreeable to bedside OT assessment, may be seen as confirmed with RN. Pt returned to bed as found, + IV locked, + RN aware

## 2023-01-05 NOTE — OCCUPATIONAL THERAPY INITIAL EVALUATION ADULT - DIAGNOSIS, OT EVAL
Debilitation s/p fall with Left femoral neck fx s/p CRPP; acute minimally displaced fracture of the greater trochanter

## 2023-01-05 NOTE — PHYSICAL THERAPY INITIAL EVALUATION ADULT - PERTINENT HX OF CURRENT PROBLEM, REHAB EVAL
88 y/o female with pmhx  Hypertension, HDL, CAD s/p CABG, Aortic valve replacement  on Coumadin, Osteoporosis, old right hip fracture. Presents to the ED from NH  s/p fall one week ago c/o pain to left hip and shoulder. Patient s/p fall off a chair. Patient with fracture of left shoulder and left hip. She ambulates with walker and assistance at baseline.

## 2023-01-06 ENCOUNTER — TRANSCRIPTION ENCOUNTER (OUTPATIENT)
Age: 88
End: 2023-01-06

## 2023-01-06 LAB
ALBUMIN SERPL ELPH-MCNC: 3.2 G/DL — LOW (ref 3.5–5.2)
ALBUMIN SERPL ELPH-MCNC: 3.3 G/DL — LOW (ref 3.5–5.2)
ALP SERPL-CCNC: 119 U/L — HIGH (ref 30–115)
ALP SERPL-CCNC: 129 U/L — HIGH (ref 30–115)
ALT FLD-CCNC: 12 U/L — SIGNIFICANT CHANGE UP (ref 0–41)
ALT FLD-CCNC: 8 U/L — SIGNIFICANT CHANGE UP (ref 0–41)
ANION GAP SERPL CALC-SCNC: 11 MMOL/L — SIGNIFICANT CHANGE UP (ref 7–14)
ANION GAP SERPL CALC-SCNC: 9 MMOL/L — SIGNIFICANT CHANGE UP (ref 7–14)
APTT BLD: 37.1 SEC — SIGNIFICANT CHANGE UP (ref 27–39.2)
APTT BLD: 47 SEC — HIGH (ref 27–39.2)
AST SERPL-CCNC: 23 U/L — SIGNIFICANT CHANGE UP (ref 0–41)
AST SERPL-CCNC: 28 U/L — SIGNIFICANT CHANGE UP (ref 0–41)
BASOPHILS # BLD AUTO: 0.04 K/UL — SIGNIFICANT CHANGE UP (ref 0–0.2)
BASOPHILS # BLD AUTO: 0.05 K/UL — SIGNIFICANT CHANGE UP (ref 0–0.2)
BASOPHILS NFR BLD AUTO: 0.5 % — SIGNIFICANT CHANGE UP (ref 0–1)
BASOPHILS NFR BLD AUTO: 0.5 % — SIGNIFICANT CHANGE UP (ref 0–1)
BILIRUB SERPL-MCNC: 0.4 MG/DL — SIGNIFICANT CHANGE UP (ref 0.2–1.2)
BILIRUB SERPL-MCNC: 0.4 MG/DL — SIGNIFICANT CHANGE UP (ref 0.2–1.2)
BLD GP AB SCN SERPL QL: SIGNIFICANT CHANGE UP
BUN SERPL-MCNC: 42 MG/DL — HIGH (ref 10–20)
BUN SERPL-MCNC: 43 MG/DL — HIGH (ref 10–20)
CALCIUM SERPL-MCNC: 9.3 MG/DL — SIGNIFICANT CHANGE UP (ref 8.4–10.4)
CALCIUM SERPL-MCNC: 9.7 MG/DL — SIGNIFICANT CHANGE UP (ref 8.4–10.5)
CHLORIDE SERPL-SCNC: 95 MMOL/L — LOW (ref 98–110)
CHLORIDE SERPL-SCNC: 97 MMOL/L — LOW (ref 98–110)
CO2 SERPL-SCNC: 26 MMOL/L — SIGNIFICANT CHANGE UP (ref 17–32)
CO2 SERPL-SCNC: 27 MMOL/L — SIGNIFICANT CHANGE UP (ref 17–32)
CREAT SERPL-MCNC: 1.5 MG/DL — SIGNIFICANT CHANGE UP (ref 0.7–1.5)
CREAT SERPL-MCNC: 1.8 MG/DL — HIGH (ref 0.7–1.5)
EGFR: 27 ML/MIN/1.73M2 — LOW
EGFR: 34 ML/MIN/1.73M2 — LOW
EOSINOPHIL # BLD AUTO: 0.57 K/UL — SIGNIFICANT CHANGE UP (ref 0–0.7)
EOSINOPHIL # BLD AUTO: 0.61 K/UL — SIGNIFICANT CHANGE UP (ref 0–0.7)
EOSINOPHIL NFR BLD AUTO: 6.5 % — SIGNIFICANT CHANGE UP (ref 0–8)
EOSINOPHIL NFR BLD AUTO: 6.6 % — SIGNIFICANT CHANGE UP (ref 0–8)
GLUCOSE BLDC GLUCOMTR-MCNC: 156 MG/DL — HIGH (ref 70–99)
GLUCOSE SERPL-MCNC: 105 MG/DL — HIGH (ref 70–99)
GLUCOSE SERPL-MCNC: 143 MG/DL — HIGH (ref 70–99)
HCT VFR BLD CALC: 24.8 % — LOW (ref 37–47)
HCT VFR BLD CALC: 25.3 % — LOW (ref 37–47)
HGB BLD-MCNC: 7.9 G/DL — LOW (ref 12–16)
HGB BLD-MCNC: 8.4 G/DL — LOW (ref 12–16)
IMM GRANULOCYTES NFR BLD AUTO: 0.4 % — HIGH (ref 0.1–0.3)
IMM GRANULOCYTES NFR BLD AUTO: 0.6 % — HIGH (ref 0.1–0.3)
INR BLD: 1.04 RATIO — SIGNIFICANT CHANGE UP (ref 0.65–1.3)
INR BLD: 1.07 RATIO — SIGNIFICANT CHANGE UP (ref 0.65–1.3)
LACTATE SERPL-SCNC: 2.1 MMOL/L — HIGH (ref 0.7–2)
LYMPHOCYTES # BLD AUTO: 1.92 K/UL — SIGNIFICANT CHANGE UP (ref 1.2–3.4)
LYMPHOCYTES # BLD AUTO: 2.6 K/UL — SIGNIFICANT CHANGE UP (ref 1.2–3.4)
LYMPHOCYTES # BLD AUTO: 21.7 % — SIGNIFICANT CHANGE UP (ref 20.5–51.1)
LYMPHOCYTES # BLD AUTO: 28.3 % — SIGNIFICANT CHANGE UP (ref 20.5–51.1)
MAGNESIUM SERPL-MCNC: 1.9 MG/DL — SIGNIFICANT CHANGE UP (ref 1.8–2.4)
MAGNESIUM SERPL-MCNC: 2.1 MG/DL — SIGNIFICANT CHANGE UP (ref 1.8–2.4)
MCHC RBC-ENTMCNC: 28.8 PG — SIGNIFICANT CHANGE UP (ref 27–31)
MCHC RBC-ENTMCNC: 29.3 PG — SIGNIFICANT CHANGE UP (ref 27–31)
MCHC RBC-ENTMCNC: 31.9 G/DL — LOW (ref 32–37)
MCHC RBC-ENTMCNC: 33.2 G/DL — SIGNIFICANT CHANGE UP (ref 32–37)
MCV RBC AUTO: 88.2 FL — SIGNIFICANT CHANGE UP (ref 81–99)
MCV RBC AUTO: 90.5 FL — SIGNIFICANT CHANGE UP (ref 81–99)
MONOCYTES # BLD AUTO: 0.64 K/UL — HIGH (ref 0.1–0.6)
MONOCYTES # BLD AUTO: 0.8 K/UL — HIGH (ref 0.1–0.6)
MONOCYTES NFR BLD AUTO: 7.2 % — SIGNIFICANT CHANGE UP (ref 1.7–9.3)
MONOCYTES NFR BLD AUTO: 8.7 % — SIGNIFICANT CHANGE UP (ref 1.7–9.3)
NEUTROPHILS # BLD AUTO: 5.08 K/UL — SIGNIFICANT CHANGE UP (ref 1.4–6.5)
NEUTROPHILS # BLD AUTO: 5.61 K/UL — SIGNIFICANT CHANGE UP (ref 1.4–6.5)
NEUTROPHILS NFR BLD AUTO: 55.5 % — SIGNIFICANT CHANGE UP (ref 42.2–75.2)
NEUTROPHILS NFR BLD AUTO: 63.5 % — SIGNIFICANT CHANGE UP (ref 42.2–75.2)
NRBC # BLD: 0 /100 WBCS — SIGNIFICANT CHANGE UP (ref 0–0)
NRBC # BLD: 0 /100 WBCS — SIGNIFICANT CHANGE UP (ref 0–0)
PHOSPHATE SERPL-MCNC: 4.7 MG/DL — SIGNIFICANT CHANGE UP (ref 2.1–4.9)
PLATELET # BLD AUTO: 284 K/UL — SIGNIFICANT CHANGE UP (ref 130–400)
PLATELET # BLD AUTO: 311 K/UL — SIGNIFICANT CHANGE UP (ref 130–400)
POTASSIUM SERPL-MCNC: 4.4 MMOL/L — SIGNIFICANT CHANGE UP (ref 3.5–5)
POTASSIUM SERPL-MCNC: 5.1 MMOL/L — HIGH (ref 3.5–5)
POTASSIUM SERPL-SCNC: 4.4 MMOL/L — SIGNIFICANT CHANGE UP (ref 3.5–5)
POTASSIUM SERPL-SCNC: 5.1 MMOL/L — HIGH (ref 3.5–5)
PROT SERPL-MCNC: 5.1 G/DL — LOW (ref 6–8)
PROT SERPL-MCNC: 5.2 G/DL — LOW (ref 6–8)
PROTHROM AB SERPL-ACNC: 11.9 SEC — SIGNIFICANT CHANGE UP (ref 9.95–12.87)
PROTHROM AB SERPL-ACNC: 12.2 SEC — SIGNIFICANT CHANGE UP (ref 9.95–12.87)
RBC # BLD: 2.74 M/UL — LOW (ref 4.2–5.4)
RBC # BLD: 2.87 M/UL — LOW (ref 4.2–5.4)
RBC # FLD: 14.6 % — HIGH (ref 11.5–14.5)
RBC # FLD: 14.6 % — HIGH (ref 11.5–14.5)
SARS-COV-2 RNA SPEC QL NAA+PROBE: SIGNIFICANT CHANGE UP
SODIUM SERPL-SCNC: 132 MMOL/L — LOW (ref 135–146)
SODIUM SERPL-SCNC: 133 MMOL/L — LOW (ref 135–146)
TROPONIN T SERPL-MCNC: 0.03 NG/ML — CRITICAL HIGH
WBC # BLD: 8.83 K/UL — SIGNIFICANT CHANGE UP (ref 4.8–10.8)
WBC # BLD: 9.18 K/UL — SIGNIFICANT CHANGE UP (ref 4.8–10.8)
WBC # FLD AUTO: 8.83 K/UL — SIGNIFICANT CHANGE UP (ref 4.8–10.8)
WBC # FLD AUTO: 9.18 K/UL — SIGNIFICANT CHANGE UP (ref 4.8–10.8)

## 2023-01-06 PROCEDURE — 93010 ELECTROCARDIOGRAM REPORT: CPT

## 2023-01-06 PROCEDURE — 99232 SBSQ HOSP IP/OBS MODERATE 35: CPT

## 2023-01-06 RX ORDER — METOPROLOL TARTRATE 50 MG
5 TABLET ORAL ONCE
Refills: 0 | Status: COMPLETED | OUTPATIENT
Start: 2023-01-06 | End: 2023-01-06

## 2023-01-06 RX ORDER — MIDODRINE HYDROCHLORIDE 2.5 MG/1
10 TABLET ORAL ONCE
Refills: 0 | Status: DISCONTINUED | OUTPATIENT
Start: 2023-01-06 | End: 2023-01-09

## 2023-01-06 RX ORDER — SODIUM CHLORIDE 9 MG/ML
1000 INJECTION INTRAMUSCULAR; INTRAVENOUS; SUBCUTANEOUS
Refills: 0 | Status: DISCONTINUED | OUTPATIENT
Start: 2023-01-06 | End: 2023-01-09

## 2023-01-06 RX ORDER — WARFARIN SODIUM 2.5 MG/1
2 TABLET ORAL ONCE
Refills: 0 | Status: COMPLETED | OUTPATIENT
Start: 2023-01-06 | End: 2023-01-06

## 2023-01-06 RX ORDER — ASPIRIN/CALCIUM CARB/MAGNESIUM 324 MG
81 TABLET ORAL DAILY
Refills: 0 | Status: DISCONTINUED | OUTPATIENT
Start: 2023-01-06 | End: 2023-01-09

## 2023-01-06 RX ORDER — ATORVASTATIN CALCIUM 80 MG/1
40 TABLET, FILM COATED ORAL AT BEDTIME
Refills: 0 | Status: DISCONTINUED | OUTPATIENT
Start: 2023-01-06 | End: 2023-01-09

## 2023-01-06 RX ORDER — METOPROLOL TARTRATE 50 MG
12.5 TABLET ORAL EVERY 12 HOURS
Refills: 0 | Status: DISCONTINUED | OUTPATIENT
Start: 2023-01-06 | End: 2023-01-08

## 2023-01-06 RX ORDER — HEPARIN SODIUM 5000 [USP'U]/ML
INJECTION INTRAVENOUS; SUBCUTANEOUS
Qty: 25000 | Refills: 0 | Status: DISCONTINUED | OUTPATIENT
Start: 2023-01-06 | End: 2023-01-07

## 2023-01-06 RX ADMIN — Medication 650 MILLIGRAM(S): at 19:53

## 2023-01-06 RX ADMIN — Medication 1 TABLET(S): at 23:30

## 2023-01-06 RX ADMIN — Medication 5 MILLIGRAM(S): at 19:54

## 2023-01-06 RX ADMIN — Medication 650 MILLIGRAM(S): at 00:20

## 2023-01-06 RX ADMIN — ATORVASTATIN CALCIUM 40 MILLIGRAM(S): 80 TABLET, FILM COATED ORAL at 23:31

## 2023-01-06 RX ADMIN — GABAPENTIN 100 MILLIGRAM(S): 400 CAPSULE ORAL at 23:31

## 2023-01-06 RX ADMIN — Medication 1 TABLET(S): at 11:23

## 2023-01-06 RX ADMIN — Medication 325 MILLIGRAM(S): at 11:23

## 2023-01-06 RX ADMIN — CELECOXIB 200 MILLIGRAM(S): 200 CAPSULE ORAL at 05:09

## 2023-01-06 RX ADMIN — SODIUM CHLORIDE 60 MILLILITER(S): 9 INJECTION INTRAMUSCULAR; INTRAVENOUS; SUBCUTANEOUS at 11:22

## 2023-01-06 RX ADMIN — ENOXAPARIN SODIUM 70 MILLIGRAM(S): 100 INJECTION SUBCUTANEOUS at 05:08

## 2023-01-06 RX ADMIN — Medication 650 MILLIGRAM(S): at 11:24

## 2023-01-06 RX ADMIN — GABAPENTIN 100 MILLIGRAM(S): 400 CAPSULE ORAL at 13:13

## 2023-01-06 RX ADMIN — Medication 650 MILLIGRAM(S): at 05:09

## 2023-01-06 RX ADMIN — WARFARIN SODIUM 2 MILLIGRAM(S): 2.5 TABLET ORAL at 23:51

## 2023-01-06 RX ADMIN — POLYETHYLENE GLYCOL 3350 17 GRAM(S): 17 POWDER, FOR SOLUTION ORAL at 23:30

## 2023-01-06 RX ADMIN — Medication 650 MILLIGRAM(S): at 17:26

## 2023-01-06 RX ADMIN — Medication 650 MILLIGRAM(S): at 05:47

## 2023-01-06 RX ADMIN — Medication 1 TABLET(S): at 05:09

## 2023-01-06 RX ADMIN — CELECOXIB 200 MILLIGRAM(S): 200 CAPSULE ORAL at 05:47

## 2023-01-06 RX ADMIN — Medication 650 MILLIGRAM(S): at 11:23

## 2023-01-06 RX ADMIN — Medication 15 MILLIGRAM(S): at 00:20

## 2023-01-06 RX ADMIN — PANTOPRAZOLE SODIUM 40 MILLIGRAM(S): 20 TABLET, DELAYED RELEASE ORAL at 05:09

## 2023-01-06 RX ADMIN — GABAPENTIN 100 MILLIGRAM(S): 400 CAPSULE ORAL at 05:09

## 2023-01-06 RX ADMIN — SENNA PLUS 2 TABLET(S): 8.6 TABLET ORAL at 23:31

## 2023-01-06 RX ADMIN — Medication 1 TABLET(S): at 13:13

## 2023-01-06 RX ADMIN — Medication 650 MILLIGRAM(S): at 23:30

## 2023-01-06 NOTE — PROGRESS NOTE ADULT - ASSESSMENT
ORTHOPAEDIC SURGERY   POST-OP CHECK NOTE    Patient Name: ORAL WU  Operation: Left femoral neck fx s/p CRPP  Surgeon:  Dr. ibarra  ---------------------------------------  SUBJECTIVE    87y Female seen and examined with orthopaedic surgery team during morning rounds.   Patient is resting comfortably in no acute distress. Denies f/c/cp/sob/n/v/d.    ---------------------------------------  P/E:  General: awake, alert, resting comfortably in NAD  Cardio: RRR per peripheral pulses  Respiratory: breathing quietly without use of accessory muscles      LLE: hip  Dressing/splint in place, c/d/i  SILT sp/dp/t/sural/saph  Firing ta/ehl/fhl/gs  Foot WWP, 2+ DP pulse  ---------------------------------------  LABS  CBC Full  -  ( 05 Jan 2023 09:12 )  Hemoglobin : 9.6 g/dL  Hematocrit : 28.4 %     PT/INR - ( 05 Jan 2023 09:12 )   PT: 13.50 sec;   INR: 1.18 ratio    PTT - ( 05 Jan 2023 11:26 )  PTT:37.9 sec    INR:   2.9 (1/3/23)  2.6 (1/4/23)  1.18 (1/5/23)      MEDS  MEDICATIONS  (STANDING):  acetaminophen     Tablet .. 650 milliGRAM(s) Oral every 6 hours  calcium carbonate 1250 mG  + Vitamin D (OsCal 500 + D) 1 Tablet(s) Oral three times a day  ceFAZolin   IVPB 2000 milliGRAM(s) IV Intermittent every 8 hours  enoxaparin Injectable 30 milliGRAM(s) SubCutaneous every 12 hours  ferrous    sulfate 325 milliGRAM(s) Oral daily  gabapentin 100 milliGRAM(s) Oral three times a day  ketorolac   Injectable 15 milliGRAM(s) IV Push every 6 hours  multivitamin 1 Tablet(s) Oral daily  polyethylene glycol 3350 17 Gram(s) Oral at bedtime  senna 2 Tablet(s) Oral at bedtime  warfarin 2 milliGRAM(s) Oral once    MEDICATIONS  (PRN):  magnesium hydroxide Suspension 30 milliLiter(s) Oral daily PRN Constipation  ondansetron Injectable 4 milliGRAM(s) IV Push every 6 hours PRN Nausea and/or Vomiting  oxyCODONE    IR 5 milliGRAM(s) Oral every 3 hours PRN Moderate Pain (4 - 6)  oxyCODONE    IR 10 milliGRAM(s) Oral every 3 hours PRN Severe Pain (7 - 10)    ---------------------------------------    ASSESSMENT  87y Female W/  #LEFT FEMORAL NECK FX S/P CRPP  on 1/4/2022, POD#2, in stable condition.    #LEFT HUMERUS GT FX  - NONOP     ===============  PLAN:   - follow-up labs  - INR sub-therpeutic (last INR 1.18) -- trend INR daily  - Activity:  WBAT LLE, NWB LUE  - Antibiotics: Ancef 2g q8h for 24h - completed     DVT PPX:   Patient on Coumadin for AFib with CHADS2 score 2-3 : intermediate risk of VTE  Please bridge to home dose of coumadin w/ goal INR as noted by Cardio or PCP  - After the procedure the resumption of unfractionated heparin and LMW heparin is similar. Once it has been decided that adequate homeostasis has been obtained then these can be restarted. Postoperatively warfarin is generally resumed on the same postoperative day as the heparin or LMW heparin, which is then discontinued when the INR reaches the therapeutic range (Douketis & Lip, 2020).   - Warfarin 2mg ordered  - Lovenox to bridge   - please DC Lvx once INR is within therapeutic range     - Pain: continue care per current regimen- PT    -Disposition:   Subacute rehab facility, pending PT evaluation      Please page ORTHO w/ any questions or concerns       ORTHOPAEDIC SURGERY   POST-OP CHECK NOTE    Patient Name: ORAL WU  Operation: Left femoral neck fx s/p CRPP  Surgeon:  Dr. ibarra  ---------------------------------------  SUBJECTIVE    87y Female seen and examined with orthopaedic surgery team during morning rounds.   Patient is resting comfortably in no acute distress. Denies f/c/cp/sob/n/v/d.    ---------------------------------------  P/E:  General: awake, alert, resting comfortably in NAD  Cardio: RRR per peripheral pulses  Respiratory: breathing quietly without use of accessory muscles      LLE: hip  Dressing/splint in place, c/d/i  SILT sp/dp/t/sural/saph  Firing ta/ehl/fhl/gs  Foot WWP, 2+ DP pulse  ---------------------------------------  LABS  CBC Full  -  ( 05 Jan 2023 09:12 )  Hemoglobin : 9.6 g/dL  Hematocrit : 28.4 %     PT/INR - ( 05 Jan 2023 09:12 )   PT: 13.50 sec;   INR: 1.18 ratio    PTT - ( 05 Jan 2023 11:26 )  PTT:37.9 sec    INR:   2.9 (1/3/23)  2.6 (1/4/23)  1.18 (1/5/23)      MEDS  MEDICATIONS  (STANDING):  acetaminophen     Tablet .. 650 milliGRAM(s) Oral every 6 hours  calcium carbonate 1250 mG  + Vitamin D (OsCal 500 + D) 1 Tablet(s) Oral three times a day  ceFAZolin   IVPB 2000 milliGRAM(s) IV Intermittent every 8 hours  enoxaparin Injectable 30 milliGRAM(s) SubCutaneous every 12 hours  ferrous    sulfate 325 milliGRAM(s) Oral daily  gabapentin 100 milliGRAM(s) Oral three times a day  ketorolac   Injectable 15 milliGRAM(s) IV Push every 6 hours  multivitamin 1 Tablet(s) Oral daily  polyethylene glycol 3350 17 Gram(s) Oral at bedtime  senna 2 Tablet(s) Oral at bedtime  warfarin 2 milliGRAM(s) Oral once    MEDICATIONS  (PRN):  magnesium hydroxide Suspension 30 milliLiter(s) Oral daily PRN Constipation  ondansetron Injectable 4 milliGRAM(s) IV Push every 6 hours PRN Nausea and/or Vomiting  oxyCODONE    IR 5 milliGRAM(s) Oral every 3 hours PRN Moderate Pain (4 - 6)  oxyCODONE    IR 10 milliGRAM(s) Oral every 3 hours PRN Severe Pain (7 - 10)    ---------------------------------------    ASSESSMENT  87y Female W/  #LEFT FEMORAL NECK FX S/P CRPP  on 1/4/2022, POD#2, in stable condition.    #LEFT HUMERUS GT FX  - NONOP   Weeks 1-3  ·	No ROM  ·	Sling for 3 weeks  ·	Wrist and hand exercises  Weeks 3-6  ·	Begin FULL ROM  ·	Sling as needed for comfort only  ·	Pendulums and active assisted exercises  Weeks 6-12  ·	Full ROM  ·	No Sling Active Exercises        ===============  PLAN:   - follow-up labs  - INR sub-therpeutic (last INR 1.18) -- trend INR daily  - Activity:  WBAT LLE, NWB LUE  - Antibiotics: Ancef 2g q8h for 24h - completed     DVT PPX:   Patient on Coumadin for AFib with CHADS2 score 2-3 : intermediate risk of VTE  Please bridge to home dose of coumadin w/ goal INR as noted by Cardio or PCP  - After the procedure the resumption of unfractionated heparin and LMW heparin is similar. Once it has been decided that adequate homeostasis has been obtained then these can be restarted. Postoperatively warfarin is generally resumed on the same postoperative day as the heparin or LMW heparin, which is then discontinued when the INR reaches the therapeutic range (Douketis & Lip, 2020).   - Warfarin 2mg ordered  - Lovenox to bridge   - please DC Lvx once INR is within therapeutic range     - Pain: continue care per current regimen- PT    -Disposition:   Subacute rehab facility, pending PT evaluation      Please page ORTHO w/ any questions or concerns

## 2023-01-06 NOTE — DISCHARGE NOTE PROVIDER - CARE PROVIDERS DIRECT ADDRESSES
[email or web address]

## 2023-01-06 NOTE — DISCHARGE NOTE PROVIDER - PROVIDER TOKENS
PROVIDER:[TOKEN:[[id number]],FOLLOWUP:[2 weeks]],PROVIDER:[TOKEN:[[id number]],FOLLOWUP:[2 weeks]]

## 2023-01-06 NOTE — RAPID RESPONSE TEAM SUMMARY - NSSITUATIONBACKGROUNDRRT_GEN_ALL_CORE
88 yo F, with PMHx of HTN, HLD, CAD s/p CABG, AV replacement on coumadin, osteoporosis, old right hip fracture admitted for mechanical fall and hip fracture. Patient POD 1 s/p ORIF. Patient had SHOBHA this AM creatinine 0.9>1.8. Was started on maintenance fluids. Hgb 8.5. Patient was hypotensive, tachycardic, and decreased mental status. EKG showed sinus tachycardia with ST depressions. Labs were drawn and 2L of fluids were bolused. Patient was put in Trendelenburg. Blood pressure and mental status improved.   
88 yo F, with PMHx of HTN, HLD, CAD s/p CABG, AV replacement on coumadin, osteoporosis, old right hip fracture admitted for mechanical fall and hip fracture. Patient POD 1 s/p ORIF. Patient had SHOBHA this AM creatinine 0.9>1.8. Second rapid called patient now has chest pain. Patient was hypotensive, tachycardic, and complaints of chest pain. EKG showed sinus tachycardia with ST depressions. Patient was put in Trendelenburg and on 2L NC. Blood pressure and chest pain improved.

## 2023-01-06 NOTE — DISCHARGE NOTE PROVIDER - HOSPITAL COURSE
86 y/o female with pmhx  Hypertension, HDL, CAD s/p CABG, Aortic valve replacement  on Coumadin, Osteoporosis, old right hip fracture. Presents to the ED from NH  s/p fall one week ago c/o pain to left hip and shoulder. Patient s/p fall off a chair. Patient with fracture of left shoulder and left hip. She ambulates with walker and assistance at baseline. Patient without any abdominal pain , chest pain or head injury, no fevers, no tingling distally.   - C/O pain to left hip 87F from NH. PMH: HTN, HDL, CAD s/p CABG, Aortic valve replacement (Coumadin), Osteoporosis, old right hip fracture. Presented 1/3 s/p falling off a chair x1 week prior c/o pain to left hip and shoulder. Found to have fracture of left shoulder and left hip. She ambulates with walker and assistance at baseline. S/p L ORIF 1/4. Coumadin resumed. 1/6: Pt had SHOBHA, Rapid response called for  hypotension, tachycardia, and decreased mental status. EKG showed sinus tachycardia with ST depressions. S/p 2L bolus, metoprolol IV push then started on oral metoprolol. Blood pressure and mental status improved. Troponin 0.03.  Her BP and HR stabilized. Cardio fellow was contacted due to new EKG changes (ST deppressions and afib), repeat EKG: NSR, no ST depression, and repeat trops were stable at 0.03. Heparin ggt started w/ plan to bridge to coumanid; however 1/7: hgb drop <7 given 2u pRBCs - hgb uptrended and stable. Patient was bridged to coumadin and will continue home coumadin dose with frequent coumadin clinic follow up.      87F from NH. PMH: HTN, HDL, CAD s/p CABG, Aortic valve replacement (Coumadin), Osteoporosis, old right hip fracture. Presented 1/3 s/p falling off a chair x1 week prior c/o pain to left hip and shoulder. Found to have fracture of left shoulder and left hip. She ambulates with walker and assistance at baseline. S/p L ORIF 1/4. Coumadin resumed. 1/6: Pt had SHOBHA, Rapid response called for  hypotension, tachycardia, and decreased mental status. EKG showed sinus tachycardia with ST depressions. S/p 2L bolus, metoprolol IV push then started on oral metoprolol. Blood pressure and mental status improved. Troponin 0.03.  Her BP and HR stabilized. Cardio fellow was contacted due to new EKG changes (ST deppressions and afib), repeat EKG: NSR, no ST depression, and repeat trops were stable at 0.03. Heparin ggt started w/ plan to bridge to coumanid; however 1/7: hgb drop <7 given 2u pRBCs - hgb uptrended and stable. Patient was bridged to coumadin and will continue home coumadin dose with frequent coumadin clinic follow up.     pt seen and examined today   -stable for NH discharge   -spent over 35 mins d/c planning; direct patient care and chart review

## 2023-01-06 NOTE — PROGRESS NOTE ADULT - ATTENDING COMMENTS
patient seen and examined at bedside independently of medical resident and agree with the note unless otherwise stated     Vital Signs Last 24 Hrs  T(C): 36.4 (06 Jan 2023 12:04), Max: 37 (05 Jan 2023 15:01)  T(F): 97.6 (06 Jan 2023 12:04), Max: 98.6 (05 Jan 2023 15:01)  HR: 65 (06 Jan 2023 12:04) (64 - 80)  BP: 152/62 (06 Jan 2023 12:04) (112/53 - 159/68)  BP(mean): --  RR: 18 (06 Jan 2023 12:04) (16 - 18)  SpO2: --                          8.4    9.18  )-----------( 284      ( 06 Jan 2023 08:02 )             25.3   01-06    132<L>  |  95<L>  |  43<H>  ----------------------------<  105<H>  4.4   |  26  |  1.8<H>    Ca    9.7      06 Jan 2023 08:02  Mg     2.1     01-06    TPro  5.2<L>  /  Alb  3.3<L>  /  TBili  0.4  /  DBili  x   /  AST  28  /  ALT  12  /  AlkPhos  129<H>  01-06    # s/p Mechanical Fall  # L hip fracture - s/p CRPP  # SHOBHA   # Chronic AFIB and AVR - long term anticoagulant   # HTN/CAD/CABG  # Anemia   # Hyponatremia     -POD # 1 - ortho follow up noted   -rehab/pt/ot as tolerated - with SNF d/c planning   -pain control prn   -dressing change as per ortho team  -continue to monitor H/H - keep active type and screen   -covid swab today for an anticipation for weekend discharge   -with acute renal injury - IVF 24 hrs and hold off on Lovenox bridging (heparin for now with close PTT monitoring)  -INR monitoring for coumadin dose   -continue with rest of maintenance meds   -discussed with patient this morning     Attending Physician Dr. Leah Hernandes # [id number]
88 yo F, with PMHx of HTN, HLD, CAD s/p CABG, AV replacement on coumadin, osteoporosis, old right hip fracture presented to the ED from NH s/p fall. Found to have a L Femoral Neck fracture and L minimally displaced Greater Tuberosity Fx of Shoulder. Pt is now s/p Percutaneous pinning of femoral neck fracture on 1/5. Pt is NWB LUE, and WBAT LLE. Will need rehab placement on discharge. Coumadin held perioperatively, and Vitamin K was given. Now will need to bridge back to therapeutic INR with Lovenox.     Dispo: Lovenox to bridge to coumadin prior to discharge     Total time spent to complete patient's bedside assessment, review medical chart, discuss medical plan of care with covering medical team was more than 35 minutes  with >50% of time spent face to face with patient, discussion with patient/family and/or coordination of care      Viviana Cuello, DO

## 2023-01-06 NOTE — DISCHARGE NOTE PROVIDER - CARE PROVIDER_API CALL
Bart Hughes (DO)  Medicine  [address] [facility], [address]st [place] [address]  Phone: [phone]  Fax: [phone]  Follow Up Time: 2 weeks    Mike Moreno)  Orthopaedic Surgery  [address] [place] [address]  Phone: [phone]  Fax: [phone]  Follow Up Time: 2 weeks

## 2023-01-06 NOTE — DISCHARGE NOTE PROVIDER - NSDCCPCAREPLAN_GEN_ALL_CORE_FT
PRINCIPAL DISCHARGE DIAGNOSIS  Diagnosis: Fall  Assessment and Plan of Treatment: You came to the hospital after a fall with a fracture which was repaired by orthopedic surgery. Take all medications as prescribd. Continue Aspirin for 6 weeks and follow up with Orthopedic surgery 2 weeks after discharge.      SECONDARY DISCHARGE DIAGNOSES  Diagnosis: Subcapital fracture of left hip  Assessment and Plan of Treatment:      PRINCIPAL DISCHARGE DIAGNOSIS  Diagnosis: Fall  Assessment and Plan of Treatment: You came to the hospital after a fall with a fracture which was repaired by orthopedic surgery. Take all medications as prescribd. Continue Aspirin for 6 weeks and follow up with Orthopedic surgery 2 weeks after discharge.      SECONDARY DISCHARGE DIAGNOSES  Diagnosis: Subcapital fracture of left hip  Assessment and Plan of Treatment: see above

## 2023-01-06 NOTE — PROGRESS NOTE ADULT - ASSESSMENT
86 yo F, with PMHx of HTN, HLD, CAD s/p CABG, AV replacement on coumadin, osteoporosis, old right hip fracture presented to the ED from NH s/p fall     #L femoral neck fracture s/p fall 2/2 osteoporosis   #L minimally displaced greater tuberosity fx of the shoulder  - s/p [place] for orthopedic treatment  - Cardiology Consult for OR clearance- pt at intermediate risk  - s/p Percutaneous pinning of femoral neck fracture 1/5  - continue ASA  - toradol and oxycodone PRN for pain control  - activity status: NWB LUE, WBAT LLE  - PT and OT eval: rehab  - ortho following  - coumadin was held prior to procedure, Vitamin K x1 given  - coumadin restarted 1/5    #Chronic atrial fibrillation  #CAD s/p CABG  #AVR on coumadin  - on home coumadin 2mg Sunday/Tuesday/Friday and 4mg Monday/Wednedsay/Thursday/Saturday  - continue ASA  - on Atenolol for HR control  - off home atorvastatin for now for mild transaminitis  - trend daily INRs, dose coumadin accordingly  - INR today subtherapeutic, start therapeutic lovenox for bridging to coumadin    #HTN  - continue Atenolol    #Depression  - continue Escitalopram    #HLD  - continue with home medications    #Misc:  - DVT ppx: lovenox briding to coumadin  - gi ppx: protonix  - diet: soft and bite sized  - Activity: NBW LUE and WBAT LLE  - Dispo: to STR  - Pending: coumadin bridging (daily INRs)   86 yo F, with PMHx of HTN, HLD, CAD s/p CABG, AV replacement on coumadin, osteoporosis, old right hip fracture presented to the ED from NH s/p fall     #L femoral neck fracture s/p fall 2/2 osteoporosis   #L minimally displaced greater tuberosity fx of the shoulder  - s[place] [facility] treatment  - Cardiology Consult for OR clearance- pt at intermediate risk  - s/p Percutaneous pinning of femoral neck fracture 1/5  - continue ASA  - toradol and oxycodone PRN for pain control  - activity status: NWB LUE, WBAT LLE  - PT and OT eval: rehab  - ortho following  - coumadin was held prior to procedure, Vitamin K x1 given  - coumadin restarted 1/5    #SHOBHA  - baseline normal, today Cr is 1.8  - check U/A, urine lytes  - avoid nephrotoxic agents, monitor U/O  - gentle hydration for now  - bladder scan if pt hasn't been urinating  - check RBUS if continues to worsen    #Chronic atrial fibrillation  #CAD s/p CABG  #AVR on coumadin  - on home coumadin 2mg Sunday/Tuesday/Friday and 4mg Monday/Wednedsay/Thursday/Saturday  - continue ASA  - on Atenolol for HR control  - off home atorvastatin for now for mild transaminitis  - trend daily INRs, dose coumadin accordingly  - INR still subtherapeutic, switch lovenox to heparin gtt given new SHOBHA    #HTN  - continue Atenolol    #Depression  - continue Escitalopram    #HLD  - continue with home medications    #Misc:  - DVT ppx: lovenox briding to coumadin  - gi ppx: protonix  - diet: soft and bite sized  - Activity: NBW LUE and WBAT LLE  - Dispo: to STR  - Pending: coumadin bridging (daily INRs), SHOBHA, heparin gtt (monitor PTTs)

## 2023-01-06 NOTE — DISCHARGE NOTE PROVIDER - NSDCFUSCHEDAPPT_GEN_ALL_CORE_FT
Joseph Kaye  [facility] OP [address]  Scheduled Appointment: 01/11/2023    Joseph Kaye  [facility] OP [address]  Scheduled Appointment: 01/18/2023    Joseph Kaye  [facility] OP [address]  Scheduled Appointment: 01/25/2023    Joseph [facility] [address]  Scheduled Appointment: 02/01/2023    Joseph Kaye  [facility] OP [address]  Scheduled Appointment: 02/08/2023    Mike Moreno  [facility]  [address]  Scheduled Appointment: 02/14/2023

## 2023-01-06 NOTE — DISCHARGE NOTE PROVIDER - NSDCMRMEDTOKEN_GEN_ALL_CORE_FT
ALPRAZolam 0.5 mg oral tablet: 1/2 tab(s) orally once a day (at bedtime), As Needed  aspirin 81 mg oral delayed release tablet: 1 tab(s) orally once a day  atenolol 25 mg oral tablet: 2 tab(s) orally once a day  atorvastatin 40 mg oral tablet: 1 tab(s) orally once a day (at bedtime)  Coumadin: 2mg on Sun/Tues/Friday  4mg on Mon/Wed/Thurs/Sat  escitalopram 10 mg oral tablet: 1 tab(s) orally once a day  Vitamin C 500 mg oral tablet: 1 tab(s) orally once a day  Vitamin D3 50 mcg (2000 intl units) oral tablet: 1 tab(s) orally once a day

## 2023-01-07 LAB
ALBUMIN SERPL ELPH-MCNC: 2.7 G/DL — LOW (ref 3.5–5.2)
ALP SERPL-CCNC: 95 U/L — SIGNIFICANT CHANGE UP (ref 30–115)
ALT FLD-CCNC: <5 U/L — SIGNIFICANT CHANGE UP (ref 0–41)
ANION GAP SERPL CALC-SCNC: 5 MMOL/L — LOW (ref 7–14)
APTT BLD: >200 SEC — CRITICAL HIGH (ref 27–39.2)
AST SERPL-CCNC: 19 U/L — SIGNIFICANT CHANGE UP (ref 0–41)
BASOPHILS # BLD AUTO: 0.05 K/UL — SIGNIFICANT CHANGE UP (ref 0–0.2)
BASOPHILS NFR BLD AUTO: 0.5 % — SIGNIFICANT CHANGE UP (ref 0–1)
BILIRUB SERPL-MCNC: 0.4 MG/DL — SIGNIFICANT CHANGE UP (ref 0.2–1.2)
BLD GP AB SCN SERPL QL: SIGNIFICANT CHANGE UP
BUN SERPL-MCNC: 37 MG/DL — HIGH (ref 10–20)
CALCIUM SERPL-MCNC: 8.7 MG/DL — SIGNIFICANT CHANGE UP (ref 8.4–10.5)
CHLORIDE SERPL-SCNC: 102 MMOL/L — SIGNIFICANT CHANGE UP (ref 98–110)
CO2 SERPL-SCNC: 24 MMOL/L — SIGNIFICANT CHANGE UP (ref 17–32)
CREAT SERPL-MCNC: 1 MG/DL — SIGNIFICANT CHANGE UP (ref 0.7–1.5)
EGFR: 55 ML/MIN/1.73M2 — LOW
EOSINOPHIL # BLD AUTO: 1 K/UL — HIGH (ref 0–0.7)
EOSINOPHIL NFR BLD AUTO: 10.7 % — HIGH (ref 0–8)
GLUCOSE SERPL-MCNC: 101 MG/DL — HIGH (ref 70–99)
HCT VFR BLD CALC: 20 % — LOW (ref 37–47)
HCT VFR BLD CALC: 30.3 % — LOW (ref 37–47)
HGB BLD-MCNC: 10 G/DL — LOW (ref 12–16)
HGB BLD-MCNC: 6.4 G/DL — CRITICAL LOW (ref 12–16)
IMM GRANULOCYTES NFR BLD AUTO: 0.9 % — HIGH (ref 0.1–0.3)
INR BLD: 1.2 RATIO — SIGNIFICANT CHANGE UP (ref 0.65–1.3)
LACTATE SERPL-SCNC: 1.2 MMOL/L — SIGNIFICANT CHANGE UP (ref 0.7–2)
LYMPHOCYTES # BLD AUTO: 2.21 K/UL — SIGNIFICANT CHANGE UP (ref 1.2–3.4)
LYMPHOCYTES # BLD AUTO: 23.7 % — SIGNIFICANT CHANGE UP (ref 20.5–51.1)
MAGNESIUM SERPL-MCNC: 1.8 MG/DL — SIGNIFICANT CHANGE UP (ref 1.8–2.4)
MCHC RBC-ENTMCNC: 29 PG — SIGNIFICANT CHANGE UP (ref 27–31)
MCHC RBC-ENTMCNC: 30.1 PG — SIGNIFICANT CHANGE UP (ref 27–31)
MCHC RBC-ENTMCNC: 32 G/DL — SIGNIFICANT CHANGE UP (ref 32–37)
MCHC RBC-ENTMCNC: 33 G/DL — SIGNIFICANT CHANGE UP (ref 32–37)
MCV RBC AUTO: 90.5 FL — SIGNIFICANT CHANGE UP (ref 81–99)
MCV RBC AUTO: 91.3 FL — SIGNIFICANT CHANGE UP (ref 81–99)
MONOCYTES # BLD AUTO: 0.78 K/UL — HIGH (ref 0.1–0.6)
MONOCYTES NFR BLD AUTO: 8.4 % — SIGNIFICANT CHANGE UP (ref 1.7–9.3)
NEUTROPHILS # BLD AUTO: 5.21 K/UL — SIGNIFICANT CHANGE UP (ref 1.4–6.5)
NEUTROPHILS NFR BLD AUTO: 55.8 % — SIGNIFICANT CHANGE UP (ref 42.2–75.2)
NRBC # BLD: 0 /100 WBCS — SIGNIFICANT CHANGE UP (ref 0–0)
NRBC # BLD: 0 /100 WBCS — SIGNIFICANT CHANGE UP (ref 0–0)
PLATELET # BLD AUTO: 251 K/UL — SIGNIFICANT CHANGE UP (ref 130–400)
PLATELET # BLD AUTO: 286 K/UL — SIGNIFICANT CHANGE UP (ref 130–400)
POTASSIUM SERPL-MCNC: 4.6 MMOL/L — SIGNIFICANT CHANGE UP (ref 3.5–5)
POTASSIUM SERPL-SCNC: 4.6 MMOL/L — SIGNIFICANT CHANGE UP (ref 3.5–5)
PROT SERPL-MCNC: 4.4 G/DL — LOW (ref 6–8)
PROTHROM AB SERPL-ACNC: 13.8 SEC — HIGH (ref 9.95–12.87)
RBC # BLD: 2.21 M/UL — LOW (ref 4.2–5.4)
RBC # BLD: 3.32 M/UL — LOW (ref 4.2–5.4)
RBC # FLD: 14.5 % — SIGNIFICANT CHANGE UP (ref 11.5–14.5)
RBC # FLD: 14.6 % — HIGH (ref 11.5–14.5)
SODIUM SERPL-SCNC: 131 MMOL/L — LOW (ref 135–146)
TROPONIN T SERPL-MCNC: 0.03 NG/ML — CRITICAL HIGH
TROPONIN T SERPL-MCNC: 0.03 NG/ML — CRITICAL HIGH
TROPONIN T SERPL-MCNC: 0.05 NG/ML — CRITICAL HIGH
WBC # BLD: 8.56 K/UL — SIGNIFICANT CHANGE UP (ref 4.8–10.8)
WBC # BLD: 9.33 K/UL — SIGNIFICANT CHANGE UP (ref 4.8–10.8)
WBC # FLD AUTO: 8.56 K/UL — SIGNIFICANT CHANGE UP (ref 4.8–10.8)
WBC # FLD AUTO: 9.33 K/UL — SIGNIFICANT CHANGE UP (ref 4.8–10.8)

## 2023-01-07 PROCEDURE — 71045 X-RAY EXAM CHEST 1 VIEW: CPT | Mod: 26

## 2023-01-07 PROCEDURE — 93010 ELECTROCARDIOGRAM REPORT: CPT

## 2023-01-07 PROCEDURE — 74176 CT ABD & PELVIS W/O CONTRAST: CPT | Mod: 26

## 2023-01-07 PROCEDURE — 99233 SBSQ HOSP IP/OBS HIGH 50: CPT

## 2023-01-07 RX ADMIN — Medication 650 MILLIGRAM(S): at 05:47

## 2023-01-07 RX ADMIN — GABAPENTIN 100 MILLIGRAM(S): 400 CAPSULE ORAL at 14:50

## 2023-01-07 RX ADMIN — Medication 1 TABLET(S): at 14:50

## 2023-01-07 RX ADMIN — ATORVASTATIN CALCIUM 40 MILLIGRAM(S): 80 TABLET, FILM COATED ORAL at 21:35

## 2023-01-07 RX ADMIN — Medication 12.5 MILLIGRAM(S): at 00:31

## 2023-01-07 RX ADMIN — Medication 81 MILLIGRAM(S): at 12:00

## 2023-01-07 RX ADMIN — POLYETHYLENE GLYCOL 3350 17 GRAM(S): 17 POWDER, FOR SOLUTION ORAL at 21:36

## 2023-01-07 RX ADMIN — HEPARIN SODIUM 1200 UNIT(S)/HR: 5000 INJECTION INTRAVENOUS; SUBCUTANEOUS at 00:32

## 2023-01-07 RX ADMIN — Medication 650 MILLIGRAM(S): at 17:19

## 2023-01-07 RX ADMIN — Medication 325 MILLIGRAM(S): at 12:00

## 2023-01-07 RX ADMIN — Medication 1 TABLET(S): at 05:47

## 2023-01-07 RX ADMIN — Medication 650 MILLIGRAM(S): at 12:00

## 2023-01-07 RX ADMIN — Medication 1 TABLET(S): at 17:19

## 2023-01-07 RX ADMIN — Medication 12.5 MILLIGRAM(S): at 12:00

## 2023-01-07 RX ADMIN — SENNA PLUS 2 TABLET(S): 8.6 TABLET ORAL at 21:35

## 2023-01-07 RX ADMIN — GABAPENTIN 100 MILLIGRAM(S): 400 CAPSULE ORAL at 21:36

## 2023-01-07 RX ADMIN — GABAPENTIN 100 MILLIGRAM(S): 400 CAPSULE ORAL at 05:47

## 2023-01-07 RX ADMIN — PANTOPRAZOLE SODIUM 40 MILLIGRAM(S): 20 TABLET, DELAYED RELEASE ORAL at 06:40

## 2023-01-07 RX ADMIN — SODIUM CHLORIDE 60 MILLILITER(S): 9 INJECTION INTRAMUSCULAR; INTRAVENOUS; SUBCUTANEOUS at 05:49

## 2023-01-07 NOTE — PROGRESS NOTE ADULT - ASSESSMENT
ORTHOPAEDIC SURGERY     Patient Name: ORAL WU  Operation: Left femoral neck fx s/p CRPP  Surgeon:  Dr. ibarra  ---------------------------------------  SUBJECTIVE    87y Female seen and examined this morning  Patient is resting comfortably in no acute distress. Denies f/c/cp/sob/n/v/d.    ---------------------------------------  P/E:  General: awake, alert, resting comfortably in NAD  NLB      LLE: hip  Dressing in place with mild strikethrough - changed today, incision healing well  SILT sp/dp/t/sural/saph  Firing ta/ehl/fhl/gs  Foot WWP, 2+ DP pulse      ASSESSMENT  87y Female W/  #LEFT FEMORAL NECK FX S/P CRPP  on 1/4/2022, POD#3, in stable condition.    #LEFT HUMERUS GT FX  - NONOP   Weeks 1-3  ·	No ROM  ·	Sling for 3 weeks  ·	Wrist and hand exercises  Weeks 3-6  ·	Begin FULL ROM  ·	Sling as needed for comfort only  ·	Pendulums and active assisted exercises  Weeks 6-12  ·	Full ROM  ·	No Sling Active Exercises        ===============  PLAN:   - WBAT  - Bridge HSQ to Coumadin  - PT/OT  -  mg daily x 6 weeks upon discharge  - F/U at [address] 2 weeks after discharge    [place] Ng,  Orthopedic Surgery, PGY-3

## 2023-01-07 NOTE — CHART NOTE - NSCHARTNOTEFT_GEN_A_CORE
Patient has been monitored since the rapid, during rapid metoprolol 5mg IV push was and pt was started on metoprolol 12.5mg PO BID. Her BP at 9PM was stable in 140s/60s, and HR was in 70s-80s. Pt is comfortable. Cardio fellow was contacted due to new EKG changes (ST deppressions and afib), and elevated trops 0.03. They rec to get another EKG, which showed sinus rhythm with occational PVCs., no ST depressions seen, and repeat trops were stable at 0.03. Discussed with fellow and will c/w current treatment. Pt will be transferred to tele. Heparin ggt was restarted, and coumadin was given.

## 2023-01-07 NOTE — PROGRESS NOTE ADULT - ASSESSMENT
Initial presentation:   86 yo F, with PMHx of HTN, HLD, CAD s/p CABG, AV replacement on coumadin, osteoporosis, old right hip fracture presented to the ED from NH s/p fall     ·	Acute Anemia - r/o active blood loss   ·	s/p Mechanical Fall  ·	L hip fracture - s/p CRPP  ·	SHOBHA (resolving)  ·	Chronic AFIB and AVR - long term anticoagulant   ·	HTN/CAD/CABG   ·	Hyponatremia     -ICU upgrade evaluation - stat one unit PRBC transfusion, cbc monitoring, CE full set, EKG, stat ct abdomen pelvis r/o retroperitoneal bleed   -heparin on hold - resume once cbc stable and no active bleeding (high risk of stroke and PE if not on AC)  -POD # 2 - ortho following   -rehab/pt/ot as tolerate  -pain control prn   -serum Cr improved with IVF   -continue with rest of maintenance meds     DISPO; tele upgrade in progress and requesting ICU eval - discussed in MDR     Attending Physician Dr. Leah Hernandes # [id number] .

## 2023-01-08 LAB
ALBUMIN SERPL ELPH-MCNC: 3.2 G/DL — LOW (ref 3.5–5.2)
ALP SERPL-CCNC: 108 U/L — SIGNIFICANT CHANGE UP (ref 30–115)
ALT FLD-CCNC: <5 U/L — SIGNIFICANT CHANGE UP (ref 0–41)
ANION GAP SERPL CALC-SCNC: 10 MMOL/L — SIGNIFICANT CHANGE UP (ref 7–14)
APTT BLD: 34.7 SEC — SIGNIFICANT CHANGE UP (ref 27–39.2)
AST SERPL-CCNC: 20 U/L — SIGNIFICANT CHANGE UP (ref 0–41)
BASOPHILS # BLD AUTO: 0.06 K/UL — SIGNIFICANT CHANGE UP (ref 0–0.2)
BASOPHILS NFR BLD AUTO: 0.7 % — SIGNIFICANT CHANGE UP (ref 0–1)
BILIRUB SERPL-MCNC: 0.6 MG/DL — SIGNIFICANT CHANGE UP (ref 0.2–1.2)
BUN SERPL-MCNC: 29 MG/DL — HIGH (ref 10–20)
CALCIUM SERPL-MCNC: 8.7 MG/DL — SIGNIFICANT CHANGE UP (ref 8.4–10.5)
CHLORIDE SERPL-SCNC: 104 MMOL/L — SIGNIFICANT CHANGE UP (ref 98–110)
CO2 SERPL-SCNC: 22 MMOL/L — SIGNIFICANT CHANGE UP (ref 17–32)
CREAT SERPL-MCNC: 0.8 MG/DL — SIGNIFICANT CHANGE UP (ref 0.7–1.5)
EGFR: 71 ML/MIN/1.73M2 — SIGNIFICANT CHANGE UP
EOSINOPHIL # BLD AUTO: 1.68 K/UL — HIGH (ref 0–0.7)
EOSINOPHIL NFR BLD AUTO: 19.4 % — HIGH (ref 0–8)
GLUCOSE SERPL-MCNC: 94 MG/DL — SIGNIFICANT CHANGE UP (ref 70–99)
HCT VFR BLD CALC: 28.7 % — LOW (ref 37–47)
HCT VFR BLD CALC: 30.9 % — LOW (ref 37–47)
HGB BLD-MCNC: 10.4 G/DL — LOW (ref 12–16)
HGB BLD-MCNC: 9.7 G/DL — LOW (ref 12–16)
IMM GRANULOCYTES NFR BLD AUTO: 1.2 % — HIGH (ref 0.1–0.3)
INR BLD: 1.26 RATIO — SIGNIFICANT CHANGE UP (ref 0.65–1.3)
LYMPHOCYTES # BLD AUTO: 1.84 K/UL — SIGNIFICANT CHANGE UP (ref 1.2–3.4)
LYMPHOCYTES # BLD AUTO: 21.2 % — SIGNIFICANT CHANGE UP (ref 20.5–51.1)
MAGNESIUM SERPL-MCNC: 1.7 MG/DL — LOW (ref 1.8–2.4)
MCHC RBC-ENTMCNC: 30.2 PG — SIGNIFICANT CHANGE UP (ref 27–31)
MCHC RBC-ENTMCNC: 30.3 PG — SIGNIFICANT CHANGE UP (ref 27–31)
MCHC RBC-ENTMCNC: 33.7 G/DL — SIGNIFICANT CHANGE UP (ref 32–37)
MCHC RBC-ENTMCNC: 33.8 G/DL — SIGNIFICANT CHANGE UP (ref 32–37)
MCV RBC AUTO: 89.4 FL — SIGNIFICANT CHANGE UP (ref 81–99)
MCV RBC AUTO: 90.1 FL — SIGNIFICANT CHANGE UP (ref 81–99)
MONOCYTES # BLD AUTO: 0.68 K/UL — HIGH (ref 0.1–0.6)
MONOCYTES NFR BLD AUTO: 7.9 % — SIGNIFICANT CHANGE UP (ref 1.7–9.3)
NEUTROPHILS # BLD AUTO: 4.3 K/UL — SIGNIFICANT CHANGE UP (ref 1.4–6.5)
NEUTROPHILS NFR BLD AUTO: 49.6 % — SIGNIFICANT CHANGE UP (ref 42.2–75.2)
NRBC # BLD: 0 /100 WBCS — SIGNIFICANT CHANGE UP (ref 0–0)
NRBC # BLD: 0 /100 WBCS — SIGNIFICANT CHANGE UP (ref 0–0)
PLATELET # BLD AUTO: 249 K/UL — SIGNIFICANT CHANGE UP (ref 130–400)
PLATELET # BLD AUTO: 252 K/UL — SIGNIFICANT CHANGE UP (ref 130–400)
POTASSIUM SERPL-MCNC: 4.9 MMOL/L — SIGNIFICANT CHANGE UP (ref 3.5–5)
POTASSIUM SERPL-SCNC: 4.9 MMOL/L — SIGNIFICANT CHANGE UP (ref 3.5–5)
PROT SERPL-MCNC: 5.1 G/DL — LOW (ref 6–8)
PROTHROM AB SERPL-ACNC: 14.5 SEC — HIGH (ref 9.95–12.87)
RBC # BLD: 3.21 M/UL — LOW (ref 4.2–5.4)
RBC # BLD: 3.43 M/UL — LOW (ref 4.2–5.4)
RBC # FLD: 14.6 % — HIGH (ref 11.5–14.5)
RBC # FLD: 15.2 % — HIGH (ref 11.5–14.5)
SODIUM SERPL-SCNC: 136 MMOL/L — SIGNIFICANT CHANGE UP (ref 135–146)
WBC # BLD: 8.66 K/UL — SIGNIFICANT CHANGE UP (ref 4.8–10.8)
WBC # BLD: 8.92 K/UL — SIGNIFICANT CHANGE UP (ref 4.8–10.8)
WBC # FLD AUTO: 8.66 K/UL — SIGNIFICANT CHANGE UP (ref 4.8–10.8)
WBC # FLD AUTO: 8.92 K/UL — SIGNIFICANT CHANGE UP (ref 4.8–10.8)

## 2023-01-08 PROCEDURE — 93010 ELECTROCARDIOGRAM REPORT: CPT

## 2023-01-08 PROCEDURE — 99232 SBSQ HOSP IP/OBS MODERATE 35: CPT

## 2023-01-08 RX ORDER — HYDRALAZINE HCL 50 MG
50 TABLET ORAL EVERY 8 HOURS
Refills: 0 | Status: DISCONTINUED | OUTPATIENT
Start: 2023-01-08 | End: 2023-01-09

## 2023-01-08 RX ORDER — WARFARIN SODIUM 2.5 MG/1
3 TABLET ORAL ONCE
Refills: 0 | Status: COMPLETED | OUTPATIENT
Start: 2023-01-08 | End: 2023-01-08

## 2023-01-08 RX ORDER — AMLODIPINE BESYLATE 2.5 MG/1
2.5 TABLET ORAL ONCE
Refills: 0 | Status: COMPLETED | OUTPATIENT
Start: 2023-01-08 | End: 2023-01-08

## 2023-01-08 RX ORDER — METOPROLOL TARTRATE 50 MG
25 TABLET ORAL EVERY 12 HOURS
Refills: 0 | Status: DISCONTINUED | OUTPATIENT
Start: 2023-01-08 | End: 2023-01-09

## 2023-01-08 RX ORDER — ENOXAPARIN SODIUM 100 MG/ML
70 INJECTION SUBCUTANEOUS EVERY 12 HOURS
Refills: 0 | Status: DISCONTINUED | OUTPATIENT
Start: 2023-01-08 | End: 2023-01-09

## 2023-01-08 RX ORDER — METOPROLOL TARTRATE 50 MG
12.5 TABLET ORAL EVERY 8 HOURS
Refills: 0 | Status: DISCONTINUED | OUTPATIENT
Start: 2023-01-08 | End: 2023-01-09

## 2023-01-08 RX ORDER — METOPROLOL TARTRATE 50 MG
12.5 TABLET ORAL ONCE
Refills: 0 | Status: COMPLETED | OUTPATIENT
Start: 2023-01-08 | End: 2023-01-08

## 2023-01-08 RX ADMIN — Medication 81 MILLIGRAM(S): at 12:25

## 2023-01-08 RX ADMIN — Medication 1 TABLET(S): at 15:27

## 2023-01-08 RX ADMIN — ENOXAPARIN SODIUM 70 MILLIGRAM(S): 100 INJECTION SUBCUTANEOUS at 12:37

## 2023-01-08 RX ADMIN — GABAPENTIN 100 MILLIGRAM(S): 400 CAPSULE ORAL at 05:50

## 2023-01-08 RX ADMIN — AMLODIPINE BESYLATE 2.5 MILLIGRAM(S): 2.5 TABLET ORAL at 12:31

## 2023-01-08 RX ADMIN — Medication 1 TABLET(S): at 22:44

## 2023-01-08 RX ADMIN — PANTOPRAZOLE SODIUM 40 MILLIGRAM(S): 20 TABLET, DELAYED RELEASE ORAL at 05:50

## 2023-01-08 RX ADMIN — Medication 650 MILLIGRAM(S): at 00:47

## 2023-01-08 RX ADMIN — Medication 25 MILLIGRAM(S): at 17:33

## 2023-01-08 RX ADMIN — GABAPENTIN 100 MILLIGRAM(S): 400 CAPSULE ORAL at 13:25

## 2023-01-08 RX ADMIN — Medication 1 TABLET(S): at 12:25

## 2023-01-08 RX ADMIN — Medication 325 MILLIGRAM(S): at 12:25

## 2023-01-08 RX ADMIN — POLYETHYLENE GLYCOL 3350 17 GRAM(S): 17 POWDER, FOR SOLUTION ORAL at 22:45

## 2023-01-08 RX ADMIN — SENNA PLUS 2 TABLET(S): 8.6 TABLET ORAL at 22:43

## 2023-01-08 RX ADMIN — GABAPENTIN 100 MILLIGRAM(S): 400 CAPSULE ORAL at 22:45

## 2023-01-08 RX ADMIN — Medication 12.5 MILLIGRAM(S): at 00:47

## 2023-01-08 RX ADMIN — Medication 12.5 MILLIGRAM(S): at 10:19

## 2023-01-08 RX ADMIN — WARFARIN SODIUM 3 MILLIGRAM(S): 2.5 TABLET ORAL at 22:43

## 2023-01-08 RX ADMIN — Medication 1 TABLET(S): at 05:50

## 2023-01-08 NOTE — PROGRESS NOTE ADULT - ASSESSMENT
Initial presentation:   86 yo F, with PMHx of HTN, HLD, CAD s/p CABG, AV replacement on coumadin, osteoporosis, old right hip fracture presented to the ED from NH s/p fall     ·	Acute Anemia - r/o active blood loss   ·	s/p Mechanical Fall  ·	L hip fracture - s/p CRPP  ·	SHOBHA (resolving)  ·	Chronic AFIB and AVR - long term anticoagulant   ·	HTN/CAD/CABG   ·	Hyponatremia   ·	suspected Mg2+ def    -patient is s/p one unit PRBC transfusion 1/7/23, cbc stable; ct abdomen pelvis ruled out retroperitoneal bleed   -lovenox 70mg twice daily with coumadin bridging   -POD # 3 - ortho following   -increased dose of BB - also added hydralazine fo elevated SBP - RN to recheck and chart - will address accordingly   -rehab/pt/ot as tolerated  -pain control prn   -replete electrolytes as needed   -serum Cr improved with IVF   -continue with rest of maintenance meds     DISPO; not discharge ready     Attending Physician Dr. Leah Hernandes # [id number] .

## 2023-01-08 NOTE — PROGRESS NOTE ADULT - ASSESSMENT
88 yo F, with PMHx of HTN, HLD, CAD s/p CABG, AV replacement on coumadin, osteoporosis, old right hip fracture presented to the ED from NH s/p fall     ·	Acute Anemia - r/o active blood loss   ·	s/p Mechanical Fall  ·	L hip fracture - s/p CRPP  ·	SHOBHA (resolving)  ·	Chronic AFIB and AVR - long term anticoagulant   ·	HTN/CAD/CABG   ·	Hyponatremia     -ICU upgrade evaluation - stat one unit PRBC transfusion, cbc monitoring, CE full set, EKG, stat ct abdomen pelvis r/o retroperitoneal bleed   -heparin on hold - resume once cbc stable and no active bleeding (high risk of stroke and PE if not on AC)  -POD # 2 - ortho following   -rehab/pt/ot as tolerate  -pain control prn   -serum Cr improved with IVF   -continue with rest of maintenance meds     DISPO; tele upgrade in progress and requesting ICU eval - discussed in MDR

## 2023-01-08 NOTE — CHART NOTE - NSCHARTNOTEFT_GEN_A_CORE
Patient had transient episode (1-2 minutes) of suspected SVT, HR up to 197, which broke spontaneously. STAT EKG was obtained and placed in paper chart. Patient was sleeping during episode but was noted to be alert, oriented and calmly sitting up in bed when awakened. /79, HR 81 just after episode broke. History of recent orthopedic surgery on 1/4 noted. Discussed with senior resident Dr. Laguna. Metoprolol tartrate dose increased from 12.5mg q12h to 25mg q12h.

## 2023-01-09 ENCOUNTER — TRANSCRIPTION ENCOUNTER (OUTPATIENT)
Age: 88
End: 2023-01-09

## 2023-01-09 VITALS
SYSTOLIC BLOOD PRESSURE: 100 MMHG | RESPIRATION RATE: 18 BRPM | DIASTOLIC BLOOD PRESSURE: 45 MMHG | HEART RATE: 98 BPM | TEMPERATURE: 100 F

## 2023-01-09 LAB
ALBUMIN SERPL ELPH-MCNC: 2.7 G/DL — LOW (ref 3.5–5.2)
ALP SERPL-CCNC: 97 U/L — SIGNIFICANT CHANGE UP (ref 30–115)
ALT FLD-CCNC: 5 U/L — SIGNIFICANT CHANGE UP (ref 0–41)
ANION GAP SERPL CALC-SCNC: 8 MMOL/L — SIGNIFICANT CHANGE UP (ref 7–14)
APTT BLD: 41.8 SEC — HIGH (ref 27–39.2)
AST SERPL-CCNC: 20 U/L — SIGNIFICANT CHANGE UP (ref 0–41)
BASOPHILS # BLD AUTO: 0.05 K/UL — SIGNIFICANT CHANGE UP (ref 0–0.2)
BASOPHILS NFR BLD AUTO: 0.6 % — SIGNIFICANT CHANGE UP (ref 0–1)
BILIRUB SERPL-MCNC: 0.7 MG/DL — SIGNIFICANT CHANGE UP (ref 0.2–1.2)
BUN SERPL-MCNC: 19 MG/DL — SIGNIFICANT CHANGE UP (ref 10–20)
CALCIUM SERPL-MCNC: 8.2 MG/DL — LOW (ref 8.4–10.5)
CHLORIDE SERPL-SCNC: 100 MMOL/L — SIGNIFICANT CHANGE UP (ref 98–110)
CO2 SERPL-SCNC: 26 MMOL/L — SIGNIFICANT CHANGE UP (ref 17–32)
CREAT SERPL-MCNC: 0.6 MG/DL — LOW (ref 0.7–1.5)
EGFR: 87 ML/MIN/1.73M2 — SIGNIFICANT CHANGE UP
EOSINOPHIL # BLD AUTO: 1.28 K/UL — HIGH (ref 0–0.7)
EOSINOPHIL NFR BLD AUTO: 14.4 % — HIGH (ref 0–8)
GLUCOSE SERPL-MCNC: 102 MG/DL — HIGH (ref 70–99)
HCT VFR BLD CALC: 28 % — LOW (ref 37–47)
HGB BLD-MCNC: 9.7 G/DL — LOW (ref 12–16)
IMM GRANULOCYTES NFR BLD AUTO: 1 % — HIGH (ref 0.1–0.3)
INR BLD: 1.42 RATIO — HIGH (ref 0.65–1.3)
LYMPHOCYTES # BLD AUTO: 1.95 K/UL — SIGNIFICANT CHANGE UP (ref 1.2–3.4)
LYMPHOCYTES # BLD AUTO: 22 % — SIGNIFICANT CHANGE UP (ref 20.5–51.1)
MAGNESIUM SERPL-MCNC: 1.5 MG/DL — LOW (ref 1.8–2.4)
MCHC RBC-ENTMCNC: 30.5 PG — SIGNIFICANT CHANGE UP (ref 27–31)
MCHC RBC-ENTMCNC: 34.6 G/DL — SIGNIFICANT CHANGE UP (ref 32–37)
MCV RBC AUTO: 88.1 FL — SIGNIFICANT CHANGE UP (ref 81–99)
MONOCYTES # BLD AUTO: 0.74 K/UL — HIGH (ref 0.1–0.6)
MONOCYTES NFR BLD AUTO: 8.3 % — SIGNIFICANT CHANGE UP (ref 1.7–9.3)
NEUTROPHILS # BLD AUTO: 4.77 K/UL — SIGNIFICANT CHANGE UP (ref 1.4–6.5)
NEUTROPHILS NFR BLD AUTO: 53.7 % — SIGNIFICANT CHANGE UP (ref 42.2–75.2)
NRBC # BLD: 0 /100 WBCS — SIGNIFICANT CHANGE UP (ref 0–0)
PLATELET # BLD AUTO: 282 K/UL — SIGNIFICANT CHANGE UP (ref 130–400)
POTASSIUM SERPL-MCNC: 4.3 MMOL/L — SIGNIFICANT CHANGE UP (ref 3.5–5)
POTASSIUM SERPL-SCNC: 4.3 MMOL/L — SIGNIFICANT CHANGE UP (ref 3.5–5)
PROT SERPL-MCNC: 4.6 G/DL — LOW (ref 6–8)
PROTHROM AB SERPL-ACNC: 16.4 SEC — HIGH (ref 9.95–12.87)
RBC # BLD: 3.18 M/UL — LOW (ref 4.2–5.4)
RBC # FLD: 14.9 % — HIGH (ref 11.5–14.5)
SARS-COV-2 RNA SPEC QL NAA+PROBE: SIGNIFICANT CHANGE UP
SODIUM SERPL-SCNC: 134 MMOL/L — LOW (ref 135–146)
WBC # BLD: 8.88 K/UL — SIGNIFICANT CHANGE UP (ref 4.8–10.8)
WBC # FLD AUTO: 8.88 K/UL — SIGNIFICANT CHANGE UP (ref 4.8–10.8)

## 2023-01-09 PROCEDURE — 99239 HOSP IP/OBS DSCHRG MGMT >30: CPT

## 2023-01-09 PROCEDURE — 93306 TTE W/DOPPLER COMPLETE: CPT | Mod: 26

## 2023-01-09 RX ORDER — MAGNESIUM SULFATE 500 MG/ML
2 VIAL (ML) INJECTION
Refills: 0 | Status: DISCONTINUED | OUTPATIENT
Start: 2023-01-09 | End: 2023-01-09

## 2023-01-09 RX ORDER — WARFARIN SODIUM 2.5 MG/1
3 TABLET ORAL ONCE
Refills: 0 | Status: DISCONTINUED | OUTPATIENT
Start: 2023-01-09 | End: 2023-01-09

## 2023-01-09 RX ORDER — MAGNESIUM OXIDE 400 MG ORAL TABLET 241.3 MG
400 TABLET ORAL ONCE
Refills: 0 | Status: COMPLETED | OUTPATIENT
Start: 2023-01-09 | End: 2023-01-09

## 2023-01-09 RX ORDER — WARFARIN SODIUM 2.5 MG/1
1 TABLET ORAL
Qty: 0 | Refills: 0 | DISCHARGE

## 2023-01-09 RX ORDER — WARFARIN SODIUM 2.5 MG/1
0 TABLET ORAL
Qty: 0 | Refills: 0 | DISCHARGE

## 2023-01-09 RX ADMIN — Medication 1 TABLET(S): at 06:25

## 2023-01-09 RX ADMIN — PANTOPRAZOLE SODIUM 40 MILLIGRAM(S): 20 TABLET, DELAYED RELEASE ORAL at 06:25

## 2023-01-09 RX ADMIN — Medication 25 MILLIGRAM(S): at 06:25

## 2023-01-09 RX ADMIN — ATORVASTATIN CALCIUM 40 MILLIGRAM(S): 80 TABLET, FILM COATED ORAL at 02:20

## 2023-01-09 RX ADMIN — ENOXAPARIN SODIUM 70 MILLIGRAM(S): 100 INJECTION SUBCUTANEOUS at 00:00

## 2023-01-09 RX ADMIN — GABAPENTIN 100 MILLIGRAM(S): 400 CAPSULE ORAL at 13:48

## 2023-01-09 RX ADMIN — Medication 325 MILLIGRAM(S): at 12:16

## 2023-01-09 RX ADMIN — Medication 81 MILLIGRAM(S): at 12:16

## 2023-01-09 RX ADMIN — Medication 1 TABLET(S): at 13:49

## 2023-01-09 RX ADMIN — GABAPENTIN 100 MILLIGRAM(S): 400 CAPSULE ORAL at 06:25

## 2023-01-09 RX ADMIN — Medication 25 GRAM(S): at 14:55

## 2023-01-09 RX ADMIN — MAGNESIUM OXIDE 400 MG ORAL TABLET 400 MILLIGRAM(S): 241.3 TABLET ORAL at 15:36

## 2023-01-09 RX ADMIN — Medication 25 GRAM(S): at 13:51

## 2023-01-09 RX ADMIN — ENOXAPARIN SODIUM 70 MILLIGRAM(S): 100 INJECTION SUBCUTANEOUS at 12:16

## 2023-01-09 RX ADMIN — Medication 1 TABLET(S): at 12:16

## 2023-01-09 NOTE — DISCHARGE NOTE NURSING/CASE MANAGEMENT/SOCIAL WORK - PATIENT PORTAL LINK FT
You can access the FollowMyHealth Patient Portal offered by [facility] by registering at the following website: [email or web address]. By joining [facility]’s FollowMyHealth portal, you will also be able to view your health information using other applications (apps) compatible with our system.

## 2023-01-09 NOTE — PROGRESS NOTE ADULT - SUBJECTIVE AND OBJECTIVE BOX
ORAL WU 87y Female  MRN#: [id number]   Hospital Day: 2d    SUBJECTIVE  Patient is a 87y old Female who presents with a chief complaint of Left femoral neck fracture (05 Jan 2023 05:30)  Currently admitted to medicine with the primary diagnosis of Fall    INTERVAL HPI AND OVERNIGHT EVENTS:  Patient was examined and seen at bedside. This morning she is resting comfortably in bed and reports no issues or overnight events.    OBJECTIVE  PAST MEDICAL & SURGICAL HISTORY  HTN (hypertension)    High cholesterol    Osteoporosis    Fracture of right shoulder    Anxiety    Depression    Cervical spondyloarthritis    Chronic atrial fibrillation    Exposure to COVID-19 virus    H/O heart valve replacement with mechanical valve    S/P hip hemiarthroplasty  on 7/16/2021 for right subcapital femoral neck fracture      ALLERGIES:  penicillin (Unknown)  pinapples (Unknown)    MEDICATIONS:  STANDING MEDICATIONS  acetaminophen     Tablet .. 650 milliGRAM(s) Oral every 6 hours  calcium carbonate 1250 mG  + Vitamin D (OsCal 500 + D) 1 Tablet(s) Oral three times a day  ceFAZolin   IVPB 2000 milliGRAM(s) IV Intermittent every 8 hours  enoxaparin Injectable 30 milliGRAM(s) SubCutaneous every 12 hours  ferrous    sulfate 325 milliGRAM(s) Oral daily  gabapentin 100 milliGRAM(s) Oral three times a day  ketorolac   Injectable 15 milliGRAM(s) IV Push every 6 hours  multivitamin 1 Tablet(s) Oral daily  polyethylene glycol 3350 17 Gram(s) Oral at bedtime  senna 2 Tablet(s) Oral at bedtime  warfarin 2 milliGRAM(s) Oral once    PRN MEDICATIONS  magnesium hydroxide Suspension 30 milliLiter(s) Oral daily PRN  ondansetron Injectable 4 milliGRAM(s) IV Push every 6 hours PRN  oxyCODONE    IR 5 milliGRAM(s) Oral every 3 hours PRN  oxyCODONE    IR 10 milliGRAM(s) Oral every 3 hours PRN      VITAL SIGNS: Last 24 Hours  T(C): 35.8 (05 Jan 2023 04:53), Max: 37 (04 Jan 2023 20:20)  T(F): 96.4 (05 Jan 2023 04:53), Max: 98.6 (04 Jan 2023 20:20)  HR: 82 (05 Jan 2023 08:00) (61 - 82)  BP: 113/59 (05 Jan 2023 04:53) (113/59 - 199/83)  BP(mean): --  RR: 18 (05 Jan 2023 04:53) (16 - 22)  SpO2: 93% (05 Jan 2023 08:00) (93% - 99%)    LABS:                        9.7    6.53  )-----------( 231      ( 04 Jan 2023 07:55 )             30.3     01-04    135  |  97<L>  |  19  ----------------------------<  85  4.4   |  30  |  0.6<L>    Ca    8.4      04 Jan 2023 07:55  Mg     1.7     01-04    TPro  5.3<L>  /  Alb  3.2<L>  /  TBili  0.8  /  DBili  x   /  AST  14  /  ALT  8   /  AlkPhos  63  01-04    PT/INR - ( 04 Jan 2023 07:55 )   PT: 30.70 sec;   INR: 2.61 ratio         PTT - ( 04 Jan 2023 07:55 )  PTT:42.2 sec      PHYSICAL EXAM:  CONSTITUTIONAL: No acute distress, well-developed, well-groomed, AAOx3  PULMONARY: Clear to auscultation bilaterally  CARDIOVASCULAR: Regular rate and rhythm; no murmurs, rubs, or gallops  GASTROINTESTINAL: Soft, non-tender, non-distended; bowel sounds present  MUSCULOSKELETAL: dressing in LLE clean and dry, no bleeding  NEUROLOGY: non-focal  SKIN: No rashes or lesions; warm and dry  
ORAL WU 87y Female  MRN#: [id number]     Hospital Day: 5d    CC:  FALL SUBCAPITAL FRACTURE OF LEFT HIP    HOSPITAL COURSE:   87F from NH. PMH: HTN, HDL, CAD s/p CABG, Aortic valve replacement (Coumadin), Osteoporosis, old right hip fracture. Presented 1/3 s/p falling off a chair x1 week prior c/o pain to left hip and shoulder. Found to have fracture of left shoulder and left hip. She ambulates with walker and assistance at baseline. S/p L ORIF 1/4. Coumadin resumed. 1/6: Pt had SHOBHA, Rapid response called for  hypotension, tachycardia, and decreased mental status. EKG showed sinus tachycardia with ST depressions. S/p 2L bolus, metoprolol IV push then started on oral metoprolol. Blood pressure and mental status improved. Troponin 0.03.  Her BP and HR stabilized. Cardio fellow was contacted due to new EKG changes (ST deppressions and afib), repeat EKG: NSR, no ST depression, and repeat trops were stable at 0.03. Heparin ggt started w/ plan to bridge to coumanid; however 1/7: hgb drop <7 given 2u pRBCs - hgb uptrended and stable. Heparin ggt/coumadin were held. with plan to resume once stable as risk of PE post ORIF.     SUBJECTIVE     Overnight events  None    Subjective complaints   Pt evaluated at bedside. No active complaints. Asleep.                                            ----------------------------------------------------------  OBJECTIVE  PAST MEDICAL & SURGICAL HISTORY  HTN (hypertension)    High cholesterol    Osteoporosis    Fracture of right shoulder    Anxiety    Depression    Cervical spondyloarthritis    Chronic atrial fibrillation    Exposure to COVID-19 virus    H/O heart valve replacement with mechanical valve    S/P hip hemiarthroplasty  on 7/16/2021 for right subcapital femoral neck fracture                                              -----------------------------------------------------------  ALLERGIES:  penicillin (Unknown)  pinapples (Unknown)                                            ------------------------------------------------------------    HOME MEDICATIONS  Home Medications:  ALPRAZolam 0.5 mg oral tablet: 1/2 tab(s) orally once a day (at bedtime), As Needed (03 Jan 2023 14:04)  aspirin 81 mg oral delayed release tablet: 1 tab(s) orally once a day (03 Jan 2023 14:04)  atenolol 25 mg oral tablet: 2 tab(s) orally once a day (03 Jan 2023 14:04)  atorvastatin 40 mg oral tablet: 1 tab(s) orally once a day (at bedtime) (03 Jan 2023 14:04)  Coumadin: 2mg on Sun/Tues/Friday  4mg on Mon/Wed/Thurs/Sat (05 Jan 2023 14:23)  escitalopram 10 mg oral tablet: 1 tab(s) orally once a day (03 Jan 2023 14:04)  Vitamin C 500 mg oral tablet: 1 tab(s) orally once a day (03 Jan 2023 14:04)  Vitamin D3 50 mcg (2000 intl units) oral tablet: 1 tab(s) orally once a day (03 Jan 2023 14:04)                           MEDICATIONS:  STANDING MEDICATIONS  aspirin enteric coated 81 milliGRAM(s) Oral daily  atorvastatin 40 milliGRAM(s) Oral at bedtime  calcium carbonate 1250 mG  + Vitamin D (OsCal 500 + D) 1 Tablet(s) Oral three times a day  ferrous    sulfate 325 milliGRAM(s) Oral daily  gabapentin 100 milliGRAM(s) Oral three times a day  metoprolol tartrate 12.5 milliGRAM(s) Oral every 12 hours  midodrine. 10 milliGRAM(s) Oral once  multivitamin 1 Tablet(s) Oral daily  pantoprazole    Tablet 40 milliGRAM(s) Oral before breakfast  polyethylene glycol 3350 17 Gram(s) Oral at bedtime  senna 2 Tablet(s) Oral at bedtime  sodium chloride 0.9%. 1000 milliLiter(s) IV Continuous <Continuous>    PRN MEDICATIONS  magnesium hydroxide Suspension 30 milliLiter(s) Oral daily PRN  ondansetron Injectable 4 milliGRAM(s) IV Push every 6 hours PRN  oxyCODONE    IR 5 milliGRAM(s) Oral every 3 hours PRN  oxyCODONE    IR 10 milliGRAM(s) Oral every 3 hours PRN                                            ------------------------------------------------------------  VITAL SIGNS: Last 24 Hours  T(C): 36.2 (07 Jan 2023 21:57), Max: 36.3 (07 Jan 2023 12:21)  T(F): 97.1 (07 Jan 2023 21:57), Max: 97.4 (07 Jan 2023 12:21)  HR: 77 (07 Jan 2023 21:57) (77 - 148)  BP: 179/74 (07 Jan 2023 21:57) (112/62 - 179/74)  BP(mean): --  RR: 18 (07 Jan 2023 12:21) (18 - 18)  SpO2: --                                             --------------------------------------------------------------  LABS:                        9.7    8.92  )-----------( 249      ( 08 Jan 2023 01:20 )             28.7     01-07    131<L>  |  102  |  37<H>  ----------------------------<  101<H>  4.6   |  24  |  1.0    Ca    8.7      07 Jan 2023 07:08  Phos  4.7     01-06  Mg     1.8     01-07    TPro  4.4<L>  /  Alb  2.7<L>  /  TBili  0.4  /  DBili  x   /  AST  19  /  ALT  <5  /  AlkPhos  95  01-07    PT/INR - ( 07 Jan 2023 07:08 )   PT: 13.80 sec;   INR: 1.20 ratio         PTT - ( 07 Jan 2023 07:08 )  PTT:>200.0 sec      Troponin T, Serum: 0.03 ng/mL *HH* (01-07-23 @ 12:40)  Lactate, Blood: 1.2 mmol/L (01-07-23 @ 12:40)  Troponin T, Serum: 0.05 ng/mL *HH* (01-07-23 @ 07:08)        Culture - Blood (collected 06 Jan 2023 20:25)  Source: .Blood Blood-Peripheral  Preliminary Report (08 Jan 2023 02:02):    No growth to date.        CARDIAC MARKERS ( 07 Jan 2023 12:40 )  x     / 0.03 ng/mL / x     / x     / x      CARDIAC MARKERS ( 07 Jan 2023 07:08 )  x     / 0.05 ng/mL / x     / x     / x      CARDIAC MARKERS ( 07 Jan 2023 00:29 )  x     / 0.03 ng/mL / x     / x     / x      CARDIAC MARKERS ( 06 Jan 2023 17:57 )  x     / 0.03 ng/mL / x     / x     / x                                                  -------------------------------------------------------------  RADIOLOGY:                                            --------------------------------------------------------------    PHYSICAL EXAM:  GENERAL: appears pale - but not in distress; resting comfortably   HEAD:  Atraumatic, Normocephalic  EYES: EOMI, PERRLA, conjunctiva and sclera clear  NERVOUS SYSTEM:  asleep   CHEST/LUNG: Clear b/l  CV/HEART: Regular rate and rhythm; No murmurs, rubs, or gallops  GI/ABDOMEN: no guarding   EXTREMITIES:  2+ Peripheral Pulses, No clubbing, cyanosis, or edema  SKIN: No rashes or lesions                                           --------------------------------------------------------------                
  ORAL WU  87y  Female      Patient is a 87y old  Female who presents with a chief complaint of Left Hip Fx (07 Jan 2023 07:36)      INTERVAL HPI/OVERNIGHT EVENTS:  1/7/23  pt seen this am   -yesterday RR events noted   -currently with hb drop < 7 - needs ICU evaluation for upgrade   -stat ct abdomen, stat blood transfusion - (tele upgrade already requested overnight and will follow up with ICU team for critical care upgrade - heparin drip on hold - will need to be resumed asap once cbc stable as pt with hx of AVR (+AFIB) with increased risk of stroke inpatient and or PE post surgery   -discussed with senior resident in rounds     1/8/23:  pt seen this am   -overnight events noted   -BB dose adjusted - will monitor BP - RN to recheck; added hydralazine prn doses   -rehab/pt as tolerated   -may need cardio consult inpatient     Vital Signs Last 24 Hrs  T(C): 35.8 (08 Jan 2023 04:16), Max: 36.2 (07 Jan 2023 21:57)  T(F): 96.5 (08 Jan 2023 04:16), Max: 97.1 (07 Jan 2023 21:57)  HR: 83 (08 Jan 2023 04:16) (77 - 83)  BP: 203/77 (08 Jan 2023 04:16) (179/74 - 203/77) - recheck BP; added prn bp meds  RR: 18 (08 Jan 2023 04:16) (18 - 18)    PHYSICAL EXAM:  GENERAL: appears pale - but not in distress; resting comfortably   HEAD:  Atraumatic, Normocephalic  EYES: EOMI, PERRLA, conjunctiva and sclera clear  NERVOUS SYSTEM:  asleep   CHEST/LUNG: Clear b/l  CV/HEART: Regular rate and rhythm; No murmurs, rubs, or gallops  GI/ABDOMEN: no guarding   EXTREMITIES:  2+ Peripheral Pulses, No clubbing, cyanosis, or edema  SKIN: No rashes or lesions    LAB:                                 10.4   8.66  )-----------( 252      ( 08 Jan 2023 07:07 )             30.9   01-08    136  |  104  |  29<H>  ----------------------------<  94  4.9   |  22  |  0.8    Ca    8.7      08 Jan 2023 07:07  Phos  4.7     01-06  Mg     1.7     01-08    TPro  5.1<L>  /  Alb  3.2<L>  /  TBili  0.6  /  DBili  x   /  AST  20  /  ALT  <5  /  AlkPhos  108  01-08      CARDIAC MARKERS ( 07 Jan 2023 07:08 )  x     / 0.05 ng/mL / x     / x     / x      CARDIAC MARKERS ( 07 Jan 2023 00:29 )  x     / 0.03 ng/mL / x     / x     / x      CARDIAC MARKERS ( 06 Jan 2023 17:57 )  x     / 0.03 ng/mL / x     / x     / x        Daily   CAPILLARY BLOOD GLUCOSE      POCT Blood Glucose.: 156 mg/dL (06 Jan 2023 17:33)      LIVER FUNCTIONS - ( 07 Jan 2023 07:08 )  Alb: 2.7 g/dL / Pro: 4.4 g/dL / ALK PHOS: 95 U/L / ALT: <5 U/L / AST: 19 U/L / GGT: x           RADIOLOGY:    Imaging Personally visualized and Reviewed:  [y ] YES  [ ] NO    HEALTH ISSUES - PROBLEM Dx:  Trochanteric fracture of right femur    HTN (hypertension)    High cholesterol    Depression    Chronic atrial fibrillation      MEDICATIONS  (STANDING):  aspirin enteric coated 81 milliGRAM(s) Oral daily  atorvastatin 40 milliGRAM(s) Oral at bedtime  calcium carbonate 1250 mG  + Vitamin D (OsCal 500 + D) 1 Tablet(s) Oral three times a day  enoxaparin Injectable 70 milliGRAM(s) SubCutaneous every 12 hours  ferrous    sulfate 325 milliGRAM(s) Oral daily  gabapentin 100 milliGRAM(s) Oral three times a day  metoprolol tartrate 25 milliGRAM(s) Oral every 12 hours  midodrine. 10 milliGRAM(s) Oral once  multivitamin 1 Tablet(s) Oral daily  pantoprazole    Tablet 40 milliGRAM(s) Oral before breakfast  polyethylene glycol 3350 17 Gram(s) Oral at bedtime  senna 2 Tablet(s) Oral at bedtime  sodium chloride 0.9%. 1000 milliLiter(s) (60 mL/Hr) IV Continuous <Continuous>  warfarin 3 milliGRAM(s) Oral once    MEDICATIONS  (PRN):  hydrALAZINE 50 milliGRAM(s) Oral every 8 hours PRN Systolic blood pressure > 170  magnesium hydroxide Suspension 30 milliLiter(s) Oral daily PRN Constipation  ondansetron Injectable 4 milliGRAM(s) IV Push every 6 hours PRN Nausea and/or Vomiting  oxyCODONE    IR 5 milliGRAM(s) Oral every 3 hours PRN Moderate Pain (4 - 6)  oxyCODONE    IR 10 milliGRAM(s) Oral every 3 hours PRN Severe Pain (7 - 10)    
      BRYCEORAL  87y, Female  Allergy: penicillin (Unknown)  pinapples (Unknown)    Hospital Day: 1d    Patient seen and examined earlier today.   no complaints  planned for procedure today by ortho     PMH/PSH:  PAST MEDICAL & SURGICAL HISTORY:  HTN (hypertension)      High cholesterol      Osteoporosis      Fracture of right shoulder      Anxiety      Depression      Cervical spondyloarthritis      Chronic atrial fibrillation      Exposure to COVID-19 virus      H/O heart valve replacement with mechanical valve      S/P hip hemiarthroplasty  on 7/16/2021 for right subcapital femoral neck fracture          LAST 24-Hr EVENTS:    VITALS:  T(F): 98 (01-04-23 @ 11:00), Max: 98 (01-04-23 @ 11:00)  HR: 62 (01-04-23 @ 11:00)  BP: 154/66 (01-04-23 @ 11:00) (153/72 - 194/76)  RR: 18 (01-04-23 @ 11:00)  SpO2: 97% (01-03-23 @ 19:45)          TESTS & MEASUREMENTS:  Weight/BMI  68 (01-03-23 @ 11:14)                          9.7    6.53  )-----------( 231      ( 04 Jan 2023 07:55 )             30.3     PT/INR - ( 04 Jan 2023 07:55 )   PT: 30.70 sec;   INR: 2.61 ratio         PTT - ( 04 Jan 2023 07:55 )  PTT:42.2 sec  INR: 2.61 ratio (01-04-23 @ 07:55)  INR: 2.92 ratio (01-03-23 @ 12:00)    01-04    135  |  97<L>  |  19  ----------------------------<  85  4.4   |  30  |  0.6<L>    Ca    8.4      04 Jan 2023 07:55  Mg     1.7     01-04    TPro  5.3<L>  /  Alb  3.2<L>  /  TBili  0.8  /  DBili  x   /  AST  14  /  ALT  8   /  AlkPhos  63  01-04    LIVER FUNCTIONS - ( 04 Jan 2023 07:55 )  Alb: 3.2 g/dL / Pro: 5.3 g/dL / ALK PHOS: 63 U/L / ALT: 8 U/L / AST: 14 U/L / GGT: x                           COVID-19 PCR: NotDetec (01-03-23 @ 11:30)                RADIOLOGY, ECG, & ADDITIONAL TESTS:  12 Lead ECG:   Ventricular Rate 55 BPM    Atrial Rate 55 BPM    P-R Interval 192 ms    QRS Duration 90 ms    Q-T Interval 466 ms    QTC Calculation(Bazett) 445 ms    P Axis 79 degrees    R Axis 47 degrees    T Axis 58 degrees    Diagnosis Line Sinus bradycardia with sinus arrhythmia  Minimal voltage criteria for LVH, may be normal variant  Borderline ECG    Confirmed by KATHRYN AN [facility] ([id number]) on 1/4/2023 12:06:50 PM (01-04-23 @ 07:58)      RECENT DIAGNOSTIC ORDERS:  Diet, NPO after Midnight:      NPO Start Date: 04-Jan-2023,   NPO Start Time: 23:59 (01-04-23 @ 09:08)  Prothrombin Time and INR, Plasma:  Start Date:04-Jan-2023. 16:00 (01-04-23 @ 09:08)  Magnesium, Serum: AM Sched. Collection: 05-Jan-2023 04:30 (01-04-23 @ 08:58)  Comprehensive Metabolic Panel: AM Sched. Collection: 05-Jan-2023 04:30 (01-04-23 @ 08:58)  Complete Blood Count + Automated Diff: AM Sched. Collection: 05-Jan-2023 04:30 (01-04-23 @ 08:58)  Activated Partial Thromboplastin Time:  Start Date:05-Jan-2023. AM Sched. Collection:05-Jan-2023 04:30 (01-04-23 @ 08:57)  Prothrombin Time and INR, Plasma:  Start Date:05-Jan-2023. AM Sched. Collection:05-Jan-2023 04:30 (01-04-23 @ 08:57)  Activated Partial Thromboplastin Time: Repeat From: 04-Jan-2023 08:57 To: 11-Jan-2023 04:30, Every 1 day(s) Start Date:04-Jan-2023 (01-04-23 @ 08:57)  Prothrombin Time and INR, Plasma: Repeat From: 04-Jan-2023 08:57 To: 11-Jan-2023 04:30, Every 1 day(s) Start Date:04-Jan-2023 (01-04-23 @ 08:57)  ABO Rh Confirmatory Specimen: AM  Addl Info: Conditional: ABO Rh Confirmatory Specimen (01-03-23 @ 23:24)  Type + Screen: AM  Sched. Collection:04-Jan-2023 04:30 (01-03-23 @ 23:24)  Diet, NPO after Midnight:      NPO Start Date: 03-Jan-2023,   NPO Start Time: 23:59 (01-03-23 @ 18:13)      MEDICATIONS:  MEDICATIONS  (STANDING):  ascorbic acid 500 milliGRAM(s) Oral daily  aspirin enteric coated 81 milliGRAM(s) Oral daily  atenolol  Tablet 50 milliGRAM(s) Oral daily  atorvastatin 40 milliGRAM(s) Oral at bedtime  chlorhexidine 2% Cloths 1 Application(s) Topical <User Schedule>  cholecalciferol 2000 Unit(s) Oral Once  escitalopram 10 milliGRAM(s) Oral daily    MEDICATIONS  (PRN):  ALPRAZolam 0.5 milliGRAM(s) Oral at bedtime PRN anxiety  morphine  - Injectable 2 milliGRAM(s) IV Push every 6 hours PRN Severe Pain (7 - 10)  traMADol 25 milliGRAM(s) Oral every 6 hours PRN Moderate Pain (4 - 6)      HOME MEDICATIONS:  ALPRAZolam 0.5 mg oral tablet (01-03)  aspirin 81 mg oral delayed release tablet (01-03)  atenolol 25 mg oral tablet (01-03)  atorvastatin 40 mg oral tablet (01-03)  escitalopram 10 mg oral tablet (01-03)  Vitamin C 500 mg oral tablet (01-03)  Vitamin D3 50 mcg (2000 intl units) oral tablet (01-03)  warfarin 2.5 mg oral tablet (01-03)      PHYSICAL EXAM:  GENERAL: Patient lying on bed, comfortable   CHEST/LUNG: NVB, no wheezing   HEART: R1+R2, RRR  ABDOMEN: Soft. non tender, BS positive  EXTREMITIES:  no edema, Bruising  CNS: AAAx4. No cranial nerves deficit.       
  ORAL WU  87y  Female      Patient is a 87y old  Female who presents with a chief complaint of Left Hip Fx (07 Jan 2023 07:36)      INTERVAL HPI/OVERNIGHT EVENTS:  1/7/23  pt seen this am   -yesterday RR events noted   -currently with hb drop < 7 - needs ICU evaluation for upgrade   -stat ct abdomen, stat blood transfusion - (tele upgrade already requested overnight and will follow up with ICU team for critical care upgrade - heparin drip on hold - will need to be resumed asap once cbc stable as pt with hx of AVR (+AFIB) with increased risk of stroke inpatient and or PE post surgery   -discussed with senior resident in rounds     1/8/23:  pt seen this am   -overnight events noted   -BB dose adjusted - will monitor BP - RN to recheck; added hydralazine prn doses   -rehab/pt as tolerated   -may need cardio consult inpatient     1/9/23:  pt seen this am   -stable for NH d/c planning   -continue with coumadin with close INR monitoring in NH   -CM assisting with today's discharge     Vital Signs Last 24 Hrs  T(C): 37.7 (09 Jan 2023 13:46), Max: 37.7 (09 Jan 2023 13:46)  T(F): 99.8 (09 Jan 2023 13:46), Max: 99.8 (09 Jan 2023 13:46)  HR: 98 (09 Jan 2023 13:46) (81 - 98)  BP: 100/45 (09 Jan 2023 13:46) (100/45 - 166/73)  BP(mean): --  RR: 18 (09 Jan 2023 13:46) (18 - 18)  SpO2: 97% (08 Jan 2023 20:40) (97% - 97%)    Parameters below as of 08 Jan 2023 20:40  Patient On (Oxygen Delivery Method): room air    PHYSICAL EXAM:  GENERAL: appears pale - but not in distress; resting comfortably   HEAD:  Atraumatic, Normocephalic  EYES: EOMI, PERRLA, conjunctiva and sclera clear  NERVOUS SYSTEM:  asleep   CHEST/LUNG: Clear b/l  CV/HEART: Regular rate and rhythm; No murmurs, rubs, or gallops  GI/ABDOMEN: no guarding   EXTREMITIES:  2+ Peripheral Pulses, No clubbing, cyanosis, or edema  SKIN: No rashes or lesions    LAB:                                      9.7    8.88  )-----------( 282      ( 09 Jan 2023 05:47 )             28.0         01-09    134<L>  |  100  |  19  ----------------------------<  102<H>  4.3   |  26  |  0.6<L>    Ca    8.2<L>      09 Jan 2023 05:47  Mg     1.5     01-09    TPro  4.6<L>  /  Alb  2.7<L>  /  TBili  0.7  /  DBili  x   /  AST  20  /  ALT  5   /  AlkPhos  97  01-09      CARDIAC MARKERS ( 07 Jan 2023 07:08 )  x     / 0.05 ng/mL / x     / x     / x      CARDIAC MARKERS ( 07 Jan 2023 00:29 )  x     / 0.03 ng/mL / x     / x     / x      CARDIAC MARKERS ( 06 Jan 2023 17:57 )  x     / 0.03 ng/mL / x     / x     / x        Daily   CAPILLARY BLOOD GLUCOSE      POCT Blood Glucose.: 156 mg/dL (06 Jan 2023 17:33)      LIVER FUNCTIONS - ( 07 Jan 2023 07:08 )  Alb: 2.7 g/dL / Pro: 4.4 g/dL / ALK PHOS: 95 U/L / ALT: <5 U/L / AST: 19 U/L / GGT: x           RADIOLOGY:    Imaging Personally visualized and Reviewed:  [y ] YES  [ ] NO    HEALTH ISSUES - PROBLEM Dx:  Trochanteric fracture of right femur    HTN (hypertension)    High cholesterol    Depression    Chronic atrial fibrillation    MEDICATIONS  (STANDING):  aspirin enteric coated 81 milliGRAM(s) Oral daily  atorvastatin 40 milliGRAM(s) Oral at bedtime  calcium carbonate 1250 mG  + Vitamin D (OsCal 500 + D) 1 Tablet(s) Oral three times a day  enoxaparin Injectable 70 milliGRAM(s) SubCutaneous every 12 hours  ferrous    sulfate 325 milliGRAM(s) Oral daily  gabapentin 100 milliGRAM(s) Oral three times a day  metoprolol tartrate 25 milliGRAM(s) Oral every 12 hours  midodrine. 10 milliGRAM(s) Oral once  multivitamin 1 Tablet(s) Oral daily  pantoprazole    Tablet 40 milliGRAM(s) Oral before breakfast  polyethylene glycol 3350 17 Gram(s) Oral at bedtime  senna 2 Tablet(s) Oral at bedtime  sodium chloride 0.9%. 1000 milliLiter(s) (60 mL/Hr) IV Continuous <Continuous>  warfarin 3 milliGRAM(s) Oral once    MEDICATIONS  (PRN):  hydrALAZINE 50 milliGRAM(s) Oral every 8 hours PRN Systolic blood pressure > 170  magnesium hydroxide Suspension 30 milliLiter(s) Oral daily PRN Constipation  metoprolol tartrate 12.5 milliGRAM(s) Oral every 8 hours PRN for HR > 110 and hold for SBP < 110  ondansetron Injectable 4 milliGRAM(s) IV Push every 6 hours PRN Nausea and/or Vomiting  oxyCODONE    IR 5 milliGRAM(s) Oral every 3 hours PRN Moderate Pain (4 - 6)  oxyCODONE    IR 10 milliGRAM(s) Oral every 3 hours PRN Severe Pain (7 - 10)  
ORAL WU 87y Female  MRN#: [id number]   Hospital Day: 3d    SUBJECTIVE  Patient is a 87y old Female who presents with a chief complaint of Left Hip Fx (06 Jan 2023 08:54)  Currently admitted to medicine with the primary diagnosis of Fall    INTERVAL HPI AND OVERNIGHT EVENTS:  Patient was examined and seen at bedside. This morning she is resting comfortably in bed and reports no issues or overnight events.    OBJECTIVE  PAST MEDICAL & SURGICAL HISTORY  HTN (hypertension)    High cholesterol    Osteoporosis    Fracture of right shoulder    Anxiety    Depression    Cervical spondyloarthritis    Chronic atrial fibrillation    Exposure to COVID-19 virus    H/O heart valve replacement with mechanical valve    S/P hip hemiarthroplasty  on 7/16/2021 for right subcapital femoral neck fracture      ALLERGIES:  penicillin (Unknown)  pinapples (Unknown)    MEDICATIONS:  STANDING MEDICATIONS  acetaminophen     Tablet .. 650 milliGRAM(s) Oral every 6 hours  calcium carbonate 1250 mG  + Vitamin D (OsCal 500 + D) 1 Tablet(s) Oral three times a day  celecoxib 200 milliGRAM(s) Oral every 12 hours  enoxaparin Injectable 70 milliGRAM(s) SubCutaneous every 12 hours  ferrous    sulfate 325 milliGRAM(s) Oral daily  gabapentin 100 milliGRAM(s) Oral three times a day  multivitamin 1 Tablet(s) Oral daily  pantoprazole    Tablet 40 milliGRAM(s) Oral before breakfast  polyethylene glycol 3350 17 Gram(s) Oral at bedtime  senna 2 Tablet(s) Oral at bedtime  warfarin 2 milliGRAM(s) Oral once    PRN MEDICATIONS  magnesium hydroxide Suspension 30 milliLiter(s) Oral daily PRN  ondansetron Injectable 4 milliGRAM(s) IV Push every 6 hours PRN  oxyCODONE    IR 5 milliGRAM(s) Oral every 3 hours PRN  oxyCODONE    IR 10 milliGRAM(s) Oral every 3 hours PRN      VITAL SIGNS: Last 24 Hours  T(C): 35.7 (06 Jan 2023 05:18), Max: 37 (05 Jan 2023 15:01)  T(F): 96.2 (06 Jan 2023 05:18), Max: 98.6 (05 Jan 2023 15:01)  HR: 64 (06 Jan 2023 05:18) (64 - 80)  BP: 159/68 (06 Jan 2023 05:18) (112/53 - 159/68)  BP(mean): --  RR: 16 (06 Jan 2023 05:18) (16 - 18)  SpO2: --    LABS:                        8.4    9.18  )-----------( 284      ( 06 Jan 2023 08:02 )             25.3     01-05    131<L>  |  96<L>  |  27<H>  ----------------------------<  154<H>  4.7   |  26  |  0.9    Ca    8.6      05 Jan 2023 09:12  Mg     1.9     01-05    TPro  5.6<L>  /  Alb  3.3<L>  /  TBili  0.7  /  DBili  x   /  AST  71<H>  /  ALT  52<H>  /  AlkPhos  190<H>  01-05    PT/INR - ( 06 Jan 2023 08:02 )   PT: 12.20 sec;   INR: 1.07 ratio         PTT - ( 06 Jan 2023 08:02 )  PTT:47.0 sec      PHYSICAL EXAM:  CONSTITUTIONAL: No acute distress, well-developed, well-groomed, AAOx3  PULMONARY: Clear to auscultation bilaterally  CARDIOVASCULAR: Regular rate and rhythm; no murmurs, rubs, or gallops  GASTROINTESTINAL: Soft, non-tender, non-distended; bowel sounds present  MUSCULOSKELETAL: dressing in LLE clean and dry, no bleeding  NEUROLOGY: non-focal  SKIN: No rashes or lesions; warm and dry    
  ORAL WU  87y  Female      Patient is a 87y old  Female who presents with a chief complaint of Left Hip Fx (07 Jan 2023 07:36)      INTERVAL HPI/OVERNIGHT EVENTS:    pt seen this am   -yesterday RR events noted   -currently with hb drop < 7 - needs ICU evaluation for upgrade   -stat ct abdomen, stat blood transfusion - (tele upgrade already requested overnight and will follow up with ICU team for critical care upgrade - heparin drip on hold - will need to be resumed asap once cbc stable as pt with hx of AVR (+AFIB) with increased risk of stroke inpatient and or PE post surgery   -discussed with senior resident in rounds     -Vital Signs Last 24 Hrs  T(C): 36.2 (07 Jan 2023 04:50), Max: 36.7 (06 Jan 2023 20:55)  T(F): 97.1 (07 Jan 2023 04:50), Max: 98 (06 Jan 2023 20:55)  HR: 71 (07 Jan 2023 04:50) (65 - 100)  BP: 147/70 (07 Jan 2023 04:50) (113/55 - 152/62)  BP(mean): --  RR: 18 (07 Jan 2023 04:50) (18 - 20)  SpO2: 100% (06 Jan 2023 23:57) (100% - 100%)    Parameters below as of 06 Jan 2023 23:57  Patient On (Oxygen Delivery Method): room air    PHYSICAL EXAM:  GENERAL: appears pale - but not in distress; resting comfortably   HEAD:  Atraumatic, Normocephalic  EYES: EOMI, PERRLA, conjunctiva and sclera clear  NERVOUS SYSTEM:  asleep   CHEST/LUNG: Clear b/l  CV/HEART: Regular rate and rhythm; No murmurs, rubs, or gallops  GI/ABDOMEN: no guarding   EXTREMITIES:  2+ Peripheral Pulses, No clubbing, cyanosis, or edema  SKIN: No rashes or lesions    LAB:                        6.4    9.33  )-----------( 286      ( 07 Jan 2023 07:08 )             20.0     01-07    131<L>  |  102  |  37<H>  ----------------------------<  101<H>  4.6   |  24  |  1.0    Ca    8.7      07 Jan 2023 07:08  Phos  4.7     01-06  Mg     1.8     01-07    TPro  4.4<L>  /  Alb  2.7<L>  /  TBili  0.4  /  DBili  x   /  AST  19  /  ALT  <5  /  AlkPhos  95  01-07    CARDIAC MARKERS ( 07 Jan 2023 07:08 )  x     / 0.05 ng/mL / x     / x     / x      CARDIAC MARKERS ( 07 Jan 2023 00:29 )  x     / 0.03 ng/mL / x     / x     / x      CARDIAC MARKERS ( 06 Jan 2023 17:57 )  x     / 0.03 ng/mL / x     / x     / x        Daily   CAPILLARY BLOOD GLUCOSE      POCT Blood Glucose.: 156 mg/dL (06 Jan 2023 17:33)      LIVER FUNCTIONS - ( 07 Jan 2023 07:08 )  Alb: 2.7 g/dL / Pro: 4.4 g/dL / ALK PHOS: 95 U/L / ALT: <5 U/L / AST: 19 U/L / GGT: x           RADIOLOGY:    Imaging Personally visualized and Reviewed:  [y ] YES  [ ] NO    HEALTH ISSUES - PROBLEM Dx:  Trochanteric fracture of right femur    HTN (hypertension)    High cholesterol    Depression    Chronic atrial fibrillation      MEDS:  acetaminophen     Tablet .. 650 milliGRAM(s) Oral every 6 hours  aspirin enteric coated 81 milliGRAM(s) Oral daily  atorvastatin 40 milliGRAM(s) Oral at bedtime  calcium carbonate 1250 mG  + Vitamin D (OsCal 500 + D) 1 Tablet(s) Oral three times a day  ferrous    sulfate 325 milliGRAM(s) Oral daily  gabapentin 100 milliGRAM(s) Oral three times a day  magnesium hydroxide Suspension 30 milliLiter(s) Oral daily PRN  metoprolol tartrate 12.5 milliGRAM(s) Oral every 12 hours  midodrine. 10 milliGRAM(s) Oral once  multivitamin 1 Tablet(s) Oral daily  ondansetron Injectable 4 milliGRAM(s) IV Push every 6 hours PRN  oxyCODONE    IR 5 milliGRAM(s) Oral every 3 hours PRN  oxyCODONE    IR 10 milliGRAM(s) Oral every 3 hours PRN  pantoprazole    Tablet 40 milliGRAM(s) Oral before breakfast  polyethylene glycol 3350 17 Gram(s) Oral at bedtime  senna 2 Tablet(s) Oral at bedtime  sodium chloride 0.9%. 1000 milliLiter(s) IV Continuous <Continuous>

## 2023-01-09 NOTE — PROGRESS NOTE ADULT - PROVIDER SPECIALTY LIST ADULT
Hospitalist
Orthopedics
Orthopedics
Hospitalist
Internal Medicine
Orthopedics
Internal Medicine
Internal Medicine

## 2023-01-09 NOTE — DISCHARGE NOTE NURSING/CASE MANAGEMENT/SOCIAL WORK - NSDCPEPTCOWAFU_GEN_ALL_CORE
Go for blood tests as directed. Because your dose is based on the PT/INR blood test, it is very important that you get your blood tested on the scheduled date and time and to keep your health care provider appointments.   Please follow up with your doctor within 3 days of discharge to schedule your next blood test. Detail Level: Zone Include Location In Plan?: No

## 2023-01-09 NOTE — PROGRESS NOTE ADULT - TIME BILLING
direct patient care and chart review  -coordinated current plan of care with medical staff on MDR   -patient with active / acute anemia - requiring stat imaging studies, stat labs, stat blood transfusions and ICU eval
direct patient care and chart review  -coordinated current plan of care with medical staff on MDR
direct patient care and chart review  -coordinated current plan of care with medical staff on MDR  -d/c papers edited by me
direct patient care and chart review   -coordinated current plan of care with medical staff on MDR

## 2023-01-09 NOTE — PROGRESS NOTE ADULT - REASON FOR ADMISSION
Left Hip Fx
Left femoral neck fracture
Left Hip Fx
yes...

## 2023-01-09 NOTE — DISCHARGE NOTE NURSING/CASE MANAGEMENT/SOCIAL WORK - NSDCPEPTCOWADIET_GEN_ALL_CORE
Normal vision: sees adequately in most situations; can see medication labels, newsprint Keep your intake of vitamin K regular. The highest amount of vitamin K is found in green and leafy vegetables like broccoli, lettuces, cabbage, and spinach. You can eat these foods but keep the portion size the same. Changes in the amount you eat can affect your PT/INR blood test. Contact your doctor before making any major changes in your diet. Limit your alcohol intake.

## 2023-01-09 NOTE — PROGRESS NOTE ADULT - ASSESSMENT
Initial presentation:   88 yo F, with PMHx of HTN, HLD, CAD s/p CABG, AV replacement on coumadin, osteoporosis, old right hip fracture presented to the ED from NH s/p fall     ·	Acute Anemia - r/o active blood loss   ·	s/p Mechanical Fall  ·	L hip fracture - s/p CRPP  ·	SHOBHA (resolving)  ·	Chronic AFIB and AVR - long term anticoagulant   ·	HTN/CAD/CABG   ·	Hyponatremia   ·	suspected Mg2+ def    -patient is s/p one unit PRBC transfusion 1/7/23, cbc stable; ct abdomen pelvis ruled out retroperitoneal bleed   -lovenox 70mg twice daily with coumadin bridging (will need close INR monitoring in NH)  -POD # 4 - ortho following   -monitor BP on current meds - avoid hypotension  -rehab/pt/ot as tolerated  -pain control prn   -replete electrolytes as needed   -serum Cr improved with IVF   -continue with rest of maintenance meds     DISPO; d/c planning to NH today once arrangements in place   -staff informed the daughter - agreeable for discharge today     Attending Physician Dr. Leah Hernandes # [id number] .          Zygomaticofacial Flap Text: Given the location of the defect, shape of the defect and the proximity to free margins a zygomaticofacial flap was deemed most appropriate for repair.  Using a sterile surgical marker, the appropriate flap was drawn incorporating the defect and placing the expected incisions within the relaxed skin tension lines where possible. The area thus outlined was incised deep to adipose tissue with a #15 scalpel blade with preservation of a vascular pedicle.  The skin margins were undermined to an appropriate distance in all directions utilizing iris scissors.  The flap was then placed into the defect and anchored with interrupted buried subcutaneous sutures.

## 2023-01-11 ENCOUNTER — APPOINTMENT (OUTPATIENT)
Dept: MEDICATION MANAGEMENT | Facility: CLINIC | Age: 88
End: 2023-01-11

## 2023-01-12 ENCOUNTER — TRANSCRIPTION ENCOUNTER (OUTPATIENT)
Age: 88
End: 2023-01-12

## 2023-01-12 LAB
CULTURE RESULTS: SIGNIFICANT CHANGE UP
SPECIMEN SOURCE: SIGNIFICANT CHANGE UP

## 2023-01-13 ENCOUNTER — TRANSCRIPTION ENCOUNTER (OUTPATIENT)
Age: 88
End: 2023-01-13

## 2023-01-17 ENCOUNTER — APPOINTMENT (OUTPATIENT)
Dept: ORTHOPEDIC SURGERY | Facility: ASSISTED LIVING FACILITY | Age: 88
End: 2023-01-17
Payer: MEDICARE

## 2023-01-17 PROBLEM — I48.20 CHRONIC ATRIAL FIBRILLATION, UNSPECIFIED: Chronic | Status: ACTIVE | Noted: 2023-01-03

## 2023-01-17 PROBLEM — Z20.822 CONTACT WITH AND (SUSPECTED) EXPOSURE TO COVID-19: Chronic | Status: ACTIVE | Noted: 2023-01-03

## 2023-01-17 PROBLEM — M47.812 SPONDYLOSIS WITHOUT MYELOPATHY OR RADICULOPATHY, CERVICAL REGION: Chronic | Status: ACTIVE | Noted: 2023-01-03

## 2023-01-17 PROCEDURE — 99024 POSTOP FOLLOW-UP VISIT: CPT

## 2023-01-18 ENCOUNTER — APPOINTMENT (OUTPATIENT)
Dept: MEDICATION MANAGEMENT | Facility: CLINIC | Age: 88
End: 2023-01-18

## 2023-01-19 ENCOUNTER — TRANSCRIPTION ENCOUNTER (OUTPATIENT)
Age: 88
End: 2023-01-19

## 2023-01-25 ENCOUNTER — APPOINTMENT (OUTPATIENT)
Dept: CARDIOLOGY | Facility: CLINIC | Age: 88
End: 2023-01-25
Payer: MEDICARE

## 2023-01-25 ENCOUNTER — APPOINTMENT (OUTPATIENT)
Dept: MEDICATION MANAGEMENT | Facility: CLINIC | Age: 88
End: 2023-01-25

## 2023-01-25 VITALS
DIASTOLIC BLOOD PRESSURE: 60 MMHG | OXYGEN SATURATION: 96 % | HEIGHT: 66 IN | HEART RATE: 62 BPM | SYSTOLIC BLOOD PRESSURE: 110 MMHG

## 2023-01-25 DIAGNOSIS — I10 ESSENTIAL (PRIMARY) HYPERTENSION: ICD-10-CM

## 2023-01-25 DIAGNOSIS — I48.0 PAROXYSMAL ATRIAL FIBRILLATION: ICD-10-CM

## 2023-01-25 PROCEDURE — 93010 ELECTROCARDIOGRAM REPORT: CPT

## 2023-01-25 PROCEDURE — 93000 ELECTROCARDIOGRAM COMPLETE: CPT

## 2023-01-25 RX ORDER — FERROUS GLUCONATE 246(27)MG
246 (27 FE) TABLET ORAL
Refills: 0 | Status: DISCONTINUED | COMMUNITY
End: 2023-01-25

## 2023-01-25 RX ORDER — ALENDRONATE SODIUM 35 MG/1
35 TABLET ORAL
Refills: 0 | Status: DISCONTINUED | COMMUNITY
End: 2023-01-25

## 2023-01-25 RX ORDER — MIDODRINE HYDROCHLORIDE 10 MG/1
10 TABLET ORAL
Refills: 0 | Status: DISCONTINUED | COMMUNITY
End: 2023-01-25

## 2023-01-25 NOTE — HISTORY OF PRESENT ILLNESS
[FreeTextEntry1] : The pt had mechanical fall in NH admitted [facility] 1/3, Disch 1/9/23. Pt had L ORIF 1/4. Pt had episode of hypotension and  A fib. Had  HGB decreased to 7 and tx 2 untis of PC. Pt also fx Left shoulder.  Pt remains on coumadin\par The pt has h/o HTN, HLD CABG, mechanical AVR 2003 on 5/14 palpitations found in V tach. IV amiodarone need temp pacemaker. Cath RCA PO. V tach secondary ischemia. Event monitor no VT. Now off amiodarone. 10/18/21 Holter No AF had SVT/VT 19 beats\par She had the dental work done in [place] she had had 10 teeth pulled  and feeling better. She has h/o bleeding on lovenox and required 2 units of PC . Denies chest pain SOB palpitations dizziness or syncope. \par Pt presents here 1/25/23 from [facility] in w/c. . Reportedly her BP is elevated intermittently. I know this pt for many years and in the past she has had intermittent hypertensive episodes always related to anxiety and depressive episodes. She was taking alprazolam 3x daily then as well as diovan 32 0mg daily . She may benefit from more acute management of her anxiety and depression. Her BP in office today is 110/60 and heart rate is NSR 60/min .At this present time I am hesitant to add an additional antihypertensive. She had an episode of reported A fib last hospitalization . She requires continued anticoagulation at INR of  2.5--3.5

## 2023-01-25 NOTE — DISCUSSION/SUMMARY
[FreeTextEntry1] : The pt had CABG mechanical AVR 2003 on 5/14 palpitations found in V tach. IV amiodarone need temp pacemaker. Cath RCA PO. V tach secondary ischemia. Event monitor no VT. Now off amiodarone. Palpitations resolved.  Echo 1017 mild pulm htn mild MR. off plavix. Now on asa 81mg not drive arthritis. She needs access a ride told resume exercise. She does not want stress test.Fx right shoulder 11/16. Refused shoulder surgery. go rehab.Dizzy get up resolved.  6/21 10 teeth pulled. On Lovenox bled mouth. received 2 units blood.  She had THR 7/16/21. Then developed afib. BP dropped now  off diovan only on atenolol.Will  taper  midodrine .  She lost 5 pounds.  MCOT 2 week. No afib.. SVT vs v tach. Not drink boost it has vitamin k. Pt had teeth done in [facility]. \par The pt had mechanical fall in NH admitted [facility] 1/3, Disch 1/9/23. Pt had Left  ORIF 1/4. Pt had episode of hypotension and  A fib. Had  HGB decreased to 7 and tx 2 untis of PC. Pt also fx Left shoulder.  Pt remains on coumadin\par Pt presents here 1/25/23 from [facility] in w/c. . Reportedly her BP is elevated intermittently. I know this pt for many years and in the past she has had intermittent hypertensive episodes always related to anxiety and depressive episodes. She was taking alprazolam 3x daily then as well as diovan 320 mg daily . She may benefit from more acute management of her anxiety and depression. Her BP in office today is 110/60 and heart rate is NSR 60/min .At this present time I am hesitant to add an additional antihypertensive. She had an episode of reported A fib last hospitalization . She requires continued anticoagulation at INR of  2.5--3.5 . Will continue atenolol 25mg twice daily. Will consider a holter monitor for reports of palpitations or documented  atrial fib. Progressive ambulation per physical therapy  f/u 3mths \par I spoke with her grand daughter Janina @ [phone] to inform her of the visit mary lou and plan

## 2023-01-25 NOTE — REVIEW OF SYSTEMS
[Joint Pain] : joint pain [Shoulder Pain] : shoulder pain [Hand Pain] : hand pain [Knee Pain] : knee pain [Lower Back Pain] : lower back pain [Weight Loss (___ Lbs)] : [unfilled] ~Ulb weight loss [Fever] : no fever [Chills] : no chills [Blurry Vision] : no blurred vision [Earache] : no earache [Hearing Loss] : no hearing loss [Sinus Pressure] : no sinus pressure [SOB] : no shortness of breath [Chest Discomfort] : no chest discomfort [Palpitations] : no palpitations [Cough] : no cough [Wheezing] : no wheezing [Abdominal Pain] : no abdominal pain [Vomiting] : no vomiting [Constipation] : no constipation [Dysuria] : no dysuria [Rash] : no rash [Skin Lesions] : no skin lesions [Dizziness] : no dizziness [Confusion] : no confusion was observed [Swollen Glands] : no swollen glands [FreeTextEntry9] : Left shoulder in sling , pain in left hip Pt in w/c  only walks with PT

## 2023-01-25 NOTE — REASON FOR VISIT
[Arrhythmia/ECG Abnorrmalities] : arrhythmia/ECG abnormalities [Coronary Artery Disease] : coronary artery disease [Family Member] : family member [Other: ______] : provided by BRIAN [Time Spent: ____ minutes] : Total time spent using  services: [unfilled] minutes. The patient's primary language is not English thus required  services. [Interpreters_IDNumber] : [id number] [Interpreters_FullName] : [place] [TWNoteComboBox1] : Maltese [FreeTextEntry1] : Here  for f/u from NH

## 2023-01-25 NOTE — PHYSICAL EXAM
[5th Left ICS - MCL] : palpated at the 5th LICS in the midclavicular line [No Precordial Heave] : no precordial heave was noted [Normal Rate] : normal [Rhythm Regular] : regular [Normal S1] : normal S1 [Normal S2] : normal S2 [Click] : a ~M click was heard [I] : a grade 1 [No Pitting Edema] : no pitting edema present [2+] : left 2+ [No Abnormalities] : the abdominal aorta was not enlarged and no bruit was heard [Normal] : moves all extremities, no focal deficits, normal speech [Frail] : frail [Ill-Appearing] : ill-appearing [S3] : no S3 [S4] : no S4 [Right Carotid Bruit] : no bruit heard over the right carotid [Left Carotid Bruit] : no bruit heard over the left carotid [de-identified] : Looks fatigued [de-identified] : In w/c non weight baring except for PT per pt [de-identified] : trace edema bilat + venous stasis [de-identified] : Cries often, looks sad

## 2023-01-26 ENCOUNTER — TRANSCRIPTION ENCOUNTER (OUTPATIENT)
Age: 88
End: 2023-01-26

## 2023-01-27 ENCOUNTER — TRANSCRIPTION ENCOUNTER (OUTPATIENT)
Age: 88
End: 2023-01-27

## 2023-02-01 ENCOUNTER — APPOINTMENT (OUTPATIENT)
Dept: MEDICATION MANAGEMENT | Facility: CLINIC | Age: 88
End: 2023-02-01

## 2023-02-08 ENCOUNTER — APPOINTMENT (OUTPATIENT)
Dept: MEDICATION MANAGEMENT | Facility: CLINIC | Age: 88
End: 2023-02-08

## 2023-02-15 ENCOUNTER — APPOINTMENT (OUTPATIENT)
Age: 88
End: 2023-02-15

## 2023-02-16 ENCOUNTER — TRANSCRIPTION ENCOUNTER (OUTPATIENT)
Age: 88
End: 2023-02-16

## 2023-02-22 ENCOUNTER — APPOINTMENT (OUTPATIENT)
Dept: MEDICATION MANAGEMENT | Facility: CLINIC | Age: 88
End: 2023-02-22

## 2023-02-23 ENCOUNTER — TRANSCRIPTION ENCOUNTER (OUTPATIENT)
Age: 88
End: 2023-02-23

## 2023-03-01 ENCOUNTER — APPOINTMENT (OUTPATIENT)
Dept: ORTHOPEDIC SURGERY | Facility: ASSISTED LIVING FACILITY | Age: 88
End: 2023-03-01
Payer: MEDICARE

## 2023-03-01 ENCOUNTER — APPOINTMENT (OUTPATIENT)
Dept: MEDICATION MANAGEMENT | Facility: CLINIC | Age: 88
End: 2023-03-01

## 2023-03-01 PROCEDURE — 99024 POSTOP FOLLOW-UP VISIT: CPT

## 2023-03-02 ENCOUNTER — TRANSCRIPTION ENCOUNTER (OUTPATIENT)
Age: 88
End: 2023-03-02

## 2023-03-08 ENCOUNTER — APPOINTMENT (OUTPATIENT)
Dept: MEDICATION MANAGEMENT | Facility: CLINIC | Age: 88
End: 2023-03-08

## 2023-03-09 ENCOUNTER — APPOINTMENT (OUTPATIENT)
Dept: ORTHOPEDIC SURGERY | Facility: CLINIC | Age: 88
End: 2023-03-09

## 2023-03-09 ENCOUNTER — TRANSCRIPTION ENCOUNTER (OUTPATIENT)
Age: 88
End: 2023-03-09

## 2023-03-12 NOTE — ED PROVIDER NOTE - CARE PLAN
Pt presents to ED c.o of leg swelling. Pt sts hx of DVT and + blood thinners on warfarin. PT sts pain and swelling to bilateral legs. PT denies CP, SOB, n/v  
Principal Discharge DX:	Hip fracture

## 2023-03-14 NOTE — DISCUSSION/SUMMARY
Chief Complaint   Patient presents with    Medication Refill     Last Appointment with Dr. Ever Daniel:  3/13/2023  No future appointments. [FreeTextEntry1] : The pt had CABG mechanical AVR 2003 on 5/14 palpitations found in V tach. IV amiodarone need temp pacemaker. Cath RCA PO. V tach secondary ischemia. Event monitor no VT. Now off amiodarone. Palpitations resolved. .Now off xanax. Echo 1017 mild pulm htn mild MR. off plavix. Now on asa 81mg not drive arthritis. She needs access a ride told resume exercise.She gained  3 lbs. She does not want stress test.Fx right shoulder 11/16. Refused shoulder surgery. go rehab.Dizzy get up resolved. She 6/21 10 teeth pulled. On Lovenox bled mouth. Got 2 units blood.  She had THR 7/16/21. Then developed afib.Will  taper stop midodrine . If still HA . Will need stop cardizem resume valsartan. . If more afib amiodarone

## 2023-03-15 ENCOUNTER — APPOINTMENT (OUTPATIENT)
Dept: MEDICATION MANAGEMENT | Facility: CLINIC | Age: 88
End: 2023-03-15

## 2023-03-16 ENCOUNTER — TRANSCRIPTION ENCOUNTER (OUTPATIENT)
Age: 88
End: 2023-03-16

## 2023-03-22 ENCOUNTER — APPOINTMENT (OUTPATIENT)
Dept: MEDICATION MANAGEMENT | Facility: CLINIC | Age: 88
End: 2023-03-22

## 2023-03-28 ENCOUNTER — APPOINTMENT (OUTPATIENT)
Dept: ORTHOPEDIC SURGERY | Facility: CLINIC | Age: 88
End: 2023-03-28

## 2023-03-28 ENCOUNTER — APPOINTMENT (OUTPATIENT)
Dept: ORTHOPEDIC SURGERY | Facility: ASSISTED LIVING FACILITY | Age: 88
End: 2023-03-28
Payer: MEDICARE

## 2023-03-28 PROCEDURE — 99308 SBSQ NF CARE LOW MDM 20: CPT

## 2023-03-29 ENCOUNTER — APPOINTMENT (OUTPATIENT)
Dept: MEDICATION MANAGEMENT | Facility: CLINIC | Age: 88
End: 2023-03-29

## 2023-03-31 ENCOUNTER — EMERGENCY (EMERGENCY)
Facility: HOSPITAL | Age: 88
LOS: 0 days | Discharge: NURSING, REHAB OR LONG-TERM CARE | End: 2023-04-01
Attending: STUDENT IN AN ORGANIZED HEALTH CARE EDUCATION/TRAINING PROGRAM
Payer: MEDICARE

## 2023-03-31 VITALS
DIASTOLIC BLOOD PRESSURE: 78 MMHG | SYSTOLIC BLOOD PRESSURE: 189 MMHG | RESPIRATION RATE: 18 BRPM | OXYGEN SATURATION: 98 % | HEART RATE: 66 BPM

## 2023-03-31 VITALS — TEMPERATURE: 97 F

## 2023-03-31 DIAGNOSIS — M25.571 PAIN IN RIGHT ANKLE AND JOINTS OF RIGHT FOOT: ICD-10-CM

## 2023-03-31 DIAGNOSIS — Z87.39 PERSONAL HISTORY OF OTHER DISEASES OF THE MUSCULOSKELETAL SYSTEM AND CONNECTIVE TISSUE: ICD-10-CM

## 2023-03-31 DIAGNOSIS — Z88.0 ALLERGY STATUS TO PENICILLIN: ICD-10-CM

## 2023-03-31 DIAGNOSIS — I10 ESSENTIAL (PRIMARY) HYPERTENSION: ICD-10-CM

## 2023-03-31 DIAGNOSIS — Z95.4 PRESENCE OF OTHER HEART-VALVE REPLACEMENT: ICD-10-CM

## 2023-03-31 DIAGNOSIS — Z96.641 PRESENCE OF RIGHT ARTIFICIAL HIP JOINT: ICD-10-CM

## 2023-03-31 DIAGNOSIS — Z20.822 CONTACT WITH AND (SUSPECTED) EXPOSURE TO COVID-19: ICD-10-CM

## 2023-03-31 DIAGNOSIS — I48.91 UNSPECIFIED ATRIAL FIBRILLATION: ICD-10-CM
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
## 2023-03-31 DIAGNOSIS — M81.0 AGE-RELATED OSTEOPOROSIS WITHOUT CURRENT PATHOLOGICAL FRACTURE: ICD-10-CM

## 2023-03-31 DIAGNOSIS — F41.9 ANXIETY DISORDER, UNSPECIFIED: ICD-10-CM

## 2023-03-31 DIAGNOSIS — Z95.1 PRESENCE OF AORTOCORONARY BYPASS GRAFT: ICD-10-CM

## 2023-03-31 DIAGNOSIS — F32.A DEPRESSION, UNSPECIFIED: ICD-10-CM

## 2023-03-31 DIAGNOSIS — Z91.018 ALLERGY TO OTHER FOODS: ICD-10-CM

## 2023-03-31 DIAGNOSIS — I25.10 ATHEROSCLEROTIC HEART DISEASE OF NATIVE CORONARY ARTERY WITHOUT ANGINA PECTORIS: ICD-10-CM

## 2023-03-31 DIAGNOSIS — Z96.649 PRESENCE OF UNSPECIFIED ARTIFICIAL HIP JOINT: Chronic | ICD-10-CM

## 2023-03-31 DIAGNOSIS — E78.00 PURE HYPERCHOLESTEROLEMIA, UNSPECIFIED: ICD-10-CM

## 2023-03-31 DIAGNOSIS — R19.7 DIARRHEA, UNSPECIFIED: ICD-10-CM

## 2023-03-31 DIAGNOSIS — Z95.2 PRESENCE OF PROSTHETIC HEART VALVE: Chronic | ICD-10-CM

## 2023-03-31 DIAGNOSIS — Z79.82 LONG TERM (CURRENT) USE OF ASPIRIN: ICD-10-CM
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
## 2023-03-31 DIAGNOSIS — Z79.01 LONG TERM (CURRENT) USE OF ANTICOAGULANTS: ICD-10-CM

## 2023-03-31 DIAGNOSIS — Z98.890 OTHER SPECIFIED POSTPROCEDURAL STATES: ICD-10-CM

## 2023-03-31 LAB
ALBUMIN SERPL ELPH-MCNC: 3.7 G/DL — SIGNIFICANT CHANGE UP (ref 3.5–5.2)
ALP SERPL-CCNC: 44 U/L — SIGNIFICANT CHANGE UP (ref 30–115)
ALT FLD-CCNC: 10 U/L — SIGNIFICANT CHANGE UP (ref 0–41)
ANION GAP SERPL CALC-SCNC: 7 MMOL/L — SIGNIFICANT CHANGE UP (ref 7–14)
APTT BLD: 30.9 SEC — SIGNIFICANT CHANGE UP (ref 27–39.2)
AST SERPL-CCNC: 32 U/L — SIGNIFICANT CHANGE UP (ref 0–41)
BASOPHILS # BLD AUTO: 0.03 K/UL — SIGNIFICANT CHANGE UP (ref 0–0.2)
BASOPHILS NFR BLD AUTO: 0.4 % — SIGNIFICANT CHANGE UP (ref 0–1)
BILIRUB SERPL-MCNC: 0.4 MG/DL — SIGNIFICANT CHANGE UP (ref 0.2–1.2)
BUN SERPL-MCNC: 12 MG/DL — SIGNIFICANT CHANGE UP (ref 10–20)
CALCIUM SERPL-MCNC: 8.8 MG/DL — SIGNIFICANT CHANGE UP (ref 8.4–10.5)
CHLORIDE SERPL-SCNC: 94 MMOL/L — LOW (ref 98–110)
CO2 SERPL-SCNC: 28 MMOL/L — SIGNIFICANT CHANGE UP (ref 17–32)
CREAT SERPL-MCNC: 0.6 MG/DL — LOW (ref 0.7–1.5)
EGFR: 87 ML/MIN/1.73M2 — SIGNIFICANT CHANGE UP
EOSINOPHIL # BLD AUTO: 0.06 K/UL — SIGNIFICANT CHANGE UP (ref 0–0.7)
EOSINOPHIL NFR BLD AUTO: 0.7 % — SIGNIFICANT CHANGE UP (ref 0–8)
GLUCOSE SERPL-MCNC: 104 MG/DL — HIGH (ref 70–99)
HCT VFR BLD CALC: 36.3 % — LOW (ref 37–47)
HGB BLD-MCNC: 11.6 G/DL — LOW (ref 12–16)
IMM GRANULOCYTES NFR BLD AUTO: 0.4 % — HIGH (ref 0.1–0.3)
INR BLD: 2.5 RATIO — HIGH (ref 0.65–1.3)
LACTATE SERPL-SCNC: 1.2 MMOL/L — SIGNIFICANT CHANGE UP (ref 0.7–2)
LIDOCAIN IGE QN: 36 U/L — SIGNIFICANT CHANGE UP (ref 7–60)
LYMPHOCYTES # BLD AUTO: 1.35 K/UL — SIGNIFICANT CHANGE UP (ref 1.2–3.4)
LYMPHOCYTES # BLD AUTO: 16 % — LOW (ref 20.5–51.1)
MCHC RBC-ENTMCNC: 28.6 PG — SIGNIFICANT CHANGE UP (ref 27–31)
MCHC RBC-ENTMCNC: 32 G/DL — SIGNIFICANT CHANGE UP (ref 32–37)
MCV RBC AUTO: 89.6 FL — SIGNIFICANT CHANGE UP (ref 81–99)
MONOCYTES # BLD AUTO: 0.51 K/UL — SIGNIFICANT CHANGE UP (ref 0.1–0.6)
MONOCYTES NFR BLD AUTO: 6 % — SIGNIFICANT CHANGE UP (ref 1.7–9.3)
NEUTROPHILS # BLD AUTO: 6.45 K/UL — SIGNIFICANT CHANGE UP (ref 1.4–6.5)
NEUTROPHILS NFR BLD AUTO: 76.5 % — HIGH (ref 42.2–75.2)
NRBC # BLD: 0 /100 WBCS — SIGNIFICANT CHANGE UP (ref 0–0)
PLATELET # BLD AUTO: 184 K/UL — SIGNIFICANT CHANGE UP (ref 130–400)
POTASSIUM SERPL-MCNC: 5.3 MMOL/L — HIGH (ref 3.5–5)
POTASSIUM SERPL-SCNC: 5.3 MMOL/L — HIGH (ref 3.5–5)
PROT SERPL-MCNC: 6 G/DL — SIGNIFICANT CHANGE UP (ref 6–8)
PROTHROM AB SERPL-ACNC: 29.3 SEC — HIGH (ref 9.95–12.87)
RBC # BLD: 4.05 M/UL — LOW (ref 4.2–5.4)
RBC # FLD: 14 % — SIGNIFICANT CHANGE UP (ref 11.5–14.5)
SARS-COV-2 RNA SPEC QL NAA+PROBE: SIGNIFICANT CHANGE UP
SODIUM SERPL-SCNC: 129 MMOL/L — LOW (ref 135–146)
TROPONIN T SERPL-MCNC: <0.01 NG/ML — SIGNIFICANT CHANGE UP
WBC # BLD: 8.43 K/UL — SIGNIFICANT CHANGE UP (ref 4.8–10.8)
WBC # FLD AUTO: 8.43 K/UL — SIGNIFICANT CHANGE UP (ref 4.8–10.8)

## 2023-03-31 PROCEDURE — 73610 X-RAY EXAM OF ANKLE: CPT | Mod: RT

## 2023-03-31 PROCEDURE — 73610 X-RAY EXAM OF ANKLE: CPT | Mod: 26,RT

## 2023-03-31 PROCEDURE — 72170 X-RAY EXAM OF PELVIS: CPT

## 2023-03-31 PROCEDURE — 85730 THROMBOPLASTIN TIME PARTIAL: CPT

## 2023-03-31 PROCEDURE — 36415 COLL VENOUS BLD VENIPUNCTURE: CPT

## 2023-03-31 PROCEDURE — 83690 ASSAY OF LIPASE: CPT

## 2023-03-31 PROCEDURE — 71045 X-RAY EXAM CHEST 1 VIEW: CPT | Mod: 26

## 2023-03-31 PROCEDURE — 80053 COMPREHEN METABOLIC PANEL: CPT

## 2023-03-31 PROCEDURE — 85610 PROTHROMBIN TIME: CPT

## 2023-03-31 PROCEDURE — 85025 COMPLETE CBC W/AUTO DIFF WBC: CPT

## 2023-03-31 PROCEDURE — 87635 SARS-COV-2 COVID-19 AMP PRB: CPT

## 2023-03-31 PROCEDURE — 83605 ASSAY OF LACTIC ACID: CPT

## 2023-03-31 PROCEDURE — 72125 CT NECK SPINE W/O DYE: CPT | Mod: 26,MA

## 2023-03-31 PROCEDURE — 84484 ASSAY OF TROPONIN QUANT: CPT

## 2023-03-31 PROCEDURE — 72170 X-RAY EXAM OF PELVIS: CPT | Mod: 26

## 2023-03-31 PROCEDURE — 71045 X-RAY EXAM CHEST 1 VIEW: CPT

## 2023-03-31 PROCEDURE — 70450 CT HEAD/BRAIN W/O DYE: CPT | Mod: 26,MA

## 2023-03-31 PROCEDURE — 74177 CT ABD & PELVIS W/CONTRAST: CPT | Mod: 26,MA

## 2023-03-31 PROCEDURE — 70450 CT HEAD/BRAIN W/O DYE: CPT | Mod: MA

## 2023-03-31 PROCEDURE — 72125 CT NECK SPINE W/O DYE: CPT | Mod: MA

## 2023-03-31 PROCEDURE — 71260 CT THORAX DX C+: CPT | Mod: 26,MA

## 2023-03-31 PROCEDURE — 93005 ELECTROCARDIOGRAM TRACING: CPT

## 2023-03-31 PROCEDURE — 74177 CT ABD & PELVIS W/CONTRAST: CPT | Mod: MA

## 2023-03-31 PROCEDURE — 93010 ELECTROCARDIOGRAM REPORT: CPT

## 2023-03-31 PROCEDURE — 71260 CT THORAX DX C+: CPT | Mod: MA

## 2023-03-31 PROCEDURE — 99285 EMERGENCY DEPT VISIT HI MDM: CPT | Mod: 25

## 2023-03-31 PROCEDURE — 99285 EMERGENCY DEPT VISIT HI MDM: CPT | Mod: FS

## 2023-03-31 RX ORDER — ACETAMINOPHEN 500 MG
650 TABLET ORAL ONCE
Refills: 0 | Status: COMPLETED | OUTPATIENT
Start: 2023-03-31 | End: 2023-03-31

## 2023-03-31 RX ADMIN — Medication 650 MILLIGRAM(S): at 21:57

## 2023-03-31 NOTE — ED ADULT NURSE NOTE - NSFALLRSKHRMRISKTYPE_ED_ALL_ED
Called patient to schedule appointment. Left a voice message requesting a call back. age(85 years old or older)

## 2023-03-31 NOTE — ED PROVIDER NOTE - ATTENDING APP SHARED VISIT CONTRIBUTION OF CARE
7-year-old female interviewed via [place]an translation sent from nursing home for evaluation, patient reports she had a large episode of diarrhea, went to get a new diaper and lost her balance and fell onto her buttocks striking her right ankle, complains of pain to same, denies head strike or LOC, is on Coumadin, on exam vitals appreciated, well-appearing, head NC/AT, PERRLA, neck supple no CT LS midline TTP, cor regular with 2 out of 6 systolic ejection murmur, lungs CTA, abdomen with normal active bowel sounds, soft nontender nondistended, pelvis stable, full range of motion x4 with minimal tenderness to the medial right ankle, neurovascularly intact, given age and anticoagulation will check imaging 87-year-old female interviewed via Hungarian translation sent from nursing home for evaluation, patient reports she had a large episode of diarrhea, went to get a new diaper and lost her balance and fell onto her buttocks striking her right ankle, complains of pain to same, denies head strike or LOC, is on Coumadin, on exam vitals appreciated, well-appearing, head NC/AT, PERRLA, neck supple no CT LS midline TTP, cor regular with 2 out of 6 systolic ejection murmur, lungs CTA, abdomen with normal active bowel sounds, soft nontender nondistended, pelvis stable, full range of motion x4 with minimal tenderness to the medial right ankle, neurovascularly intact, given age and anticoagulation will check imaging

## 2023-03-31 NOTE — ED ADULT NURSE NOTE - OBJECTIVE STATEMENT
pt complaining of falling today while trying to remove diaper  "I fell on my backside"  pt on coumadin   pt complaining of pain to right ankle and back      services used, pt [place]an speaking   [place], ID# [id number]

## 2023-03-31 NOTE — ED PROVIDER NOTE - PHYSICAL EXAMINATION
Physical Exam    Constitutional: No acute distress.   Eyes: Conjunctiva pink, Sclera clear, PERRLA, EOMI.  ENT: No sinus tenderness. No nasal discharge. No oropharyngeal erythema, edema, or exudates. Uvula midline.   Cardiovascular: Regular rate, regular rhythm. ejection murmur noted.  Respiratory: unlabored respiratory effort, clear to auscultation bilaterally no wheezing, rales or rhonchi  Gastrointestinal: Normal bowel sounds. soft, non distended, non-tender abdomen.   Musculoskeletal: supple neck, no midline tenderness. No joint or bony deformity.   Integumentary: warm, dry, no rash  Neurologic: awake, alert, cranial nerves II-XII grossly intact, extremities’ motor and sensory functions grossly intact  Psychiatric: appropriate mood, appropriate affect

## 2023-03-31 NOTE — ED ADULT TRIAGE NOTE - CHIEF COMPLAINT QUOTE
BIBA via [facility] from NH-  EMS Reports that patient had an unwitnessed fall, NO head injury, NO LOC, NH RN told ems that patient was complaining of pain to the right ankle

## 2023-03-31 NOTE — ED PROVIDER NOTE - NSFOLLOWUPINSTRUCTIONS_ED_ALL_ED_FT
Fall Prevention in the Home, Adult  Falls can cause injuries and can affect people from all age groups. There are many simple things that you can do to make your home safe and to help prevent falls. Ask for help when making these changes, if needed.    What actions can I take to prevent falls?  General instructions     Use good lighting in all rooms. Replace any light bulbs that burn out.  Turn on lights if it is dark. Use night-lights.  Place frequently used items in easy-to-reach places. Lower the shelves around your home if necessary.  Set up furniture so that there are clear paths around it. Avoid moving your furniture around.  Remove throw rugs and other tripping hazards from the floor.  Avoid walking on wet floors.  Fix any uneven floor surfaces.  Add color or contrast paint or tape to grab bars and handrails in your home. Place contrasting color strips on the first and last steps of stairways.  When you use a stepladder, make sure that it is completely opened and that the sides are firmly locked. Have someone hold the ladder while you are using it. Do not climb a closed stepladder.  Be aware of any and all pets.  What can I do in the bathroom?     Keep the floor dry. Immediately clean up any water that spills onto the floor.  Remove soap buildup in the tub or shower on a regular basis.  Use non-skid mats or decals on the floor of the tub or shower.  Attach bath mats securely with double-sided, non-slip rug tape.  If you need to sit down while you are in the shower, use a plastic, non-slip stool.  Image ImageInstall grab bars by the toilet and in the tub and shower. Do not use towel bars as grab bars.  What can I do in the bedroom?     Make sure that a bedside light is easy to reach.  Do not use oversized bedding that drapes onto the floor.  Have a firm chair that has side arms to use for getting dressed.  What can I do in the kitchen?     Clean up any spills right away.  If you need to reach for something above you, use a sturdy step stool that has a grab bar.  Keep electrical cables out of the way.  Do not use floor polish or wax that makes floors slippery. If you must use wax, make sure that it is non-skid floor wax.  What can I do in the stairways?     Do not leave any items on the stairs.  Make sure that you have a light switch at the top of the stairs and the bottom of the stairs. Have them installed if you do not have them.  Make sure that there are handrails on both sides of the stairs. Fix handrails that are broken or loose. Make sure that handrails are as long as the stairways.  Install non-slip stair treads on all stairs in your home.  Avoid having throw rugs at the top or bottom of stairways, or secure the rugs with carpet tape to prevent them from moving.  Choose a carpet design that does not hide the edge of steps on the stairway.  Check any carpeting to make sure that it is firmly attached to the stairs. Fix any carpet that is loose or worn.  What can I do on the outside of my home?     Use bright outdoor lighting.  Regularly repair the edges of walkways and driveways and fix any cracks.  Remove high doorway thresholds.  Trim any shrubbery on the main path into your home.  Regularly check that handrails are securely fastened and in good repair. Both sides of any steps should have handrails.  Install guardrails along the edges of any raised decks or porches.  Clear walkways of debris and clutter, including tools and rocks.  Have leaves, snow, and ice cleared regularly.  Use sand or salt on walkways during winter months.  In the garage, clean up any spills right away, including grease or oil spills.  What other actions can I take?     Wear closed-toe shoes that fit well and support your feet. Wear shoes that have rubber soles or low heels.  Use mobility aids as needed, such as canes, walkers, scooters, and crutches.  Review your medicines with your health care provider. Some medicines can cause dizziness or changes in blood pressure, which increase your risk of falling.  Talk with your health care provider about other ways that you can decrease your risk of falls. This may include working with a physical therapist or  to improve your strength, balance, and endurance.    Where to find more information  Centers for Disease Control and Prevention, [place]: https://www.cdc.gov  National [place] on Aging: https://ke0ogza.natasha.nih.gov  Contact a health care provider if:  You are afraid of falling at home.  You feel weak, drowsy, or dizzy at home.  You fall at home.  Summary  There are many simple things that you can do to make your home safe and to help prevent falls.  Ways to make your home safe include removing tripping hazards and installing grab bars in the bathroom.  Ask for help when making these changes in your home.  This information is not intended to replace advice given to you by your health care provider. Make sure you discuss any questions you have with your health care provider.

## 2023-03-31 NOTE — ED PROVIDER NOTE - OBJECTIVE STATEMENT
87 year old female Bolivian translation with a pmh of HTN, HDL, CAD s/p CABG, Aortic valve replacement (Coumadin), Osteoporosis,  S/p L ORIF 1/4 currently resides at [facility] for rehabilitation presents to the ED for evaluation after fall. Patient reports she had a large episode of diarrhea, went to get a new diaper and lost her balance and fell onto her buttocks. During the fall she hit her right ankle now causing some pain, no significant deformity. Denies head strike and LOC however on coumadin. Denies fever, chills, chest pain, shortness of breath, abdominal pain, nausea, vomiting, diarrhea, constipation, dysuria, hematuria, lower extremity swelling, rash.

## 2023-03-31 NOTE — ED PROVIDER NOTE - PATIENT PORTAL LINK FT
You can access the FollowMyHealth Patient Portal offered by [facility] by registering at the following website: [email or web address]. By joining [facility]’s FollowMyHealth portal, you will also be able to view your health information using other applications (apps) compatible with our system.

## 2023-03-31 NOTE — ED PROVIDER NOTE - CLINICAL SUMMARY MEDICAL DECISION MAKING FREE TEXT BOX
Patient sent for evaluation after unwitnessed fall, exam as above, labs and studies appreciated, will discharge back to nursing home

## 2023-04-05 ENCOUNTER — APPOINTMENT (OUTPATIENT)
Dept: MEDICATION MANAGEMENT | Facility: CLINIC | Age: 88
End: 2023-04-05

## 2023-04-12 ENCOUNTER — APPOINTMENT (OUTPATIENT)
Dept: MEDICATION MANAGEMENT | Facility: CLINIC | Age: 88
End: 2023-04-12

## 2023-04-19 ENCOUNTER — APPOINTMENT (OUTPATIENT)
Dept: MEDICATION MANAGEMENT | Facility: CLINIC | Age: 88
End: 2023-04-19

## 2023-04-25 ENCOUNTER — APPOINTMENT (OUTPATIENT)
Dept: ORTHOPEDIC SURGERY | Facility: ASSISTED LIVING FACILITY | Age: 88
End: 2023-04-25
Payer: MEDICARE

## 2023-04-25 PROCEDURE — 99308 SBSQ NF CARE LOW MDM 20: CPT

## 2023-04-26 ENCOUNTER — APPOINTMENT (OUTPATIENT)
Dept: MEDICATION MANAGEMENT | Facility: CLINIC | Age: 88
End: 2023-04-26

## 2023-04-26 ENCOUNTER — APPOINTMENT (OUTPATIENT)
Dept: CARDIOLOGY | Facility: CLINIC | Age: 88
End: 2023-04-26

## 2023-04-26 ENCOUNTER — NON-APPOINTMENT (OUTPATIENT)
Age: 88
End: 2023-04-26

## 2023-04-26 ENCOUNTER — APPOINTMENT (OUTPATIENT)
Dept: CARDIOLOGY | Facility: CLINIC | Age: 88
End: 2023-04-26
Payer: MEDICARE

## 2023-04-26 VITALS
DIASTOLIC BLOOD PRESSURE: 80 MMHG | HEART RATE: 55 BPM | SYSTOLIC BLOOD PRESSURE: 124 MMHG | BODY MASS INDEX: 19.61 KG/M2 | HEIGHT: 66 IN | OXYGEN SATURATION: 95 % | WEIGHT: 122 LBS

## 2023-04-26 DIAGNOSIS — Z51.81 ENCOUNTER FOR THERAPEUTIC DRUG LVL MONITORING: ICD-10-CM

## 2023-04-26 DIAGNOSIS — Z79.01 ENCOUNTER FOR THERAPEUTIC DRUG LVL MONITORING: ICD-10-CM

## 2023-04-26 PROCEDURE — 99214 OFFICE O/P EST MOD 30 MIN: CPT

## 2023-04-26 NOTE — PHYSICAL EXAM
[S3] : no S3 [S4] : no S4 [Right Carotid Bruit] : no bruit heard over the right carotid [Left Carotid Bruit] : no bruit heard over the left carotid [de-identified] : Looks fatigued [de-identified] : In w/c non weight baring except for PT per pt [de-identified] : trace edema bila much improved + venous stasis [de-identified] : Cries often, looks sad

## 2023-04-26 NOTE — DISCUSSION/SUMMARY
[FreeTextEntry1] : The pt had CABG mechanical AVR 2003 on 5/14 palpitations found in V tach. IV amiodarone need temp pacemaker. Cath RCA PO. V tach secondary ischemia. Event monitor no VT. Now off amiodarone. Palpitations resolved.  Echo 1017 mild pulm htn mild MR. off plavix. Now on asa 81mg not drive arthritis. She needs access a ride told resume exercise. She does not want stress test.Fx right shoulder 11/16. Refused shoulder surgery. go rehab.Dizzy get up resolved.  6/21 10 teeth pulled. On Lovenox bled mouth. received 2 units blood.  She had THR 7/16/21. Then developed afib. BP dropped now  off diovan only on atenolol.Will  taper  midodrine .  She lost 5 pounds.  MCOT 2 week. No afib.. SVT vs v tach. Not drink boost it has vitamin k. Pt had teeth done in [facility]. \par The pt had mechanical fall in NH admitted [facility] 1/3, Disch 1/9/23. Pt had Left  ORIF 1/4. Pt had episode of hypotension and  A fib. Had  HGB decreased to 7 and tx 2 untis of PC. Pt also fx Left shoulder.  Pt remains on coumadin\par Pt presents here 1/25/23 from [facility] in w/c. . Reportedly her BP is elevated intermittently. I know this pt for many years and in the past she has had intermittent hypertensive episodes always related to anxiety and depressive episodes. She was taking alprazolam 3x daily then as well as diovan 320 mg daily . She may benefit from more acute management of her anxiety and depression. Her BP in office today is 110/60 and heart rate is NSR 60/min .At this present time I am hesitant to add an additional antihypertensive. She had an episode of reported A fib last hospitalization . She requires continued anticoagulation at INR of  2.5--3.5 . Will continue atenolol 25mg twice daily. Will consider a holter monitor for reports of palpitations or documented  atrial fib. Progressive ambulation per physical therapy  f/u 3mths \par \par 4/26/23 Pt returns to office, denies dizziness or palpitations. BP is stable . Will continue present mgmt. Need echo to be done in June. Will order . Obtain labs from NH. Continue present mgmt. Need to increase activity as tolerated.

## 2023-04-26 NOTE — HISTORY OF PRESENT ILLNESS
[FreeTextEntry1] : The pt had mechanical fall in NH admitted [facility] 1/3, Disch 1/9/23. Pt had L ORIF 1/4. Pt had episode of hypotension and  A fib. Had  HGB decreased to 7 and tx 2 untis of PC. Pt also fx Left shoulder.  Pt remains on coumadin\par The pt has h/o HTN, HLD CABG, mechanical AVR 2003 on 5/14 palpitations found in V tach. IV amiodarone need temp pacemaker. Cath RCA PO. V tach secondary ischemia. Event monitor no VT. Now off amiodarone. 10/18/21 Holter No AF had SVT/VT 19 beats\par She had the dental work done in [place] she had had 10 teeth pulled  and feeling better. She has h/o bleeding on lovenox and required 2 units of PC . Denies chest pain SOB palpitations dizziness or syncope. \par Pt here for f/u 4/26/23 Denies chest pain palpitations dizziness . Feels well. In w/c Using Samoan

## 2023-04-26 NOTE — REASON FOR VISIT
[Time Spent: ____ minutes] : Total time spent using  services: [unfilled] minutes. The patient's primary language is not English thus required  services. [Interpreters_IDNumber] : [id number] [Interpreters_FullName] : [place] [TWNoteComboBox1] : South African [FreeTextEntry1] : Here  for f/u from Suellen GUADARRAMA

## 2023-04-26 NOTE — REVIEW OF SYSTEMS
[Fever] : no fever [Chills] : no chills [Blurry Vision] : no blurred vision [Earache] : no earache [Hearing Loss] : no hearing loss [Sinus Pressure] : no sinus pressure [SOB] : no shortness of breath [Chest Discomfort] : no chest discomfort [Palpitations] : no palpitations [Cough] : no cough [Wheezing] : no wheezing [Abdominal Pain] : no abdominal pain [Vomiting] : no vomiting [Constipation] : no constipation [Dysuria] : no dysuria [Rash] : no rash [Skin Lesions] : no skin lesions [Dizziness] : no dizziness [Confusion] : no confusion was observed [Swollen Glands] : no swollen glands [FreeTextEntry9] : Left shoulder in sling , pain in left hip Pt in w/c  only walks with PT

## 2023-07-13 ENCOUNTER — APPOINTMENT (OUTPATIENT)
Dept: CV DIAGNOSITCS | Facility: HOSPITAL | Age: 88
End: 2023-07-13

## 2023-07-19 NOTE — ED PROCEDURE NOTE - CPROC ED INFORMED CONSENT1
Benefits, risks, and possible complications of procedure explained to patient/caregiver who verbalized understanding and gave verbal consent. General

## 2023-07-24 PROBLEM — Z86.79 HISTORY OF VENTRICULAR TACHYCARDIA: Status: RESOLVED | Noted: 2021-06-30 | Resolved: 2023-07-24

## 2023-07-26 ENCOUNTER — APPOINTMENT (OUTPATIENT)
Dept: CARDIOLOGY | Facility: CLINIC | Age: 88
End: 2023-07-26
Payer: MEDICARE

## 2023-07-26 VITALS
HEART RATE: 60 BPM | DIASTOLIC BLOOD PRESSURE: 62 MMHG | HEIGHT: 66 IN | BODY MASS INDEX: 18.64 KG/M2 | SYSTOLIC BLOOD PRESSURE: 162 MMHG | WEIGHT: 116 LBS | OXYGEN SATURATION: 94 %

## 2023-07-26 VITALS — SYSTOLIC BLOOD PRESSURE: 142 MMHG | DIASTOLIC BLOOD PRESSURE: 62 MMHG

## 2023-07-26 DIAGNOSIS — F41.8 OTHER SPECIFIED ANXIETY DISORDERS: ICD-10-CM

## 2023-07-26 DIAGNOSIS — Z86.79 PERSONAL HISTORY OF OTHER DISEASES OF THE CIRCULATORY SYSTEM: ICD-10-CM

## 2023-07-26 PROCEDURE — 99213 OFFICE O/P EST LOW 20 MIN: CPT

## 2023-07-26 RX ORDER — STANDARDIZED SENNA CONCENTRATE 8.6 MG/1
8.6 TABLET ORAL
Refills: 0 | Status: ACTIVE | COMMUNITY
Start: 2023-07-26

## 2023-07-26 RX ORDER — WARFARIN 2.5 MG/1
2.5 TABLET ORAL
Refills: 0 | Status: ACTIVE | COMMUNITY
Start: 2021-02-22

## 2023-07-26 RX ORDER — ATENOLOL 25 MG/1
25 TABLET ORAL TWICE DAILY
Qty: 360 | Refills: 3 | Status: ACTIVE | COMMUNITY
Start: 2021-06-02

## 2023-07-26 NOTE — REASON FOR VISIT
[Arrhythmia/ECG Abnorrmalities] : arrhythmia/ECG abnormalities [Coronary Artery Disease] : coronary artery disease [Family Member] : family member [Other: ______] : provided by BRIAN [Source: ______] : History obtained from [unfilled] [Time Spent: ____ minutes] : Total time spent using  services: [unfilled] minutes. The patient's primary language is not English thus required  services. [Interpreters_IDNumber] : [id number] [Interpreters_FullName] : Darren [TWNoteComboBox1] : Kosovan [FreeTextEntry1] : Here  for f/u from Suellen GUADARRAMA

## 2023-07-26 NOTE — HISTORY OF PRESENT ILLNESS
[FreeTextEntry1] : The pt had mechanical fall in NH admitted [facility] 1/3, Disch 1/9/23. Pt had L ORIF 1/4. Pt had episode of hypotension and  A fib. Had  HGB decreased to 7 and tx 2 untis of PC. Pt also fx Left shoulder.  Pt remains on coumadin\par The pt has h/o HTN, HLD CABG, mechanical AVR 2003 on 5/14 palpitations found in V tach. IV amiodarone need temp pacemaker. Cath RCA PO. V tach secondary ischemia. Event monitor no VT. Now off amiodarone. 10/18/21 Holter No AF had SVT/VT 19 beats\par She had the dental work done in [place] she had had 10 teeth pulled  and feeling better. She has h/o bleeding on lovenox and required 2 units of PC . Denies chest pain SOB palpitations dizziness or syncope. \par Pt here for f/u 7/26/23 Denies chest pain palpitations dizziness . Feels well. In w/c Using Kazakh

## 2023-07-26 NOTE — REVIEW OF SYSTEMS
[Weight Loss (___ Lbs)] : [unfilled] ~Ulb weight loss [Joint Pain] : joint pain [Shoulder Pain] : shoulder pain [Hand Pain] : hand pain [Knee Pain] : knee pain [Lower Back Pain] : lower back pain [Fever] : no fever [Chills] : no chills [Blurry Vision] : no blurred vision [Earache] : no earache [Hearing Loss] : no hearing loss [Sinus Pressure] : no sinus pressure [SOB] : no shortness of breath [Chest Discomfort] : no chest discomfort [Palpitations] : no palpitations [Cough] : no cough [Wheezing] : no wheezing [Abdominal Pain] : no abdominal pain [Vomiting] : no vomiting [Constipation] : no constipation [Dysuria] : no dysuria [Rash] : no rash [Skin Lesions] : no skin lesions [Dizziness] : no dizziness [Confusion] : no confusion was observed [Swollen Glands] : no swollen glands [FreeTextEntry7] : poor appetite [FreeTextEntry9] : Left shoulder in sling , pain in left hip Pt in w/c  only walks with PT

## 2023-07-26 NOTE — DISCUSSION/SUMMARY
[FreeTextEntry1] : The pt had CABG mechanical AVR 2003 on 5/14 palpitations found in V tach. IV amiodarone need temp pacemaker. Cath RCA PO. V tach secondary ischemia. Event monitor no VT. Now off amiodarone. Palpitations resolved.  Echo 1017 mild pulm htn mild MR. off plavix. Now on asa 81mg not drive arthritis. She needs access a ride told resume exercise. She does not want stress test.Fx right shoulder 11/16. Refused shoulder surgery. go rehab.Dizzy get up resolved.  6/21 10 teeth pulled. On Lovenox bled mouth. received 2 units blood.  She had THR 7/16/21. Then developed afib. BP dropped now  off diovan only on atenolol.Will  taper  midodrine .  She lost 5 pounds.  MCOT 2 week. No afib.. SVT vs v tach. Not drink boost it has vitamin k. Pt had teeth done [facility]. \par The pt had mechanical fall in NH admitted [facility] 1/3, Disch 1/9/23. Pt had Left  ORIF 1/4. Pt had episode of hypotension and  A fib. Had  HGB decreased to 7 and tx 2 untis of PC. Pt also fx Left shoulder.  Pt remains on coumadin\par Pt presents here 1/25/23 from [facility] in w/c. . Reportedly her BP is elevated intermittently. I know this pt for many years and in the past she has had intermittent hypertensive episodes always related to anxiety and depressive episodes. She was taking alprazolam 3x daily then as well as diovan 320 mg daily . She may benefit from more acute management of her anxiety and depression. Her BP in office today is 110/60 and heart rate is NSR 60/min .At this present time I am hesitant to add an additional antihypertensive. She had an episode of reported A fib last hospitalization . She requires continued anticoagulation at INR of  2.5--3.5 . Will continue atenolol 25mg twice daily. Will consider a holter monitor for reports of palpitations or documented  atrial fib. Progressive ambulation per physical therapy  f/u 3mths \par \par 4/26/23 Pt returns to office, denies dizziness or palpitations. BP is stable . Will continue present mgmt. Need echo to be done in June. Will order . Obtain labs from NH. Continue present mgmt. Need to increase activity as tolerated.\par \par 726/23 Pt echo done 6/7/22  Without significant change. EF 64%, mechanical AV in proper position mild MR.. She has appt 10/10/23.  She needs routine labs to be done and then sent to us requested in the consult report. She has lost weight has poor appetite states that she is sad and has not seen her daughter who is in [place].  used. for exam today. Pt has much less edema than previously noted. Continue present mgmt.

## 2023-09-27 NOTE — ED ADULT TRIAGE NOTE - MODE OF ARRIVAL
[facility] Podiatry  Progress Note    Harlow Krabbe is a 67year old male. Patient presents with: Follow - Up: Annual diabetic exam. Shoe prescription. No FBS taken today. A1C 6.5 on 9/12. Diabetic Foot Care: Nail care and foot check. HPI:     Patient is a pleasant 72-year-old male who presents to clinic for diabetic foot exam.  He has elongated and thickened nails he sometimes has difficulty trimming on his own. He does have valgus deformity with some arthritic changes to his left foot and ankle. He had cortisone injection in his TN joint in the past which helped his symptoms. He is interested in new diabetic shoes and inserts. His last hemoglobin A1c was 6.5% in 9/12. No other complaints are mentioned. Past medical history, medications, and allergies reviewed. Allergies: Sulfa Antibiotics and Zestril [Lisinopril]   Current Outpatient Medications   Medication Sig Dispense Refill    [START ON 10/5/2023] Dexmethylphenidate HCl ER (FOCALIN XR) 20 MG Oral Capsule SR 24 Hr Take 1 capsule (20 mg total) by mouth daily. 30 capsule 0    [START ON 11/4/2023] Dexmethylphenidate HCl ER (FOCALIN XR) 20 MG Oral Capsule SR 24 Hr Take 1 capsule (20 mg total) by mouth daily. 30 capsule 0    [START ON 12/4/2023] Dexmethylphenidate HCl ER (FOCALIN XR) 20 MG Oral Capsule SR 24 Hr Take 1 capsule (20 mg total) by mouth daily. 30 capsule 0    cyclobenzaprine 5 MG Oral Tab Take 1 tablet (5 mg total) by mouth 3 (three) times daily as needed for Muscle spasms (Lower back pain). 15 tablet 0    sertraline 50 MG Oral Tab Take 1 tablet (50 mg total) by mouth daily. 90 tablet 1    atorvastatin 10 MG Oral Tab Take 1 tablet (10 mg total) by mouth daily. 90 tablet 3    losartan 100 MG Oral Tab Take 1 tablet (100 mg total) by mouth daily. 90 tablet 1    hydroCHLOROthiazide 25 MG Oral Tab Take 1 tablet (25 mg total) by mouth every morning.  90 tablet 1    metFORMIN  MG Oral Tablet 24 Hr Take 1 tablet (750 mg total) by mouth 2 (two) times daily with meals. 180 tablet 1    Dulaglutide (TRULICITY) 3 IN/4.8HS Subcutaneous Solution Pen-injector Inject 3 mg into the skin once a week. 2 mL 3    AMLODIPINE 5 MG Oral Tab TAKE 1 TABLET(5 MG) BY MOUTH DAILY 90 tablet 1    LEVOTHYROXINE 50 MCG Oral Tab TAKE 1 TABLET(50 MCG) BY MOUTH BEFORE BREAKFAST 90 tablet 2    tamsulosin 0.4 MG Oral Cap Take 1 capsule (0.4 mg total) by mouth every evening. Take 1/2 hour following the same meal each day 90 capsule 6    FREESTYLE LANCETS Does not apply Misc TEST ONCE DAILY AS DIRECTED 100 each 3    Glucose Blood (FREESTYLE LITE TEST) In Vitro Strip TEST ONCE DAILY AS DIRECTED 100 strip 3    lidocaine 5 % External Patch PLACE 1 PATCH ONTO THE SKIN DAILY. PUT ON FOR 12 HOURS THEN OFF FOR 12 HOURS 30 patch 5    Probiotic Product (PROBIOTIC PEARLS) Oral Cap Take 1 capsule by mouth daily. Turmeric 500 MG Oral Cap Take 1 capsule by mouth daily. ketoconazole (NIZORAL) 2 % External Cream APPLY A THIN FILM TO AFFECTED AREA(S) AS NEEDED EVERY DAY 60 g 1    Bioflavonoid Products (BIOFLEX) Oral Tab Take 1 tablet by mouth daily. Cholecalciferol (VITAMIN D3) 1000 UNITS Oral Cap Take 1 tablet by mouth daily. Cinnamon 500 MG Oral Cap Take 1 capsule by mouth 2 (two) times daily. Omega-3 Fatty Acids (FISH OIL TRIPLE STRENGTH) 1400 MG Oral Cap Take 1 capsule by mouth daily. Multiple Vitamins-Minerals (CENTRUM SILVER ULTRA MENS) Oral Tab Take 1 tablet by mouth daily. ZYRTEC 10 MG OR TABS Take 1 mg by mouth daily. Past Medical History:   Diagnosis Date    Allergic rhinitis, cause unspecified     Chronic fatigue 8/11/2015    Controlled type 2 diabetes mellitus without complication, without long-term current use of insulin ([address] 12/29/2016    Diabetes ([facility] [address]     Diverticulosis 04/25/2016    Cathryn Fuchs M.D.     Essential hypertension     Fall 08/02/2022    Infective otitis externa, unspecified 11/04/10    Internal hemorrhoids 2016    Stef Thornton M.D. 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     HERNIA SURGERY  08    inguinal    HERNIA SURGERY  10/10    HIP REPLACEMENT SURGERY  93    Total    KNEE ARTHROSCP HARV  11/15/10    Total; Left; Laterality    KNEE REPLACEMENT SURGERY      left TKA by Dr. Ja Buchanan  60    Leg Osteotomy Tibial    OTHER SURGICAL HISTORY      left tibial osteotomy as teen    RADIATION  2022    TONSILLECTOMY      and adenoidectomy as child      Family History   Problem Relation Age of Onset    Colon Cancer Father     Hypertension Mother     Dementia Mother         alzheimers    Other (hypertension) Mother     Ovarian Cancer Sister     Hypertension Maternal Grandmother     Heart Disorder Maternal Grandmother         CAD    Heart Attack Maternal Grandmother     Heart Attack Maternal Grandfather     No Known Problems Son     No Known Problems Son     No Known Problems Son       Social History    Socioeconomic History      Marital status:     Tobacco Use      Smoking status: Former        Packs/day: 1.50        Years: 4.00        Additional pack years: 0.00        Total pack years: 6.00        Types: Cigarettes        Start date: 1968        Quit date: 1973        Years since quittin.3      Smokeless tobacco: Never    Vaping Use      Vaping Use: Never used    Substance and Sexual Activity      Alcohol use: No        Alcohol/week: 0.0 standard drinks of alcohol      Drug use: No    Other Topics      Concerns:         Service: No        Blood Transfusions: No        Caffeine Concern: Yes          0-1 teas daily        Occupational Exposure: No        Hobby Hazards: No        Sleep Concern: No        Stress Concern: No        Weight Concern: No        Special Diet: No        Back Care: No        Exercise: Yes        Bike Helmet: No        Seat Belt: Yes        Self-Exams: No          REVIEW OF SYSTEMS:     Today reviewed systems as documented below  GENERAL HEALTH: feels well otherwise  SKIN: denies any unusual skin lesions or rashes  RESPIRATORY: denies shortness of breath with exertion  CARDIOVASCULAR: denies chest pain on exertion  GI: denies abdominal pain and denies heartburn  NEURO: denies headaches  MUSCULO: Acknowledges arthritis      EXAM:   There were no vitals taken for this visit. GENERAL: well developed, well nourished, in no apparent distress  EXTREMITIES:   1. Integument: Normal skin temperature and turgor  2. Vascular: Dorsalis pedis two out of four bilateral and posterior tibial pulses two out of   four bilateral, capillary refill normal.   3. Musculoskeletal: All muscle groups are graded 5 out of 5 in the foot and ankle. Decreased medial arch height noted. Mild pain on palpation noted to talonavicular joint of left foot. Pain on palpation noted to ankle joint of the left foot. Valgus deformity noted to left foot and ankle. No strength deficits or pain to posterior tibial tendon or TA tendon. Compartments are soft and compressible. 4. Neurological: Normal sharp dull sensation; reflexes normal.      XR WRIST COMPLETE (MIN 3 VIEWS), LEFT (CPT=73110)    Result Date: 9/23/2023  PROCEDURE:  XR WRIST COMPLETE (MIN 3 VIEWS), LEFT (CPT=73110)  TECHNIQUE:  Three views were obtained.   COMPARISON:  JONAH BARRIOS, WRIST (MIN 3 VIEWS), LEFT XRAY, 12/28/2009, 12:08 PM.  INDICATIONS:  left wrist pain, no injury  PATIENT STATED HISTORY: (As transcribed by Technologist)  Patient states left wrist pain and swelling that began last night, September 22, 2023, with no known trauma or injury. FINDINGS: Chronic triquetral fracture with well corticated bone fragment is noted. Marked 1st carpometacarpal osteoarthritic changes. Ulnar styloid fracture fragment is well corticated. IMPRESSION: Multiple chronic fractures are suggested. Marked 1st carpometacarpal osteoarthritic changes. LOCATION:  [facility]   Dictated by (CST): Kang Vasquez MD on 9/23/2023 at 3:14 PM     Finalized by (CST): Kang Vasquez MD on 9/23/2023 at 3:15 PM       [address] (GSS=[id number])    Result Date: 9/20/2023  CONCLUSION:  Based on velocity criteria there is no evidence of hemodynamically significant stenosis at either carotid bifurcation. LOCATION:  [facility]     Dictated by (CST): Lane Hare MD on 9/20/2023 at 11:37 AM     Finalized by (CST): Lane Hare MD on 9/20/2023 at 11:40 AM       CT CALCIUM SCORING    Result Date: 9/19/2023  PROCEDURE:  CT CALCIUM SCORING  COMPARISON:  None. INDICATIONS:  E11.9 Controlled type 2 diabetes mellitus without complication, without long-term current use of insulin ([facility])  TECHNIQUE:  The patient was placed in the supine position on the multidector CT table at [facility].  EKG Gating was used to minimize cardiac motion. Non contrast 2mm axial cross sectional images were obtained in a narrow field of view to display the heart. Dose reduction techniques were used. Dose information is transmitted to the ACR [employer] of Radiology) NRDR ([facility] [address]) which includes the Dose Index Registry. See the Radiologist's over-read for evaluation of non-cardiac and non-vascular structures.   FINDINGS:  CALCIUM SCORING RESULTS (Volume / [place]): LEFT MAIN:    0.00 / 0.00 RCA:      0.00 / 0.00 LAD:      132.32 / 138.04 CIRCUMFLEX:    0.00 / 0.00 TOTAL:      132.32 / 138.04  Your Coronary Artery Calcium Score: 138.04  Clinical Relevance of Coronary Artery Scan Results (may change depending on age and sex of patient.)  Calcium Score  Implication  Risk of Coronary Artery Disease  0  No identifiable plaque  Very low, generally less than 5%  1-10  Minimal identifiable plaque  Very unlikely, less than 10%    Definite, at least mild atherosclerotic plaque  Mild risk, mild coronary atherosclerosis likely  101-400  Definite, at least moderate atherosclerotic plaque  Moderate risk, mild coronary artery disease highly likely,     significant atherosclerosis possible  401-1000  Definite, at least moderate atherosclerotic plaque  High risk, moderate coronary artery disease highly likely  >1000  Definite, moderate to severe atherosclerotic plaque  High risk, moderate to severe coronary artery disease     highly likely     This Patient identified you as their physician, if this is not correct please contact [facility] at [phone] and he will assign this report to another physician. LOCATION:  [facility]   Dictated by (CST): Flor Diaz MD on 9/19/2023 at 2:10 PM     Finalized by (CST): Flor Diaz MD on 9/19/2023 at 2:11 PM       CT CALCIUM SCORING OVER READ    Result Date: 9/6/2023  CONCLUSION:  1. Visualized aspects of the lungs are clear. 2. No acute process identified. 3. Diffuse fatty infiltration of the liver/steatosis. 4. Extensive degenerative disc changes with evidence of bony ankylosis. LOCATION:  [facility]   Dictated by (CST): Angelic Owens DO on 9/06/2023 at 2:44 PM     Finalized by (CST): Angelic Owens DO on 9/06/2023 at 2:45 PM            ASSESSMENT AND PLAN:   Diagnoses and all orders for this visit:    Osteoarthritis of left midfoot    Arthritis of left ankle    Hammer toes of both feet    Diabetic polyneuropathy associated with type 2 diabetes mellitus ([facility] [address]        Plan:     -Patient examined, chart history reviewed.   -Discussed etiology of patient's condition and various treatment options.  -Reviewed prior x-ray findings with patient--extensive arthritic changes noted to midfoot, rear foot, and ankle. -Referral placed for new diabetic shoes and inserts. Also provided referral for [place] brace given patient's ankle deformity and arthritic changes to this joint.  -Sharply debrided nails x10 with nail nipper without incident.  -Discussed importance of proper shoe gear, pedal hygiene, tight glucose control.  -Can follow-up in 2 to 3 months for routine foot care or sooner if any other concerns arise. All questions were answered to satisfaction. The patient indicates understanding of these issues and agrees to the plan.       Derrick Curiel DPM [facility]

## 2023-10-10 ENCOUNTER — APPOINTMENT (OUTPATIENT)
Dept: CV DIAGNOSITCS | Facility: HOSPITAL | Age: 88
End: 2023-10-10

## 2023-10-23 NOTE — PHYSICAL THERAPY INITIAL EVALUATION ADULT - ORIENTATION, REHAB EVAL
I was physically present and participated in this delivery.
oriented to person, place, time and situation

## 2023-12-28 ENCOUNTER — APPOINTMENT (OUTPATIENT)
Dept: CV DIAGNOSITCS | Facility: HOSPITAL | Age: 88
End: 2023-12-28

## 2024-01-01 NOTE — PROGRESS NOTE ADULT - SUBJECTIVE AND OBJECTIVE BOX
Patient is a 85y old  Female who presents with a chief complaint of syncope (07 Jun 2021 13:45)      T(F): 98.2 (06-08-21 @ 07:10), Max: 99.1 (06-07-21 @ 15:10)  HR: 73 (06-08-21 @ 05:00)  BP: 121/56 (06-08-21 @ 05:00)  RR: 21 (06-08-21 @ 07:10)  SpO2: 99% (06-08-21 @ 05:00) (99% - 100%)    PHYSICAL EXAM:  GENERAL: NAD, well-groomed, well-developed  HEAD:  Atraumatic, Normocephalic  EYES: EOMI, PERRLA, conjunctiva and sclera clear  ENMT: No tonsillar erythema, exudates, or enlargement; Moist mucous membranes, Good dentition, No lesions  NECK: Supple, No JVD, Normal thyroid  NERVOUS SYSTEM:  Alert & Oriented X3,  Motor Strength 5/5 B/L upper and lower extremities  CHEST/LUNG: Clear to percussion bilaterally; No rales, rhonchi, wheezing, or rubs  HEART: Regular rate and rhythm; No murmurs, rubs, or gallops  ABDOMEN: Soft, Nontender, Nondistended; Bowel sounds present  EXTREMITIES:   No clubbing, cyanosis, or edema  LYMPH: No lymphadenopathy noted  SKIN: No rashes or lesions    labs  06-07    130<L>  |  99  |  33<H>  ----------------------------<  96  4.5   |  24  |  0.8    Ca    7.8<L>      07 Jun 2021 05:26  Mg     1.7     06-07    TPro  5.2<L>  /  Alb  3.4<L>  /  TBili  0.2  /  DBili  <0.2  /  AST  27  /  ALT  21  /  AlkPhos  51  06-06                          8.1    7.03  )-----------( 154      ( 08 Jun 2021 05:40 )             24.7       PT/INR - ( 08 Jun 2021 05:40 )   PT: 19.10 sec;   INR: 1.66 ratio         PTT - ( 07 Jun 2021 05:26 )  PTT:36.4 sec        ALPRAZolam 0.5 milliGRAM(s) Oral at bedtime PRN  ATENolol  Tablet 25 milliGRAM(s) Oral daily  atorvastatin 20 milliGRAM(s) Oral at bedtime  chlorhexidine 4% Liquid 1 Application(s) Topical <User Schedule>  escitalopram 10 milliGRAM(s) Oral daily  valsartan 160 milliGRAM(s) Oral at bedtime   Patient/Caregiver provided printed discharge information.

## 2024-01-03 ENCOUNTER — APPOINTMENT (OUTPATIENT)
Dept: CARDIOLOGY | Facility: CLINIC | Age: 89
End: 2024-01-03
Payer: MEDICARE

## 2024-01-03 VITALS
WEIGHT: 135 LBS | OXYGEN SATURATION: 96 % | HEIGHT: 66 IN | SYSTOLIC BLOOD PRESSURE: 126 MMHG | HEART RATE: 57 BPM | DIASTOLIC BLOOD PRESSURE: 70 MMHG | BODY MASS INDEX: 21.69 KG/M2

## 2024-01-03 DIAGNOSIS — Z95.2 PRESENCE OF PROSTHETIC HEART VALVE: ICD-10-CM

## 2024-01-03 DIAGNOSIS — I48.91 UNSPECIFIED ATRIAL FIBRILLATION: ICD-10-CM

## 2024-01-03 PROCEDURE — 99213 OFFICE O/P EST LOW 20 MIN: CPT

## 2024-01-03 RX ORDER — ASPIRIN 81 MG/1
81 TABLET ORAL
Refills: 0 | Status: DISCONTINUED | COMMUNITY
End: 2024-01-03

## 2024-01-03 NOTE — HISTORY OF PRESENT ILLNESS
[FreeTextEntry1] : The pt had mechanical fall in [place] admitted [facility] 1/3, Disch 1/9/23. Pt had L ORIF 1/4. Pt had episode of hypotension and  A fib. Had  HGB decreased to 7 and tx 2 untis of PC. Pt also fx Left shoulder.  Pt remains on coumadin The pt has h/o HTN, HLD CABG, mechanical AVR 2003 on 5/14 palpitations found in V tach. IV amiodarone need temp pacemaker. Cath RCA PO. V tach secondary ischemia. Event monitor no VT. Now off amiodarone. 10/18/21 Holter No AF had SVT/VT 19 beats She had the dental work done in [place] she had had 10 teeth pulled  and feeling better. She has h/o bleeding on lovenox and required 2 units of PC . Denies chest pain SOB palpitations dizziness or syncope.  Pt here for f/u 7/26/23 Denies chest pain palpitations dizziness . Feels well. In w/c Using Turkish  1/3/24 Pt returns from [facility] with her daughter to interpret . She has had no interval events and overall feels well. She is non ambulatory spends much of her time in w/c. Shet denies chest pain, shortness of breath,  palpitations, dizziness, syncope or near syncope.

## 2024-01-03 NOTE — DISCUSSION/SUMMARY
[FreeTextEntry1] : The pt had CABG mechanical AVR 2003 on 5/14 palpitations found in V tach. IV amiodarone need temp pacemaker. Cath RCA PO. V tach secondary ischemia. Event monitor no VT. Now off amiodarone. Palpitations resolved.  Echo 1017 mild pulm htn mild MR. off plavix. Now on asa 81mg not drive arthritis. She needs access a ride told resume exercise. She does not want stress test.Fx right shoulder 11/16. Refused shoulder surgery. go rehab.Dizzy get up resolved.  6/21 10 teeth pulled. On Lovenox bled mouth. received 2 units blood.  She had THR 7/16/21. Then developed afib. BP dropped now  off diovan only on atenolol.Will  taper  midodrine .  She lost 5 pounds.  MCOT 2 week. No afib.. SVT vs v tach. Not drink boost it has vitamin k. Pt had teeth done in Loc[facility] within [facility].  The pt had mechanical fall in [place] admitted [facility] 1/3, Disch 1/9/23. Pt had Left  ORIF 1/4. Pt had episode of hypotension and  A fib. Had  HGB decreased to 7 and tx 2 untis of PC. Pt also fx Left shoulder.  Pt remains on coumadin Pt presents here 1/25/23 from [facility] in w/c. . Reportedly her BP is elevated intermittently. I know this pt for many years and in the past she has had intermittent hypertensive episodes always related to anxiety and depressive episodes. She was taking alprazolam 3x daily then as well as diovan 320 mg daily . She may benefit from more acute management of her anxiety and depression. Her BP in office today is 110/60 and heart rate is NSR 60/min .At this present time I am hesitant to add an additional antihypertensive. She had an episode of reported A fib last hospitalization . She requires continued anticoagulation at INR of  2.5--3.5 . Will continue atenolol 25mg twice daily. Will consider a holter monitor for reports of palpitations or documented  atrial fib. Progressive ambulation per physical therapy  f/u 3mths   4/26/23 Pt returns to office, denies dizziness or palpitations. BP is stable . Will continue present mgmt. Need echo to be done in June. Will order . Obtain labs from NH. Continue present mgmt. Need to increase activity as tolerated.  726/23 Pt echo done 6/7/22  Without significant change. EF 64%, mechanical AV in proper position mild MR.. She has appt 10/10/23.  She needs routine labs to be done and then sent to us requested in the consult report. She has lost weight has poor appetite states that she is sad and has not seen her daughter who is in [place].  used. for exam today. Pt has much less edema than previously noted. Continue present mgmt. 1/3/24 /3/24 Pt returns from [facility] with her daughter to interpret . She has had no interval events and overall feels well. She is non ambulatory spends much of her time in w/c. Shet denies chest pain, shortness of breath,  palpitations, dizziness, syncope or near syncope. ECHO was done in NH 1/2/24. results report Mild LV dysfunction but the LVEF is reported as 31%. This is markedly decreased from echo done  6/22 shows LVEF at 64%. If this result is accurate then GDMT for HFrEF  would be indicated . Discussed w Dr Nagy who agrees the echo should be repeated in the [facility] system  for verification. Pt and daughter understand and agree f/u pending results

## 2024-01-03 NOTE — REVIEW OF SYSTEMS
[Weight Loss (___ Lbs)] : [unfilled] ~Ulb weight loss [Joint Pain] : joint pain [Shoulder Pain] : shoulder pain [Hand Pain] : hand pain [Knee Pain] : knee pain [Lower Back Pain] : lower back pain [Fever] : no fever [Weight Gain (___ Lbs)] : [unfilled] ~Ulb weight gain [Chills] : no chills [Blurry Vision] : no blurred vision [Earache] : no earache [Hearing Loss] : no hearing loss [Sinus Pressure] : no sinus pressure [SOB] : no shortness of breath [Chest Discomfort] : no chest discomfort [Palpitations] : no palpitations [Cough] : no cough [Wheezing] : no wheezing [Abdominal Pain] : no abdominal pain [Vomiting] : no vomiting [Constipation] : no constipation [Dysuria] : no dysuria [Rash] : no rash [Skin Lesions] : no skin lesions [Dizziness] : no dizziness [Confusion] : no confusion was observed [Swollen Glands] : no swollen glands [FreeTextEntry7] : poor appetite has improved [FreeTextEntry9] :  Pt in w/c  only walks with PT transfer w walker only

## 2024-01-03 NOTE — PHYSICAL EXAM
[Frail] : frail [5th Left ICS - MCL] : palpated at the 5th LICS in the midclavicular line [No Precordial Heave] : no precordial heave was noted [Normal Rate] : normal [Rhythm Regular] : regular [Normal S1] : normal S1 [Normal S2] : normal S2 [Click] : a ~M click was heard [I] : a grade 1 [No Pitting Edema] : no pitting edema present [2+] : left 2+ [No Abnormalities] : the abdominal aorta was not enlarged and no bruit was heard [Normal] : moves all extremities, no focal deficits, normal speech [S3] : no S3 [S4] : no S4 [Right Carotid Bruit] : no bruit heard over the right carotid [Left Carotid Bruit] : no bruit heard over the left carotid [de-identified] : Looks less fatigued [de-identified] : In w/c non weight baring except for PT per pt [de-identified] : trace edema bila much improved + venous stasis [de-identified] : Cries often, looks sad

## 2024-01-03 NOTE — REASON FOR VISIT
[Arrhythmia/ECG Abnorrmalities] : arrhythmia/ECG abnormalities [Coronary Artery Disease] : coronary artery disease [Family Member] : family member [Source: ______] : History obtained from [unfilled] [Other: ______] : provided by BRIAN [Interpreters_IDNumber] : [id number] [Interpreters_FullName] : Darren [TWNoteComboBox1] : [place] [FreeTextEntry1] : Here  for f/u from Suellen GUADARRAMA

## 2024-02-22 ENCOUNTER — RESULT REVIEW (OUTPATIENT)
Age: 89
End: 2024-02-22

## 2024-02-22 ENCOUNTER — APPOINTMENT (OUTPATIENT)
Dept: CV DIAGNOSITCS | Facility: HOSPITAL | Age: 89
End: 2024-02-22
Payer: MEDICARE

## 2024-02-22 ENCOUNTER — OUTPATIENT (OUTPATIENT)
Dept: OUTPATIENT SERVICES | Facility: HOSPITAL | Age: 89
LOS: 1 days | End: 2024-02-22
Payer: MEDICARE

## 2024-02-22 DIAGNOSIS — Z95.2 PRESENCE OF PROSTHETIC HEART VALVE: Chronic | ICD-10-CM

## 2024-02-22 DIAGNOSIS — I10 ESSENTIAL (PRIMARY) HYPERTENSION: ICD-10-CM

## 2024-02-22 DIAGNOSIS — Z96.649 PRESENCE OF UNSPECIFIED ARTIFICIAL HIP JOINT: Chronic | ICD-10-CM

## 2024-02-22 DIAGNOSIS — Z86.39 PERSONAL HISTORY OF OTHER ENDOCRINE, NUTRITIONAL AND METABOLIC DISEASE: ICD-10-CM

## 2024-02-22 PROCEDURE — 93306 TTE W/DOPPLER COMPLETE: CPT | Mod: 26

## 2024-02-22 PROCEDURE — 93306 TTE W/DOPPLER COMPLETE: CPT

## 2024-02-23 DIAGNOSIS — Z86.39 PERSONAL HISTORY OF OTHER ENDOCRINE, NUTRITIONAL AND METABOLIC DISEASE: ICD-10-CM

## 2024-02-23 DIAGNOSIS — I10 ESSENTIAL (PRIMARY) HYPERTENSION: ICD-10-CM

## 2024-03-06 NOTE — DISCHARGE NOTE NURSING/CASE MANAGEMENT/SOCIAL WORK - NSTRANSFERBELONGINGSDISPO_GEN_A_NUR
Progress Note - Cardiothoracic Surgery   Natividad Ortiz 81 y.o. female MRN: [id number]  Unit/Bed#: [facility] 428-01 Encounter: [id number]    Severe AS S/P AVR 21mm Inspiris; POD # 16      24 Hour Events: Bumex stopped yest AM. BUN up to 152 yest, suspected intravascular volume depletion, given 1 L of fluids. ID consulted given ongoing hypothermia to rule out infection, holding off any anbx at this time. Blodd cultures pending. Nephrol discussed possible HD to treat uremia with significantly elevated BUN if not improved soon, patient would not be agreeable. BUN up to 162 this AM (152), Cr 2.70 (2.47). Na 128 (126). Afib 60s-70s, on 3LNC. FOBT positive, but Hgb stable and improving.     Patient unresponsive this AM, won't open eyes, hasn't responded to family, or my verbal cues, likely due to signification uremia.    Medications:   Scheduled Meds:  Current Facility-Administered Medications   Medication Dose Route Frequency Provider Last Rate    acetaminophen  650 mg Rectal Q4H PRN Yari Peñaloza PA-C      acetaminophen  650 mg Oral Q6H While awake Yari Peñaloza PA-C      amiodarone  200 mg Oral Daily With Breakfast Fidencio Murillo PA-C      aspirin  81 mg Oral Daily Yari Peñaloza PA-C      atorvastatin  80 mg Oral Daily With Dinner Yari Peñaloza PA-C      bisacodyl  5 mg Oral Daily PRN Yari Peñaloza PA-C      bisacodyl  10 mg Rectal Daily PRN Yari Peñaloza PA-C      chlorhexidine  15 mL Mouth/Throat Q12H ANTON Yari Peñaloza PA-C      dextrose 5 % and sodium chloride 0.9 %  50 mL/hr Intravenous Continuous Ifeanyi Metz MD 50 mL/hr (03/05/24 1439)    ferrous sulfate  325 mg Oral Daily With Breakfast Yari Peñaloza PA-C      insulin lispro  1-5 Units Subcutaneous TID AC Yari Peñaloza PA-C      insulin lispro  1-5 Units Subcutaneous HS Yari Peñaloza PA-C      levalbuterol  1.25 mg Nebulization BID Yari Peñaloza PA-C      magnesium hydroxide  30 mL Oral Daily  PRN Yari Peñaloza PA-C      metoclopramide  10 mg Intravenous Q6H PRN Yari Peñaloza PA-C      metoprolol tartrate  25 mg Oral Q12H ANTON Yari Peñaloza PA-C      mirtazapine  7.5 mg Oral HS Fidencio Murillo PA-C      nystatin   Topical BID Yari Peñaloza PA-C      ondansetron  4 mg Intravenous Q6H PRN Yari Peñaloza PA-C      oxyCODONE  2.5 mg Oral Q4H PRN Yari Peñaloza PA-C      Or    oxyCODONE  5 mg Oral Q4H PRN Yari Peñaloza PA-C      pantoprazole  40 mg Oral Early Morning Yari Peñaloza PA-C      polyethylene glycol  17 g Oral Daily Yari Peñaloza PA-C      pramipexole  1 mg Oral HS Yari Peñaloza PA-C      senna-docusate sodium  1 tablet Oral BID Yari Peñaloza PA-C      sodium chloride  4 mL Nebulization BID Yari Peñaloza PA-C      temazepam  15 mg Oral HS PRN Yari Peñaloza PA-C       Continuous Infusions:dextrose 5 % and sodium chloride 0.9 %, 50 mL/hr, Last Rate: 50 mL/hr (03/05/24 1439)          PRN Meds:.  acetaminophen    bisacodyl    bisacodyl    magnesium hydroxide    metoclopramide    ondansetron    oxyCODONE **OR** oxyCODONE    temazepam    Vitals:   Vitals:    03/06/24 0337 03/06/24 0400 03/06/24 0556 03/06/24 0721   BP:    112/66   BP Location:    Right arm   Pulse:    68   Resp:    17   Temp:  97.5 °F (36.4 °C)     TempSrc:  Rectal     SpO2: 96%   98%   Weight:   109 kg (240 lb 4.8 oz)    Height:           Telemetry: Atrial Fibrillation; Heart Rate: 60-70s    Respiratory:   SpO2: SpO2: 98 %; 3 LPM    Intake/Output:     Intake/Output Summary (Last 24 hours) at 3/6/2024 0751  Last data filed at 3/6/2024 0600  Gross per 24 hour   Intake 1485 ml   Output 1080 ml   Net 405 ml   UO 375ml    Chest tube Output:  removed    Weights:   Weight (last 2 days)       Date/Time Weight    03/06/24 0556 109 (240.3)    03/05/24 0926 110 (241.4)    03/04/24 0703 109 (240.52)    03/04/24 0537 112 (247.58)              Results:   Results from last 7  days   Lab Units 03/06/24 0344 03/05/24 0453 03/04/24 0424   WBC Thousand/uL 12.53* 12.34* 10.92*   HEMOGLOBIN g/dL 9.1* 8.9* 8.5*   HEMATOCRIT % 29.1* 28.9* 27.0*   PLATELETS Thousands/uL 343 334 300     Results from last 7 days   Lab Units 03/06/24 0344 03/05/24 2033 03/05/24 0453 03/04/24 2039 03/04/24 0424 02/28/24  1756 02/28/24  1403   SODIUM mmol/L 128* 127* 126*   < > 125*   < >  --    POTASSIUM mmol/L 3.5  --  4.3  --  3.9   < >  --    CHLORIDE mmol/L 77*  --  76*  --  76*   < >  --    CO2 mmol/L 35*  --  28  --  34*   < >  --    CO2, I-STAT mmol/L  --   --   --   --   --   --  38*   BUN mg/dL 162*  --  157*  --  144*   < >  --    CREATININE mg/dL 2.70*  --  2.47*  --  2.50*   < >  --    GLUCOSE, ISTAT mg/dl  --   --   --   --   --   --  114   CALCIUM mg/dL 9.5  --  8.8  --  8.8   < >  --     < > = values in this interval not displayed.     Results from last 7 days   Lab Units 03/06/24 0344 03/05/24 0453 03/04/24 0424   INR  2.24* 2.04* 2.00*         Date:   INR:  Coumadin Dose:  3/6  2.24  hold  3/5  2.04  2.5  3/4  2.00  2.5   3/3  2.13  2.5  3/2  2.46  1  3/1  2.91  0  2/29  2.96  1  2/28  2.35  2.5  2/27  1.88  5  2/26  1.61  5  2/25  1.71  5  2/24  1.83  2.5  2/23  1.86  2.5  2/22  1.93  0  2/21  1.97  2.5            Point of care glucose: 112-161    Studies:  None today    I have personally reviewed pertinent reports.   and I have personally reviewed pertinent films in PACS    Invasive Lines/Tubes:  Invasive Devices       Peripheral Intravenous Line  Duration             Peripheral IV 03/05/24 Distal;Right;Ventral (anterior) Forearm <1 day    Peripheral IV 03/06/24 Right Antecubital <1 day              Drain  Duration             Urethral Catheter Non-latex 6 days                  Physical Exam:    General:  Ill appearing, elderly, unarousable, not responsive , lying in bed, breathing.   HEENT/NECK:  Normocephalic. Atraumatic.   Cardiac: Regular rate and rhythm  Pulmonary:  Breath sounds  diminished in the bases bilaterally  and Poor inspiratory effort  Abdomen:  Non-tender  Incisions: Sternum is stable.  Incision is clean, dry, and intact.   Extremities: Extremities warm/dry  Neuro: unresponsive to any verbal cues. Breathing, not moving extremities.   Skin: Warm/Dry, without rashes or lesions.       Assessment:  Principal Problem:    S/P AVR  Active Problems:    Hiatal hernia    Atrial fibrillation (HCC) [I48.91]    Acquired deformity of foot    Diabetic polyneuropathy associated with type 2 diabetes mellitus ([facility])    Peripheral arteriosclerosis ([facility])    RLS (restless legs syndrome)    Essential hypertension    Multiple lung nodules    Severe obesity (BMI 35.0-39.9) with comorbidity ([facility])    GERD (gastroesophageal reflux disease)    Chronic edema    Deep venous insufficiency    Stage 3a chronic kidney disease ([facility])    Chronic diastolic CHF (congestive heart failure) ([facility])    Aortic stenosis, severe       Severe AS S/P AVR 21mm Inspiris; POD # 16    Plan:    Cardiac:     Elective surgical admission  Normal ventricular systolic function, EF 55%  Chronic diastolic CHF prior to admit  Post op volume overload    Atrial Fibrillation; HR well-controlled  BP well-controlled    Decrease to Lopressor, 12.5mg PO BID     Hold ACE inhibitor/ARB secondary to renal dysfunction    Continue prophylactic Amiodarone, decrease to 200 mg PO daily    Anticoagulated for Afib  INR 2.24    Continue ASA 81 and Statin therapy    Epicardial pacing wires have been removed    TLC removed    Continue Coumadin for DVT prophylaxis    Pulmonary:   Post op acute hypoxic resp failure,resolved    Acute post-op pulmonary insufficiency; Requiring 3 liters via nasal cannula, secondary to atelectasis, pulmonary vascular congestion, poor inspiratory effort secondary to pain, and body habitus/obesity. Continue incentive spirometry/coughing/deep breathing exercises.  Wean supplemental oxygen as tolerated for saturation > 90%    Chest tubes  have been discontinued    Post op right pleural effusion s/p right thoracentesis for 950cc 2/28      Renal:     Postoperative CHAYO  Severe worsening Azotemia/uremia with  (157, 144)  with Cr 2.70, from 2.47, 2.50, 2.75, 2.86, 3.07, pk 3.55  baseline 1.1  Significant elevated BUN at 157 (144, 138), likely cause of her symptoms  Discussed with renal   Patient and family would not want HD even if temporary    Neuro:  Non responsive this AM due to uremia    Lethargic, very weak and significant deconditioning.     Situational depression, seen by Geriatrics, started on remeron to help with depression/anxiety, sleep, and appetite.     Hypothermia - treat with warming blanket, poss related to malnutrition, uremia,    - evaluated by ID to rule out infection, low suspicion for infection, hold off anbx    Neurologically intact; No active issues     Incisional pain well controlled   Continue tylenol, 975 mg PO q 8, standing dose   Continue oxycodone, 2.5 to 5 mg PO q 4 hours prn pain  Cont home gabapentin    GI:    Cardiac diet, with 1800 mL fluid restriction    Poor appetite, not eating, and likely malnourished    Likely related to high BUN and deconditioning    Continue stool softeners and prn suppository    FOBT positive but hgb stable and improving     Continue GI prophylaxis    Endo:     Pre-Op Hgb A1C: 6.1    Patient has been transitioned from continuous insulin infusion to intermittent subcutaneous dosing  Serum glucose well controlled on insulin sliding scale coverage    7    Hematology:     Post-operative acute blood loss anemia; Hemoglobin 9.1      8.   Disposition:      Worsening azotemia and uremia, patient and family do not want HD even if temporary    Consult palliative care for end of life discussions/care, change to DNR/DNI        VTE Pharmacologic Prophylaxis: Warfarin (Coumadin)  VTE Mechanical Prophylaxis: sequential compression device    Collaborative rounds completed with supervising physician  Plan  of care discussed with bedside nurse    SIGNATURE: Fidencio Murillo PA-C  DATE: March 6, 2024  TIME: 7:51 AM    with patient

## 2024-07-08 ENCOUNTER — OUTPATIENT (OUTPATIENT)
Dept: OUTPATIENT SERVICES | Facility: HOSPITAL | Age: 89
LOS: 1 days | End: 2024-07-08
Payer: MEDICAID

## 2024-07-08 DIAGNOSIS — Z96.649 PRESENCE OF UNSPECIFIED ARTIFICIAL HIP JOINT: Chronic | ICD-10-CM

## 2024-07-08 DIAGNOSIS — Z01.20 ENCOUNTER FOR DENTAL EXAMINATION AND CLEANING WITHOUT ABNORMAL FINDINGS: ICD-10-CM

## 2024-07-08 DIAGNOSIS — Z95.2 PRESENCE OF PROSTHETIC HEART VALVE: Chronic | ICD-10-CM

## 2024-07-08 PROCEDURE — D0220: CPT

## 2024-07-08 PROCEDURE — T1013: CPT

## 2024-07-08 PROCEDURE — D0230: CPT

## 2024-07-08 PROCEDURE — D0150: CPT

## 2024-07-15 DIAGNOSIS — Z01.20 ENCOUNTER FOR DENTAL EXAMINATION AND CLEANING WITHOUT ABNORMAL FINDINGS: ICD-10-CM

## 2024-08-05 ENCOUNTER — OUTPATIENT (OUTPATIENT)
Dept: OUTPATIENT SERVICES | Facility: HOSPITAL | Age: 89
LOS: 1 days | End: 2024-08-05
Payer: MEDICAID

## 2024-08-05 DIAGNOSIS — Z95.2 PRESENCE OF PROSTHETIC HEART VALVE: Chronic | ICD-10-CM

## 2024-08-05 DIAGNOSIS — K08.409 PARTIAL LOSS OF TEETH, UNSPECIFIED CAUSE, UNSPECIFIED CLASS: ICD-10-CM

## 2024-08-05 DIAGNOSIS — Z96.649 PRESENCE OF UNSPECIFIED ARTIFICIAL HIP JOINT: Chronic | ICD-10-CM

## 2024-08-05 PROCEDURE — D5120: CPT

## 2024-08-19 ENCOUNTER — OUTPATIENT (OUTPATIENT)
Dept: OUTPATIENT SERVICES | Facility: HOSPITAL | Age: 89
LOS: 1 days | End: 2024-08-19
Payer: MEDICAID

## 2024-08-19 DIAGNOSIS — Z95.2 PRESENCE OF PROSTHETIC HEART VALVE: Chronic | ICD-10-CM

## 2024-08-19 DIAGNOSIS — Z96.649 PRESENCE OF UNSPECIFIED ARTIFICIAL HIP JOINT: Chronic | ICD-10-CM

## 2024-08-19 DIAGNOSIS — K08.409 PARTIAL LOSS OF TEETH, UNSPECIFIED CAUSE, UNSPECIFIED CLASS: ICD-10-CM

## 2024-08-19 PROCEDURE — D5120: CPT

## 2024-08-22 DIAGNOSIS — K08.109 COMPLETE LOSS OF TEETH, UNSPECIFIED CAUSE, UNSPECIFIED CLASS: ICD-10-CM

## 2024-09-09 ENCOUNTER — OUTPATIENT (OUTPATIENT)
Dept: OUTPATIENT SERVICES | Facility: HOSPITAL | Age: 89
LOS: 1 days | End: 2024-09-09
Payer: MEDICAID

## 2024-09-09 DIAGNOSIS — K08.109 COMPLETE LOSS OF TEETH, UNSPECIFIED CAUSE, UNSPECIFIED CLASS: ICD-10-CM

## 2024-09-09 DIAGNOSIS — Z96.649 PRESENCE OF UNSPECIFIED ARTIFICIAL HIP JOINT: Chronic | ICD-10-CM

## 2024-09-09 DIAGNOSIS — Z95.2 PRESENCE OF PROSTHETIC HEART VALVE: Chronic | ICD-10-CM

## 2024-09-09 PROCEDURE — D5120: CPT

## 2024-09-15 NOTE — PATIENT PROFILE ADULT - VISION (WITH CORRECTIVE LENSES IF THE PATIENT USUALLY WEARS THEM):
The patient is a 38y Female complaining of pain, lower leg.
Normal vision: sees adequately in most situations; can see medication labels, newsprint

## 2024-09-23 ENCOUNTER — OUTPATIENT (OUTPATIENT)
Dept: OUTPATIENT SERVICES | Facility: HOSPITAL | Age: 89
LOS: 1 days | End: 2024-09-23
Payer: MEDICAID

## 2024-09-23 DIAGNOSIS — Z95.2 PRESENCE OF PROSTHETIC HEART VALVE: Chronic | ICD-10-CM

## 2024-09-23 DIAGNOSIS — K08.109 COMPLETE LOSS OF TEETH, UNSPECIFIED CAUSE, UNSPECIFIED CLASS: ICD-10-CM

## 2024-09-23 DIAGNOSIS — Z96.649 PRESENCE OF UNSPECIFIED ARTIFICIAL HIP JOINT: Chronic | ICD-10-CM

## 2024-09-23 PROCEDURE — D5120: CPT

## 2024-09-26 DIAGNOSIS — K08.109 COMPLETE LOSS OF TEETH, UNSPECIFIED CAUSE, UNSPECIFIED CLASS: ICD-10-CM

## 2024-10-07 ENCOUNTER — OUTPATIENT (OUTPATIENT)
Dept: OUTPATIENT SERVICES | Facility: HOSPITAL | Age: 89
LOS: 1 days | End: 2024-10-07

## 2024-10-07 DIAGNOSIS — K08.109 COMPLETE LOSS OF TEETH, UNSPECIFIED CAUSE, UNSPECIFIED CLASS: ICD-10-CM

## 2024-10-07 DIAGNOSIS — Z95.2 PRESENCE OF PROSTHETIC HEART VALVE: Chronic | ICD-10-CM

## 2024-10-07 DIAGNOSIS — Z96.649 PRESENCE OF UNSPECIFIED ARTIFICIAL HIP JOINT: Chronic | ICD-10-CM

## 2024-10-07 DIAGNOSIS — K08.409 PARTIAL LOSS OF TEETH, UNSPECIFIED CAUSE, UNSPECIFIED CLASS: ICD-10-CM

## 2024-10-07 PROCEDURE — D5411: CPT

## 2025-01-23 NOTE — ED PROVIDER NOTE - SEPSIS ALERT DATE/TIME 2
[facility], [facility], [place]  [address] [facility] [address]  [facility] [address]  Dept: [phone]        Patient:  Lilibeth Shetty  :      5/15/1972  MRN:      [id number]    Referring Provider: Deepak English MD     Chief Complaint:     Chief Complaint   Patient presents with    Follow - Up    Weight Management     Pt here for follow up       Date of Surgery:   2021  Surgery Type:   Laparoscopic gastric bypass surgery     Subjective     Satiety:  positive  Food Intake:  <01 cup(s)  Fluid Intake:  64 oz  Protein Intake:  adeq grams  Vitamin:  Yes   Bariatric capsules  Exercise: Yes  Comorbidities:  Back pain-Improvement?  yes, Joint pain-Improvement?  yes, Diabetes-Improvement?  yes, Hypertension-Improvement?  yes, KYM-Improvement?  yes and Snoring-Improvement?  yes    Objective     Vitals: /64 (BP Location: Left arm, Patient Position: Sitting, Cuff Size: adult)   Pulse 104   Ht 5' 7\" (1.702 m)   Wt 196 lb 3.2 oz (89 kg)   LMP 2024 (Within Days)   SpO2 97%   BMI 30.73 kg/m²     Starting weight: 295   Current weight:    Interval weight loss: +08   Total weight loss:  -99    Last 3 Weights   25 1405 196 lb 3.2 oz (89 kg)   24 0822 198 lb 9.6 oz (90.1 kg)   24 0903 196 lb 12.8 oz (89.3 kg)       Patient Medications:    Current Outpatient Medications:     Phentermine HCl 30 MG Oral Cap, Take 1 capsule (30 mg total) by mouth every morning., Disp: 30 capsule, Rfl: 5    MOUNJARO 2.5 MG/0.5ML Subcutaneous Solution Auto-injector, INJECT 2.5 MG SUBCUTANEOUSLY WEEKLY, Disp: 2 mL, Rfl: 0    metoprolol tartrate 50 MG Oral Tab, Take 1 tablet (50 mg total) by mouth 2 (two) times daily., Disp: 180 tablet, Rfl: 3    docusate sodium 100 MG Oral Cap, Take 1 capsule (100 mg total) by mouth 2 (two) times daily., Disp: 30 capsule, Rfl: 1    ondansetron (ZOFRAN) 4 mg tablet, Take 1 tablet (4 mg total) by mouth every 8 (eight) hours as needed  for Nausea. (Patient not taking: Reported on 12/21/2024), Disp: 12 tablet, Rfl: 0    enoxaparin 40 MG/0.4ML Injection Solution Prefilled Syringe, Inject 0.4 mL (40 mg total) into the skin daily., Disp: 21 each, Rfl: 0    Calcium Carbonate-Vit D-Min (CALCIUM 1200 OR), Take 1,200 mg by mouth in the morning and 1,200 mg before bedtime., Disp: , Rfl:     lisinopril 20 MG Oral Tab, Take 1 tablet (20 mg total) by mouth daily., Disp: 90 tablet, Rfl: 3    Nystatin 211496 UNIT/GM External Powder, Apply 1 Application  topically 4 (four) times daily. Apply to affected areas, Disp: 30 g, Rfl: 1    buPROPion SR (WELLBUTRIN SR) 150 MG Oral Tablet 12 Hr, Take 1 tablet (150 mg total) by mouth 2 (two) times daily., Disp: 60 tablet, Rfl: 2    ONETOUCH VERIO In Vitro Strip, CHECK SUGARS ONCE A DAY, Disp: 100 strip, Rfl: 0    Blood Glucose Monitoring Suppl (ONETOUCH VERIO FLEX SYSTEM) w/Device Does not apply Kit, 1 kit daily., Disp: 1 kit, Rfl: 0    OneTouch Delica Lancets 33G Does not apply Misc, 1 each daily., Disp: 100 each, Rfl: 0    atorvastatin 40 MG Oral Tab, Take 1 tablet (40 mg total) by mouth nightly., Disp: 90 tablet, Rfl: 1    Multiple Vitamins-Minerals (BARIATRIC MULTIVITAMINS/IRON) Oral Cap, Take 1 capsule by mouth daily., Disp: , Rfl:     levonorgestrel 20 MCG/24HR Intrauterine IUD, Mirena 20 mcg/24 hour (5 years) intrauterine device, Disp: , Rfl:     Allergies:  Patient has no known allergies.     Social History:    Social History     Socioeconomic History    Marital status:    Tobacco Use    Smoking status: Some Days     Types: Cigars    Smokeless tobacco: Never   Vaping Use    Vaping status: Never Used   Substance and Sexual Activity    Alcohol use: Yes     Alcohol/week: 1.0 standard drink of alcohol     Types: 1 Glasses of wine per week     Comment: socially    Drug use: No    Sexual activity: Yes     Birth control/protection: I.U.D.   Other Topics Concern    Caffeine Concern No   Social History Narrative     The patient does not use an assistive device..      The patient does live in a home with stairs.     Surgical History:    Past Surgical History:   Procedure Laterality Date    Abdominal surgery  05/2024    abdominoplasty    Colonoscopy  2015    Dr Sanz    Colonoscopy      Colonoscopy N/A 09/02/2020    Procedure: COLONOSCOPY;  Surgeon: ANAYELI Oseguera MD;  Location: [facility] ENDOSCOPY    Gastric bypass,obese<100cm sukumar-en-y  06/14/2021    Dr Cruz, [facility]    Gastric bypass,obesity,sb reconstruc      Tubal ligation         Family History:    Family History   Problem Relation Age of Onset    Diabetes Father     Hypertension Mother         HTN    Cancer Sister     Breast Cancer Maternal Aunt 64        (cause of death)    Glaucoma Neg     Macular degeneration Neg        Physical Exam:  Vital signs: /64 (BP Location: Left arm, Patient Position: Sitting, Cuff Size: adult)   Pulse 104   Ht 5' 7\" (1.702 m)   Wt 196 lb 3.2 oz (89 kg)   LMP 06/06/2024 (Within Days)   SpO2 97%   BMI 30.73 kg/m²     General appearance: alert, appears stated age, cooperative and morbidly obese  Head: Normocephalic, without obvious abnormality, atraumatic  Eyes: conjunctivae/corneas clear. PERRL, EOM's intact. Fundi benign.  Back: symmetric, no curvature. ROM normal. No CVA tenderness.  Lungs: clear to auscultation bilaterally  Heart: S1, S2 normal, no murmur, click, rub or gallop, regular rate and rhythm  Abdomen: soft, obese, non tender  Extremities: extremities normal, atraumatic, no cyanosis or edema  Pulses: 2+ and symmetric  Skin: clean and dry  Neurologic: Grossly normal     ROS:    Constitutional: negative  Respiratory: negative  Cardiovascular: negative  Gastrointestinal: negative  Musculoskeletal:negative  Neurological: negative  Behavioral/Psych: negative  Endocrine: negative  All other systems were reviewed and are negative    Assessment     DIABETES:    The patient denies any episodes of hypoglycemia since her last  clinic visit.  she denies any lower extremity skin breakdown or foot ulcers.    HYPERTENSION:  The patient's blood pressure has been well controlled.  she has been checking it as instructed and has remained in relatively good control.    Encounter Diagnosis(ses):   Encounter Diagnoses   Name Primary?    Type 2 diabetes mellitus without complication, without long-term current use of insulin (HCC) [E11.9] Yes    S/P gastric bypass [Z98.84]     Encounter for therapeutic drug monitoring     Hypertension, essential [I10]     Obesity (BMI 30-39.9) [E66.9]     Vitamin D deficiency        Plan     S/P sukumar en Y with Dr Cruz  06/14/2021    Doing great     DIABETES: Continue current medications via endo team    HYPERTENSION: Blood pressure stable on the above medications. No interval change in antihypertensive medication.     Keep up with water and protein    PHENTERMINE: refill at 30 mg  ekg done    S/P panniculectomy in May 2024    Follow up with PCP as scheduled    Bariatric labs ordered     Orders Placed This Encounter   Procedures    Vitamin D, 25-Hydroxy     Standing Status:   Future     Standing Expiration Date:   1/23/2026     Order Specific Question:   Please pick the scenario that best fits the purpose for ordering this test     Answer:   General Screening/Vit D deficiency [25(OH)D]     Order Specific Question:   Release to patient     Answer:   Immediate    Vitamin B12     Standing Status:   Future     Standing Expiration Date:   1/23/2026     Order Specific Question:   Release to patient     Answer:   Immediate    Vitamin B1 (Thiamine), Blood     Standing Status:   Future     Standing Expiration Date:   1/23/2026     Order Specific Question:   Release to patient     Answer:   Immediate           Truman Reynoso MD   31-Jul-2021 15:08

## 2025-02-20 NOTE — CHART NOTE - NSCHARTNOTEFT_GEN_A_CORE
Subjective   The ABCs of the Annual Wellness Visit  Medicare Wellness Visit      Xochilt Vasquez is a 69 y.o. patient who presents for a Medicare Wellness Visit.    The following portions of the patient's history were reviewed and   updated as appropriate: allergies, current medications, past family history, past medical history, past social history, past surgical history, and problem list.    Compared to one year ago, the patient's physical   health is the same.  Compared to one year ago, the patient's mental   health is the same.    Recent Hospitalizations:  She was not admitted to the hospital during the last year.     Current Medical Providers:  Patient Care Team:  Dilip Carrero MD as PCP - General  Dilip Carrero MD as PCP - Family Medicine    Outpatient Medications Prior to Visit   Medication Sig Dispense Refill    albuterol sulfate HFA (ProAir HFA) 108 (90 Base) MCG/ACT inhaler Inhale 2 puffs Every 6 (Six) Hours As Needed for Wheezing. 18 g 1    amLODIPine (NORVASC) 5 MG tablet TAKE 1 TABLET BY MOUTH DAILY 90 tablet 1    Calcium Carb-Cholecalciferol 1000-800 MG-UNIT tablet Take 1,200 mg by mouth Daily. 1000 U Vitamin D      fexofenadine (ALLEGRA) 180 MG tablet Take 1 tablet by mouth Daily.      FLUoxetine (PROzac) 20 MG capsule TAKE 1 CAPSULE BY MOUTH DAILY 90 capsule 3    lisinopril (PRINIVIL,ZESTRIL) 40 MG tablet Take 1 tablet by mouth Daily. 90 tablet 3    metoprolol succinate XL (Toprol XL) 50 MG 24 hr tablet Take 1 tablet by mouth Daily. 90 tablet 3    Multiple Vitamins-Minerals (MULTI FOR HER 50+ PO) Take  by mouth.      rosuvastatin (CRESTOR) 10 MG tablet TAKE 1 TABLET BY MOUTH DAILY 90 tablet 1    ALPRAZolam (Xanax) 0.5 MG tablet Take 1 tablet twice a day as needed for anxiety 60 tablet 2    azithromycin (Zithromax Z-Dylan) 250 MG tablet Take 2 tablets the first day, then 1 tablet daily for 4 days. 6 tablet 0    Fluticasone-Salmeterol (ADVAIR/WIXELA) 100-50 MCG/ACT DISKUS INHALE 1 PUFF  Spoke to patient's granddaughter who is the proxy    Patients family expressed concern about ASA and HSQ. Patient had a h/o of tooth extraction w/ prolonged bleed after receiving AC. Explained to patient the benefits of HSQ s/p orthopedic surgery including high risk of DVT and its prevention. Patient was agreeable to give HSQ but however refused ASA. Patient agreeable to discuss restarting ASA tomorrow. "BY MOUTH TWICE A DAY 1 each 3    Premarin 0.625 MG/GM vaginal cream Insert 1 g into the vagina 2 (Two) Times a Week. Sometimes 3 x week 30 g 5     No facility-administered medications prior to visit.     No opioid medication identified on active medication list. I have reviewed chart for other potential  high risk medication/s and harmful drug interactions in the elderly.      Aspirin is not on active medication list.  Aspirin use is not indicated based on review of current medical condition/s. Risk of harm outweighs potential benefits.  .    Patient Active Problem List   Diagnosis    Allergic reaction    AR (allergic rhinitis)    Asthma    Primary hypertension    Depressive disorder    Loss of hair    Mixed hyperlipidemia    Primary insomnia    Major depressive disorder with single episode, in full remission     Advance Care Planning Advance Directive is not on file.  ACP discussion was held with the patient during this visit. Patient has an advance directive (not in EMR), copy requested.            Objective   Vitals:    02/20/25 1236 02/20/25 1306   BP: 128/76    BP Location: Left arm    Patient Position: Sitting    Cuff Size: Adult    Pulse: 71    Temp: 97.8 °F (36.6 °C)    TempSrc: Infrared    SpO2: 97%    Weight: 67.9 kg (149 lb 12.8 oz)    Height:  61 cm (24.02\")   PainSc: 0-No pain        Estimated body mass index is 182.61 kg/m² as calculated from the following:    Height as of this encounter: 61 cm (24.02\").    Weight as of this encounter: 67.9 kg (149 lb 12.8 oz).    BMI is >= 25 and <30. (Overweight) The following options were offered after discussion;: exercise counseling/recommendations and nutrition counseling/recommendations           Does the patient have evidence of cognitive impairment? No  Lab Results   Component Value Date    TRIG 107 02/20/2025    HDL 69 (H) 02/20/2025    LDL 75 02/20/2025    VLDL 19 02/20/2025    HGBA1C 5.50 02/20/2025                                                            "                                     Health  Risk Assessment    Smoking Status:  Social History     Tobacco Use   Smoking Status Former    Current packs/day: 0.00    Average packs/day: 0.3 packs/day for 20.0 years (5.0 ttl pk-yrs)    Types: Cigarettes    Start date: 10/1/1991    Quit date: 10/1/2011    Years since quittin.4   Smokeless Tobacco Never   Tobacco Comments    0.1 PPD for 20 years Quit 2012     Alcohol Consumption:  Social History     Substance and Sexual Activity   Alcohol Use Yes    Comment: 8 wine or mixed drink       Fall Risk Screen  STEADI Fall Risk Assessment was completed, and patient is at LOW risk for falls.Assessment completed on:2025    Depression Screening   Little interest or pleasure in doing things? Several days   Feeling down, depressed, or hopeless? Several days   PHQ-2 Total Score 2   Trouble falling or staying asleep, or sleeping too much? Over half   Feeling tired or having little energy? Over half   Poor appetite or overeating? Several days   Feeling bad about yourself - or that you are a failure or have let yourself or your family down? Not at all   Trouble concentrating on things, such as reading the newspaper or watching television? Several days   Moving or speaking so slowly that other people could have noticed? Or the opposite - being so fidgety or restless that you have been moving around a lot more than usual? Not at all   Thoughts that you would be better off dead, or of hurting yourself in some way? Not at all   PHQ-9 Total Score 8   If you checked off any problems, how difficult have these problems made it for you to do your work, take care of things at home, or get along with other people? Not difficult at all      Health Habits and Functional and Cognitive Screenin/13/2025    10:22 AM   Functional & Cognitive Status   Do you have difficulty preparing food and eating? No    Do you have difficulty bathing yourself, getting dressed or grooming  yourself? No    Do you have difficulty using the toilet? No    Do you have difficulty moving around from place to place? No    Do you have trouble with steps or getting out of a bed or a chair? No    Current Diet Well Balanced Diet    Dental Exam Up to date    Eye Exam Not up to date    Exercise (times per week) 3 times per week    Current Exercises Include Home Exercise Program (TV, Computer, Etc.);House Cleaning;Light Weights;Walking    Do you need help using the phone?  No    Are you deaf or do you have serious difficulty hearing?  No    Do you need help to go to places out of walking distance? No    Do you need help shopping? No    Do you need help preparing meals?  No    Do you need help with housework?  No    Do you need help with laundry? No    Do you need help taking your medications? No    Do you need help managing money? No    Do you ever drive or ride in a car without wearing a seat belt? No    Have you felt unusual stress, anger or loneliness in the last month? No    Who do you live with? Alone    If you need help, do you have trouble finding someone available to you? No    Have you been bothered in the last four weeks by sexual problems? No    Do you have difficulty concentrating, remembering or making decisions? No        Patient-reported           Age-appropriate Screening Schedule:  Refer to the list below for future screening recommendations based on patient's age, sex and/or medical conditions. Orders for these recommended tests are listed in the plan section. The patient has been provided with a written plan.    Health Maintenance List  Health Maintenance   Topic Date Due    TDAP/TD VACCINES (1 - Tdap) Never done    ZOSTER VACCINE (2 of 2) 09/22/2022    DXA SCAN  11/12/2022    INFLUENZA VACCINE  07/01/2024    ANNUAL WELLNESS VISIT  12/07/2024    BMI FOLLOWUP  12/07/2024    LIPID PANEL  02/20/2026    MAMMOGRAM  09/11/2026    COLORECTAL CANCER SCREENING  03/21/2029    HEPATITIS C SCREENING   Completed    COVID-19 Vaccine  Completed    Pneumococcal Vaccine 50+  Completed    PAP SMEAR  Discontinued                                                                                                                                                CMS Preventative Services Quick Reference  Risk Factors Identified During Encounter  None Identified    The above risks/problems have been discussed with the patient.  Pertinent information has been shared with the patient in the After Visit Summary.  An After Visit Summary and PPPS were made available to the patient.    Follow Up:   Next Medicare Wellness visit to be scheduled in 1 year.     No opioid medication identified on active medication list. I have reviewed chart for other potential  high risk medication/s and harmful drug interactions in the elderly.      Additional E&M Note during same encounter follows:  Patient has additional, significant, and separately identifiable condition(s)/problem(s) that require work above and beyond the Medicare Wellness Visit     Chief Complaint  Hypertension and Hyperlipidemia    Subjective    HPI         The patient is a 69-year-old female who comes in for follow-up of hypertension, hyperlipidemia, depression, and asthma.    She reports experiencing intermittent sleep disturbances, with difficulty initiating sleep last night until 4 AM. However, she also notes periods of satisfactory sleep, such as the previous two nights. She attributes her sleep issues to an inability to quiet her mind before bed. Her pre-sleep routine includes watching television and extensive use of her mobile phone, often accompanied by late-night snacking. She is considering the implementation of a new bedtime routine, potentially involving a calming activity such as bathing. She has previously tried Ambien but discontinued its use. She has also experimented with melatonin, which provided inconsistent results, and Benadryl, which resulted in morning  "drowsiness. She has found Xanax to be effective for sleep but does not use it nightly, sometimes going weeks without it. Last night, she took melatonin and half a dose of Xanax at 4 AM.    She uses Advair as needed, but not on a daily basis. She requires a refill of this medication. She also uses albuterol, which she found particularly beneficial during a recent trip to [place].    She continues to use estrogen approximately three times per week and requires a refill. She no longer has an OB-GYN.    MEDICATIONS  Current: rosuvastatin, Advair, estrogen, Prozac, Xanax, albuterol          Objective   Vital Signs:  /76 (BP Location: Left arm, Patient Position: Sitting, Cuff Size: Adult)   Pulse 71   Temp 97.8 °F (36.6 °C) (Infrared)   Ht 61 cm (24.02\")   Wt 67.9 kg (149 lb 12.8 oz)   SpO2 97%   .61 kg/m²   Physical Exam  Vitals reviewed.   Constitutional:       Appearance: Normal appearance. She is well-developed.   HENT:      Head: Normocephalic and atraumatic.      Right Ear: Tympanic membrane and ear canal normal.      Left Ear: Tympanic membrane normal.      Mouth/Throat:      Pharynx: Oropharynx is clear. No posterior oropharyngeal erythema.   Eyes:      Extraocular Movements: Extraocular movements intact.      Conjunctiva/sclera: Conjunctivae normal.      Pupils: Pupils are equal, round, and reactive to light.   Neck:      Thyroid: No thyromegaly.      Vascular: No carotid bruit.   Cardiovascular:      Rate and Rhythm: Normal rate and regular rhythm.      Heart sounds: Normal heart sounds. No murmur heard.     No friction rub. No gallop.   Pulmonary:      Effort: Pulmonary effort is normal.      Breath sounds: Normal breath sounds.   Abdominal:      General: Bowel sounds are normal.      Palpations: There is no mass.      Tenderness: There is no abdominal tenderness.   Musculoskeletal:      Cervical back: Normal range of motion and neck supple.      Right lower leg: No edema.      Left lower " leg: No edema.   Lymphadenopathy:      Cervical: No cervical adenopathy.   Skin:     General: Skin is warm and dry.   Neurological:      Mental Status: She is alert and oriented to person, place, and time.      Cranial Nerves: No cranial nerve deficit.   Psychiatric:         Mood and Affect: Mood normal.         Behavior: Behavior normal.                       Results                Assessment and Plan        1. Health maintenance.  Overall, she remains in good health with reasonably good lifestyle habits. The importance of regular exercise, including resistance training, was discussed. She is up to date on mammography. A referral was made for colonoscopy and DEXA.    2. Hypertension.  Blood pressure is controlled on metoprolol and lisinopril.    3. Depression.  Mood is well compensated on fluoxetine. She is having difficulty sleeping. Low-dose trazodone 50 mg will be added. She is advised to take one pill at bedtime for 2-3 nights. If it is not effective, she can increase the dose to 100 mg and then to 150 mg if needed. If she requires 150 mg, she should call to adjust the prescription. She is also encouraged to work on sleep hygiene.    4. Asthma.  Asthma is currently asymptomatic. She uses Advair during her worst season and requested a refill. She also has albuterol, which she used more frequently while in [place].    5. Medication management.  Rosuvastatin was recently renewed. She uses estrogen about three times a week and requested a refill. Xanax was also renewed, and she is advised to try to wean off it while starting trazodone.    Follow-up  The patient will follow up in 1 year.            Follow Up   Return in about 1 year (around 2/20/2026) for Medicare Wellness.  Patient was given instructions and counseling regarding her condition or for health maintenance advice. Please see specific information pulled into the AVS if appropriate.  Patient or patient representative verbalized consent for the use of  Ambient Listening during the visit with  Dilip Carrero MD for chart documentation. 2/23/2025  10:30 EST

## 2025-07-07 NOTE — PHYSICAL THERAPY INITIAL EVALUATION ADULT - STANDING BALANCE: STATIC
Occupational Therapy Visit    Visit Type: Daily Treatment Note  Visit: 24  Referring Provider: COY Nunez  Medical Diagnosis (from order): M75.122 - Complete tear of left rotator cuff, unspecified whether traumatic     SUBJECTIVE                                                                                                               Patient reports she is doing well, the visit went well today with Dr. Givens's office.     Pain / Symptoms  - Pain rating (out of 10): Current: 0      OBJECTIVE                                                                                                                                          Treatment     Therapeutic Exercise  Passive range of motion to end range   Flexion x15   Internal rotation x15 x15     Quadruped position   End range flexion with weighted ball, 20 circles forward 20 backward x2 sets  External rotation with 5lbs x12 x12     Forearm knee plank serratus push ups x20 x20     Webslide blue resistance   PNF pattern D1 and D2 x12 each x2 sets   Flexion x10 x10     TRX   Low row to high row x12 x12   Ys x12 x12    5lb overhead press x12 x12     Manual Therapy   Grade III/IV glenohumeral joint mobilizations, inferior In combination with end range flexion x10   Grade III glenohumeral joint posterior superior, joint mobilization x10       Skilled input: verbal instruction/cues    Writer verbally educated and received verbal consent for hand placement, positioning of patient, and techniques to be performed today from patient for therapist position for techniques and hand placement and palpation for techniques as described above and how they are pertinent to the patient's plan of care.  Home Exercise Program  Access Code: [email or web address]  URL: [email or web address]  Date: 05/23/2025  Prepared by: Altagracia Palmer    Exercises  - Seated Shoulder Flexion AAROM with Pulley Behind  - 3-4 x daily - 7 x weekly - 3 sets - 10 reps  - Seated Shoulder  Scaption AAROM with Pulley at Side  - 3-4 x daily - 7 x weekly - 3 sets - 10 reps  - Seated Shoulder Abduction AAROM with Pulley Behind  - 3-4 x daily - 7 x weekly - 3 sets - 10 reps  - Supine Shoulder External Internal Rotation AAROM with Dowel  - 4-5 x daily - 7 x weekly - 15 reps  - Shoulder Flexion Wall Slide with Towel  - 3 x daily - 7 x weekly - 3 sets - 10 reps  - Scaption Wall Slide with Towel  - 3 x daily - 7 x weekly - 3 sets - 10 reps  - Standing Shoulder Abduction Slides at Wall  - 3 x daily - 7 x weekly - 3 sets - 10 reps  - Standing Scapular Retraction  - 4-5 x daily - 7 x weekly - 15 reps  - Standing Shoulder Flexion to 90 Degrees with Dumbbells  - 2 x daily - 7 x weekly - 3 sets - 10 reps  - Shoulder Abduction with Dumbbells - Thumbs Up  - 2 x daily - 7 x weekly - 3 sets - 10 reps  - Supine Shoulder Flexion with Free Weight  - 2 x daily - 7 x weekly - 3 sets - 10 reps  - Sidelying Shoulder Abduction Full Range of Motion with Dumbbell  - 2 x daily - 7 x weekly - 3 sets - 10 reps  - Sidelying Shoulder ER with Towel and Dumbbell  - 2 x daily - 7 x weekly - 3 sets - 10 reps  - Prone Shoulder Horizontal Abduction with External Rotation  - 2 x daily - 7 x weekly - 3 sets - 10 reps  - Standing Bent Over Single Arm Scapular Row with Table Support  - 2 x daily - 7 x weekly - 3 sets - 10 reps      ASSESSMENT                                                                                                            Patient presents with end range of motion deficits in internal rotation and flexion, as well as strength and muscular endurance deficits in affected shoulder. Patient tolerated range of motion well, able to improve in all planes. Strength tolerated well this date, note-able fatigue in end range of motion endurance activities. Patient would benefit from skilled therapy to increase end range of motion and strength/endurance.   Pain/symptoms after session (out of 10): 0  Education:   - Results of above  outlined education: Verbalizes understanding    PLAN                                                                                                                           Suggestions for next session as indicated: Progress per plan of care to include end range of motion as needed, emphasis on internal rotation, strength.            Therapy procedure time and total treatment time can be found documented on the Time Entry flowsheet   poor balance